# Patient Record
Sex: FEMALE | Race: ASIAN | NOT HISPANIC OR LATINO | Employment: OTHER | URBAN - METROPOLITAN AREA
[De-identification: names, ages, dates, MRNs, and addresses within clinical notes are randomized per-mention and may not be internally consistent; named-entity substitution may affect disease eponyms.]

---

## 2017-01-10 ENCOUNTER — TELEPHONE (OUTPATIENT)
Dept: FAMILY MEDICINE CLINIC | Facility: CLINIC | Age: 82
End: 2017-01-10

## 2017-01-10 DIAGNOSIS — R39.9 UTI SYMPTOMS: Primary | ICD-10-CM

## 2017-01-10 DIAGNOSIS — N39.0 URINARY TRACT INFECTION, SITE UNSPECIFIED: Primary | ICD-10-CM

## 2017-01-10 LAB
BILIRUB BLD-MCNC: NEGATIVE MG/DL
CLARITY, POC: ABNORMAL
COLOR UR: YELLOW
GLUCOSE UR STRIP-MCNC: NEGATIVE MG/DL
KETONES UR QL: NEGATIVE
LEUKOCYTE EST, POC: ABNORMAL
NITRITE UR-MCNC: NEGATIVE MG/ML
PH UR: 5.5 [PH] (ref 5–8)
PROT UR STRIP-MCNC: ABNORMAL MG/DL
RBC # UR STRIP: ABNORMAL /UL
SP GR UR: 1.03 (ref 1–1.03)
UROBILINOGEN UR QL: NORMAL

## 2017-01-10 PROCEDURE — 81003 URINALYSIS AUTO W/O SCOPE: CPT | Performed by: FAMILY MEDICINE

## 2017-01-10 RX ORDER — CIPROFLOXACIN 250 MG/1
250 TABLET, FILM COATED ORAL 2 TIMES DAILY
Qty: 14 TABLET | Refills: 0 | Status: SHIPPED | OUTPATIENT
Start: 2017-01-10 | End: 2017-06-22

## 2017-01-12 LAB
BACTERIA UR CULT: NO GROWTH
BACTERIA UR CULT: NORMAL

## 2017-06-22 ENCOUNTER — OFFICE VISIT (OUTPATIENT)
Dept: FAMILY MEDICINE CLINIC | Facility: CLINIC | Age: 82
End: 2017-06-22

## 2017-06-22 VITALS
BODY MASS INDEX: 22.16 KG/M2 | HEIGHT: 65 IN | DIASTOLIC BLOOD PRESSURE: 80 MMHG | SYSTOLIC BLOOD PRESSURE: 120 MMHG | HEART RATE: 66 BPM | OXYGEN SATURATION: 91 % | WEIGHT: 133 LBS

## 2017-06-22 DIAGNOSIS — K59.09 OTHER CONSTIPATION: ICD-10-CM

## 2017-06-22 DIAGNOSIS — R07.89 ATYPICAL CHEST PAIN: ICD-10-CM

## 2017-06-22 DIAGNOSIS — I10 BENIGN ESSENTIAL HYPERTENSION: Primary | ICD-10-CM

## 2017-06-22 DIAGNOSIS — E78.5 DYSLIPIDEMIA: ICD-10-CM

## 2017-06-22 DIAGNOSIS — M81.0 POST-MENOPAUSAL OSTEOPOROSIS: ICD-10-CM

## 2017-06-22 DIAGNOSIS — E11.9 CONTROLLED TYPE 2 DIABETES MELLITUS WITHOUT COMPLICATION, WITHOUT LONG-TERM CURRENT USE OF INSULIN (HCC): ICD-10-CM

## 2017-06-22 DIAGNOSIS — R10.32 LLQ ABDOMINAL PAIN: ICD-10-CM

## 2017-06-22 LAB
HCT VFR BLDA CALC: 38 %
HGB BLDA-MCNC: 12.3 G/DL
MCH, POC: 32.3
MCHC, POC: 32.3
MCV, POC: 100.1
PLATELET # BLD AUTO: 198 10*3/MM3
PMV BLD: 7.3 FL
RBC, POC: 3.8
RDW, POC: 13.4
WBC # BLD: 5.6 10*3/UL

## 2017-06-22 PROCEDURE — 36415 COLL VENOUS BLD VENIPUNCTURE: CPT | Performed by: FAMILY MEDICINE

## 2017-06-22 PROCEDURE — 85027 COMPLETE CBC AUTOMATED: CPT | Performed by: FAMILY MEDICINE

## 2017-06-22 PROCEDURE — 74000 XR ABDOMEN KUB: CPT | Performed by: FAMILY MEDICINE

## 2017-06-22 PROCEDURE — 93000 ELECTROCARDIOGRAM COMPLETE: CPT | Performed by: FAMILY MEDICINE

## 2017-06-22 PROCEDURE — 99214 OFFICE O/P EST MOD 30 MIN: CPT | Performed by: FAMILY MEDICINE

## 2017-06-22 RX ORDER — LISINOPRIL 10 MG/1
10 TABLET ORAL DAILY
Qty: 90 TABLET | Refills: 3 | Status: SHIPPED | OUTPATIENT
Start: 2017-06-22 | End: 2017-08-11 | Stop reason: HOSPADM

## 2017-06-22 RX ORDER — LOVASTATIN 40 MG/1
40 TABLET ORAL NIGHTLY
Qty: 90 TABLET | Refills: 3 | Status: SHIPPED | OUTPATIENT
Start: 2017-06-22

## 2017-06-22 RX ORDER — POLYETHYLENE GLYCOL 3350 17 G/17G
17 POWDER, FOR SOLUTION ORAL NIGHTLY
Qty: 30 PACKET | Refills: 12 | Status: SHIPPED | OUTPATIENT
Start: 2017-06-22

## 2017-06-22 RX ORDER — BUDESONIDE AND FORMOTEROL FUMARATE DIHYDRATE 80; 4.5 UG/1; UG/1
2 AEROSOL RESPIRATORY (INHALATION)
Qty: 3 INHALER | Refills: 2 | Status: SHIPPED | OUTPATIENT
Start: 2017-06-22

## 2017-06-22 NOTE — PROGRESS NOTES
Subjective   Magnus Hartley is a 88 y.o. female. Presents today for   Chief Complaint   Patient presents with   • Diabetes     pt here for 6 month med review and labs   • Constipation     pt has been having hard time with bowel mvmt. she has been taking milk of magnesium once per week.   • Dizziness     pt has been getting dizzy when she stands up too fast. I did orthostatics on her.       Chest Pain    This is a new (x1 only) problem. Episode onset: 2 months ago, has not recurred. The onset quality is sudden. Episode frequency: x1. The problem has been resolved. The pain is present in the substernal region. The pain is moderate. The quality of the pain is described as tightness. The pain does not radiate. Associated symptoms include abdominal pain (occly llq abd pain if moves certain position mild.) and dizziness. Pertinent negatives include no back pain, cough, diaphoresis, fever, leg pain, lower extremity edema, nausea, near-syncope, palpitations, PND, shortness of breath, syncope, vomiting or weakness. Associated symptoms comments: Lasted 30 seconds.. The pain is aggravated by nothing. She has tried nothing for the symptoms.   Her past medical history is significant for diabetes, hyperlipidemia and hypertension.   Pertinent negatives for past medical history include no CAD, no heart disease, no MI, no PE and no PVD. Prior workup: Did not seek care.   Dizziness   This is a new problem. The current episode started 1 to 4 weeks ago. The problem occurs intermittently. The problem has been unchanged. Associated symptoms include abdominal pain (occly llq abd pain if moves certain position mild.) and chest pain. Pertinent negatives include no coughing, diaphoresis, fever, nausea, vertigo, vomiting or weakness. Associated symptoms comments: +dizzy and light headed.  No falls, no syncope.. Exacerbated by: standing up quickly. Treatments tried: Takes time and helps. The treatment provided mild relief.   Diabetes   She  presents for her follow-up diabetic visit. She has type 2 diabetes mellitus. Her disease course has been stable. Hypoglycemia symptoms include dizziness. Associated symptoms include chest pain. Pertinent negatives for diabetes include no blurred vision, no foot paresthesias, no foot ulcerations and no weakness. Pertinent negatives for diabetic complications include no CVA, heart disease, nephropathy or PVD. Risk factors for coronary artery disease include diabetes mellitus, dyslipidemia, hypertension and post-menopausal. Current diabetic treatment includes diet. She is compliant with treatment all of the time. Her weight is stable. She is following a diabetic diet. She participates in exercise daily. An ACE inhibitor/angiotensin II receptor blocker is being taken. Eye exam is current.   Hyperlipidemia   This is a chronic problem. The current episode started more than 1 year ago. The problem is controlled. Recent lipid tests were reviewed and are normal. Exacerbating diseases include diabetes. There are no known factors aggravating her hyperlipidemia. Associated symptoms include chest pain. Pertinent negatives include no focal sensory loss, focal weakness, leg pain or shortness of breath. Current antihyperlipidemic treatment includes statins. The current treatment provides moderate improvement of lipids. There are no compliance problems.  Risk factors for coronary artery disease include dyslipidemia, diabetes mellitus, hypertension and post-menopausal.   Constipation   This is a chronic problem. The current episode started more than 1 month ago. The problem is unchanged. Her stool frequency is 1 time per day. The stool is described as formed. The patient is on a high fiber diet. She exercises regularly. There has been adequate water intake. Associated symptoms include abdominal pain (occly llq abd pain if moves certain position mild.). Pertinent negatives include no back pain, bloating, diarrhea, fever, hematochezia,  melena, nausea or vomiting. Risk factors: no changes. She has tried laxatives for the symptoms. The treatment provided no relief.   Hx of osteoporosis, due for prolia injection;    Denies falls.  Reports under a lot of stress of late as family want her to move in with them and not ready to give up home.    Review of Systems   Constitutional: Negative for diaphoresis and fever.   Eyes: Negative for blurred vision.   Respiratory: Negative for cough and shortness of breath.    Cardiovascular: Positive for chest pain. Negative for palpitations, syncope, PND and near-syncope.   Gastrointestinal: Positive for abdominal pain (occly llq abd pain if moves certain position mild.) and constipation. Negative for bloating, diarrhea, hematochezia, melena, nausea and vomiting.   Musculoskeletal: Negative for back pain.   Neurological: Positive for dizziness. Negative for vertigo, focal weakness and weakness.       The following portions of the patient's history were reviewed and updated as appropriate: allergies, current medications, past medical history and problem list.    Patient Active Problem List   Diagnosis   • Foot pain   • Asthma   • Benign essential hypertension   • Controlled type 2 diabetes mellitus without complication   • Dyslipidemia   • Macrocytosis without anemia   • Senile osteoporosis   • Post-menopausal osteoporosis   • Sinus bradycardia   • Stress incontinence in female   • Urge incontinence of urine   • Atrophic vaginitis   • Encounter for fitting and adjustment of other specified devices   • Incomplete emptying of bladder   • Urethral caruncle   • Incomplete uterovaginal prolapse       Allergies   Allergen Reactions   • Aspirin    • Atenolol    • Penicillins        Current Outpatient Prescriptions on File Prior to Visit   Medication Sig Dispense Refill   • prednisoLONE acetate (PRED FORTE) 1 % ophthalmic suspension instill 1 drop into both eyes twice a day for 2 weeks then STOP THIS DROP  0   • PREMARIN  "0.625 MG/GM vaginal cream Insert 0.5 g into the vagina daily.  0   • RESTASIS 0.05 % ophthalmic emulsion   0   • SYSTANE BALANCE 0.6 % solution instill 1 drop into both eyes four times a day  0   • [DISCONTINUED] budesonide-formoterol (SYMBICORT) 80-4.5 MCG/ACT inhaler Inhale 2 puffs 2 (two) times a day. 3 inhaler 2   • [DISCONTINUED] lisinopril (PRINIVIL,ZESTRIL) 10 MG tablet Take 1 tablet by mouth Daily. 90 tablet 3   • [DISCONTINUED] lovastatin (MEVACOR) 40 MG tablet Take 1 tablet by mouth Every Night. 90 tablet 3   • [DISCONTINUED] ciprofloxacin (CIPRO) 250 MG tablet Take 1 tablet by mouth 2 (Two) Times a Day. 14 tablet 0     No current facility-administered medications on file prior to visit.        Objective   Vitals:    06/22/17 1408 06/22/17 1412 06/22/17 1414   BP: 130/70 158/80 120/80   BP Location: Left arm Left arm Left arm   Patient Position: Lying Sitting Standing   Cuff Size: Adult Adult Adult   Pulse: 66 68 66   SpO2: 92% 95% 91%   Weight: 133 lb (60.3 kg)     Height: 64.5\" (163.8 cm)         Physical Exam   Constitutional: She appears well-developed and well-nourished.   HENT:   Head: Normocephalic and atraumatic.   Neck: Neck supple. No JVD present. No thyromegaly present.   Cardiovascular: Normal rate, regular rhythm and normal heart sounds.  Exam reveals no gallop and no friction rub.    No murmur heard.  Pulmonary/Chest: Effort normal and breath sounds normal. No respiratory distress. She has no wheezes. She has no rales.   Abdominal: Soft. Bowel sounds are normal. She exhibits no distension. There is no tenderness. There is no rebound and no guarding.   Musculoskeletal: She exhibits no edema.   Neurological: She is alert.   Skin: Skin is warm and dry.   Psychiatric: She has a normal mood and affect. Her behavior is normal.   Nursing note and vitals reviewed.    ECG 12 Lead - atypical chest pain  Date/Time: 6/22/2017 3:01 PM  Performed by: NEISHA LUCAS  Authorized by: NEISHA LUCAS"   Rhythm: sinus rhythm  Rate: normal  BPM: 61  Conduction: conduction normal  Conduction comments: QRS 98 ms  QTc 467 ms  ST Segments: ST segments normal  T flattening: III  QRS axis: normal  Clinical impression: normal ECG        XRAY: KUB  INDICATION:  Constipation, LLQ abd pain  Wet read:  NSBGP, right sided stool  No Comparative data  Imagine independently viewed by myself  Radiology reading pending.    POC CBC   Order: [id number]   Status:  Final result   Visible to patient:  No (Not Released) Dx:  LLQ abdominal pain      Ref Range & Units 3:36 PM     Hematocrit % 38.0   Hemoglobin g/dL 12.3   RBC  3.80   WBC  5.6   MCV  100.1   MCH  32.3   MCHC  32.3   RDW-CV  13.4   Platelets 10*3/mm3 198   MPV  7.3   Resulting Agency  [facility]      Specimen Collected: 06/22/17  3:36 PM Last Resulted: 06/22/17  3:36 PM                Assessment/Plan   Magnus was seen today for diabetes, constipation and dizziness.    Diagnoses and all orders for this visit:    Benign essential hypertension  -     Comprehensive Metabolic Panel  -     lisinopril (PRINIVIL,ZESTRIL) 10 MG tablet; Take 1 tablet by mouth Daily.    Controlled type 2 diabetes mellitus without complication, without long-term current use of insulin  -     Comprehensive Metabolic Panel  -     Lipid Panel  -     Hemoglobin A1c    Dyslipidemia  -     Comprehensive Metabolic Panel  -     Lipid Panel  -     lovastatin (MEVACOR) 40 MG tablet; Take 1 tablet by mouth Every Night.    LLQ abdominal pain  -     POC CBC  -     XR Abdomen KUB  -     polyethylene glycol (MIRALAX) packet; Take 17 g by mouth Every Night.    Other constipation  -     XR Abdomen KUB  -     polyethylene glycol (MIRALAX) packet; Take 17 g by mouth Every Night.    Atypical chest pain  -     ECG 12 Lead    Post-menopausal osteoporosis  -     Ambulatory Referral to [facility] For Infusion Treatment  -     denosumab (PROLIA) 60 MG/ML solution syringe; Inject 1 mL under the skin 1 (One) Time  for 1 dose.    Other orders  -     budesonide-formoterol (SYMBICORT) 80-4.5 MCG/ACT inhaler; Inhale 2 puffs 2 (Two) Times a Day.    -atypical cp, only 1 episode 2 months ago;  If recurs, go ER immediately if lasts more than few minutes.  If continues to let me know and will refer to Cardiology.  -llq abd pain very mild on deep palpation;  Has constipation;  WBC normal;  -gave fobt x1 to return;  If worsens, seek care immediately.  Will start daily fiber.  -osteoporosis, due for prolia will arrange  -HLD - continue statin, recheck lipids.  -dm2 - borderline, has been stable on just diet changes  -hypertension - controlled, continue medications;  Take time getting up, let sensation subside.  May need to cut back on BP medications;  Will have f/u in 3 mos rather than 6 to check on patient.  RTC if worsening.  No falls or syncope per patient.           -Follow up: 3 months       Current Outpatient Prescriptions:   •  budesonide-formoterol (SYMBICORT) 80-4.5 MCG/ACT inhaler, Inhale 2 puffs 2 (Two) Times a Day., Disp: 3 inhaler, Rfl: 2  •  lisinopril (PRINIVIL,ZESTRIL) 10 MG tablet, Take 1 tablet by mouth Daily., Disp: 90 tablet, Rfl: 3  •  lovastatin (MEVACOR) 40 MG tablet, Take 1 tablet by mouth Every Night., Disp: 90 tablet, Rfl: 3  •  prednisoLONE acetate (PRED FORTE) 1 % ophthalmic suspension, instill 1 drop into both eyes twice a day for 2 weeks then STOP THIS DROP, Disp: , Rfl: 0  •  PREMARIN 0.625 MG/GM vaginal cream, Insert 0.5 g into the vagina daily., Disp: , Rfl: 0  •  RESTASIS 0.05 % ophthalmic emulsion, , Disp: , Rfl: 0  •  SYSTANE BALANCE 0.6 % solution, instill 1 drop into both eyes four times a day, Disp: , Rfl: 0  •  denosumab (PROLIA) 60 MG/ML solution syringe, Inject 1 mL under the skin 1 (One) Time for 1 dose., Disp: 1 syringe, Rfl: 1  •  polyethylene glycol (MIRALAX) packet, Take 17 g by mouth Every Night., Disp: 30 packet, Rfl: 12

## 2017-06-23 LAB
ALBUMIN SERPL-MCNC: 3.9 G/DL (ref 3.5–4.7)
ALBUMIN/GLOB SERPL: 1.2 {RATIO} (ref 1.2–2.2)
ALP SERPL-CCNC: 62 IU/L (ref 39–117)
ALT SERPL-CCNC: 20 IU/L (ref 0–32)
AST SERPL-CCNC: 25 IU/L (ref 0–40)
BILIRUB SERPL-MCNC: 0.5 MG/DL (ref 0–1.2)
BUN SERPL-MCNC: 16 MG/DL (ref 8–27)
BUN/CREAT SERPL: 20 (ref 12–28)
CALCIUM SERPL-MCNC: 8.8 MG/DL (ref 8.7–10.3)
CHLORIDE SERPL-SCNC: 98 MMOL/L (ref 96–106)
CHOLEST SERPL-MCNC: 171 MG/DL (ref 100–199)
CO2 SERPL-SCNC: 27 MMOL/L (ref 18–29)
CREAT SERPL-MCNC: 0.81 MG/DL (ref 0.57–1)
GLOBULIN SER CALC-MCNC: 3.3 G/DL (ref 1.5–4.5)
GLUCOSE SERPL-MCNC: 105 MG/DL (ref 65–99)
HBA1C MFR BLD: 6.6 % (ref 4.8–5.6)
HDLC SERPL-MCNC: 73 MG/DL
LDLC SERPL CALC-MCNC: 77 MG/DL (ref 0–99)
POTASSIUM SERPL-SCNC: 4.4 MMOL/L (ref 3.5–5.2)
PROT SERPL-MCNC: 7.2 G/DL (ref 6–8.5)
SODIUM SERPL-SCNC: 140 MMOL/L (ref 134–144)
TRIGL SERPL-MCNC: 103 MG/DL (ref 0–149)
VLDLC SERPL CALC-MCNC: 21 MG/DL (ref 5–40)

## 2017-06-26 NOTE — PROGRESS NOTES
Diabetes is adequately controlled.  Cholesterol is adequately controlled.  Rest of labs normal or stable.

## 2017-07-03 ENCOUNTER — CLINICAL SUPPORT (OUTPATIENT)
Dept: FAMILY MEDICINE CLINIC | Facility: CLINIC | Age: 82
End: 2017-07-03

## 2017-07-03 DIAGNOSIS — K92.1 BLOOD IN STOOL: Primary | ICD-10-CM

## 2017-07-03 LAB
DEVELOPER EXPIRATION DATE: NORMAL
DEVELOPER LOT NUMBER: NORMAL
EXPIRATION DATE: NORMAL
FECAL OCCULT BLOOD SCREEN, POC: NEGATIVE
Lab: NORMAL
NEGATIVE CONTROL: NEGATIVE
POSITIVE CONTROL: POSITIVE

## 2017-07-03 PROCEDURE — 82270 OCCULT BLOOD FECES: CPT | Performed by: NURSE PRACTITIONER

## 2017-07-24 ENCOUNTER — APPOINTMENT (OUTPATIENT)
Dept: MRI IMAGING | Facility: HOSPITAL | Age: 82
End: 2017-07-24
Attending: INTERNAL MEDICINE

## 2017-07-24 ENCOUNTER — HOSPITAL ENCOUNTER (INPATIENT)
Facility: HOSPITAL | Age: 82
LOS: 4 days | End: 2017-07-28
Attending: EMERGENCY MEDICINE | Admitting: INTERNAL MEDICINE

## 2017-07-24 ENCOUNTER — APPOINTMENT (OUTPATIENT)
Dept: CT IMAGING | Facility: HOSPITAL | Age: 82
End: 2017-07-24

## 2017-07-24 DIAGNOSIS — R53.1 GENERALIZED WEAKNESS: ICD-10-CM

## 2017-07-24 DIAGNOSIS — I63.511 CEREBROVASCULAR ACCIDENT (CVA) DUE TO OCCLUSION OF RIGHT MIDDLE CEREBRAL ARTERY (HCC): Primary | ICD-10-CM

## 2017-07-24 DIAGNOSIS — I48.91 NEW ONSET ATRIAL FIBRILLATION (HCC): ICD-10-CM

## 2017-07-24 LAB
ABO GROUP BLD: NORMAL
ALBUMIN SERPL-MCNC: 3.9 G/DL (ref 3.5–5.2)
ALBUMIN/GLOB SERPL: 1 G/DL
ALP SERPL-CCNC: 66 U/L (ref 39–117)
ALT SERPL W P-5'-P-CCNC: 24 U/L (ref 1–33)
ANION GAP SERPL CALCULATED.3IONS-SCNC: 13.8 MMOL/L
ASA PLATELET INHIBITION: 604 ARU
AST SERPL-CCNC: 33 U/L (ref 1–32)
BASOPHILS # BLD AUTO: 0.01 10*3/MM3 (ref 0–0.2)
BASOPHILS NFR BLD AUTO: 0.1 % (ref 0–1.5)
BILIRUB SERPL-MCNC: 0.7 MG/DL (ref 0.1–1.2)
BLD GP AB SCN SERPL QL: NEGATIVE
BUN BLD-MCNC: 10 MG/DL (ref 8–23)
BUN/CREAT SERPL: 13.9 (ref 7–25)
CALCIUM SPEC-SCNC: 9 MG/DL (ref 8.6–10.5)
CHLORIDE SERPL-SCNC: 97 MMOL/L (ref 98–107)
CO2 SERPL-SCNC: 26.2 MMOL/L (ref 22–29)
CREAT BLD-MCNC: 0.72 MG/DL (ref 0.57–1)
CRP SERPL-MCNC: 3.44 MG/DL (ref 0–0.5)
DEPRECATED RDW RBC AUTO: 47.2 FL (ref 37–54)
EOSINOPHIL # BLD AUTO: 0.01 10*3/MM3 (ref 0–0.7)
EOSINOPHIL NFR BLD AUTO: 0.1 % (ref 0.3–6.2)
ERYTHROCYTE [DISTWIDTH] IN BLOOD BY AUTOMATED COUNT: 12.9 % (ref 11.7–13)
ERYTHROCYTE [SEDIMENTATION RATE] IN BLOOD: 37 MM/HR (ref 0–30)
GFR SERPL CREATININE-BSD FRML MDRD: 76 ML/MIN/1.73
GLOBULIN UR ELPH-MCNC: 3.9 GM/DL
GLUCOSE BLD-MCNC: 166 MG/DL (ref 65–99)
GLUCOSE BLDC GLUCOMTR-MCNC: 223 MG/DL (ref 70–130)
HCT VFR BLD AUTO: 40.2 % (ref 35.6–45.5)
HGB BLD-MCNC: 13.4 G/DL (ref 11.9–15.5)
IMM GRANULOCYTES # BLD: 0 10*3/MM3 (ref 0–0.03)
IMM GRANULOCYTES NFR BLD: 0 % (ref 0–0.5)
INR PPP: 1.09 (ref 0.9–1.1)
LYMPHOCYTES # BLD AUTO: 0.96 10*3/MM3 (ref 0.9–4.8)
LYMPHOCYTES NFR BLD AUTO: 10.6 % (ref 19.6–45.3)
MCH RBC QN AUTO: 33.3 PG (ref 26.9–32)
MCHC RBC AUTO-ENTMCNC: 33.3 G/DL (ref 32.4–36.3)
MCV RBC AUTO: 100 FL (ref 80.5–98.2)
MONOCYTES # BLD AUTO: 0.58 10*3/MM3 (ref 0.2–1.2)
MONOCYTES NFR BLD AUTO: 6.4 % (ref 5–12)
NEUTROPHILS # BLD AUTO: 7.53 10*3/MM3 (ref 1.9–8.1)
NEUTROPHILS NFR BLD AUTO: 82.8 % (ref 42.7–76)
NRBC BLD MANUAL-RTO: 0 /100 WBC (ref 0–0)
PLATELET # BLD AUTO: 190 10*3/MM3 (ref 140–500)
PMV BLD AUTO: 9.8 FL (ref 6–12)
POTASSIUM BLD-SCNC: 3.7 MMOL/L (ref 3.5–5.2)
PROT SERPL-MCNC: 7.8 G/DL (ref 6–8.5)
PROTHROMBIN TIME: 13.7 SECONDS (ref 11.7–14.2)
RBC # BLD AUTO: 4.02 10*6/MM3 (ref 3.9–5.2)
RH BLD: POSITIVE
SODIUM BLD-SCNC: 137 MMOL/L (ref 136–145)
TSH SERPL DL<=0.05 MIU/L-ACNC: 2.66 MIU/ML (ref 0.27–4.2)
WBC NRBC COR # BLD: 9.09 10*3/MM3 (ref 4.5–10.7)

## 2017-07-24 PROCEDURE — 63710000001 INSULIN ASPART PER 5 UNITS: Performed by: INTERNAL MEDICINE

## 2017-07-24 PROCEDURE — 70450 CT HEAD/BRAIN W/O DYE: CPT

## 2017-07-24 PROCEDURE — 70544 MR ANGIOGRAPHY HEAD W/O DYE: CPT

## 2017-07-24 PROCEDURE — 86900 BLOOD TYPING SEROLOGIC ABO: CPT | Performed by: EMERGENCY MEDICINE

## 2017-07-24 PROCEDURE — 99223 1ST HOSP IP/OBS HIGH 75: CPT | Performed by: RADIOLOGY

## 2017-07-24 PROCEDURE — 99285 EMERGENCY DEPT VISIT HI MDM: CPT

## 2017-07-24 PROCEDURE — 85576 BLOOD PLATELET AGGREGATION: CPT | Performed by: RADIOLOGY

## 2017-07-24 PROCEDURE — 97161 PT EVAL LOW COMPLEX 20 MIN: CPT

## 2017-07-24 PROCEDURE — 85652 RBC SED RATE AUTOMATED: CPT | Performed by: RADIOLOGY

## 2017-07-24 PROCEDURE — 94640 AIRWAY INHALATION TREATMENT: CPT

## 2017-07-24 PROCEDURE — 0 GADOBENATE DIMEGLUMINE 529 MG/ML SOLUTION: Performed by: INTERNAL MEDICINE

## 2017-07-24 PROCEDURE — 70553 MRI BRAIN STEM W/O & W/DYE: CPT

## 2017-07-24 PROCEDURE — A9577 INJ MULTIHANCE: HCPCS | Performed by: INTERNAL MEDICINE

## 2017-07-24 PROCEDURE — 80053 COMPREHEN METABOLIC PANEL: CPT | Performed by: EMERGENCY MEDICINE

## 2017-07-24 PROCEDURE — 86850 RBC ANTIBODY SCREEN: CPT | Performed by: EMERGENCY MEDICINE

## 2017-07-24 PROCEDURE — 86140 C-REACTIVE PROTEIN: CPT | Performed by: RADIOLOGY

## 2017-07-24 PROCEDURE — 85025 COMPLETE CBC W/AUTO DIFF WBC: CPT | Performed by: EMERGENCY MEDICINE

## 2017-07-24 PROCEDURE — 82962 GLUCOSE BLOOD TEST: CPT

## 2017-07-24 PROCEDURE — 82565 ASSAY OF CREATININE: CPT

## 2017-07-24 PROCEDURE — 70549 MR ANGIOGRAPH NECK W/O&W/DYE: CPT

## 2017-07-24 PROCEDURE — 84443 ASSAY THYROID STIM HORMONE: CPT | Performed by: INTERNAL MEDICINE

## 2017-07-24 PROCEDURE — 86901 BLOOD TYPING SEROLOGIC RH(D): CPT | Performed by: EMERGENCY MEDICINE

## 2017-07-24 PROCEDURE — 93005 ELECTROCARDIOGRAM TRACING: CPT | Performed by: EMERGENCY MEDICINE

## 2017-07-24 PROCEDURE — 99222 1ST HOSP IP/OBS MODERATE 55: CPT | Performed by: INTERNAL MEDICINE

## 2017-07-24 PROCEDURE — 93010 ELECTROCARDIOGRAM REPORT: CPT | Performed by: INTERNAL MEDICINE

## 2017-07-24 PROCEDURE — 92610 EVALUATE SWALLOWING FUNCTION: CPT | Performed by: SPEECH-LANGUAGE PATHOLOGIST

## 2017-07-24 PROCEDURE — 85610 PROTHROMBIN TIME: CPT | Performed by: EMERGENCY MEDICINE

## 2017-07-24 RX ORDER — DEXTROSE MONOHYDRATE 25 G/50ML
25 INJECTION, SOLUTION INTRAVENOUS
Status: DISCONTINUED | OUTPATIENT
Start: 2017-07-24 | End: 2017-07-28 | Stop reason: HOSPADM

## 2017-07-24 RX ORDER — ACETAMINOPHEN 325 MG/1
650 TABLET ORAL EVERY 4 HOURS PRN
Status: DISCONTINUED | OUTPATIENT
Start: 2017-07-24 | End: 2017-07-28 | Stop reason: HOSPADM

## 2017-07-24 RX ORDER — ACETAMINOPHEN 650 MG/1
650 SUPPOSITORY RECTAL EVERY 4 HOURS PRN
Status: DISCONTINUED | OUTPATIENT
Start: 2017-07-24 | End: 2017-07-28 | Stop reason: HOSPADM

## 2017-07-24 RX ORDER — SODIUM CHLORIDE 0.9 % (FLUSH) 0.9 %
10 SYRINGE (ML) INJECTION AS NEEDED
Status: DISCONTINUED | OUTPATIENT
Start: 2017-07-24 | End: 2017-07-28 | Stop reason: HOSPADM

## 2017-07-24 RX ORDER — CLOPIDOGREL BISULFATE 75 MG/1
75 TABLET ORAL DAILY
Status: DISCONTINUED | OUTPATIENT
Start: 2017-07-24 | End: 2017-07-26

## 2017-07-24 RX ORDER — PREDNISOLONE ACETATE 10 MG/ML
1 SUSPENSION/ DROPS OPHTHALMIC EVERY 12 HOURS SCHEDULED
Status: DISCONTINUED | OUTPATIENT
Start: 2017-07-24 | End: 2017-07-28 | Stop reason: HOSPADM

## 2017-07-24 RX ORDER — SODIUM CHLORIDE 0.9 % (FLUSH) 0.9 %
1-10 SYRINGE (ML) INJECTION AS NEEDED
Status: DISCONTINUED | OUTPATIENT
Start: 2017-07-24 | End: 2017-07-28 | Stop reason: HOSPADM

## 2017-07-24 RX ORDER — BUDESONIDE AND FORMOTEROL FUMARATE DIHYDRATE 80; 4.5 UG/1; UG/1
2 AEROSOL RESPIRATORY (INHALATION)
Status: DISCONTINUED | OUTPATIENT
Start: 2017-07-24 | End: 2017-07-28 | Stop reason: HOSPADM

## 2017-07-24 RX ORDER — NICOTINE POLACRILEX 4 MG
15 LOZENGE BUCCAL
Status: DISCONTINUED | OUTPATIENT
Start: 2017-07-24 | End: 2017-07-28 | Stop reason: HOSPADM

## 2017-07-24 RX ORDER — ATORVASTATIN CALCIUM 80 MG/1
80 TABLET, FILM COATED ORAL NIGHTLY
Status: DISCONTINUED | OUTPATIENT
Start: 2017-07-24 | End: 2017-07-28 | Stop reason: HOSPADM

## 2017-07-24 RX ORDER — SODIUM CHLORIDE 9 MG/ML
75 INJECTION, SOLUTION INTRAVENOUS CONTINUOUS
Status: DISCONTINUED | OUTPATIENT
Start: 2017-07-24 | End: 2017-07-28 | Stop reason: HOSPADM

## 2017-07-24 RX ADMIN — BUDESONIDE AND FORMOTEROL FUMARATE DIHYDRATE 2 PUFF: 80; 4.5 AEROSOL RESPIRATORY (INHALATION) at 19:50

## 2017-07-24 RX ADMIN — ATORVASTATIN CALCIUM 80 MG: 80 TABLET, FILM COATED ORAL at 21:21

## 2017-07-24 RX ADMIN — SODIUM CHLORIDE 75 ML/HR: 9 INJECTION, SOLUTION INTRAVENOUS at 11:28

## 2017-07-24 RX ADMIN — SODIUM CHLORIDE 1000 ML: 9 INJECTION, SOLUTION INTRAVENOUS at 10:19

## 2017-07-24 RX ADMIN — METOROPROLOL TARTRATE 2.5 MG: 5 INJECTION, SOLUTION INTRAVENOUS at 19:19

## 2017-07-24 RX ADMIN — GADOBENATE DIMEGLUMINE 12 ML: 529 INJECTION, SOLUTION INTRAVENOUS at 12:50

## 2017-07-24 RX ADMIN — PREDNISOLONE ACETATE 1 DROP: 10 SUSPENSION/ DROPS OPHTHALMIC at 21:21

## 2017-07-24 RX ADMIN — INSULIN ASPART 4 UNITS: 100 INJECTION, SOLUTION INTRAVENOUS; SUBCUTANEOUS at 21:30

## 2017-07-24 RX ADMIN — CLOPIDOGREL 75 MG: 75 TABLET, FILM COATED ORAL at 18:30

## 2017-07-24 RX ADMIN — SODIUM CHLORIDE 75 ML/HR: 9 INJECTION, SOLUTION INTRAVENOUS at 21:20

## 2017-07-25 ENCOUNTER — APPOINTMENT (OUTPATIENT)
Dept: CARDIOLOGY | Facility: HOSPITAL | Age: 82
End: 2017-07-25
Attending: INTERNAL MEDICINE

## 2017-07-25 LAB
BH CV ECHO MEAS - BSA(HAYCOCK): 1.6 M^2
BH CV ECHO MEAS - BSA: 1.6 M^2
BH CV ECHO MEAS - BZI_BMI: 24.5 KILOGRAMS/M^2
BH CV ECHO MEAS - BZI_METRIC_HEIGHT: 157.5 CM
BH CV ECHO MEAS - BZI_METRIC_WEIGHT: 60.8 KG
BH CV ECHO MEAS - TR MAX VEL: 266 CM/SEC
CHOLEST SERPL-MCNC: 154 MG/DL (ref 0–200)
GLUCOSE BLDC GLUCOMTR-MCNC: 105 MG/DL (ref 70–130)
GLUCOSE BLDC GLUCOMTR-MCNC: 106 MG/DL (ref 70–130)
GLUCOSE BLDC GLUCOMTR-MCNC: 158 MG/DL (ref 70–130)
HBA1C MFR BLD: 6.38 % (ref 4.8–5.6)
HDLC SERPL-MCNC: 71 MG/DL (ref 40–60)
LDLC SERPL CALC-MCNC: 73 MG/DL (ref 0–100)
LDLC/HDLC SERPL: 1.02 {RATIO}
TRIGL SERPL-MCNC: 52 MG/DL (ref 0–150)
VLDLC SERPL-MCNC: 10.4 MG/DL (ref 5–40)

## 2017-07-25 PROCEDURE — 83036 HEMOGLOBIN GLYCOSYLATED A1C: CPT | Performed by: INTERNAL MEDICINE

## 2017-07-25 PROCEDURE — 93325 DOPPLER ECHO COLOR FLOW MAPG: CPT

## 2017-07-25 PROCEDURE — 99152 MOD SED SAME PHYS/QHP 5/>YRS: CPT | Performed by: INTERNAL MEDICINE

## 2017-07-25 PROCEDURE — 25010000002 MIDAZOLAM PER 1 MG: Performed by: INTERNAL MEDICINE

## 2017-07-25 PROCEDURE — 93320 DOPPLER ECHO COMPLETE: CPT

## 2017-07-25 PROCEDURE — 63710000001 INSULIN ASPART PER 5 UNITS: Performed by: INTERNAL MEDICINE

## 2017-07-25 PROCEDURE — 99232 SBSQ HOSP IP/OBS MODERATE 35: CPT | Performed by: INTERNAL MEDICINE

## 2017-07-25 PROCEDURE — 93325 DOPPLER ECHO COLOR FLOW MAPG: CPT | Performed by: INTERNAL MEDICINE

## 2017-07-25 PROCEDURE — 94799 UNLISTED PULMONARY SVC/PX: CPT

## 2017-07-25 PROCEDURE — 99232 SBSQ HOSP IP/OBS MODERATE 35: CPT | Performed by: NURSE PRACTITIONER

## 2017-07-25 PROCEDURE — 97166 OT EVAL MOD COMPLEX 45 MIN: CPT | Performed by: OCCUPATIONAL THERAPIST

## 2017-07-25 PROCEDURE — 80061 LIPID PANEL: CPT | Performed by: INTERNAL MEDICINE

## 2017-07-25 PROCEDURE — 25010000002 FENTANYL CITRATE (PF) 100 MCG/2ML SOLUTION: Performed by: INTERNAL MEDICINE

## 2017-07-25 PROCEDURE — 93320 DOPPLER ECHO COMPLETE: CPT | Performed by: INTERNAL MEDICINE

## 2017-07-25 PROCEDURE — 97110 THERAPEUTIC EXERCISES: CPT

## 2017-07-25 PROCEDURE — 82962 GLUCOSE BLOOD TEST: CPT

## 2017-07-25 PROCEDURE — 93312 ECHO TRANSESOPHAGEAL: CPT | Performed by: INTERNAL MEDICINE

## 2017-07-25 PROCEDURE — 93312 ECHO TRANSESOPHAGEAL: CPT

## 2017-07-25 RX ORDER — FENTANYL CITRATE 50 UG/ML
INJECTION, SOLUTION INTRAMUSCULAR; INTRAVENOUS
Status: COMPLETED | OUTPATIENT
Start: 2017-07-25 | End: 2017-07-25

## 2017-07-25 RX ORDER — MIDAZOLAM HYDROCHLORIDE 1 MG/ML
INJECTION INTRAMUSCULAR; INTRAVENOUS
Status: COMPLETED | OUTPATIENT
Start: 2017-07-25 | End: 2017-07-25

## 2017-07-25 RX ORDER — SODIUM CHLORIDE 9 MG/ML
INJECTION, SOLUTION INTRAVENOUS
Status: COMPLETED | OUTPATIENT
Start: 2017-07-25 | End: 2017-07-25

## 2017-07-25 RX ADMIN — FENTANYL CITRATE 25 MCG: 50 INJECTION INTRAMUSCULAR; INTRAVENOUS at 12:22

## 2017-07-25 RX ADMIN — BENZOCAINE, BUTAMBEN, AND TETRACAINE HYDROCHLORIDE 1 SPRAY: .028; .004; .004 AEROSOL, SPRAY TOPICAL at 12:07

## 2017-07-25 RX ADMIN — METOROPROLOL TARTRATE 2.5 MG: 5 INJECTION, SOLUTION INTRAVENOUS at 22:14

## 2017-07-25 RX ADMIN — BUDESONIDE AND FORMOTEROL FUMARATE DIHYDRATE 2 PUFF: 80; 4.5 AEROSOL RESPIRATORY (INHALATION) at 07:53

## 2017-07-25 RX ADMIN — INSULIN ASPART 2 UNITS: 100 INJECTION, SOLUTION INTRAVENOUS; SUBCUTANEOUS at 18:24

## 2017-07-25 RX ADMIN — PREDNISOLONE ACETATE 1 DROP: 10 SUSPENSION/ DROPS OPHTHALMIC at 20:14

## 2017-07-25 RX ADMIN — METOROPROLOL TARTRATE 2.5 MG: 5 INJECTION, SOLUTION INTRAVENOUS at 20:05

## 2017-07-25 RX ADMIN — SODIUM CHLORIDE 75 ML/HR: 9 INJECTION, SOLUTION INTRAVENOUS at 13:30

## 2017-07-25 RX ADMIN — FENTANYL CITRATE 12.5 MCG: 50 INJECTION INTRAMUSCULAR; INTRAVENOUS at 12:26

## 2017-07-25 RX ADMIN — ATORVASTATIN CALCIUM 80 MG: 80 TABLET, FILM COATED ORAL at 20:16

## 2017-07-25 RX ADMIN — SODIUM CHLORIDE 50 ML/HR: 9 INJECTION, SOLUTION INTRAVENOUS at 12:02

## 2017-07-25 RX ADMIN — MIDAZOLAM HYDROCHLORIDE 1 MG: 1 INJECTION, SOLUTION INTRAMUSCULAR; INTRAVENOUS at 12:27

## 2017-07-25 RX ADMIN — APIXABAN 2.5 MG: 2.5 TABLET, FILM COATED ORAL at 23:40

## 2017-07-25 RX ADMIN — LIDOCAINE HYDROCHLORIDE 10 ML: 20 SOLUTION ORAL; TOPICAL at 12:02

## 2017-07-25 RX ADMIN — MIDAZOLAM HYDROCHLORIDE 2 MG: 1 INJECTION, SOLUTION INTRAMUSCULAR; INTRAVENOUS at 12:22

## 2017-07-25 RX ADMIN — BUDESONIDE AND FORMOTEROL FUMARATE DIHYDRATE 2 PUFF: 80; 4.5 AEROSOL RESPIRATORY (INHALATION) at 21:13

## 2017-07-25 RX ADMIN — CLOPIDOGREL 75 MG: 75 TABLET, FILM COATED ORAL at 08:33

## 2017-07-25 RX ADMIN — PREDNISOLONE ACETATE 1 DROP: 10 SUSPENSION/ DROPS OPHTHALMIC at 08:33

## 2017-07-25 RX ADMIN — MIDAZOLAM HYDROCHLORIDE 1 MG: 1 INJECTION, SOLUTION INTRAMUSCULAR; INTRAVENOUS at 12:25

## 2017-07-26 ENCOUNTER — APPOINTMENT (OUTPATIENT)
Dept: GENERAL RADIOLOGY | Facility: HOSPITAL | Age: 82
End: 2017-07-26

## 2017-07-26 LAB
GLUCOSE BLDC GLUCOMTR-MCNC: 118 MG/DL (ref 70–130)
GLUCOSE BLDC GLUCOMTR-MCNC: 136 MG/DL (ref 70–130)
GLUCOSE BLDC GLUCOMTR-MCNC: 149 MG/DL (ref 70–130)
GLUCOSE BLDC GLUCOMTR-MCNC: 98 MG/DL (ref 70–130)
INR PPP: 1.32 (ref 0.9–1.1)
PROTHROMBIN TIME: 15.9 SECONDS (ref 11.7–14.2)

## 2017-07-26 PROCEDURE — 92611 MOTION FLUOROSCOPY/SWALLOW: CPT

## 2017-07-26 PROCEDURE — 74230 X-RAY XM SWLNG FUNCJ C+: CPT

## 2017-07-26 PROCEDURE — 99231 SBSQ HOSP IP/OBS SF/LOW 25: CPT | Performed by: NURSE PRACTITIONER

## 2017-07-26 PROCEDURE — 97110 THERAPEUTIC EXERCISES: CPT

## 2017-07-26 PROCEDURE — 94799 UNLISTED PULMONARY SVC/PX: CPT

## 2017-07-26 PROCEDURE — 85610 PROTHROMBIN TIME: CPT | Performed by: INTERNAL MEDICINE

## 2017-07-26 PROCEDURE — 82962 GLUCOSE BLOOD TEST: CPT

## 2017-07-26 PROCEDURE — 97535 SELF CARE MNGMENT TRAINING: CPT | Performed by: OCCUPATIONAL THERAPIST

## 2017-07-26 PROCEDURE — 25010000002 ENOXAPARIN PER 10 MG: Performed by: INTERNAL MEDICINE

## 2017-07-26 RX ORDER — WARFARIN SODIUM 5 MG/1
5 TABLET ORAL
Status: DISCONTINUED | OUTPATIENT
Start: 2017-07-26 | End: 2017-07-28 | Stop reason: HOSPADM

## 2017-07-26 RX ORDER — BISACODYL 10 MG
10 SUPPOSITORY, RECTAL RECTAL DAILY PRN
Status: DISCONTINUED | OUTPATIENT
Start: 2017-07-26 | End: 2017-07-28 | Stop reason: HOSPADM

## 2017-07-26 RX ORDER — SENNA AND DOCUSATE SODIUM 50; 8.6 MG/1; MG/1
2 TABLET, FILM COATED ORAL NIGHTLY
Status: DISCONTINUED | OUTPATIENT
Start: 2017-07-26 | End: 2017-07-28 | Stop reason: HOSPADM

## 2017-07-26 RX ADMIN — CLOPIDOGREL 75 MG: 75 TABLET, FILM COATED ORAL at 09:05

## 2017-07-26 RX ADMIN — ACETAMINOPHEN 650 MG: 325 TABLET ORAL at 06:51

## 2017-07-26 RX ADMIN — BUDESONIDE AND FORMOTEROL FUMARATE DIHYDRATE 2 PUFF: 80; 4.5 AEROSOL RESPIRATORY (INHALATION) at 20:46

## 2017-07-26 RX ADMIN — WARFARIN SODIUM 5 MG: 5 TABLET ORAL at 17:44

## 2017-07-26 RX ADMIN — ACETAMINOPHEN 650 MG: 325 TABLET ORAL at 14:40

## 2017-07-26 RX ADMIN — ATORVASTATIN CALCIUM 80 MG: 80 TABLET, FILM COATED ORAL at 20:49

## 2017-07-26 RX ADMIN — DOCUSATE SODIUM -SENNOSIDES 2 TABLET: 50; 8.6 TABLET, COATED ORAL at 20:49

## 2017-07-26 RX ADMIN — PREDNISOLONE ACETATE 1 DROP: 10 SUSPENSION/ DROPS OPHTHALMIC at 09:05

## 2017-07-26 RX ADMIN — SODIUM CHLORIDE 75 ML/HR: 9 INJECTION, SOLUTION INTRAVENOUS at 04:30

## 2017-07-26 RX ADMIN — BUDESONIDE AND FORMOTEROL FUMARATE DIHYDRATE 2 PUFF: 80; 4.5 AEROSOL RESPIRATORY (INHALATION) at 07:20

## 2017-07-26 RX ADMIN — APIXABAN 2.5 MG: 2.5 TABLET, FILM COATED ORAL at 09:05

## 2017-07-26 RX ADMIN — ENOXAPARIN SODIUM 60 MG: 60 INJECTION SUBCUTANEOUS at 17:44

## 2017-07-26 RX ADMIN — PREDNISOLONE ACETATE 1 DROP: 10 SUSPENSION/ DROPS OPHTHALMIC at 20:49

## 2017-07-27 PROBLEM — Z79.01 WARFARIN ANTICOAGULATION: Status: ACTIVE | Noted: 2017-07-27

## 2017-07-27 LAB
DEPRECATED RDW RBC AUTO: 48.2 FL (ref 37–54)
ERYTHROCYTE [DISTWIDTH] IN BLOOD BY AUTOMATED COUNT: 12.9 % (ref 11.7–13)
GLUCOSE BLDC GLUCOMTR-MCNC: 122 MG/DL (ref 70–130)
GLUCOSE BLDC GLUCOMTR-MCNC: 127 MG/DL (ref 70–130)
GLUCOSE BLDC GLUCOMTR-MCNC: 131 MG/DL (ref 70–130)
GLUCOSE BLDC GLUCOMTR-MCNC: 169 MG/DL (ref 70–130)
HCT VFR BLD AUTO: 36.2 % (ref 35.6–45.5)
HGB BLD-MCNC: 12 G/DL (ref 11.9–15.5)
INR PPP: 1.25 (ref 0.9–1.1)
MCH RBC QN AUTO: 33.8 PG (ref 26.9–32)
MCHC RBC AUTO-ENTMCNC: 33.1 G/DL (ref 32.4–36.3)
MCV RBC AUTO: 102 FL (ref 80.5–98.2)
PLATELET # BLD AUTO: 161 10*3/MM3 (ref 140–500)
PMV BLD AUTO: 10.7 FL (ref 6–12)
PROTHROMBIN TIME: 15.2 SECONDS (ref 11.7–14.2)
RBC # BLD AUTO: 3.55 10*6/MM3 (ref 3.9–5.2)
WBC NRBC COR # BLD: 5.27 10*3/MM3 (ref 4.5–10.7)

## 2017-07-27 PROCEDURE — 94799 UNLISTED PULMONARY SVC/PX: CPT

## 2017-07-27 PROCEDURE — 97110 THERAPEUTIC EXERCISES: CPT

## 2017-07-27 PROCEDURE — 99233 SBSQ HOSP IP/OBS HIGH 50: CPT | Performed by: INTERNAL MEDICINE

## 2017-07-27 PROCEDURE — 82962 GLUCOSE BLOOD TEST: CPT

## 2017-07-27 PROCEDURE — 85027 COMPLETE CBC AUTOMATED: CPT | Performed by: HOSPITALIST

## 2017-07-27 PROCEDURE — 85610 PROTHROMBIN TIME: CPT | Performed by: INTERNAL MEDICINE

## 2017-07-27 PROCEDURE — 63710000001 INSULIN ASPART PER 5 UNITS: Performed by: INTERNAL MEDICINE

## 2017-07-27 PROCEDURE — 25010000002 ENOXAPARIN PER 10 MG: Performed by: INTERNAL MEDICINE

## 2017-07-27 PROCEDURE — 92526 ORAL FUNCTION THERAPY: CPT

## 2017-07-27 RX ORDER — DILTIAZEM HYDROCHLORIDE 60 MG/1
60 TABLET, FILM COATED ORAL EVERY 6 HOURS SCHEDULED
Status: DISCONTINUED | OUTPATIENT
Start: 2017-07-27 | End: 2017-07-28

## 2017-07-27 RX ADMIN — METOPROLOL TARTRATE 25 MG: 25 TABLET ORAL at 14:36

## 2017-07-27 RX ADMIN — ENOXAPARIN SODIUM 60 MG: 60 INJECTION SUBCUTANEOUS at 05:26

## 2017-07-27 RX ADMIN — PREDNISOLONE ACETATE 1 DROP: 10 SUSPENSION/ DROPS OPHTHALMIC at 23:51

## 2017-07-27 RX ADMIN — DILTIAZEM HYDROCHLORIDE 60 MG: 60 TABLET, FILM COATED ORAL at 12:38

## 2017-07-27 RX ADMIN — BUDESONIDE AND FORMOTEROL FUMARATE DIHYDRATE 2 PUFF: 80; 4.5 AEROSOL RESPIRATORY (INHALATION) at 21:05

## 2017-07-27 RX ADMIN — DILTIAZEM HYDROCHLORIDE 60 MG: 60 TABLET, FILM COATED ORAL at 17:06

## 2017-07-27 RX ADMIN — PREDNISOLONE ACETATE 1 DROP: 10 SUSPENSION/ DROPS OPHTHALMIC at 12:38

## 2017-07-27 RX ADMIN — METOPROLOL TARTRATE 25 MG: 25 TABLET ORAL at 20:39

## 2017-07-27 RX ADMIN — WARFARIN SODIUM 5 MG: 5 TABLET ORAL at 17:06

## 2017-07-27 RX ADMIN — INSULIN ASPART 2 UNITS: 100 INJECTION, SOLUTION INTRAVENOUS; SUBCUTANEOUS at 20:44

## 2017-07-27 RX ADMIN — ATORVASTATIN CALCIUM 80 MG: 80 TABLET, FILM COATED ORAL at 20:39

## 2017-07-27 RX ADMIN — DILTIAZEM HYDROCHLORIDE 60 MG: 60 TABLET, FILM COATED ORAL at 23:49

## 2017-07-27 RX ADMIN — ENOXAPARIN SODIUM 60 MG: 60 INJECTION SUBCUTANEOUS at 16:54

## 2017-07-27 RX ADMIN — BUDESONIDE AND FORMOTEROL FUMARATE DIHYDRATE 2 PUFF: 80; 4.5 AEROSOL RESPIRATORY (INHALATION) at 06:53

## 2017-07-27 RX ADMIN — SODIUM CHLORIDE 75 ML/HR: 9 INJECTION, SOLUTION INTRAVENOUS at 06:51

## 2017-07-27 RX ADMIN — SODIUM CHLORIDE 75 ML/HR: 9 INJECTION, SOLUTION INTRAVENOUS at 20:39

## 2017-07-27 RX ADMIN — DOCUSATE SODIUM -SENNOSIDES 2 TABLET: 50; 8.6 TABLET, COATED ORAL at 20:39

## 2017-07-28 ENCOUNTER — HOSPITAL ENCOUNTER (INPATIENT)
Facility: HOSPITAL | Age: 82
LOS: 14 days | Discharge: HOME | End: 2017-08-11
Attending: PHYSICAL MEDICINE & REHABILITATION | Admitting: PHYSICAL MEDICINE & REHABILITATION

## 2017-07-28 VITALS
OXYGEN SATURATION: 97 % | HEART RATE: 69 BPM | TEMPERATURE: 97.3 F | DIASTOLIC BLOOD PRESSURE: 85 MMHG | HEIGHT: 62 IN | WEIGHT: 134.7 LBS | RESPIRATION RATE: 18 BRPM | BODY MASS INDEX: 24.79 KG/M2 | SYSTOLIC BLOOD PRESSURE: 130 MMHG

## 2017-07-28 DIAGNOSIS — G81.94 LEFT HEMIPARESIS (HCC): Primary | ICD-10-CM

## 2017-07-28 DIAGNOSIS — R47.1 DYSARTHRIA: ICD-10-CM

## 2017-07-28 LAB
GLUCOSE BLDC GLUCOMTR-MCNC: 105 MG/DL (ref 70–130)
GLUCOSE BLDC GLUCOMTR-MCNC: 118 MG/DL (ref 70–130)
GLUCOSE BLDC GLUCOMTR-MCNC: 147 MG/DL (ref 70–130)
GLUCOSE BLDC GLUCOMTR-MCNC: 157 MG/DL (ref 70–130)
INR PPP: 1.59 (ref 0.9–1.1)
PROTHROMBIN TIME: 18.4 SECONDS (ref 11.7–14.2)

## 2017-07-28 PROCEDURE — 94799 UNLISTED PULMONARY SVC/PX: CPT

## 2017-07-28 PROCEDURE — 25010000002 ENOXAPARIN PER 10 MG: Performed by: INTERNAL MEDICINE

## 2017-07-28 PROCEDURE — 82962 GLUCOSE BLOOD TEST: CPT

## 2017-07-28 PROCEDURE — 94640 AIRWAY INHALATION TREATMENT: CPT

## 2017-07-28 PROCEDURE — 97110 THERAPEUTIC EXERCISES: CPT

## 2017-07-28 PROCEDURE — 25010000002 ENOXAPARIN PER 10 MG: Performed by: HOSPITALIST

## 2017-07-28 PROCEDURE — 85610 PROTHROMBIN TIME: CPT | Performed by: INTERNAL MEDICINE

## 2017-07-28 PROCEDURE — 63710000001 INSULIN ASPART PER 5 UNITS: Performed by: INTERNAL MEDICINE

## 2017-07-28 PROCEDURE — 99232 SBSQ HOSP IP/OBS MODERATE 35: CPT | Performed by: INTERNAL MEDICINE

## 2017-07-28 RX ORDER — DEXTROSE MONOHYDRATE 25 G/50ML
25 INJECTION, SOLUTION INTRAVENOUS
Status: CANCELLED | OUTPATIENT
Start: 2017-07-28

## 2017-07-28 RX ORDER — NICOTINE POLACRILEX 4 MG
15 LOZENGE BUCCAL
Status: CANCELLED | OUTPATIENT
Start: 2017-07-28

## 2017-07-28 RX ORDER — PREDNISOLONE ACETATE 10 MG/ML
1 SUSPENSION/ DROPS OPHTHALMIC EVERY 12 HOURS SCHEDULED
Status: DISCONTINUED | OUTPATIENT
Start: 2017-07-28 | End: 2017-08-11 | Stop reason: HOSPADM

## 2017-07-28 RX ORDER — ACETAMINOPHEN 650 MG/1
650 SUPPOSITORY RECTAL EVERY 4 HOURS PRN
Status: DISCONTINUED | OUTPATIENT
Start: 2017-07-28 | End: 2017-08-11 | Stop reason: HOSPADM

## 2017-07-28 RX ORDER — ACETAMINOPHEN 325 MG/1
650 TABLET ORAL EVERY 4 HOURS PRN
Status: DISCONTINUED | OUTPATIENT
Start: 2017-07-28 | End: 2017-08-11 | Stop reason: HOSPADM

## 2017-07-28 RX ORDER — ACETAMINOPHEN 650 MG/1
650 SUPPOSITORY RECTAL EVERY 4 HOURS PRN
Status: CANCELLED | OUTPATIENT
Start: 2017-07-28

## 2017-07-28 RX ORDER — ACETAMINOPHEN 325 MG/1
650 TABLET ORAL EVERY 4 HOURS PRN
Status: CANCELLED | OUTPATIENT
Start: 2017-07-28

## 2017-07-28 RX ORDER — WARFARIN SODIUM 5 MG/1
5 TABLET ORAL
Status: DISCONTINUED | OUTPATIENT
Start: 2017-07-28 | End: 2017-07-31

## 2017-07-28 RX ORDER — BISACODYL 10 MG
10 SUPPOSITORY, RECTAL RECTAL DAILY PRN
Status: CANCELLED | OUTPATIENT
Start: 2017-07-28

## 2017-07-28 RX ORDER — BUDESONIDE AND FORMOTEROL FUMARATE DIHYDRATE 80; 4.5 UG/1; UG/1
2 AEROSOL RESPIRATORY (INHALATION)
Status: DISCONTINUED | OUTPATIENT
Start: 2017-07-28 | End: 2017-08-11 | Stop reason: HOSPADM

## 2017-07-28 RX ORDER — NICOTINE POLACRILEX 4 MG
15 LOZENGE BUCCAL
Status: DISCONTINUED | OUTPATIENT
Start: 2017-07-28 | End: 2017-08-11 | Stop reason: HOSPADM

## 2017-07-28 RX ORDER — WARFARIN SODIUM 5 MG/1
5 TABLET ORAL
Status: CANCELLED | OUTPATIENT
Start: 2017-07-28

## 2017-07-28 RX ORDER — BISACODYL 10 MG
10 SUPPOSITORY, RECTAL RECTAL DAILY PRN
Status: DISCONTINUED | OUTPATIENT
Start: 2017-07-28 | End: 2017-08-11 | Stop reason: HOSPADM

## 2017-07-28 RX ORDER — SENNA AND DOCUSATE SODIUM 50; 8.6 MG/1; MG/1
2 TABLET, FILM COATED ORAL NIGHTLY
Status: DISCONTINUED | OUTPATIENT
Start: 2017-07-28 | End: 2017-08-11 | Stop reason: HOSPADM

## 2017-07-28 RX ORDER — ATORVASTATIN CALCIUM 80 MG/1
80 TABLET, FILM COATED ORAL NIGHTLY
Status: DISCONTINUED | OUTPATIENT
Start: 2017-07-28 | End: 2017-08-11 | Stop reason: HOSPADM

## 2017-07-28 RX ORDER — PREDNISOLONE ACETATE 10 MG/ML
1 SUSPENSION/ DROPS OPHTHALMIC EVERY 12 HOURS SCHEDULED
Status: CANCELLED | OUTPATIENT
Start: 2017-07-28

## 2017-07-28 RX ORDER — ONDANSETRON 4 MG/1
4 TABLET, FILM COATED ORAL EVERY 4 HOURS PRN
Status: DISCONTINUED | OUTPATIENT
Start: 2017-07-28 | End: 2017-08-11 | Stop reason: HOSPADM

## 2017-07-28 RX ORDER — SENNA AND DOCUSATE SODIUM 50; 8.6 MG/1; MG/1
2 TABLET, FILM COATED ORAL NIGHTLY
Status: CANCELLED | OUTPATIENT
Start: 2017-07-28

## 2017-07-28 RX ORDER — ATORVASTATIN CALCIUM 80 MG/1
80 TABLET, FILM COATED ORAL NIGHTLY
Status: CANCELLED | OUTPATIENT
Start: 2017-07-28

## 2017-07-28 RX ORDER — DEXTROSE MONOHYDRATE 25 G/50ML
25 INJECTION, SOLUTION INTRAVENOUS
Status: DISCONTINUED | OUTPATIENT
Start: 2017-07-28 | End: 2017-08-11 | Stop reason: HOSPADM

## 2017-07-28 RX ORDER — BUDESONIDE AND FORMOTEROL FUMARATE DIHYDRATE 80; 4.5 UG/1; UG/1
2 AEROSOL RESPIRATORY (INHALATION)
Status: CANCELLED | OUTPATIENT
Start: 2017-07-28

## 2017-07-28 RX ADMIN — DILTIAZEM HYDROCHLORIDE 60 MG: 60 TABLET, FILM COATED ORAL at 05:44

## 2017-07-28 RX ADMIN — METOPROLOL TARTRATE 25 MG: 25 TABLET ORAL at 09:47

## 2017-07-28 RX ADMIN — DOCUSATE SODIUM -SENNOSIDES 2 TABLET: 50; 8.6 TABLET, COATED ORAL at 20:52

## 2017-07-28 RX ADMIN — PREDNISOLONE ACETATE 1 DROP: 10 SUSPENSION/ DROPS OPHTHALMIC at 20:52

## 2017-07-28 RX ADMIN — METOPROLOL TARTRATE 25 MG: 25 TABLET ORAL at 20:52

## 2017-07-28 RX ADMIN — ATORVASTATIN CALCIUM 80 MG: 80 TABLET, FILM COATED ORAL at 20:52

## 2017-07-28 RX ADMIN — BUDESONIDE AND FORMOTEROL FUMARATE DIHYDRATE 2 PUFF: 80; 4.5 AEROSOL RESPIRATORY (INHALATION) at 20:31

## 2017-07-28 RX ADMIN — INSULIN ASPART 2 UNITS: 100 INJECTION, SOLUTION INTRAVENOUS; SUBCUTANEOUS at 13:09

## 2017-07-28 RX ADMIN — BUDESONIDE AND FORMOTEROL FUMARATE DIHYDRATE 2 PUFF: 80; 4.5 AEROSOL RESPIRATORY (INHALATION) at 08:06

## 2017-07-28 RX ADMIN — ENOXAPARIN SODIUM 60 MG: 60 INJECTION SUBCUTANEOUS at 18:22

## 2017-07-28 RX ADMIN — ENOXAPARIN SODIUM 60 MG: 60 INJECTION SUBCUTANEOUS at 05:44

## 2017-07-28 RX ADMIN — WARFARIN SODIUM 5 MG: 5 TABLET ORAL at 18:22

## 2017-07-28 RX ADMIN — PREDNISOLONE ACETATE 1 DROP: 10 SUSPENSION/ DROPS OPHTHALMIC at 09:47

## 2017-07-29 PROBLEM — I61.9 CVA (CEREBROVASCULAR ACCIDENT DUE TO INTRACEREBRAL HEMORRHAGE) (HCC): Status: ACTIVE | Noted: 2017-07-29

## 2017-07-29 LAB
CREAT BLDA-MCNC: 0.7 MG/DL (ref 0.6–1.3)
GLUCOSE BLDC GLUCOMTR-MCNC: 113 MG/DL (ref 70–130)
GLUCOSE BLDC GLUCOMTR-MCNC: 119 MG/DL (ref 70–130)
GLUCOSE BLDC GLUCOMTR-MCNC: 119 MG/DL (ref 70–130)
GLUCOSE BLDC GLUCOMTR-MCNC: 129 MG/DL (ref 70–130)
INR PPP: 1.77 (ref 0.9–1.1)
PROTHROMBIN TIME: 20 SECONDS (ref 11.7–14.2)

## 2017-07-29 PROCEDURE — 94799 UNLISTED PULMONARY SVC/PX: CPT

## 2017-07-29 PROCEDURE — 97165 OT EVAL LOW COMPLEX 30 MIN: CPT

## 2017-07-29 PROCEDURE — 92523 SPEECH SOUND LANG COMPREHEN: CPT | Performed by: SPEECH-LANGUAGE PATHOLOGIST

## 2017-07-29 PROCEDURE — 97162 PT EVAL MOD COMPLEX 30 MIN: CPT

## 2017-07-29 PROCEDURE — 85610 PROTHROMBIN TIME: CPT | Performed by: HOSPITALIST

## 2017-07-29 PROCEDURE — 97535 SELF CARE MNGMENT TRAINING: CPT

## 2017-07-29 PROCEDURE — 97110 THERAPEUTIC EXERCISES: CPT

## 2017-07-29 PROCEDURE — 82962 GLUCOSE BLOOD TEST: CPT

## 2017-07-29 PROCEDURE — 25010000002 ENOXAPARIN PER 10 MG: Performed by: HOSPITALIST

## 2017-07-29 RX ADMIN — DOCUSATE SODIUM -SENNOSIDES 2 TABLET: 50; 8.6 TABLET, COATED ORAL at 20:03

## 2017-07-29 RX ADMIN — PREDNISOLONE ACETATE 1 DROP: 10 SUSPENSION/ DROPS OPHTHALMIC at 20:03

## 2017-07-29 RX ADMIN — ATORVASTATIN CALCIUM 80 MG: 80 TABLET, FILM COATED ORAL at 20:03

## 2017-07-29 RX ADMIN — METOPROLOL TARTRATE 25 MG: 25 TABLET ORAL at 08:40

## 2017-07-29 RX ADMIN — ENOXAPARIN SODIUM 60 MG: 60 INJECTION SUBCUTANEOUS at 06:12

## 2017-07-29 RX ADMIN — PREDNISOLONE ACETATE 1 DROP: 10 SUSPENSION/ DROPS OPHTHALMIC at 08:40

## 2017-07-29 RX ADMIN — METOPROLOL TARTRATE 25 MG: 25 TABLET ORAL at 20:03

## 2017-07-29 RX ADMIN — ENOXAPARIN SODIUM 60 MG: 60 INJECTION SUBCUTANEOUS at 17:34

## 2017-07-29 RX ADMIN — WARFARIN SODIUM 5 MG: 5 TABLET ORAL at 17:34

## 2017-07-29 RX ADMIN — BUDESONIDE AND FORMOTEROL FUMARATE DIHYDRATE 2 PUFF: 80; 4.5 AEROSOL RESPIRATORY (INHALATION) at 19:36

## 2017-07-29 RX ADMIN — BUDESONIDE AND FORMOTEROL FUMARATE DIHYDRATE 2 PUFF: 80; 4.5 AEROSOL RESPIRATORY (INHALATION) at 07:43

## 2017-07-30 LAB
CREAT BLD-MCNC: 0.49 MG/DL (ref 0.57–1)
GFR SERPL CREATININE-BSD FRML MDRD: 119 ML/MIN/1.73
GLUCOSE BLDC GLUCOMTR-MCNC: 130 MG/DL (ref 70–130)
GLUCOSE BLDC GLUCOMTR-MCNC: 131 MG/DL (ref 70–130)
GLUCOSE BLDC GLUCOMTR-MCNC: 140 MG/DL (ref 70–130)
GLUCOSE BLDC GLUCOMTR-MCNC: 97 MG/DL (ref 70–130)
INR PPP: 2.23 (ref 0.9–1.1)
PLATELET # BLD AUTO: 190 10*3/MM3 (ref 140–500)
PROTHROMBIN TIME: 24 SECONDS (ref 11.7–14.2)

## 2017-07-30 PROCEDURE — 25010000002 ENOXAPARIN PER 10 MG: Performed by: HOSPITALIST

## 2017-07-30 PROCEDURE — 82565 ASSAY OF CREATININE: CPT | Performed by: HOSPITALIST

## 2017-07-30 PROCEDURE — 82962 GLUCOSE BLOOD TEST: CPT

## 2017-07-30 PROCEDURE — 94799 UNLISTED PULMONARY SVC/PX: CPT

## 2017-07-30 PROCEDURE — 85049 AUTOMATED PLATELET COUNT: CPT | Performed by: HOSPITALIST

## 2017-07-30 PROCEDURE — 85610 PROTHROMBIN TIME: CPT | Performed by: HOSPITALIST

## 2017-07-30 RX ADMIN — PREDNISOLONE ACETATE 1 DROP: 10 SUSPENSION/ DROPS OPHTHALMIC at 20:37

## 2017-07-30 RX ADMIN — BUDESONIDE AND FORMOTEROL FUMARATE DIHYDRATE 2 PUFF: 80; 4.5 AEROSOL RESPIRATORY (INHALATION) at 20:02

## 2017-07-30 RX ADMIN — ENOXAPARIN SODIUM 60 MG: 60 INJECTION SUBCUTANEOUS at 05:29

## 2017-07-30 RX ADMIN — METOPROLOL TARTRATE 25 MG: 25 TABLET ORAL at 20:34

## 2017-07-30 RX ADMIN — WARFARIN SODIUM 5 MG: 5 TABLET ORAL at 17:57

## 2017-07-30 RX ADMIN — PREDNISOLONE ACETATE 1 DROP: 10 SUSPENSION/ DROPS OPHTHALMIC at 08:28

## 2017-07-30 RX ADMIN — METOPROLOL TARTRATE 25 MG: 25 TABLET ORAL at 08:28

## 2017-07-30 RX ADMIN — BUDESONIDE AND FORMOTEROL FUMARATE DIHYDRATE 2 PUFF: 80; 4.5 AEROSOL RESPIRATORY (INHALATION) at 08:22

## 2017-07-30 RX ADMIN — DOCUSATE SODIUM -SENNOSIDES 2 TABLET: 50; 8.6 TABLET, COATED ORAL at 20:34

## 2017-07-30 RX ADMIN — ATORVASTATIN CALCIUM 80 MG: 80 TABLET, FILM COATED ORAL at 20:34

## 2017-07-31 LAB
GLUCOSE BLDC GLUCOMTR-MCNC: 107 MG/DL (ref 70–130)
GLUCOSE BLDC GLUCOMTR-MCNC: 129 MG/DL (ref 70–130)
GLUCOSE BLDC GLUCOMTR-MCNC: 172 MG/DL (ref 70–130)
INR PPP: 2.76 (ref 0.9–1.1)
PROTHROMBIN TIME: 28.3 SECONDS (ref 11.7–14.2)

## 2017-07-31 PROCEDURE — 85610 PROTHROMBIN TIME: CPT | Performed by: HOSPITALIST

## 2017-07-31 PROCEDURE — 97110 THERAPEUTIC EXERCISES: CPT

## 2017-07-31 PROCEDURE — 63710000001 INSULIN ASPART PER 5 UNITS: Performed by: HOSPITALIST

## 2017-07-31 PROCEDURE — 97532: CPT

## 2017-07-31 PROCEDURE — 94799 UNLISTED PULMONARY SVC/PX: CPT

## 2017-07-31 PROCEDURE — 82962 GLUCOSE BLOOD TEST: CPT

## 2017-07-31 PROCEDURE — 97112 NEUROMUSCULAR REEDUCATION: CPT

## 2017-07-31 RX ORDER — WARFARIN SODIUM 2 MG/1
2 TABLET ORAL
Status: DISCONTINUED | OUTPATIENT
Start: 2017-07-31 | End: 2017-08-01

## 2017-07-31 RX ADMIN — INSULIN ASPART 2 UNITS: 100 INJECTION, SOLUTION INTRAVENOUS; SUBCUTANEOUS at 11:52

## 2017-07-31 RX ADMIN — WARFARIN SODIUM 2 MG: 2 TABLET ORAL at 17:34

## 2017-07-31 RX ADMIN — ATORVASTATIN CALCIUM 80 MG: 80 TABLET, FILM COATED ORAL at 21:24

## 2017-07-31 RX ADMIN — METOPROLOL TARTRATE 25 MG: 25 TABLET ORAL at 21:24

## 2017-07-31 RX ADMIN — DOCUSATE SODIUM -SENNOSIDES 2 TABLET: 50; 8.6 TABLET, COATED ORAL at 21:24

## 2017-07-31 RX ADMIN — BUDESONIDE AND FORMOTEROL FUMARATE DIHYDRATE 2 PUFF: 80; 4.5 AEROSOL RESPIRATORY (INHALATION) at 06:44

## 2017-07-31 RX ADMIN — PREDNISOLONE ACETATE 1 DROP: 10 SUSPENSION/ DROPS OPHTHALMIC at 08:37

## 2017-07-31 RX ADMIN — BUDESONIDE AND FORMOTEROL FUMARATE DIHYDRATE 2 PUFF: 80; 4.5 AEROSOL RESPIRATORY (INHALATION) at 19:05

## 2017-07-31 RX ADMIN — PREDNISOLONE ACETATE 1 DROP: 10 SUSPENSION/ DROPS OPHTHALMIC at 21:25

## 2017-07-31 RX ADMIN — METOPROLOL TARTRATE 25 MG: 25 TABLET ORAL at 08:37

## 2017-08-01 LAB
GLUCOSE BLDC GLUCOMTR-MCNC: 118 MG/DL (ref 70–130)
GLUCOSE BLDC GLUCOMTR-MCNC: 96 MG/DL (ref 70–130)
INR PPP: 2.63 (ref 0.9–1.1)
PROTHROMBIN TIME: 27.3 SECONDS (ref 11.7–14.2)

## 2017-08-01 PROCEDURE — 94799 UNLISTED PULMONARY SVC/PX: CPT

## 2017-08-01 PROCEDURE — 85610 PROTHROMBIN TIME: CPT | Performed by: HOSPITALIST

## 2017-08-01 PROCEDURE — 97535 SELF CARE MNGMENT TRAINING: CPT

## 2017-08-01 PROCEDURE — 82962 GLUCOSE BLOOD TEST: CPT

## 2017-08-01 PROCEDURE — 97110 THERAPEUTIC EXERCISES: CPT

## 2017-08-01 PROCEDURE — 97532: CPT

## 2017-08-01 PROCEDURE — 95992 CANALITH REPOSITIONING PROC: CPT

## 2017-08-01 PROCEDURE — 97112 NEUROMUSCULAR REEDUCATION: CPT

## 2017-08-01 RX ORDER — WARFARIN SODIUM 4 MG/1
4 TABLET ORAL
Status: DISCONTINUED | OUTPATIENT
Start: 2017-08-01 | End: 2017-08-02

## 2017-08-01 RX ORDER — VALACYCLOVIR HYDROCHLORIDE 500 MG/1
2000 TABLET, FILM COATED ORAL EVERY 12 HOURS SCHEDULED
Status: COMPLETED | OUTPATIENT
Start: 2017-08-01 | End: 2017-08-01

## 2017-08-01 RX ADMIN — PREDNISOLONE ACETATE 1 DROP: 10 SUSPENSION/ DROPS OPHTHALMIC at 08:39

## 2017-08-01 RX ADMIN — VALACYCLOVIR 2000 MG: 500 TABLET, FILM COATED ORAL at 21:06

## 2017-08-01 RX ADMIN — DOCUSATE SODIUM -SENNOSIDES 2 TABLET: 50; 8.6 TABLET, COATED ORAL at 21:06

## 2017-08-01 RX ADMIN — ATORVASTATIN CALCIUM 80 MG: 80 TABLET, FILM COATED ORAL at 21:06

## 2017-08-01 RX ADMIN — METOPROLOL TARTRATE 25 MG: 25 TABLET ORAL at 08:39

## 2017-08-01 RX ADMIN — METOPROLOL TARTRATE 25 MG: 25 TABLET ORAL at 21:06

## 2017-08-01 RX ADMIN — VALACYCLOVIR 2000 MG: 500 TABLET, FILM COATED ORAL at 10:52

## 2017-08-01 RX ADMIN — BUDESONIDE AND FORMOTEROL FUMARATE DIHYDRATE 2 PUFF: 80; 4.5 AEROSOL RESPIRATORY (INHALATION) at 10:36

## 2017-08-01 RX ADMIN — PREDNISOLONE ACETATE 1 DROP: 10 SUSPENSION/ DROPS OPHTHALMIC at 21:07

## 2017-08-01 RX ADMIN — WARFARIN SODIUM 4 MG: 4 TABLET ORAL at 17:24

## 2017-08-01 RX ADMIN — BUDESONIDE AND FORMOTEROL FUMARATE DIHYDRATE 2 PUFF: 80; 4.5 AEROSOL RESPIRATORY (INHALATION) at 19:01

## 2017-08-01 NOTE — PLAN OF CARE
Problem: Stroke (Ischemic) (Adult)  Goal: Signs and Symptoms of Listed Potential Problems Will be Absent or Manageable (Stroke)  Outcome: Ongoing (interventions implemented as appropriate)    08/01/17 0220   Stroke (Ischemic)   Problems Assessed (Stroke (Ischemic)/TIA) all   Problems Present (Stroke (Ischemic)/TIA) motor/sensory impairment         Problem: Fall Risk (Adult)  Goal: Absence of Falls  Outcome: Ongoing (interventions implemented as appropriate)    08/01/17 0220   Fall Risk (Adult)   Absence of Falls making progress toward outcome         Problem: Patient Care Overview (Adult)  Goal: Plan of Care Review  Outcome: Ongoing (interventions implemented as appropriate)    08/01/17 0220   Coping/Psychosocial Response Interventions   Plan Of Care Reviewed With patient   Patient Care Overview   Progress improving   Outcome Evaluation   Outcome Summary/Follow up Plan Patient calm, cooperative, and pleasant. The patient states that she likes Dr. Paul and that he has a wonderful bedside. Ambulates with walker CGA of 1. No unsafe behaviors, no c/o pain.

## 2017-08-01 NOTE — CONSULTS
Adult Nutrition  Assessment/PES    Patient Name:  Magnus Hartley  YOB: 1928  MRN: [id number]  Admit Date:  7/28/2017    Assessment Date:  8/1/2017     Comments: Diet education for coumadin/Vit K interaction complete.           Reason for Assessment       08/01/17 1137    Reason for Assessment    Reason For Assessment/Visit education;physician consult              Nutrition/Diet History       08/01/17 1138    Nutrition/Diet History    Typical Food/Fluid Intake pt indicates she loves & eats a lot of deep leafy green vegetables at home.     Food Preferences Hot water q meal - she has her own green tea bags        Problem/Interventions:          Problem 2       08/01/17 1139    Nutrition Diagnoses Problem 2    Problem 2 Knowledge Deficit    Etiology (related to) Medication Interaction    Medication(s) Anticoagulant                  Intervention Goal       08/01/17 1139    Intervention Goal    General Provide information regarding MNT for treatment/condition;Disease management/therapy                Education/Evaluation       08/01/17 1139    Education    Education Provided education regarding;Education topics;Advised regarding habits/behavior    Provided education regarding Diet rationale;Key food habit change    Education Topics Vitamin K    Advised Regarding Habits/Behavior Food choices;Eating pattern;Appropriate portions;Other (comment)   written info provided    Monitor/Evaluation    Monitor Per protocol    Education Follow-up Reinforce PRN              Electronically signed by:  Tashia Farah RD  08/01/17 11:40 AM

## 2017-08-01 NOTE — PROGRESS NOTES
"   LOS: 4 days   Patient Care Team:  JOSHUA Chaudhari as PCP - General  Mario Mata MD as PCP - Claims Attributed  Hortencia Lisa MD as Consulting Physician (Cardiology)  Pierre Walker MD as Consulting Physician (Gastroenterology)    Chief Complaint: R MCA ischemic infarct    Subjective     History of Present Illness    Subjective    Strength stable.   Has fever blister - started 2 days ago.  History taken from: patient    Objective     Vital Signs  Temp:  [97.9 °F (36.6 °C)-98.9 °F (37.2 °C)] 98.2 °F (36.8 °C)  Heart Rate:  [] 88  Resp:  [18] 18  BP: (125-154)/(80-90) 154/80    Objective:  Vital signs: (most recent): Blood pressure 154/80, pulse 88, temperature 98.2 °F (36.8 °C), temperature source Oral, resp. rate 18, height 64\" (162.6 cm), weight 141 lb 11.2 oz (64.3 kg), SpO2 97 %.            MENTAL STATUS- A/A  HEENT- VESICLE LATERAL LEFT LIP AND SMALL AREA LATERAL RIGHT LIP  LUNGS-CTA  HEART-IRREG  ABD-NABS,SOFT,NT  EXT- NO EDEMA.  NEURO - TAKES RESISTANCE BUE AND BLE.     Results Review:     I reviewed the patient's new clinical results.    Lab Results  Lab Value Date/Time   INR 2.63 (H) 08/01/2017 0658   INR 2.76 (H) 07/31/2017 0609   INR 2.23 (H) 07/30/2017 0645   INR 1.77 (H) 07/29/2017 0541   INR 1.59 (H) 07/28/2017 0512   INR 1.25 (H) 07/27/2017 0515   INR 1.32 (H) 07/26/2017 1411   INR 1.09 07/24/2017 0954       Results from last 7 days  Lab Units 07/30/17  0645 07/27/17  0514   WBC 10*3/mm3  --  5.27   HEMOGLOBIN g/dL  --  12.0   HEMATOCRIT %  --  36.2   PLATELETS 10*3/mm3 190 161         Results from last 7 days  Lab Units 07/30/17  0645   CREATININE mg/dL 0.49*       Medication Review: done    atorvastatin 80 mg Oral Nightly   budesonide-formoterol 2 puff Inhalation BID - RT   insulin aspart 0-9 Units Subcutaneous BID AC   metoprolol tartrate 25 mg Oral Q12H   prednisoLONE acetate 1 drop Both Eyes Q12H   sennosides-docusate sodium 2 tablet Oral Nightly   warfarin 4 mg Oral Daily "         Assessment/Plan     Active Problems:    Benign essential hypertension    Controlled type 2 diabetes mellitus without complication    Cerebrovascular accident (CVA) due to occlusion of right middle cerebral artery    Atrial fibrillation    Warfarin anticoagulation (goal INR 2-3)    CVA (cerebrovascular accident due to intracerebral hemorrhage)      Assessment & Plan  Cerebrovascular accident (CVA) due to occlusion of right middle cerebral artery  Benign essential hypertension  Controlled type 2 diabetes mellitus without complication  Atrial fibrillation  Warfarin anticoagulation (goal INR 2-3)- INR trending up on 5 mg. Change to 2 mg on July 31 and re-assess dose on August 1. August 1 - change to 4 mg daily.  Fever blister- Valtrex ordered 2 gm q 12 hours for two doses (clarified dosing for herpes labialis with pharmacy for her age and renal function- can give standard dose).     Continue rehabilitation efforts.  Heraclio Paul MD  08/01/17  9:06 AM    Time:

## 2017-08-01 NOTE — PROGRESS NOTES
Inpatient Rehabilitation Functional Measures Assessment and Plan of Care    Plan of Care  Updated Problems/Interventions  Mobility    [PT] Bed/Chair/Wheelchair(Active)  Current Status(08/01/2017): Min/CGA  Weekly Goal(08/11/2017): CGA  Discharge Goal: SBA    [PT] Bed Mobility(Active)  Current Status(08/01/2017): CGA  Weekly Goal(08/11/2017): Indep  Discharge Goal: Indep    [PT] Walk(Active)  Current Status(08/01/2017): 2000 Rwx CGA  Weekly Goal(08/11/2017): BR Rwx SBA  Discharge Goal: 200 ft AAD SBA    [PT] Stairs(Active)  Current Status(08/01/2017): 4 HR Min/CGA  Weekly Goal(08/11/2017): PT only  Discharge Goal: 4 HR CGA    [PT] Car Transfers(Active)  Current Status(08/01/2017): CGA, rwx  Weekly Goal(08/11/2017): PT only  Discharge Goal: Car tsf CGA, AAD    Functional Measures  BETH Eating:  Branch  BETH Grooming: Branch  BETH Bathing:  Branch  BETH Upper Body Dressing:  Branch  BETH Lower Body Dressing:  Branch  [facility] Toileting:  Branch    [facility] Bladder Management  Level of Assistance:  Branch  Frequency/Number of Accidents this Shift:  Branch    BETH Bowel Management  Level of Assistance: Branch  Frequency/Number of Accidents this Shift: Branch    BETH Bed/Chair/Wheelchair Transfer:  Bed/chair/wheelchair Transfer Score = 4.  Patient performs 75% or more of effort and minimal assistance (little/incidental  help/lifting of one limb/steadying) for transferring to and from the  bed/chair/wheelchair, requiring: Patient requires the following assistive  device(s): Walker.  BETH Toilet Transfer:  Branch  BETH Tub/Shower Transfer:  Branch    Previously Documented Mode of Locomotion at Discharge: Field  BETH Expected Mode of Locomotion at Discharge: Branch  [facility] Walk/Wheelchair:  WHEELCHAIR OBSERVATION   Activity was not observed.    WALK OBSERVATION   Walk Distance Scale = 3.  Distance walked is greater than 150 feet. Walk Score  = 4.  Patient performs 75% or more of effort and requires minimal assistance.  Incidental help/contact  guard/steadying was provided. Patient walked a distance  of  200 feet. Patient requires the following assistive device(s): Rolling  walker.  BETH Stairs:  Stairs Score = 2.  Incidental assistance with lifting or lowering,  contact guard or steadying was provided. Patient performs 75% or more of effort  and requires minimal contact assistance. Patient negotiated  4 stairs. Patient  requires the following assistive device(s): Handrail(s).    BETH Comprehension:  Branch  BETH Expression:  Branch  BETH Social Interaction:  Branch  BETH Problem Solving:  Branch  BETH Memory:  Branch    Therapy Mode Minutes  Occupational Therapy: Branch  Physical Therapy: Branch  Speech Language Pathology:  Branch    Signed by: Angelica Treadwell PTA

## 2017-08-01 NOTE — PROGRESS NOTES
Occupational Therapy: Individual: 60 minutes.    Physical Therapy: Branch    Speech Language Pathology:  Branch    Signed by: Karen Allan OT Student     - CoSigned By: Adelia Salamanca OT 8/1/2017 3:47:46 PM

## 2017-08-01 NOTE — PROGRESS NOTES
Inpatient Rehabilitation Functional Measures Assessment    Functional Measures  BETH Eating:  Branch  [facility] Grooming: Branch  BETH Bathing:  Branch  [facility] Upper Body Dressing:  Branch  [facility] Lower Body Dressing:  Branch  [facility] Toileting:  Toileting Score = 5.  Patient is supervision/set-up for  toileting, requiring: Stand by assistance. Setting out toileting equipment.  Verbal cuing, prompting, or instructing. Patient requires the following  assistive device(s): Grab bar.    BETH Bladder Management  Level of Assistance:  Bladder Score = 5.  Patient is supervision/set-up for  bladder management, requiring: Setting out equipment. Stand by assistance. No  assistive devices were required.  Frequency/Number of Accidents this Shift:  Bladder accidents this shift:  0 .  Patient has not had an accident, but used a device/medication this shift  requiring: Pad .    BETH Bowel Management  Level of Assistance: Bowel Score = 5.  Patient is supervision/set-up for bowel  management, requiring: Setting out equipment. Patient requires the following  assistive device(s): Pad .  Frequency/Number of Accidents this Shift: Bowel accidents this shift: 0 .  Patient has not had an accident, but used a device/medication this shift  requiring: Pad .    BETH Bed/Chair/Wheelchair Transfer:  Bed/chair/wheelchair Transfer Score = 5.  Patient is supervision/set-up for transferring to and from the  bed/chair/wheelchair, requiring: Set up (positioning equipment, lock brakes  and/or adjust foot rest). Patient requires the following assistive device(s):  Grab bars.  BETH Toilet Transfer:  Activity was not observed.  BETH Tub/Shower Transfer:  Branch    Previously Documented Mode of Locomotion at Discharge: Field  BETH Expected Mode of Locomotion at Discharge: [facility] Walk/Wheelchair:  Branch  [facility] Stairs:  Branch    [facility] Comprehension:  Branch  BETH Expression:  Branch  BETH Social Interaction:  Branch  BETH Problem Solving:  Branch  BETH Memory:  Branch    Therapy  Mode Minutes  Occupational Therapy: Branch  Physical Therapy: Branch  Speech Language Pathology:  Branch    Signed by: JULITA Lemos

## 2017-08-01 NOTE — PROGRESS NOTES
Inpatient Rehabilitation Plan of Care Note    Plan of Care  Care Plan Reviewed - No updates at this time.    Safety    Performed Intervention(s)  falls precautions  hourly rounding, items within reach  bed alarm      Sphincter Control    Performed Intervention(s)  monitor intake and output  stool softners as ordered      Psychosocial    Performed Intervention(s)  encourage expression of feelings and concerns    Signed by: Carol Vinson RN

## 2017-08-01 NOTE — THERAPY TREATMENT NOTE
Inpatient Rehabilitation - Physical Therapy Treatment Note  [facility]     Patient Name: Magnus Hartley  : 1928  MRN: [id number]  Today's Date: 2017  Onset of Illness/Injury or Date of Surgery Date: 17  Date of Referral to PT: 17  Referring Physician: Jac    Admit Date: 2017    Visit Dx:    ICD-10-CM ICD-9-CM   1. Left hemiparesis G81.94 342.90   2. Dysarthria R47.1 784.51     Patient Active Problem List   Diagnosis   • Foot pain   • Asthma   • Benign essential hypertension   • Controlled type 2 diabetes mellitus without complication   • Dyslipidemia   • Macrocytosis without anemia   • Senile osteoporosis   • Post-menopausal osteoporosis   • Sinus bradycardia   • Stress incontinence in female   • Urge incontinence of urine   • Atrophic vaginitis   • Encounter for fitting and adjustment of other specified devices   • Incomplete emptying of bladder   • Urethral caruncle   • Incomplete uterovaginal prolapse   • Cerebrovascular accident (CVA) due to occlusion of right middle cerebral artery   • Atrial fibrillation   • Warfarin anticoagulation (goal INR 2-3)   • CVA (cerebrovascular accident due to intracerebral hemorrhage)               Adult Rehabilitation Note       17 1150 17 1100 17 0940    Rehab Assessment/Intervention    Discipline (P)  occupational therapist  -SHILPI speech language pathologist  -LT physical therapy assistant  -LB    Document Type (P)  therapy note (daily note)  -SHILPI therapy note (daily note)  -LT therapy note (daily note)  -LB    Subjective Information (P)  agree to therapy;no complaints  -SHILPI agree to therapy  -LT agree to therapy  -LB    Patient Effort, Rehab Treatment (P)  good  -SHILPI good  -LT good  -LB    Symptoms Noted During/After Treatment (P)  shortness of breath   w/ tsf  -SHILPI      Symptoms Noted Comment (P)  rest breaks as needed; monitered SpO2  -SHILPI      Precautions/Limitations (P)  fall precautions  -SHILPI  fall precautions  -LB     Recorded by [SHILPI] Karen Allan, OT Student [LT] Yvonne Miles MS CCC-SLP [LB] Angelica Treadwell PTA    Vital Signs    Intra SpO2 (%) (P)  98  -SHILPI      Recorded by [SHILPI] Karen Allan OT Student      Pain Assessment    Pain Assessment (P)  No/denies pain  -SHILPI  No/denies pain  -LB    Recorded by [SHILPI] Karen Allan, OT Student  [LB] Angelica Treadwell PTA    Cognitive Assessment/Intervention    Current Cognitive/Communication Assessment (P)  functional  -SHILPI      Orientation Status (P)  oriented x 4  -SHILPI      Follows Commands/Answers Questions (P)  100% of the time;able to follow single-step instructions;needs cueing;needs repetition  -SHILPI      Personal Safety (P)  mild impairment;decreased insight to deficits;decreased awareness, need for safety  -SHILPI      Personal Safety Interventions (P)  fall prevention program maintained;gait belt;nonskid shoes/slippers when out of bed;supervised activity  -SHILPI  fall prevention program maintained;gait belt;muscle strengthening facilitated;nonskid shoes/slippers when out of bed  -LB    Recorded by [SHILPI] Karen Allan, OT Student  [LB] Angelica Treadwell PTA    Improve memory skills    Memory Skills Progress  60%;with inconsistent cues  -LT     Recorded by  [LT] Yvonne Miles MS CCC-SLP     Improve functional problem solving    Functional Problem Solving Progress  50%;with inconsistent cues  -LT     Recorded by  [LT] Yvonne Miles MS CCC-SLP     Improve executive function skills    Executive Function Skills Progress  80%;without cues  -LT     Recorded by  [LT] Yvonne Miles MS CCC-SLP     Bed Mobility, Assessment/Treatment    Bed Mobility, Assistive Device   --   assessed on txment mat  -LB    Bed Mobility, Roll Left, [place]   contact guard assist  -LB    Bed Mobility, Roll Right, [place]   contact guard assist;verbal cues required  -LB    Bed Mobility, Scoot/Bridge, [place]   supervision required  -LB    Bed Mob, Supine to Sit, [place] (P)  contact guard  assist  -SHILPI  contact guard assist;verbal cues required  -LB    Bed Mob, Sit to Supine, [place]   contact guard assist;verbal cues required  -LB    Recorded by [SHILPI] Karen Allan, OT Student  [LB] Angelica Treadwell PTA    Transfer Assessment/Treatment    Transfers, Bed-Chair [place] (P)  contact guard assist;verbal cues required  -SHILPI  minimum assist (75% patient effort);contact guard assist;verbal cues required  -LB    Transfers, Chair-Bed [place]   minimum assist (75% patient effort);contact guard assist;verbal cues required  -LB    Transfers, Sit-Stand [place] (P)  contact guard assist;verbal cues required   vcs to maintain wide base of support  -SHILPI  contact guard assist  -LB    Transfers, Stand-Sit [place] (P)  verbal cues required;contact guard assist   vcs to reach back for w/c  -SHILPI  contact guard assist  -LB    Transfers, Sit-Stand-Sit, Assist Device   rolling walker  -LB    Toilet Transfer, [place] (P)  verbal cues required;contact guard assist   vcs for hand placement   -SHILPI      Toilet Transfer, Assistive Device (P)  rolling walker;elevated toilet seat   grab bars  -SHILPI      Transfer, Comment (P)  EOM tsf to and from w/c; CGA, VCs/gestural prompts for hand placement   -SHILPI  car tsf, rwx, CGA, vc's  -LB    Recorded by [SHILPI] Karen Allan, OT Student  [LB] Angelica Treadwell PTA    Gait Assessment/Treatment    Gait, [place] Level   contact guard assist;verbal cues required   cues for upright posture  -LB    Gait, Assistive Device   rolling walker  -LB    Gait, Distance (Feet)   200  -LB    Gait, Gait Deviations   rojelio decreased;forward flexed posture;left:;decreased heel strike  -LB    Recorded by   [LB] Angelica Treadwell PTA    Stairs Assessment/Treatment    Number of Stairs   4  -LB    Stairs, Handrail Location   both sides  -LB    Stairs, [place] Level   minimum assist (75% patient effort);contact guard assist  -LB    Stairs, Technique Used   step to step  (ascending);step to step (descending)  -LB    Stairs, Comment   curb, rwx, min/CGA  -LB    Recorded by   [LB] Angelica Treadwell, PTA    Functional Mobility    Functional Mobility- Ind. Level (P)  contact guard assist  -SHILPI      Functional Mobility- Device (P)  rolling walker  -SHILPI      Functional Mobility-Maintain WBing (P)  able to maintain weight bearing status  -SHILPI      Functional Mobility- Comment (P)  pt was able to walk into bathroom from bed and maneuver around bathroom w/ rwx w/ CGA   -SHILPI      Recorded by [SHILPI] Karen Allan, OT Student      Upper Body Dressing Assessment/Training    UB Dressing Assess/Train, Clothing Type (P)  doffing:;donning:;t-shirt  -SHILPI      UB Dressing Assess/Train, Position (P)  sitting;edge of bed  -SHILPI      UB Dressing Assess/Train, [place] (P)  minimum assist (75% patient effort)   min a to thread R arm through correct hole   -SHILPI      Recorded by [SHILPI] Karen Allan OT Student      Lower Body Dressing Assessment/Training    LB Dressing Assess/Train, Clothing Type (P)  doffing:;donning:;pants;shoes;socks  -SHILPI      LB Dressing Assess/Train, Position (P)  sitting;edge of bed;standing  -SHILPI      LB Dressing Assess/Train, [place] (P)  minimum assist (75% patient effort);contact guard assist  -SHILPI      LB Dressing Assess/Train, Comment (P)  CGA while standing to pull up pants; min a to adjust and tie shoes due to decreased sensation in LUE  -SHILPI      Recorded by [SHILPI] Karen Allan OT Student      Toileting Assessment/Training    Toileting Assess/Train, Assistive Device (P)  grab bars  -SHILPI      Toileting Assess/Train, Position (P)  sitting  -SHILPI      Toileting Assess/Train, Indepen Level (P)  contact guard assist  -SHILPI      Toileting Assess/Train, Comment (P)  pt able to manage clothes and perform hygiene IND; CGA while standing to adjust clothes  -SHILPI      Recorded by [SHILPI] Karen Allan OT Student      Therapy Exercises    Bilateral Lower Extremities   AROM:;10 reps;standing;supine   -LB    Recorded by   [LB] Angelica Treadwell PTA    Sensory Treatment    Sensory Reeducation Techniques (P)  sensory training, visual reinforcement  -SHILPI      Sensory Reeduction Treatment (P)  pt matched objects placed in hand w/ sight word cards w/ eyes closed, pt was unable to ID any items but was able to ID items w/ eyes open and tactile sensation; pt stated some items felt heavy, some soft w/o visual input;   -SHILPI      Sensory Treatment Comment (P)  pt selected small foam beads from a pile and put on a string using BLE while standing at countertop, pt displayed difficulty w/ using appropraite grasp to pickup and maintain hold on beads; pt found and picked out 10 buttons from yellow theraputty while standing at countertop w/ BUE, pt reported having a difficult time; pt was provided w/ sponge to squeeze and rub on LUE to increase sensory input to regain functional grasp and sensation necessary for self care tasks  -SHILPI      Recorded by [SHILPI] Karen Allan OT Student      Positioning and Restraints    Pre-Treatment Position (P)  in bed  -SHILPI      Post Treatment Position (P)  chair  -SHILPI  wheelchair  -LB    In Chair (P)  encouraged to call for assist;call light within reach;sitting  -SHILPI      In Wheelchair   sitting;with SLP  -LB    Recorded by [SHILPI] Karen Allan OT Student  [LB] Angelica Treadwell PTA      07/31/17 1551 07/31/17 1000 07/31/17 0947    Rehab Assessment/Intervention    Discipline occupational therapist  -SHILPI NULL,KIM speech language pathologist  -LT physical therapy assistant  -LB    Document Type therapy note (daily note)  -SHIPLI NULL AF2 therapy note (daily note)  -LT therapy note (daily note)  -LB    Subjective Information agree to therapy;no complaints  -SHILPI NULL AF2 agree to therapy  -LT agree to therapy  -LB    Patient Effort, Rehab Treatment good  -SHILPI NULL,CHAKA2 good  -LT good  -LB    Precautions/Limitations fall precautions  -SHILPI NULL AF2  fall precautions  -LB    Recorded by [SHILPI NULL,AF2] Adelia Salamanca, OTR (r)  Karen Allan, OT Student (t) Adelia Salamanca OTR (c) [LT] Yvonne Miles MS CCC-SLP [LB] Angelica Treadwell PTA    Pain Assessment    Pain Assessment No/denies pain  -AF,SHILPI,AF2  No/denies pain  -LB    Recorded by [AF,SHILPI,AF2] Adelia Salamanca OTR (r) Karen Allan, OT Student (t) NO Cooney (c)  [LB] Angelica Treadwell PTA    Cognitive Assessment/Intervention    Current Cognitive/Communication Assessment functional  -AF,SHILPI,AF2      Orientation Status oriented x 4  -AF,SHILPI,AF2      Follows Commands/Answers Questions 100% of the time;able to follow single-step instructions;needs cueing;needs repetition  -AF,SHILPI,AF2      Personal Safety mild impairment;decreased insight to deficits  -AF,SHILPI,AF2      Personal Safety Interventions fall prevention program maintained;gait belt;nonskid shoes/slippers when out of bed  -AF,SHILPI,AF2  fall prevention program maintained;gait belt;muscle strengthening facilitated;nonskid shoes/slippers when out of bed  -LB    Recorded by [AF,SHILPI,AF2] Adelia Salamanca OTR (r) Karen Allan, OT Student (t) NO [place]oney (c)  [LB] Angelica Treadwell PTA    Communication Treatment Objective and Progress    Cognitive Linguistic Treatment Objectives  Improve memory skills;Improve functional problem solving;Improve executive function skills  -LT     Recorded by  [LT] Yvonne Miles MS CCC-SLP     Improve memory skills    Improve memory skills through:  recalling unrelated word lists immediately;recalling unrelated word lists with an imposed delay;visual memory task;listen to a paragraph and answer questions;use written schedule;80%;without cues  -LT     Memory Skills Progress  0%;without cues;60%;70%;with inconsistent cues   recall 3 unrelated words p 5 mins  -LT     Recorded by  [LT] Yvonne Miles MS CCC-SLP     Improve functional problem solving    Improve functional problem solving through:  determine solutions to complex problems;complete high level reasoning task;complete  organization/home management task;complete functional math task;80%;without cues  -LT     Functional Problem Solving Progress  60%;with inconsistent cues  -LT     Recorded by  [LT] Yvonne Miles MS CCC-SLP     Improve executive function skills    Improve executive function skills:  identify strategies, strengths, limitations;exhibit cognitive flexibility;80%;without cues  -LT     Recorded by  [LT] Yvonne Miles MS CCC-SLP     Improve articulation    Improve articulation:  by over-articulating in connected speech;80%;without cues  -LT     Articulation Progress  with inconsistent cues  -LT     Recorded by  [LT] Yvonne Miles MS CCC-SLP     General UE Assessment    ROM RUE ROM was WNL;shoulder, left: UE ROM deficit;scapula, left: UE ROM deficit   AROM  -AF,SHILPI,AF2      ROM Detail PROM shoulder flexion/extension WNL, PROM shoulder external rotation WNL  -AF,SHILPI,AF2      Recorded by [AF,SHILPI,AF2] Adelia Salamanca OTR (r) Karen Allan, OT Student (t) NO Cooney (c)      General Hand Assessment    ROM other (see comments)  -AF,SHILPI,AF2      ROM Detail able to complete L opposition w/ increased time and attention ; no opposition deficits noted on R hand   -AF,SHILPI,AF2      Recorded by [AF,SHILPI,AF2] NO Cooney (r) Karen Allan OT Student (t) ON Cooney (c)      MMT (Manual Muscle Testing)    General MMT Assessment no strength deficits identified   WFL for age   -AF,SHILPI,AF2      General MMT Assessment Detail  R 45, L 25; 3 pt pinch R 8, L 5; lateral pinch R 8, L 6   pt reports right hand dominance   -AF,SHILPI,AF2      Recorded by [AF,SHILPI,AF2] NO [place] (r) Karen Allan OT Student (t) NO Cooney (c)      Bed Mobility, Assessment/Treatment    Bed Mobility, Comment   up in chair  -LB    Recorded by   [LB] Angelica Treadwell, PTA    Transfer Assessment/Treatment    Transfers, Sit-Stand [place]   contact guard assist  -LB    Transfers, Stand-Sit [place]   contact  guard assist  -LB    Transfers, Sit-Stand-Sit, Assist Device   rolling walker  -LB    Transfer, Comment to and from EOM from w/c CGA; from w/c to recliner CGA w/ vcs for hand placement   -AF,SHILPI,AF2  car tsf CG/sumi, Rwx  -LB    Recorded by [AF,SHILPI,AF2] Adelia Salamanca, OTR (r) Karen Allan, OT Student (t) Adelia Salamanca, OTR (c)  [LB] Angelica Treadwell PTA    Gait Assessment/Treatment    Gait, [place] Level   contact guard assist  -LB    Gait, Assistive Device   rolling walker  -LB    Gait, Distance (Feet)   160  -LB    Gait, Gait Deviations   rojelio decreased;forward flexed posture;narrow base;left:;decreased heel strike  -LB    Recorded by   [LB] Angelica Treadwell PTA    Stairs Assessment/Treatment    Number of Stairs   4  -LB    Stairs, Handrail Location   both sides  -LB    Stairs, [place] Level   minimum assist (75% patient effort);contact guard assist  -LB    Stairs, Technique Used   step to step (ascending);step to step (descending)  -LB    Stairs, Comment   curb, rwx, sumi, vc's  -LB    Recorded by   [LB] Angelica Treadwell PTA    Balance Skills Training    Standing-Level of Assistance   Minimum assistance  -LB    Static Standing Balance Support   parallel bars  -LB    Standing-Balance Activities   Compliant surfaces  -LB    Gait Balance-Level of Assistance   Contact guard  -LB    Gait Balance Support   parallel bars  -LB    Gait Balance Activities   side-stepping  -LB    Recorded by   [LB] Angelica Treadwell PTA    Therapy Exercises    Bilateral Lower Extremities   AROM:;10 reps;standing  -LB    Recorded by   [LB] Angelica Treadwell PTA    Fine Motor Coordination Training    9-Hole Peg Right 25 secs  -AF,SHILPI,AF2      9-Hole Peg Left 61 secs  -AF,SHILPI,AF2      Box and Blocks Right 56 blocks  -AF,SHILPI,AF2      Box and Blocks Left 39 blocks  -AF,SHILPI,AF2      Recorded by [AF,SHILPI,AF2] Adelia Salmaanca, OTR (r) Karen Allan, OT Student (t) Adelia Salamanca OTR (c)      Sensory Assessment/Intervention     Sensory Impairment numbness;dull;sharp  -AF,SHILPI,AF2      Light Touch LUE  -AF,SHILPI,AF2      LUE Light Touch mild impairment   unable to recognize light touch w/ eyes closed   -AF,SHILPI,AF2      RUE Light Touch WNL  -AF,SHILPI,AF2      Sharp/Dull Discrimination LUE   sharp 1/3 trials correct; dull 2/3 trials correct  -AF,SHILPI,AF2      LUE Sharp/Dull Discrimination severe impairment  -AF,SHILPI,AF2      Stereognosis LUE   unable to recognize 3/3 objects; dropped objects w/o noticin  -AF,SHILPI,AF2      LUE Stereognosis severe impairment  -AF,SHILPI,AF2      Recorded by [AF,SHILPI,AF2] NO Cooney (r) Karen Allan OT Student (t) NO Cooney (c)      Sensory Treatment    Sensory Reeducation Techniques sensory training, visual reinforcement  -AF,SHILPI,AF2      Sensory Reeduction Treatment pt was unable to find various objects w/ L hand from box of beans w/ eyes closed; pt dug to find objects w/ eyes open   -AF,SHILPI,AF2      Recorded by [AF,SHILPI,AF2] NO Cooney (r) Karen Allan, OT Student (t) NO Cooney (c)      Positioning and Restraints    Pre-Treatment Position sitting in chair/recliner  -AF,SHILPI,AF2      Post Treatment Position wheelchair  -AF,SHILPI,AF2  wheelchair  -LB    In Chair encouraged to call for assist;call light within reach  -AF,SHILPI,AF2      In Wheelchair   sitting;with SLP  -LB    Recorded by [AF,SHILPI,AF2] NO Cooney (r) Karen Allan OT Student (t) NO Cooney (rhona)  [LB] Angelica Treadwell PTA      User Key  (r) = Recorded By, (t) = Taken By, (c) = Cosigned By    Initials Name Effective Dates    LT Yvonne Miles MS CCC-SLP 04/13/15 -     LB Angelica Treadwell PTA 02/18/16 -     AF NO Cooney 12/01/15 -     SHILPI Allan, OT Student 07/11/17 -                 IP PT Goals       07/29/17 1211 07/24/17 1605 07/24/17 1546    Bed Mobility PT LTG    Bed Mobility PT LTG, Date Established 07/29/17  -LB 07/24/17  -LH (r) KS (t) LH (c) 07/24/17  -LH (r) KS (t) LH (c)     Bed Mobility PT LTG, Time to Achieve 2 wks  -LB 5 - 7 days  -LH (r) KS (t) LH (c) 5 - 7 days  -LH (r) KS (t) LH (c)    Bed Mobility PT LTG, Activity Type roll left/roll right;supine to sit/sit to supine  -LB all bed mobility  -LH (r) KS (t) LH (c) all bed mobility  -LH (r) KS (t) LH (c)    Bed Mobility PT LTG, [place] Level independent  -LB contact guard assist  -LH (r) KS (t) LH (c) independent  -LH (r) KS (t) LH (c)    Transfer Training PT LTG    Transfer Training PT LTG, Date Established 07/29/17  -LB  07/24/17  -LH (r) KS (t) LH (c)    Transfer Training PT LTG, Time to Achieve 2 wks  -LB  5 - 7 days  -LH (r) KS (t) LH (c)    Transfer Training PT LTG, Activity Type sit to stand/stand to sit  -LB  all transfers  -LH (r) KS (t) LH (c)    Transfer Training PT LTG, [place] Level conditional independence  -LB  independent  -LH (r) KS (t) LH (c)    Transfer Training PT LTG, Assist Device cane, straight  -LB  walker, rolling  -LH (r) KS (t) LH (c)    Transfer Training 2 PT LTG    Transfer Training PT 2 LTG, Date Established 07/29/17  -LB      Transfer Training PT 2 LTG, Time to Achieve 2 wks  -LB      Transfer Training PT 2 LTG, Activity Type --   car transfer  -LB      Transfer Training PT 2 LTG, [place] Level contact guard assist  -LB      Transfer Training PT 2 LTG, Assist Device cane, straight  -LB      Gait Training PT LTG    Gait Training Goal PT LTG, Date Established 07/29/17  -LB 07/24/17  -LH (r) KS (t) LH (c) 07/24/17  -LH (r) KS (t) LH (c)    Gait Training Goal PT LTG, Time to Achieve 2 wks  -LB 5 - 7 days  -LH (r) KS (t) LH (c) 5 - 7 days  -LH (r) KS (t) LH (c)    Gait Training Goal PT LTG, [place] Level conditional independence  -LB minimum assist (75% patient effort)  -LH (r) KS (t) LH (c) conditional independence  -LH (r) KS (t) LH (c)    Gait Training Goal PT LTG, Assist Device cane, straight  -LB walker, rolling  -LH (r) KS (t) LH (c) walker, rolling  -LH (r) KS (t) LH (c)    Gait  Training Goal PT LTG, Distance to Achieve 150  -  -LH (r) KS (t) LH (c) 150  -LH (r) KS (t) LH (c)    Stair Training PT LTG    Stair Training Goal PT LTG, Date Established 07/29/17  -LB      Stair Training Goal PT LTG, Time to Achieve 2 wks  -LB      Stair Training Goal PT LTG, Number of Steps 4  -LB      Stair Training Goal PT LTG, [place] Level contact guard assist  -LB      Stair Training Goal PT LTG, Assist Device 1 handrail  -LB      Static Sitting Balance PT LTG    Static Sitting Balance PT LTG, Date Established   07/24/17  -LH (r) KS (t) LH (c)    Static Sitting Balance PT LTG, Time to Achieve   5 - 7 days  -LH (r) KS (t) LH (c)    Static Sitting Balance PT LTG, [place] Level   independent  -LH (r) KS (t) LH (c)    Static Sitting Balance PT LTG, Assist Device   UE Support  -LH (r) KS (t) LH (c)    Static Sitting Balance PT LTG, Additional Goal   7 min w/o L lean  -LH (r) KS (t) LH (c)      User Key  (r) = Recorded By, (t) = Taken By, (c) = Cosigned By    Initials Name Provider Type    LB Rosemary Hoffmann, PT Physical Therapist     Angelica Pearson, PT Physical Therapist    KS Jessica Germain, PT Student PT Student          Physical Therapy Education     Title: PT OT SLP Therapies (Active)     Topic: Physical Therapy (Active)     Point: Mobility training (Done)    Learning Progress Summary    Learner Readiness Method Response Comment Documented by Status   Patient Acceptance E VU,NR  LB 08/01/17 0939 Done    Acceptance E VU,NR  LB 07/31/17 0947 Done    Acceptance E VU,NR  LB1 07/29/17 1210 Done                      User Key     Initials Effective Dates Name Provider Type Discipline    LB1 10/06/15 -  Rosemary Hoffmann, PT Physical Therapist PT    LB 02/18/16 -  Angelica Treadwell, PTA Physical Therapy Assistant PT                    PT Recommendation and Plan  Anticipated Equipment Needs At Discharge: standard cane  Anticipated Discharge Disposition: home with outpatient services, home with  assist  Planned Therapy Interventions: bed mobility training, balance training, neuromuscular re-education, transfer training, strengthening, stair training  PT Frequency: 2 times/day            Time Calculation:         PT Charges       08/01/17 1344 08/01/17 0939       Time Calculation    Start Time 1330  -LB 0930  -LB     Stop Time 1400  -LB 1000  -LB     Time Calculation (min) 30 min  -LB 30 min  -LB       User Key  (r) = Recorded By, (t) = Taken By, (c) = Cosigned By    Initials Name Provider Type     Angelica Treadwell PTA Physical Therapy Assistant          Therapy Charges for Today     Code Description Service Date Service Provider Modifiers Qty    21661841282 HC PT THER PROC EA 15 MIN 7/31/2017 Angelica Treadwell PTA GP 4    53421969603 HC PT THER PROC EA 15 MIN 8/1/2017 Angelica Treadwell PTA GP 4    33375110923 HC PT CANALITH REPOSITIONING PER DAY 8/1/2017 Angelica Treadwell PTA GP 1               Angelica Treadwell PTA  8/1/2017

## 2017-08-01 NOTE — PLAN OF CARE
Problem: Fall Risk (Adult)  Goal: Absence of Falls  Outcome: Ongoing (interventions implemented as appropriate)    Problem: Patient Care Overview (Adult)  Goal: Plan of Care Review  Outcome: Ongoing (interventions implemented as appropriate)    08/01/17 8209   Coping/Psychosocial Response Interventions   Plan Of Care Reviewed With patient   Patient Care Overview   Progress improving   Outcome Evaluation   Outcome Summary/Follow up Plan Pleasant and cooperative. No unsafe behavior. Ambulates with walker CGA of 1.

## 2017-08-01 NOTE — PROGRESS NOTES
Inpatient Rehabilitation Functional Measures Assessment    Functional Measures  BETH Eating:  [facility] Grooming: [facility] Bathing:  [facility] Upper Body Dressing:  [facility] Lower Body Dressing:  [facility] Toileting:  [facility] Bladder Management  Level of Assistance:  [place]  Frequency/Number of Accidents this Shift:  [facility] Bowel Management  Level of Assistance: [place]  Frequency/Number of Accidents this Shift: [facility] Bed/Chair/Wheelchair Transfer:  [facility] Toilet Transfer:  [facility] Tub/Shower Transfer:  [place]    Previously Documented Mode of Locomotion at Discharge: Field  BETH Expected Mode of Locomotion at Discharge: [facility] Walk/Wheelchair:  [facility] Stairs:  [facility] Comprehension:  Auditory comprehension is the usual mode. Comprehension  Score = 6, Modified [place].  Patient comprehends complex/abstract  information in their primary language with only mild difficulty.  BETH Expression:  Vocal expression is the usual mode. Expression Score = 6,  Modified Independent.  Patient expresses complex/abstract information in their  primary language with only mild difficulty with tasks.  BETH Social Interaction:  Social Interaction Score = 7, Independent. Patient is  completely independent for social interaction.  There are no activity  limitations.  BETH Problem Solving:  Activity was not observed.  BETH Memory:  Memory Score = 6, Modified [place].  Patient is modified  independent for memory, having only mild difficulty and using self-initiated or  environmental cues to remember.    Therapy Mode Minutes  Occupational Therapy: [place]  Physical Therapy: [place]  Speech Language Pathology:  [place]    Signed by: Carol Vinson RN

## 2017-08-01 NOTE — PROGRESS NOTES
Inpatient Rehabilitation Functional Measures Assessment    Functional Measures  BETH Eating:  [facility] Grooming: [place]  [facility] Bathing:  [place]  [facility] Upper Body Dressing:  [place]  [facility] Lower Body Dressing:  [place]  [facility] Toileting:  [facility] Bladder Management  Level of Assistance:  [place]  Frequency/Number of Accidents this Shift:  [facility] Bowel Management  Level of Assistance: [place]  Frequency/Number of Accidents this Shift: [place]    [facility] Bed/Chair/Wheelchair Transfer:  [facility] Toilet Transfer:  [facility] Tub/Shower Transfer:  [place]    Previously Documented Mode of Locomotion at Discharge: Field  BETH Expected Mode of Locomotion at Discharge: [facility] Walk/Wheelchair:  [facility] Stairs:  [facility] Comprehension:  Auditory comprehension is the usual mode. Patient does not  comprehend complex/abstract information in their primary language without  assistance from a helper. Comprehension Score = 4, Minimal Prompting. Patient  comprehends basic daily needs or ideas 75-90% of the time. Patient requires  minimal/occasional prompting. No assistive devices were required.  BETH Expression:  Vocal expression is the usual mode. Patient does not express  complex/abstract information in their primary language without a helper.  Expression Score = 4, Minimal Prompting. Patient expresses basic daily needs or  ideas 75-90% of the time.  Patient requires minimal/occasional prompting. No  assistive devices were required.  BETH Social Interaction:  Social Interaction Score = 7, Independent. Patient is  completely independent for social interaction.  There are no activity  limitations.  BETH Problem Solving:  Patient does not make appropriate decisions in order to  solve complex problems without assistance from a helper. Problem Solving Score =  4, Minimal Direction. Patient makes appropriate decisions in order to solve  routine problems 75-90% of the time. Patient requires minimal/occasional  direction for the  following behavior(s):  BETH Memory:  Memory Score = 3, Moderate Prompting. Patient recognizes and  remembers 50-74% of the time. Patient requires moderate/some prompting  for  memory for the following:    Therapy Mode Minutes  Occupational Therapy: [place]  Physical Therapy: [place]  Speech Language Pathology:  Individual: 60 minutes.    Signed by: Yvonne Miles MS,CCC-SLP

## 2017-08-01 NOTE — PROGRESS NOTES
Inpatient Rehabilitation Functional Measures Assessment    Functional Measures  BETH Eating:  [facility] Grooming: [facility] Bathing:  [facility] Upper Body Dressing:  [facility] Lower Body Dressing:  [facility] Toileting:  [facility] Bladder Management  Level of Assistance:  [place]  Frequency/Number of Accidents this Shift:  [facility] Bowel Management  Level of Assistance: [place]  Frequency/Number of Accidents this Shift: [facility] Bed/Chair/Wheelchair Transfer:  [facility] Toilet Transfer:  [facility] Tub/Shower Transfer:  [place]    Previously Documented Mode of Locomotion at Discharge: Field  BETH Expected Mode of Locomotion at Discharge: [facility] Walk/Wheelchair:  [facility] Stairs:  [facility] Comprehension:  Auditory comprehension is the usual mode. Comprehension  Score = 7, Independent.  Patient comprehends complex/abstract information in  their primary language.  Patient is completely independent for auditory  comprehension.  There are no activity limitations.  BETH Expression:  Vocal expression is the usual mode. Expression Score = 7,  Independent.  Patient expresses complex/abstract information in their primary  language.  Patient is completely independent for vocal expression.  There are no  activity limitations.  BETH Social Interaction:  Social Interaction Score = 7, Independent. Patient is  completely independent for social interaction.  There are no activity  limitations.  BETH Problem Solving:  Problem Solving Score = 6, Modified [place].  Patient  makes appropriate decisions in order to solve complex problems, but requires  extra time.  BETH Memory:  Memory Score = 7, Independent.  Patient is completely independent  for memory.  There are no activity limitations.    Therapy Mode Minutes  Occupational Therapy: [place]  Physical Therapy: [place]  Speech Language Pathology:  [place]    Signed by: Carmela Cuellar RN

## 2017-08-01 NOTE — THERAPY TREATMENT NOTE
Inpatient Rehabilitation - Occupational Therapy Treatment Note  [facility]     Patient Name: Magnus Hartley  : 1928  MRN: [id number]  Today's Date: 2017  Onset of Illness/Injury or Date of Surgery Date: 17     Referring Physician: Jac      Admit Date: 2017    Visit Dx:     ICD-10-CM ICD-9-CM   1. Left hemiparesis G81.94 342.90   2. Dysarthria R47.1 784.51     Patient Active Problem List   Diagnosis   • Foot pain   • Asthma   • Benign essential hypertension   • Controlled type 2 diabetes mellitus without complication   • Dyslipidemia   • Macrocytosis without anemia   • Senile osteoporosis   • Post-menopausal osteoporosis   • Sinus bradycardia   • Stress incontinence in female   • Urge incontinence of urine   • Atrophic vaginitis   • Encounter for fitting and adjustment of other specified devices   • Incomplete emptying of bladder   • Urethral caruncle   • Incomplete uterovaginal prolapse   • Cerebrovascular accident (CVA) due to occlusion of right middle cerebral artery   • Atrial fibrillation   • Warfarin anticoagulation (goal INR 2-3)   • CVA (cerebrovascular accident due to intracerebral hemorrhage)             Adult Rehabilitation Note       17 1150 17 1100 17 0940    Rehab Assessment/Intervention    Discipline (P)  occupational therapist  -SHILPI speech language pathologist  -LT physical therapy assistant  -LB    Document Type (P)  therapy note (daily note)  -SHILPI therapy note (daily note)  -LT therapy note (daily note)  -LB    Subjective Information (P)  agree to therapy;no complaints  -SHILPI agree to therapy  -LT agree to therapy  -LB    Patient Effort, Rehab Treatment (P)  good  -SHILPI good  -LT good  -LB    Symptoms Noted During/After Treatment (P)  shortness of breath   w/ tsf  -SHILPI      Symptoms Noted Comment (P)  rest breaks as needed; monitered SpO2  -SHILPI      Precautions/Limitations (P)  fall precautions  -SHILPI  fall precautions  -LB    Recorded by [SHILPI] Karen Allan,  OT Student [LT] Yvonne Miles MS CCC-SLP [LB] Angelica Treadwell PTA    Vital Signs    Intra SpO2 (%) (P)  98  -SHILPI      Recorded by [SHILPI] Karen Allan OT Student      Pain Assessment    Pain Assessment (P)  No/denies pain  -SHILPI  No/denies pain  -LB    Recorded by [SHILPI] Karen Allan OT Student  [LB] Angelica Treadwell PTA    Cognitive Assessment/Intervention    Current Cognitive/Communication Assessment (P)  functional  -SHILPI      Orientation Status (P)  oriented x 4  -SHILPI      Follows Commands/Answers Questions (P)  100% of the time;able to follow single-step instructions;needs cueing;needs repetition  -SHILPI      Personal Safety (P)  mild impairment;decreased insight to deficits;decreased awareness, need for safety  -SHILPI      Personal Safety Interventions (P)  fall prevention program maintained;gait belt;nonskid shoes/slippers when out of bed;supervised activity  -SHILPI  fall prevention program maintained;gait belt;muscle strengthening facilitated;nonskid shoes/slippers when out of bed  -LB    Recorded by [SHILPI] Karen Allan, OT Student  [LB] Angelica Treadwell PTA    Improve memory skills    Memory Skills Progress  60%;with inconsistent cues  -LT     Recorded by  [LT] Yvonne Miles MS CCC-SLP     Improve functional problem solving    Functional Problem Solving Progress  50%;with inconsistent cues  -LT     Recorded by  [LT] Yvonne Miles MS CCC-SLP     Improve executive function skills    Executive Function Skills Progress  80%;without cues  -LT     Recorded by  [LT] Yvonne Miles MS CCC-SLP     Bed Mobility, Assessment/Treatment    Bed Mobility, Assistive Device   --   assessed on txment mat  -LB    Bed Mobility, Roll Left, [place]   contact guard assist  -LB    Bed Mobility, Scoot/Bridge, [place]   supervision required  -LB    Bed Mob, Supine to Sit, [place] (P)  contact guard assist  -SHILPI  contact guard assist;verbal cues required  -LB    Recorded by [SHILPI] Karen Allan, OT Student  [LB] Angelica LOPES  YARA Treadwell    Transfer Assessment/Treatment    Transfers, Bed-Chair [place] (P)  contact guard assist;verbal cues required  -SHILPI  minimum assist (75% patient effort);contact guard assist;verbal cues required  -LB    Transfers, Chair-Bed [place]   minimum assist (75% patient effort);contact guard assist;verbal cues required  -LB    Transfers, Sit-Stand [place] (P)  contact guard assist;verbal cues required   vcs to maintain wide base of support  -SHILPI  contact guard assist  -LB    Transfers, Stand-Sit [place] (P)  verbal cues required;contact guard assist   vcs to reach back for w/c  -SHILPI  contact guard assist  -LB    Transfers, Sit-Stand-Sit, Assist Device   rolling walker  -LB    Toilet Transfer, [place] (P)  verbal cues required;contact guard assist   vcs for hand placement   -SHILPI      Toilet Transfer, Assistive Device (P)  rolling walker;elevated toilet seat   grab bars  -SHILPI      Transfer, Comment (P)  EOM tsf to and from w/c; CGA, VCs/gestural prompts for hand placement   -SHILPI  car tsf, rwx, CGA, vc's  -LB    Recorded by [SHILPI] Karen Allan, OT Student  [LB] Angelica Treadwell PTA    Gait Assessment/Treatment    Gait, [place] Level   contact guard assist;verbal cues required   cues for upright posture  -LB    Gait, Assistive Device   rolling walker  -LB    Gait, Distance (Feet)   200  -LB    Gait, Gait Deviations   rojelio decreased;forward flexed posture;left:;decreased heel strike  -LB    Recorded by   [LB] Angelica Treadwell PTA    Stairs Assessment/Treatment    Number of Stairs   4  -LB    Stairs, Handrail Location   both sides  -LB    Stairs, [place] Level   minimum assist (75% patient effort);contact guard assist  -LB    Stairs, Technique Used   step to step (ascending);step to step (descending)  -LB    Stairs, Comment   curb, rwx, min/CGA  -LB    Recorded by   [LB] Angelica Treadwell PTA    Functional Mobility    Functional Mobility- Ind. Level (P)  contact guard assist   -SHILPI      Functional Mobility- Device (P)  rolling walker  -SHILPI      Functional Mobility-Maintain WBing (P)  able to maintain weight bearing status  -SHILPI      Functional Mobility- Comment (P)  pt was able to walk into bathroom from bed and maneuver around bathroom w/ rwx w/ CGA   -SHILPI      Recorded by [SHILPI] Karen Allan, OT Student      Upper Body Dressing Assessment/Training    UB Dressing Assess/Train, Clothing Type (P)  doffing:;donning:;t-shirt  -SHILPI      UB Dressing Assess/Train, Position (P)  sitting;edge of bed  -SHILPI      UB Dressing Assess/Train, [place] (P)  minimum assist (75% patient effort)   min a to thread R arm through correct hole   -SHILPI      Recorded by [SHILPI] Karen Allan OT Student      Lower Body Dressing Assessment/Training    LB Dressing Assess/Train, Clothing Type (P)  doffing:;donning:;pants;shoes;socks  -SHILPI      LB Dressing Assess/Train, Position (P)  sitting;edge of bed;standing  -SHILPI      LB Dressing Assess/Train, [place] (P)  minimum assist (75% patient effort);contact guard assist  -SHILPI      LB Dressing Assess/Train, Comment (P)  CGA while standing to pull up pants; min a to adjust and tie shoes due to decreased sensation in LUE  -SHILPI      Recorded by [SHILPI] Karen Allan OT Student      Toileting Assessment/Training    Toileting Assess/Train, Assistive Device (P)  grab bars  -SHILPI      Toileting Assess/Train, Position (P)  sitting  -SHILPI      Toileting Assess/Train, [place] Level (P)  contact guard assist  -SHILPI      Toileting Assess/Train, Comment (P)  pt able to manage clothes and perform hygiene IND; CGA while standing to adjust clothes  -SHILPI      Recorded by [SHILPI] Karen Allan, OT Student      Sensory Treatment    Sensory Reeducation Techniques (P)  sensory training, visual reinforcement  -SHILPI      Sensory Reeduction Treatment (P)  pt matched objects placed in hand w/ sight word cards w/ eyes closed, pt was unable to ID any items but was able to ID items w/ eyes open and tactile  sensation; pt stated some items felt heavy, some soft w/o visual input;   -SHILPI      Sensory Treatment Comment (P)  pt selected small foam beads from a pile and put on a string using BLE while standing at countertop, pt displayed difficulty w/ using appropraite grasp to pickup and maintain hold on beads; pt found and picked out 10 buttons from yellow theraputty while standing at countertop w/ BUE, pt reported having a difficult time; pt was provided w/ sponge to squeeze and rub on LUE to increase sensory input to regain functional grasp and sensation necessary for self care tasks  -SHILPI      Recorded by [SHILPI] Karen Allan OT Student      Positioning and Restraints    Pre-Treatment Position (P)  in bed  -SHILPI      Post Treatment Position (P)  chair  -SHILPI  wheelchair  -LB    In Chair (P)  encouraged to call for assist;call light within reach;sitting  -SHILPI      In Wheelchair   sitting;with SLP  -LB    Recorded by [SHILPI] Karen Allan OT Student  [LB] Angelica Treadwell PTA      07/31/17 1551 07/31/17 1000 07/31/17 0947    Rehab Assessment/Intervention    Discipline occupational therapist  -SHILPI NULL,AF2 speech language pathologist  -LT physical therapy assistant  -LB    Document Type therapy note (daily note)  -SHILPI NULL,KIM therapy note (daily note)  -LT therapy note (daily note)  -LB    Subjective Information agree to therapy;no complaints  -SHILPI NULL,AF2 agree to therapy  -LT agree to therapy  -LB    Patient Effort, Rehab Treatment good  -SHILPI NULL,AF2 good  -LT good  -LB    Precautions/Limitations fall precautions  -SHILPI NULL,AF2  fall precautions  -LB    Recorded by [CHAKA,SHILPI,AF2] Adelia Salamanca OTBETO (r) Karen Allan OT Student (t) NO Cooney (c) [LT] Yvonne Miles MS CCC-SLP [LB] Angelica Treadwell PTA    Pain Assessment    Pain Assessment No/denies pain  -SHILPI NULL,AF2  No/denies pain  -LB    Recorded by [CHAKA,SHILPI,AF2] NO Cooney (r) Karen Allan OT Student (t) NO Cooney (c)  [LB] Angelica Treadwell, PTA     Cognitive Assessment/Intervention    Current Cognitive/Communication Assessment functional  -AF,SHILPI,AF2      Orientation Status oriented x 4  -AF,SHILPI,AF2      Follows Commands/Answers Questions 100% of the time;able to follow single-step instructions;needs cueing;needs repetition  -AF,SHILPI,AF2      Personal Safety mild impairment;decreased insight to deficits  -AF,SHILPI,AF2      Personal Safety Interventions fall prevention program maintained;gait belt;nonskid shoes/slippers when out of bed  -AF,SHILPI,AF2  fall prevention program maintained;gait belt;muscle strengthening facilitated;nonskid shoes/slippers when out of bed  -LB    Recorded by [AF,SHILPI,AF2] Adelia Salamanca, OTR (r) Karen Allan OT Student (t) Adelia Salamanca, OTR (c)  [LB] Angelica Treadwell PTA    Communication Treatment Objective and Progress    Cognitive Linguistic Treatment Objectives  Improve memory skills;Improve functional problem solving;Improve executive function skills  -LT     Recorded by  [LT] Yvonne Miles MS CCC-SLP     Improve memory skills    Improve memory skills through:  recalling unrelated word lists immediately;recalling unrelated word lists with an imposed delay;visual memory task;listen to a paragraph and answer questions;use written schedule;80%;without cues  -LT     Memory Skills Progress  0%;without cues;60%;70%;with inconsistent cues   recall 3 unrelated words p 5 mins  -LT     Recorded by  [LT] Yvonne Miles MS CCC-SLP     Improve functional problem solving    Improve functional problem solving through:  determine solutions to complex problems;complete high level reasoning task;complete organization/home management task;complete functional math task;80%;without cues  -LT     Functional Problem Solving Progress  60%;with inconsistent cues  -LT     Recorded by  [LT] Yvonne Miles MS CCC-SLP     Improve executive function skills    Improve executive function skills:  identify strategies, strengths, limitations;exhibit cognitive  flexibility;80%;without cues  -LT     Recorded by  [LT] Yvonne Miles MS CCC-SLP     Improve articulation    Improve articulation:  by over-articulating in connected speech;80%;without cues  -LT     Articulation Progress  with inconsistent cues  -LT     Recorded by  [LT] Yvonne Miles MS CCC-SLP     General UE Assessment    ROM RUE ROM was WNL;shoulder, left: UE ROM deficit;scapula, left: UE ROM deficit   AROM  -AF,SHILPI,AF2      ROM Detail PROM shoulder flexion/extension WNL, PROM shoulder external rotation WNL  -AF,SHILPI,AF2      Recorded by [AF,SHILPI,AF2] Adelia Salamanca OTR (r) Karen Allan, OT Student (t) NO Cooney (c)      General Hand Assessment    ROM other (see comments)  -AF,SHILPI,AF2      ROM Detail able to complete L opposition w/ increased time and attention ; no opposition deficits noted on R hand   -AF,SHILPI,AF2      Recorded by [AF,SHILPI,AF2] NO Cooney (r) Karen Allan OT Student (t) NO Cooney (c)      MMT (Manual Muscle Testing)    General MMT Assessment no strength deficits identified   WFL for age   -AF,SHILPI,AF2      General MMT Assessment Detail  R 45, L 25; 3 pt pinch R 8, L 5; lateral pinch R 8, L 6   pt reports right hand dominance   -AF,SHILPI,AF2      Recorded by [AF,SHILPI,AF2] NO Cooney (r) Karen Allan OT Student (t) NO Cooney (c)      Bed Mobility, Assessment/Treatment    Bed Mobility, Comment   up in chair  -LB    Recorded by   [LB] Angelica Treadwell, PTA    Transfer Assessment/Treatment    Transfers, Sit-Stand [place]   contact guard assist  -LB    Transfers, Stand-Sit [place]   contact guard assist  -LB    Transfers, Sit-Stand-Sit, Assist Device   rolling walker  -LB    Transfer, Comment to and from EOM from w/c CGA; from w/c to recliner CGA w/ vcs for hand placement   -AF,SHILPI,AF2  car tsf CG/sumi, Rwx  -LB    Recorded by [AF,SHILPI,AF2] Adelia Salamanca, OTR (r) Karen Allan OT Student (t) Adelia Salamanca, OTR (c)  [LB] Angelica LOPES  YARA Treadwell    Gait Assessment/Treatment    Gait, [place] Level   contact guard assist  -LB    Gait, Assistive Device   rolling walker  -LB    Gait, Distance (Feet)   160  -LB    Gait, Gait Deviations   rojelio decreased;forward flexed posture;narrow base;left:;decreased heel strike  -LB    Recorded by   [LB] Angelica Treadwell PTA    Stairs Assessment/Treatment    Number of Stairs   4  -LB    Stairs, Handrail Location   both sides  -LB    Stairs, [place] Level   minimum assist (75% patient effort);contact guard assist  -LB    Stairs, Technique Used   step to step (ascending);step to step (descending)  -LB    Stairs, Comment   curb, rwx, sumi, vc's  -LB    Recorded by   [LB] Angelica Treadwell PTA    Balance Skills Training    Standing-Level of Assistance   Minimum assistance  -LB    Static Standing Balance Support   parallel bars  -LB    Standing-Balance Activities   Compliant surfaces  -LB    Gait Balance-Level of Assistance   Contact guard  -LB    Gait Balance Support   parallel bars  -LB    Gait Balance Activities   side-stepping  -LB    Recorded by   [LB] Angelica Treadwell PTA    Therapy Exercises    Bilateral Lower Extremities   AROM:;10 reps;standing  -LB    Recorded by   [LB] Angelica Treadwell PTA    Fine Motor Coordination Training    9-Hole Peg Right 25 secs  -AF,SHILPI,AF2      9-Hole Peg Left 61 secs  -AF,SHILPI,AF2      Box and Blocks Right 56 blocks  -AF,SHILPI,AF2      Box and Blocks Left 39 blocks  -AF,SHILPI,AF2      Recorded by [AF,SHILPI,AF2] NO Cooney (r) Karen Allan OT Student (t) Adelia Salamanca OTR (c)      Sensory Assessment/Intervention    Sensory Impairment numbness;dull;sharp  -AF,SHILPI,AF2      Light Touch LUE  -AF,SHILPI,AF2      LUE Light Touch mild impairment   unable to recognize light touch w/ eyes closed   -AF,SHILPI,AF2      RUE Light Touch WNL  -AF,SHILPI,AF2      Sharp/Dull Discrimination LUE   sharp 1/3 trials correct; dull 2/3 trials correct  -AF,SHILPI,AF2      LUE Sharp/Dull  Discrimination severe impairment  -AF,SHILPI,AF2      Stereognosis LUE   unable to recognize 3/3 objects; dropped objects w/o noticin  -AF,SHILPI,AF2      LUE Stereognosis severe impairment  -AF,SHILPI,AF2      Recorded by [AF,SHILPI,AF2] NO Cooney (r) Karen Allan, OT Student (t) NO Cooney (c)      Sensory Treatment    Sensory Reeducation Techniques sensory training, visual reinforcement  -AF,SHILPI,AF2      Sensory Reeduction Treatment pt was unable to find various objects w/ L hand from box of beans w/ eyes closed; pt dug to find objects w/ eyes open   -AF,SHILPI,AF2      Recorded by [AF,SHILPI,AF2] NO Cooney (r) Karen Allan, OT Student (t) NO Cooney (c)      Positioning and Restraints    Pre-Treatment Position sitting in chair/recliner  -CHAKA,SHILPI,AF2      Post Treatment Position wheelchair  -CHAKASHILPI,AF2  wheelchair  -LB    In Chair encouraged to call for assist;call light within reach  -CRYSTAL NULLD,AF2      In Wheelchair   sitting;with SLP  -LB    Recorded by [AF,SHILPI,AF2] NO Cooney (r) Karen Allan, OT Student (t) NO Cooney (c)  [LB] Angelcia Treadwell, PTA      User Key  (r) = Recorded By, (t) = Taken By, (c) = Cosigned By    Initials Name Effective Dates    LT Yvonne Miles MS CCC-SLP 04/13/15 -     LB Angelica Treadwell, YARA 02/18/16 -     AF Adelia Salamanca OTR 12/01/15 -     SHILPI Allan OT Student 07/11/17 -                 OT Goals       07/29/17 1054 07/25/17 1443       Transfer Training OT STG    Transfer Training OT STG, Date Established 07/29/17  -RE      Transfer Training OT STG, Time to Achieve 3 days  -RE      Transfer Training OT STG, Activity Type toilet  -RE      Transfer Training OT STG, [place] Level supervision required;verbal cues required  -RE      Transfer Training OT STG, Outcome goal ongoing  -RE      Transfer Training OT LTG    Transfer Training OT LTG, Date Established 07/29/17  -RE 07/25/17  -SO     Transfer Training OT LTG, Time to  Achieve 1 wk  -RE 1 wk  -SO     Transfer Training OT LTG, Activity Type toilet  -RE sit to stand/stand to sit;toilet  -SO     Transfer Training OT LTG, [place] Level conditional independence  -RE contact guard assist;supervision required  -SO     Transfer Training OT LTG, Assist Device  walker, rolling  -SO     Transfer Training OT LTG, Outcome goal ongoing  -RE      Transfer Training 2 OT STG    Transfer Training 2 OT STG, Time to Achieve 3 days  -RE      Transfer Training 2 OT STG, Activity Type shower chair  -RE      Transfer Training 2 OT STG, [place] Level contact guard assist  -RE      Transfer Training 2 OT STG, Outcome goal ongoing  -RE      Transfer Training 2 OT LTG    Transfer Training 2 OT LTG, Time to Achieve 2 wks  -RE      Transfer Training 2 OT LTG, Activity Type shower chair;tub  -RE      Transfer Training 2 OT LTG, [place] Level supervision required  -RE      Transfer Training 2 OT LTG, Outcome goal ongoing  -RE      Bathing OT STG    Bathing Goal OT STG, Date Established 07/29/17  -RE      Bathing Goal OT STG, Time to Achieve 3 days  -RE      Bathing Goal OT STG, Activity Type upper body bathing;lower body bathing  -RE      Bathing Goal OT STG, [place] Level set up required  -RE      Bathing Goal OT STG, Outcome goal ongoing  -RE      Bathing OT LTG    Bathing Goal OT LTG, Time to Achieve 2 wks  -RE      Bathing Goal OT LTG, Activity Type upper body bathing;lower body bathing  -RE      Bathing Goal OT LTG, [place] Level conditional independence  -RE      Bathing Goal OT LTG, Outcome goal ongoing  -RE      Grooming OT STG    Grooming Goal OT STG, Time to Achieve 3 days  -RE      Grooming Goal OT STG, [place] Level conditional independence  -RE      Grooming Goal OT STG, Outcome goal ongoing  -RE      Grooming OT LTG    Grooming Goal OT LTG, Time to Achieve 1 wk  -RE      Grooming Goal OT LTG, [place] Level conditional independence  -RE      Grooming Goal OT LTG,  Outcome goal ongoing  -RE      LB Dressing OT STG    LB Dressing Goal OT STG, Date Established 07/29/17  -RE      LB Dressing Goal OT STG, Time to Achieve 5 - 7 days  -RE      LB Dressing Goal OT STG, [place] Level supervision required;verbal cues required  -RE      LB Dressing Goal OT STG, Outcome goal ongoing  -RE      LB Dressing OT LTG    LB Dressing Goal OT LTG, Date Established 07/29/17  -RE      LB Dressing Goal OT LTG, Time to Achieve 2 wks  -RE      LB Dressing Goal OT LTG, [place] Level conditional independence  -RE      LB Dressing Goal OT LTG, Outcome goal ongoing  -RE      UB Dressing OT STG    UB Dressing Goal OT STG, Date Established 07/29/17  -RE      UB Dressing Goal OT STG, Time to Achieve 3 days  -RE      UB Dressing Goal OT STG, [place] Level set up required  -RE      UB Dressing Goal OT STG, Outcome goal ongoing  -RE      UB Dressing OT LTG    UB Dressing Goal OT LTG, Date Established 07/29/17  -RE      UB Dressing Goal OT LTG, Time to Achieve 2 wks  -RE      UB Dressing Goal OT LTG, [place] Level conditional independence  -RE      UB Dressing Goal OT LTG, Outcome goal ongoing  -RE      Isolated Movement OT LTG    Isolated Movement OT LTG, Date Established  07/25/17  -SO     Isolated Movement OT LTG, Time to Achieve  1 wk  -SO     Isolated Movement OT LTG, Body Area  left scapula;left shoulder;left elbow;left forearm;left wrist;left fingers  -SO     Isolated Movement OT LTG, Level  WFL  -SO     ADL OT LTG    ADL OT LTG, Date Established  07/25/17  -SO     ADL OT LTG, Time to Achieve  1 wk  -SO     ADL OT LTG, Activity Type  ADL skills  -SO     ADL OT LTG, [place] Level  contact guard;standby assist;assistive device  -SO       User Key  (r) = Recorded By, (t) = Taken By, (c) = Cosigned By    Initials Name Provider Type    DELMER Apodaca, OTR Occupational Therapist    SO Bindu Matamoros, OTR Occupational Therapist                OT Recommendation and  Plan  Anticipated Equipment Needs At Discharge: tub bench  Planned Therapy Interventions: adaptive equipment training, ADL retraining, activity intolerance, energy conservation, fine motor coordination training, neuromuscular re-education  Therapy Frequency: 5 times/wk          Time Calculation:         Time Calculation- OT       08/01/17 1212 08/01/17 1211       Time Calculation- OT    OT Start Time (P)  1100  -SHILPI (P)  0830  -SHILPI     OT Stop Time (P)  1130  -SHILPI (P)  0900  -SHILPI     OT Time Calculation (min) (P)  30 min  -SHILPI (P)  30 min  -SHILPI       User Key  (r) = Recorded By, (t) = Taken By, (c) = Cosigned By    Initials Name Provider Type    SHILPIJUSTIN Allan, OT Student OT Student           Therapy Charges for Today     Code Description Service Date Service Provider Modifiers Qty    04078303448 HC OT NEUROMUSC RE EDUCATION EA 15 MIN 7/31/2017 Karen Allan OT Student GO 3    [id number]  OT THER PROC EA 15 MIN 7/31/2017 Karen Allan OT Student GO 1    [id number] HC OT SELF CARE/MGMT/TRAIN EA 15 MIN 8/1/2017 Karen Allan OT Student GO 2    [id number]  OT NEUROMUSC RE EDUCATION EA 15 MIN 8/1/2017 Karen Allan OT Student GO 2               Karen Allan OT Student  8/1/2017

## 2017-08-01 NOTE — THERAPY TREATMENT NOTE
Inpatient Rehabilitation - Speech Language Pathology Treatment Note  [facility]     Patient Name: Magnus Hartley  : 1928  MRN: [id number]  Today's Date: 2017         Admit Date: 2017    Visit Dx:      ICD-10-CM ICD-9-CM   1. Left hemiparesis G81.94 342.90   2. Dysarthria R47.1 784.51     Patient Active Problem List   Diagnosis   • Foot pain   • Asthma   • Benign essential hypertension   • Controlled type 2 diabetes mellitus without complication   • Dyslipidemia   • Macrocytosis without anemia   • Senile osteoporosis   • Post-menopausal osteoporosis   • Sinus bradycardia   • Stress incontinence in female   • Urge incontinence of urine   • Atrophic vaginitis   • Encounter for fitting and adjustment of other specified devices   • Incomplete emptying of bladder   • Urethral caruncle   • Incomplete uterovaginal prolapse   • Cerebrovascular accident (CVA) due to occlusion of right middle cerebral artery   • Atrial fibrillation   • Warfarin anticoagulation (goal INR 2-3)   • CVA (cerebrovascular accident due to intracerebral hemorrhage)              Adult Rehabilitation Note       17 1100 17 0940 17 1551    Rehab Assessment/Intervention    Discipline speech language pathologist  -LT physical therapy assistant  -LB occupational therapist  -SHILPI NULL,AF2    Document Type therapy note (daily note)  -LT therapy note (daily note)  -LB therapy note (daily note)  -SHILPI NULL,AF2    Subjective Information agree to therapy  -LT agree to therapy  -LB agree to therapy;no complaints  -CHAKASHILPI,AF2    Patient Effort, Rehab Treatment good  -LT good  -LB good  -CHAKASHILPI,AF2    Precautions/Limitations  fall precautions  -LB fall precautions  -SHILPI NULL,AF2    Recorded by [LT] Yvonne Miles MS CCC-SLP [LB] Angelica Treadwell PTA [CHAKA,SHILPI,AF2] NO Cooney (r) Karen Allan OT Student (t) NO Cooney (c)    Pain Assessment    Pain Assessment  No/denies pain  -LB No/denies pain  -CHAKASHILPI,AF2    Recorded  by  [LB] Angelica Treadwell PTA [AF,SHILPI,AF2] Adelia Salamanca OTR (r) Karen Allan OT Student (t) NO Cooney (c)    Cognitive Assessment/Intervention    Current Cognitive/Communication Assessment   functional  -AF,SHILPI,AF2    Orientation Status   oriented x 4  -AF,SHILPI,AF2    Follows Commands/Answers Questions   100% of the time;able to follow single-step instructions;needs cueing;needs repetition  -AF,SHILPI,AF2    Personal Safety   mild impairment;decreased insight to deficits  -AF,SHILPI,AF2    Personal Safety Interventions  fall prevention program maintained;gait belt;muscle strengthening facilitated;nonskid shoes/slippers when out of bed  -LB fall prevention program maintained;gait belt;nonskid shoes/slippers when out of bed  -AF,SHILPI,AF2    Recorded by  [LB] Angelica Treadwell PTA [AF,SHILPI,AF2] Adelia Salamanca OTR (r) Karen Allan, OT Student (t) NO Cooney (c)    Improve memory skills    Memory Skills Progress 60%;with inconsistent cues  -LT      Recorded by [LT] Yvonne Miles MS CCC-SLP      Improve functional problem solving    Functional Problem Solving Progress 50%;with inconsistent cues  -LT      Recorded by [LT] Yvonne Miles MS CCC-SLP      Improve executive function skills    Executive Function Skills Progress 80%;without cues  -LT      Recorded by [LT] Yvonne Miles MS CCC-SLP      General UE Assessment    ROM   RUE ROM was WNL;shoulder, left: UE ROM deficit;scapula, left: UE ROM deficit   AROM  -AF,SHILPI,AF2    ROM Detail   PROM shoulder flexion/extension WNL, PROM shoulder external rotation WNL  -AF,SHILPI,AF2    Recorded by   [AF,SHILPI,AF2] NO Cooney (r) Karen Allan OT Student (t) NO Cooney (c)    General Hand Assessment    ROM   other (see comments)  -AF,SHILPI,AF2    ROM Detail   able to complete L opposition w/ increased time and attention ; no opposition deficits noted on R hand   -AF,SHILPI,AF2    Recorded by   [AF,SHILPI,AF2] NO Cooney (r) Karen Allan, OT  Student (t) NO Cooney (c)    MMT (Manual Muscle Testing)    General MMT Assessment   no strength deficits identified   WFL for age   -AF,SHILPI,AF2    General MMT Assessment Detail    R 45, L 25; 3 pt pinch R 8, L 5; lateral pinch R 8, L 6   pt reports right hand dominance   -AF,SHILPI,AF2    Recorded by   [AF,SHILPI,AF2] NO Cooney (r) Karen Allan OT Student (t) NO Cooney (c)    Bed Mobility, Assessment/Treatment    Bed Mobility, Assistive Device  --   assessed on txment mat  -LB     Bed Mobility, Roll Left, [place]  contact guard assist  -LB     Bed Mobility, Scoot/Bridge, [place]  supervision required  -LB     Bed Mob, Supine to Sit, [place]  contact guard assist;verbal cues required  -LB     Recorded by  [LB] Angelica Treadwell PTA     Transfer Assessment/Treatment    Transfers, Bed-Chair [place]  minimum assist (75% patient effort);contact guard assist;verbal cues required  -LB     Transfers, Chair-Bed [place]  minimum assist (75% patient effort);contact guard assist;verbal cues required  -LB     Transfers, Sit-Stand [place]  contact guard assist  -LB     Transfers, Stand-Sit [place]  contact guard assist  -LB     Transfers, Sit-Stand-Sit, Assist Device  rolling walker  -LB     Transfer, Comment  car tsf, rwx, CGA, vc's  -LB to and from EOM from w/c CGA; from w/c to recliner CGA w/ vcs for hand placement   -AF,SHILPI,AF2    Recorded by  [LB] Angelica Treadwell PTA [AF,SHILPI,AF2] Adelia Salamanca OTR (r) Karen Allan OT Student (t) NO Cooney (c)    Gait Assessment/Treatment    Gait, [place] Level  contact guard assist;verbal cues required   cues for upright posture  -LB     Gait, Assistive Device  rolling walker  -LB     Gait, Distance (Feet)  200  -LB     Gait, Gait Deviations  rojelio decreased;forward flexed posture;left:;decreased heel strike  -LB     Recorded by  [LB] Angelica Treadwell PTA     Stairs Assessment/Treatment     Number of Stairs  4  -LB     Stairs, Handrail Location  both sides  -LB     Stairs, [place] Level  minimum assist (75% patient effort);contact guard assist  -LB     Stairs, Technique Used  step to step (ascending);step to step (descending)  -LB     Stairs, Comment  curb, rwx, min/CGA  -LB     Recorded by  [LB] Angelica Treadwell, PTA     Fine Motor Coordination Training    9-Hole Peg Right   25 secs  -AF,SHILPI,AF2    9-Hole Peg Left   61 secs  -AF,SHILPI,AF2    Box and Blocks Right   56 blocks  -AF,SHILPI,AF2    Box and Blocks Left   39 blocks  -AF,SHILPI,AF2    Recorded by   [AF,SHILPI,AF2] NO Cooney (r) Karen Allan, OT Student (t) NO Cooney (c)    Sensory Assessment/Intervention    Sensory Impairment   numbness;dull;sharp  -AF,SHILPI,AF2    Light Touch   LUE  -AF,SHILPI,AF2    LUE Light Touch   mild impairment   unable to recognize light touch w/ eyes closed   -AF,SHILPI,AF2    RUE Light Touch   WNL  -AF,SHILPI,AF2    Sharp/Dull Discrimination   LUE   sharp 1/3 trials correct; dull 2/3 trials correct  -AF,SHILPI,AF2    LUE Sharp/Dull Discrimination   severe impairment  -AF,SHILPI,AF2    Stereognosis   LUE   unable to recognize 3/3 objects; dropped objects w/o noticin  -AF,SHILPI,AF2    LUE Stereognosis   severe impairment  -AF,SHILPI,AF2    Recorded by   [AF,SHILPI,AF2] NO Cooney (r) Karen Allan, OT Student (t) NO Cooney (c)    Sensory Treatment    Sensory Reeducation Techniques   sensory training, visual reinforcement  -AF,SHILPI,AF2    Sensory Reeduction Treatment   pt was unable to find various objects w/ L hand from box of beans w/ eyes closed; pt dug to find objects w/ eyes open   -AF,SHILPI,AF2    Recorded by   [AF,SHILPI,AF2] NO Cooney (r) Karen Allan, OT Student (t) NO Cooney (c)    Positioning and Restraints    Pre-Treatment Position   sitting in chair/recliner  -AF,SHILPI,AF2    Post Treatment Position  wheelchair  -LB wheelchair  -CHAKA,SHILPI,AF2    In Chair   encouraged to call for assist;call  light within reach  -AF,SHILPI,AF2    In Wheelchair  sitting;with SLP  -LB     Recorded by  [LB] Angelica Treadewll PTA [AF,SHILPI,AF2] Adelia Salamanca, OTR (r) Karen Allan OT Student (t) Adelia Salamanca, OTR (c)      07/31/17 1000 07/31/17 0947       Rehab Assessment/Intervention    Discipline speech language pathologist  -LT physical therapy assistant  -LB     Document Type therapy note (daily note)  -LT therapy note (daily note)  -LB     Subjective Information agree to therapy  -LT agree to therapy  -LB     Patient Effort, Rehab Treatment good  -LT good  -LB     Precautions/Limitations  fall precautions  -LB     Recorded by [LT] Yvonne Miles MS CCC-SLP [LB] Angelica Treadwell PTA     Pain Assessment    Pain Assessment  No/denies pain  -LB     Recorded by  [LB] Angelica Treadwell PTA     Cognitive Assessment/Intervention    Personal Safety Interventions  fall prevention program maintained;gait belt;muscle strengthening facilitated;nonskid shoes/slippers when out of bed  -LB     Recorded by  [LB] Angelica Treadwell PTA     Communication Treatment Objective and Progress    Cognitive Linguistic Treatment Objectives Improve memory skills;Improve functional problem solving;Improve executive function skills  -LT      Recorded by [LT] Yvonne Miles MS CCC-SLP      Improve memory skills    Improve memory skills through: recalling unrelated word lists immediately;recalling unrelated word lists with an imposed delay;visual memory task;listen to a paragraph and answer questions;use written schedule;80%;without cues  -LT      Memory Skills Progress 0%;without cues;60%;70%;with inconsistent cues   recall 3 unrelated words p 5 mins  -LT      Recorded by [LT] Yvonne Miles MS CCC-SLP      Improve functional problem solving    Improve functional problem solving through: determine solutions to complex problems;complete high level reasoning task;complete organization/home management task;complete functional math task;80%;without  cues  -LT      Functional Problem Solving Progress 60%;with inconsistent cues  -LT      Recorded by [LT] Yvonne Miles MS CCC-SLP      Improve executive function skills    Improve executive function skills: identify strategies, strengths, limitations;exhibit cognitive flexibility;80%;without cues  -LT      Recorded by [LT] Yvonne Miles MS CCC-SLP      Improve articulation    Improve articulation: by over-articulating in connected speech;80%;without cues  -LT      Articulation Progress with inconsistent cues  -LT      Recorded by [LT] Yvonne Miles MS CCC-SLP      Bed Mobility, Assessment/Treatment    Bed Mobility, Comment  up in chair  -LB     Recorded by  [LB] Angelica Treadwell PTA     Transfer Assessment/Treatment    Transfers, Sit-Stand [place]  contact guard assist  -LB     Transfers, Stand-Sit [place]  contact guard assist  -LB     Transfers, Sit-Stand-Sit, Assist Device  rolling walker  -LB     Transfer, Comment  car tsf CG/sumi, Rwx  -LB     Recorded by  [LB] Angelica Treadwell PTA     Gait Assessment/Treatment    Gait, [place] Level  contact guard assist  -LB     Gait, Assistive Device  rolling walker  -LB     Gait, Distance (Feet)  160  -LB     Gait, Gait Deviations  rojelio decreased;forward flexed posture;narrow base;left:;decreased heel strike  -LB     Recorded by  [LB] Angelica Treadwell PTA     Stairs Assessment/Treatment    Number of Stairs  4  -LB     Stairs, Handrail Location  both sides  -LB     Stairs, [place] Level  minimum assist (75% patient effort);contact guard assist  -LB     Stairs, Technique Used  step to step (ascending);step to step (descending)  -LB     Stairs, Comment  curb, rwx, sumi, vc's  -LB     Recorded by  [LB] Angelica Treadwell PTA     Balance Skills Training    Standing-Level of Assistance  Minimum assistance  -LB     Static Standing Balance Support  parallel bars  -LB     Standing-Balance Activities  Compliant surfaces  -LB     Gait Balance-Level of  Assistance  Contact guard  -LB     Gait Balance Support  parallel bars  -LB     Gait Balance Activities  side-stepping  -LB     Recorded by  [LB] Angelica Treadwell PTA     Therapy Exercises    Bilateral Lower Extremities  AROM:;10 reps;standing  -LB     Recorded by  [LB] Angelica Treadwell PTA     Positioning and Restraints    Post Treatment Position  wheelchair  -LB     In Wheelchair  sitting;with SLP  -LB     Recorded by  [LB] Angelica Treadwell PTA       User Key  (r) = Recorded By, (t) = Taken By, (c) = Cosigned By    Initials Name Effective Dates    LT Yvonne Ginger, MS CCC-SLP 04/13/15 -     LB Angelica Treadwell, PTA 02/18/16 -     AF Adelia Salamanca, OTR 12/01/15 -     SHILPI Karen Allan, OT Student 07/11/17 -               IP SLP Goals       07/31/17 1102 07/27/17 1501 07/26/17 0930    Safely Consume Diet    Safely Consume Diet- SLP, Date Established   07/26/17  -OC    Safely Consume Diet- SLP, Time to Achieve  by discharge  -JT by discharge  -OC    Safely Consume Diet- SLP, Activity Level  Patient will improve oral skills for more efficient eating  -JT     Safely Consume Diet- SLP, Outcome  goal ongoing  -JT goal ongoing  -OC    Cognitive Linguistic- Optimal Participation in Care    Cognitive Linguistic Optimal Participation in Care- SLP, Date Established 07/31/17  -LT      Cognitive Linguistic Optimal Participation in Care- SLP, Time to Achieve by discharge  -LT      Cognitive Linguistic Optimal Participation in Care- SLP, Outcome goal ongoing  -LT      Dysarthria- Optimal Particpation in Care    Dysarthria Optimal Participation in Care- SLP, Date Established 07/31/17  -LT      Dysarthria Optimal Participation in Care- SLP, Time to Achieve by discharge  -LT      Dysarthria Optimal Participation in Care- SLP, Outcome goal ongoing  -LT        07/24/17 1817          Safely Consume Diet    Safely Consume Diet- SLP, Date Established 07/24/17  -SA      Safely Consume Diet- SLP, Time to Achieve by discharge  -SA         User Key  (r) = Recorded By, (t) = Taken By, (c) = Cosigned By    Initials Name Provider Type    SA Julia Beach, MS CCC-SLP Speech and Language Pathologist    OC Kaylah Vasquez MA,[facility]-SLP Speech and Language Pathologist    LT Yvonne Miles MS CCC-SLP Speech and Language Pathologist    JT Karen Ha Speech and Language Pathologist          EDUCATION  The patient has been educated in the following areas:   Cognitive Impairment.    SLP Recommendation and Plan                                         SLP Outcome Measures (last 72 hours)      SLP Outcome Measures       07/31/17 1100 07/29/17 1200       SLP Outcome Measures    Outcome Measure Used? Adult NOMS  -LT Adult NOMS  -JJ     FCM Scores    FCM Chosen  Motor Speech  -JJ     Swallowing FCM Score  5  -JJ     Problem Solving FCM Score 5  -LT        User Key  (r) = Recorded By, (t) = Taken By, (c) = Cosigned By    Initials Name Effective Dates     Lashawn Godwin, MS CCC-SLP 04/13/15 -     LT Yvonne Miles MS CCC-SLP 04/13/15 -           Time Calculation:         Time Calculation- SLP       08/01/17 1138 08/01/17 0930       Time Calculation- SLP    SLP Start Time 1000  -LT 0900  -LT     SLP Stop Time 1030  -LT 0930  -LT     SLP Time Calculation (min) 30 min  -LT 30 min  -LT       User Key  (r) = Recorded By, (t) = Taken By, (c) = Cosigned By    Initials Name Provider Type    LT Yvonne Miles MS CCC-SLP Speech and Language Pathologist          Therapy Charges for Today     Code Description Service Date Service Provider Modifiers Qty    [id number]  ST DEV OF COGN SKILLS EACH 15 MIN 7/31/2017 Yvonne Miles MS CCC-SLP GN 4    [id number]  ST DEV OF COGN SKILLS EACH 15 MIN 8/1/2017 Yvonne Miles MS CCC-SLP GN 4               Yvonne Miles MS CCC-SLP  8/1/2017

## 2017-08-01 NOTE — PROGRESS NOTES
Inpatient Rehabilitation Plan of Care Note    Plan of Care  Care Plan Reviewed - No updates at this time.    Safety    Performed Intervention(s)  falls precautions  hourly rounding, items within reach  bed alarm      Sphincter Control    Performed Intervention(s)  monitor intake and output  stool softners as ordered      Psychosocial    Performed Intervention(s)  encourage expression of feelings and concerns    Signed by: Carmela Cuellar RN

## 2017-08-01 NOTE — PROGRESS NOTES
Occupational Therapy: Branch    Physical Therapy: Individual: 60 minutes.    Speech Language Pathology:  Branch    Signed by: Angelica Treadwell PTA

## 2017-08-02 LAB
GLUCOSE BLDC GLUCOMTR-MCNC: 104 MG/DL (ref 70–130)
GLUCOSE BLDC GLUCOMTR-MCNC: 138 MG/DL (ref 70–130)
INR PPP: 2.87 (ref 0.9–1.1)
PROTHROMBIN TIME: 29.2 SECONDS (ref 11.7–14.2)

## 2017-08-02 PROCEDURE — 82962 GLUCOSE BLOOD TEST: CPT

## 2017-08-02 PROCEDURE — 97532: CPT

## 2017-08-02 PROCEDURE — 97535 SELF CARE MNGMENT TRAINING: CPT

## 2017-08-02 PROCEDURE — 97110 THERAPEUTIC EXERCISES: CPT

## 2017-08-02 PROCEDURE — 94799 UNLISTED PULMONARY SVC/PX: CPT

## 2017-08-02 PROCEDURE — 85610 PROTHROMBIN TIME: CPT | Performed by: HOSPITALIST

## 2017-08-02 PROCEDURE — 97112 NEUROMUSCULAR REEDUCATION: CPT

## 2017-08-02 RX ORDER — WARFARIN SODIUM 2.5 MG/1
2.5 TABLET ORAL
Status: DISCONTINUED | OUTPATIENT
Start: 2017-08-02 | End: 2017-08-03

## 2017-08-02 RX ADMIN — DOCUSATE SODIUM -SENNOSIDES 2 TABLET: 50; 8.6 TABLET, COATED ORAL at 20:09

## 2017-08-02 RX ADMIN — BUDESONIDE AND FORMOTEROL FUMARATE DIHYDRATE 2 PUFF: 80; 4.5 AEROSOL RESPIRATORY (INHALATION) at 20:46

## 2017-08-02 RX ADMIN — WARFARIN SODIUM 2.5 MG: 2.5 TABLET ORAL at 17:07

## 2017-08-02 RX ADMIN — BUDESONIDE AND FORMOTEROL FUMARATE DIHYDRATE 2 PUFF: 80; 4.5 AEROSOL RESPIRATORY (INHALATION) at 06:35

## 2017-08-02 RX ADMIN — METOPROLOL TARTRATE 25 MG: 25 TABLET ORAL at 20:09

## 2017-08-02 RX ADMIN — METOPROLOL TARTRATE 25 MG: 25 TABLET ORAL at 08:15

## 2017-08-02 RX ADMIN — PREDNISOLONE ACETATE 1 DROP: 10 SUSPENSION/ DROPS OPHTHALMIC at 08:15

## 2017-08-02 RX ADMIN — PREDNISOLONE ACETATE 1 DROP: 10 SUSPENSION/ DROPS OPHTHALMIC at 20:44

## 2017-08-02 RX ADMIN — ATORVASTATIN CALCIUM 80 MG: 80 TABLET, FILM COATED ORAL at 20:09

## 2017-08-02 NOTE — PROGRESS NOTES
Inpatient Rehabilitation Functional Measures Assessment    Functional Measures  BETH Eating:  [facility] Grooming: [facility] Bathing:  [facility] Upper Body Dressing:  [facility] Lower Body Dressing:  [facility] Toileting:  [facility] Bladder Management  Level of Assistance:  [place]  Frequency/Number of Accidents this Shift:  [facility] Bowel Management  Level of Assistance: [place]  Frequency/Number of Accidents this Shift: [facility] Bed/Chair/Wheelchair Transfer:  [facility] Toilet Transfer:  [facility] Tub/Shower Transfer:  [place]    Previously Documented Mode of Locomotion at Discharge: Field  BETH Expected Mode of Locomotion at Discharge: [facility] Walk/Wheelchair:  [facility] Stairs:  [facility] Comprehension:  Auditory comprehension is the usual mode. Comprehension  Score = 6, Modified [place].  Patient comprehends complex/abstract  information in their primary language, requiring:  BETH Expression:  Vocal expression is the usual mode. Expression Score = 6,  Modified Independent.  Patient expresses complex/abstract information in their  primary language, requiring:  [facility] Social Interaction:  Social Interaction Score = 7, Independent. Patient is  completely independent for social interaction.  There are no activity  limitations.  BETH Problem Solving:  Activity was not observed.  BETH Memory:  Memory Score = 6, Modified [place].  Patient is modified  independent for memory, requiring:    Therapy Mode Minutes  Occupational Therapy: Branch  Physical Therapy: Branch  Speech Language Pathology:  Branch    Signed by: Mila Flores RN

## 2017-08-02 NOTE — PROGRESS NOTES
Inpatient Rehabilitation Functional Measures Assessment    Functional Measures  BETH Eating:  [facility] Grooming: [facility] Bathing:  [facility] Upper Body Dressing:  [facility] Lower Body Dressing:  [facility] Toileting:  [facility] Bladder Management  Level of Assistance:  [place]  Frequency/Number of Accidents this Shift:  [facility] Bowel Management  Level of Assistance: [place]  Frequency/Number of Accidents this Shift: [facility] Bed/Chair/Wheelchair Transfer:  [facility] Toilet Transfer:  [facility] Tub/Shower Transfer:  [place]    Previously Documented Mode of Locomotion at Discharge: Field  BETH Expected Mode of Locomotion at Discharge: [facility] Walk/Wheelchair:  [facility] Stairs:  [facility] Comprehension:  Auditory comprehension is the usual mode. Comprehension  Score = 6, Modified [place].  Patient comprehends complex/abstract  information in their primary language, requiring: Additional time.  BETH Expression:  Vocal expression is the usual mode. Expression Score = 6,  Modified Independent.  Patient expresses complex/abstract information in their  primary language, requiring: Additional time.  BETH Social Interaction:  Social Interaction Score = 6, Modified Independent.  Patient is modified independent for social interaction, requiring: Requires  additional time.  BETH Problem Solving:  Problem Solving Score = 6, Modified [place].  Patient  makes appropriate decisions in order to solve complex problems, but requires  extra time.  BETH Memory:  Memory Score = 6, Modified [place].  Patient is modified  independent for memory, requiring: Requires additional time.    Therapy Mode Minutes  Occupational Therapy: Branch  Physical Therapy: [place]  Speech Language Pathology:  [place]    Signed by: Jayashree Weston RN

## 2017-08-02 NOTE — PLAN OF CARE
Problem: Stroke (Ischemic) (Adult)  Goal: Signs and Symptoms of Listed Potential Problems Will be Absent or Manageable (Stroke)  Outcome: Ongoing (interventions implemented as appropriate)    Problem: Fall Risk (Adult)  Goal: Absence of Falls  Outcome: Ongoing (interventions implemented as appropriate)    Problem: Patient Care Overview (Adult)  Goal: Plan of Care Review  Outcome: Ongoing (interventions implemented as appropriate)    08/02/17 1414   Coping/Psychosocial Response Interventions   Plan Of Care Reviewed With patient   Patient Care Overview   Progress improving   Outcome Evaluation   Outcome Summary/Follow up Plan Patient doing well. Patient participated in therapy.        Goal: Adult Individualization and Mutuality  Outcome: Ongoing (interventions implemented as appropriate)  Goal: Discharge Needs Assessment  Outcome: Ongoing (interventions implemented as appropriate)

## 2017-08-02 NOTE — PLAN OF CARE
Problem: Inpatient Occupational Therapy  Goal: Transfer Training Goal 1 STG- OT  Outcome: Ongoing (interventions implemented as appropriate)    07/29/17 1054 08/02/17 1558   Transfer Training OT STG   Transfer Training OT STG, Date Established --  08/02/17   Transfer Training OT STG, Time to Achieve --  1 wk   Transfer Training OT STG, Activity Type toilet --    Transfer Training OT STG, [place] Level supervision required;verbal cues required --    Transfer Training OT STG, Assist Device --  walker, rolling  (grab bars)   Transfer Training OT STG, Date Goal Reviewed --  08/02/17   Transfer Training OT STG, Outcome goal ongoing --        Goal: Transfer Training Goal 1 LTG- OT  Outcome: Ongoing (interventions implemented as appropriate)    07/29/17 1054 08/02/17 1558   Transfer Training OT LTG   Transfer Training OT LTG, Date Established 07/29/17 --    Transfer Training OT LTG, Time to Achieve 1 wk --    Transfer Training OT LTG, Activity Type toilet --    Transfer Training OT LTG, [place] Level conditional independence --    Transfer Training OT LTG, Date Goal Reviewed --  08/02/17   Transfer Training OT LTG, Outcome goal ongoing --        Goal: Transfer Training Goal 2 STG- OT  Outcome: Revised    08/02/17 1558   Transfer Training 2 OT STG   Transfer Training 2 OT STG, Date Established 08/02/17   Transfer Training 2 OT STG, Time to Achieve 1 wk   Transfer Training 2 OT STG, Activity Type shower chair  (rwx)   Transfer Training 2 OT STG, [place] Level supervision required   Transfer Training 2 OT STG, Outcome goal revised       Goal: Transfer Training Goal 2 LTG- OT  Outcome: Ongoing (interventions implemented as appropriate)    07/29/17 1054 08/02/17 1558   Transfer Training 2 OT LTG   Transfer Training 2 OT LTG, Date Established --  08/02/17   Transfer Training 2 OT LTG, Time to Achieve --  by discharge   Transfer Training 2 OT LTG, Activity Type shower chair;tub --    Transfer Training 2 OT LTG,  [place] Level supervision required --    Transfer Training 2 OT LTG, Date Goal Reviewed --  08/02/17   Transfer Training 2 OT LTG, Outcome goal ongoing --        Goal: Bathing Goal STG- OT  Outcome: Ongoing (interventions implemented as appropriate)    07/29/17 1054 08/02/17 1558   Bathing OT STG   Bathing Goal OT STG, Date Established --  08/02/17   Bathing Goal OT STG, Time to Achieve --  1 wk   Bathing Goal OT STG, [place] Level --  contact guard assist   Bathing Goal OT STG, Assist Device --  grab bars;shower head, detachable;shower chair with back   Bathing Goal OT STG, Date Goal Reviewed --  08/02/17   Bathing Goal OT STG, Outcome goal ongoing --        Goal: Bathing Goal LTG- OT  Outcome: Ongoing (interventions implemented as appropriate)    07/29/17 1054 08/02/17 1558   Bathing OT LTG   Bathing Goal OT LTG, Date Established --  08/02/17   Bathing Goal OT LTG, Time to Achieve --  by discharge   Bathing Goal OT LTG, Activity Type upper body bathing;lower body bathing --    Bathing Goal OT LTG, [place] Level --  supervision required   Bathing Goal OT LTG, Date Goal Reviewed --  08/02/17   Bathing Goal OT LTG, Outcome goal ongoing --        Goal: Grooming Goal STG- OT    07/29/17 1054 08/02/17 1558   Grooming OT STG   Grooming Goal OT STG, Date Established --  08/02/17   Grooming Goal OT STG, Time to Achieve --  1 wk   Grooming Goal OT STG, [place] Level conditional independence --    Grooming Goal OT STG, Date Goal Reviewed --  08/02/17       Goal: Grooming Goal LTG- OT  Outcome: Ongoing (interventions implemented as appropriate)    07/29/17 1054 08/02/17 1558   Grooming OT LTG   Grooming Goal OT LTG, Date Established --  08/02/17   Grooming Goal OT LTG, Time to Achieve --  by discharge   Grooming Goal OT LTG, [place] Level conditional independence --    Grooming Goal OT LTG, Date Goal Reviewed --  08/02/17       Goal: LB Dressing Goal STG- OT  Outcome: Ongoing (interventions  implemented as appropriate)    08/02/17 1558   LB Dressing OT STG   LB Dressing Goal OT STG, Date Established 08/02/17   LB Dressing Goal OT STG, Time to Achieve 1 wk   LB Dressing Goal OT STG, [place] Level contact guard assist  (tying shoes )   LB Dressing Goal OT STG, Adaptive Equipment (elastic laces)   LB Dressing Goal OT STG, Date Goal Reviewed 08/02/17       Goal: LB Dressing Goal LTG- OT  Outcome: Ongoing (interventions implemented as appropriate)    08/02/17 1558   LB Dressing OT LTG   LB Dressing Goal OT LTG, Date Established 08/02/17   LB Dressing Goal OT LTG, Time to Achieve by discharge   LB Dressing Goal OT LTG, [place] Level supervision required   LB Dressing Goal OT LTG, Adaptive Equipment (w/ shoe strings)   LB Dressing Goal OT LTG, Date Goal Reviewed 08/02/17       Goal: UB Dressing Goal STG- OT  Outcome: Ongoing (interventions implemented as appropriate)    07/29/17 1054 08/02/17 1558   UB Dressing OT STG   UB Dressing Goal OT STG, Date Established --  08/02/17   UB Dressing Goal OT STG, Time to Achieve --  1 wk   UB Dressing Goal OT STG, [place] Level set up required --    UB Dressing Goal OT STG, Date Goal Reviewed --  08/02/17   UB Dressing Goal OT STG, Outcome goal ongoing --        Goal: UB Dressing Goal LTG- OT  Outcome: Ongoing (interventions implemented as appropriate)    07/29/17 1054 08/02/17 1558   UB Dressing OT LTG   UB Dressing Goal OT LTG, Date Established --  08/02/17   UB Dressing Goal OT LTG, Time to Achieve --  by discharge   UB Dressing Goal OT LTG, [place] Level conditional independence --    UB Dressing Goal OT LTG, Date Goal Reviewed --  08/02/17   UB Dressing Goal OT LTG, Outcome goal ongoing --

## 2017-08-02 NOTE — PROGRESS NOTES
LOS: 5 days   Patient Care Team:  JOSHUA Chaudhari as PCP - General  Mario Mata MD as PCP - Claims Attributed  Hortencia Lisa MD as Consulting Physician (Cardiology)  Pierre Walker MD as Consulting Physician (Gastroenterology)    Chief Complaint: same    Subjective     History of Present Illness    Subjective Pt is awake and alert. Pt is upset because she was told that she has to take all meds at once in AM.     History taken from: patient    Objective     Vital Signs  Temp:  [98.2 °F (36.8 °C)-98.3 °F (36.8 °C)] 98.2 °F (36.8 °C)  Heart Rate:  [54-96] 82  Resp:  [16-20] 16  BP: (119-154)/(69-86) 154/86    Objectiveexam unchanged calves soft NT resp unlabored and regular    Results Review:     I reviewed the patient's new clinical results.    Medication Review:     Assessment/Plan     Active Problems:    Benign essential hypertension    Controlled type 2 diabetes mellitus without complication    Cerebrovascular accident (CVA) due to occlusion of right middle cerebral artery    Atrial fibrillation    Warfarin anticoagulation (goal INR 2-3)    CVA (cerebrovascular accident due to intracerebral hemorrhage)      Assessment & Plan    Bayron Nathan MD  08/02/17  8:25 AM    Time:

## 2017-08-02 NOTE — THERAPY TREATMENT NOTE
Inpatient Rehabilitation - Speech Language Pathology Treatment Note  [facility]     Patient Name: Magnus Hartley  : 1928  MRN: [id number]  Today's Date: 2017         Admit Date: 2017    Visit Dx:      ICD-10-CM ICD-9-CM   1. Left hemiparesis G81.94 342.90   2. Dysarthria R47.1 784.51     Patient Active Problem List   Diagnosis   • Foot pain   • Asthma   • Benign essential hypertension   • Controlled type 2 diabetes mellitus without complication   • Dyslipidemia   • Macrocytosis without anemia   • Senile osteoporosis   • Post-menopausal osteoporosis   • Sinus bradycardia   • Stress incontinence in female   • Urge incontinence of urine   • Atrophic vaginitis   • Encounter for fitting and adjustment of other specified devices   • Incomplete emptying of bladder   • Urethral caruncle   • Incomplete uterovaginal prolapse   • Cerebrovascular accident (CVA) due to occlusion of right middle cerebral artery   • Atrial fibrillation   • Warfarin anticoagulation (goal INR 2-3)   • CVA (cerebrovascular accident due to intracerebral hemorrhage)              Adult Rehabilitation Note       17 1000 17 0941 17 1150    Rehab Assessment/Intervention    Discipline speech language pathologist  -LT physical therapy assistant  -LB occupational therapist  -CHAKASHILPI,AF2    Document Type therapy note (daily note)  -LT therapy note (daily note)  -LB therapy note (daily note)  -SHILPI NULL AF2    Subjective Information agree to therapy  -LT agree to therapy  -LB agree to therapy;no complaints  -AFSHILPI,AF2    Patient Effort, Rehab Treatment good  -LT good  -LB good  -CHAKASHILPI,AF2    Symptoms Noted During/After Treatment   shortness of breath   w/ tsf  -AFSHILPI,AF2    Symptoms Noted Comment   rest breaks as needed; monitered SpO2  -AF,SHILPI,AF2    Precautions/Limitations  fall precautions  -LB fall precautions  -CHAKASHILPI,AF2    Recorded by [LT] Yvonne Miles MS CCC-SLP [LB] Angelica Treadwell PTA [AF,SHILPI,AF2] Adelia Salamanca,  OTR (r) Karen Allan OT Student (t) NO Cooney (c)    Vital Signs    Intra SpO2 (%)   98  -AF,SHILPI,AF2    Recorded by   [AF,SHILPI,AF2] NO Cooney (r) Karen Allan OT Student (t) NO Cooney (c)    Pain Assessment    Pain Assessment  No/denies pain  -LB No/denies pain  -AF,SHILPI,AF2    Recorded by  [LB] Angelica Treadwell PTA [AF,SHILPI,AF2] Adelia Salamanca OTR (r) Karen Allan, OT Student (t) NO Cooney (c)    Cognitive Assessment/Intervention    Current Cognitive/Communication Assessment   functional  -AF,SHILPI,AF2    Orientation Status   oriented x 4  -AF,SHILPI,AF2    Follows Commands/Answers Questions   100% of the time;able to follow single-step instructions;needs cueing;needs repetition  -AF,SHILPI,AF2    Personal Safety   mild impairment;decreased insight to deficits;decreased awareness, need for safety  -AF,SHILPI,AF2    Personal Safety Interventions  fall prevention program maintained;gait belt;muscle strengthening facilitated;nonskid shoes/slippers when out of bed  -LB fall prevention program maintained;gait belt;nonskid shoes/slippers when out of bed;supervised activity  -AF,SHILPI,AF2    Recorded by  [LB] Angelica Treadwell PTA [AF,SHILPI,AF2] Adelia Salamanca OTR (r) Karen Allan OT Student (t) NO Cooney (c)    Improve memory skills    Memory Skills Progress 80%;with inconsistent cues  -LT      Recorded by [LT] Yvonne Miles MS CCC-SLP      Improve functional problem solving    Functional Problem Solving Progress 70%;with inconsistent cues  -LT      Recorded by [LT] Yvonne Miles MS CCC-SLP      Improve executive function skills    Executive Function Skills Progress 70%;without cues  -LT      Recorded by [LT] Yvonne Miles MS CCC-SLP      Bed Mobility, Assessment/Treatment    Bed Mobility, Assistive Device  --   assessed on txment mat  -LB     Bed Mobility, Roll Left, [place]  supervision required  -LB     Bed Mobility, Roll Right, [place]  supervision  required  -LB     Bed Mob, Supine to Sit, [place]  supervision required  -LB contact guard assist  -AF,SHILPI,AF2    Bed Mob, Sit to Supine, [place]  supervision required  -LB     Recorded by  [LB] Angelica Treadwell PTA [AF,SHILPI,AF2] Adelia Salamanca, OTR (r) Karen Allan, OT Student (t) Adelia Salamanca OTR (c)    Transfer Assessment/Treatment    Transfers, Bed-Chair [place]  contact guard assist;minimum assist (75% patient effort)  -LB contact guard assist;verbal cues required  -AF,SHILPI,AF2    Transfers, Chair-Bed [place]  contact guard assist;minimum assist (75% patient effort)  -LB     Transfers, Bed-Chair-Bed, Assist Device  rolling walker  -LB     Transfers, Sit-Stand [place]  contact guard assist  -LB contact guard assist;verbal cues required   vcs to maintain wide base of support  -AF,SHILPI,AF2    Transfers, Stand-Sit [place]  contact guard assist  -LB verbal cues required;contact guard assist   vcs to reach back for w/c  -AF,SHILIP,AF2    Transfers, Sit-Stand-Sit, Assist Device  rolling walker  -LB     Toilet Transfer, [place]   verbal cues required;contact guard assist   vcs for hand placement   -AF,SHILPI,AF2    Toilet Transfer, Assistive Device   rolling walker;elevated toilet seat   grab bars  -AF,SHILPI,AF2    Transfer, Comment  car tsf CGA/min, Rwx  -LB EOM tsf to and from w/c; CGA, VCs/gestural prompts for hand placement   -AF,SHILPI,AF2    Recorded by  [LB] Angelica Treadwell PTA [AF,SHILPI,AF2] Adelia Salamanca OTR (r) Karen Allan, OT Student (t) NO Cooney (c)    Gait Assessment/Treatment    Gait, [place] Level  contact guard assist;verbal cues required  -LB     Gait, Assistive Device  rolling walker  -LB     Gait, Distance (Feet)  200  -LB     Gait, Gait Deviations  rojelio decreased;forward flexed posture  -LB     Recorded by  [LB] Angelica Treadwell PTA     Stairs Assessment/Treatment    Number of Stairs  4  -LB     Stairs, Handrail Location  both sides  -LB      Stairs, [place] Level  minimum assist (75% patient effort);contact guard assist;verbal cues required  -LB     Stairs, Technique Used  step to step (ascending);step to step (descending)  -LB     Stairs, Comment  curb rwx, cga, vc's  -LB     Recorded by  [LB] Angelica Treadwell, PTA     Functional Mobility    Functional Mobility- Ind. Level   contact guard assist  -AF,SHILPI,AF2    Functional Mobility- Device   rolling walker  -AF,SHILPI,AF2    Functional Mobility-Maintain WBing   able to maintain weight bearing status  -AF,SHILPI,AF2    Functional Mobility- Comment   pt was able to walk into bathroom from bed and maneuver around bathroom w/ rwx w/ CGA   -AF,SHILPI,AF2    Recorded by   [AF,SHILPI,AF2] NO Cooney (r) Karen Allan, OT Student (t) NO Cooney (c)    Upper Body Dressing Assessment/Training    UB Dressing Assess/Train, Clothing Type   doffing:;donning:;t-shirt  -AF,SHILPI,AF2    UB Dressing Assess/Train, Position   sitting;edge of bed  -AF,SHILPI,AF2    UB Dressing Assess/Train, [place]   minimum assist (75% patient effort)   min a to thread R arm through correct hole   -AF,SHILPI,AF2    Recorded by   [AF,SHILPI,AF2] NO Cooney (r) Karen Allan, OT Student (t) NO Cooney (c)    Lower Body Dressing Assessment/Training    LB Dressing Assess/Train, Clothing Type   doffing:;donning:;pants;shoes;socks  -AF,SHILPI,AF2    LB Dressing Assess/Train, Position   sitting;edge of bed;standing  -AF,SHILPI,AF2    LB Dressing Assess/Train, [place]   minimum assist (75% patient effort);contact guard assist  -AF,SHILPI,AF2    LB Dressing Assess/Train, Comment   CGA while standing to pull up pants; min a to adjust and tie shoes due to decreased sensation in LUE  -AF,SHILPI,AF2    Recorded by   [AF,SHILPI,AF2] NO Cooney (r) Karen Allan OT Student (t) Adelia Mattie Salamanca, OTR (c)    Toileting Assessment/Training    Toileting Assess/Train, Assistive Device   riley zapata  -AF,SHILPI,AF2    Toileting Assess/Train,  Position   sitting  -AF,SHILPI,AF2    Toileting Assess/Train, Indepen Level   contact guard assist  -AF,SHILPI,AF2    Toileting Assess/Train, Comment   pt able to manage clothes and perform hygiene IND; CGA while standing to adjust clothes  -AF,SHILPI,AF2    Recorded by   [AF,SHILPI,AF2] NO Cooney (r) Karen Allan, OT Student (t) NO Cooney (c)    Sensory Treatment    Sensory Reeducation Techniques   sensory training, visual reinforcement  -AF,SHILPI,AF2    Sensory Reeduction Treatment   pt matched objects placed in hand w/ sight word cards w/ eyes closed, pt was unable to ID any items but was able to ID items w/ eyes open and tactile sensation; pt stated some items felt heavy, some soft w/o visual input;   -AF,SHILPI,AF2    Sensory Treatment Comment   pt selected small foam beads from a pile and put on a string using BLE while standing at countertop, pt displayed difficulty w/ using appropraite grasp to pickup and maintain hold on beads; pt found and picked out 10 buttons from yellow theraputty while standing at countertop w/ BUE, pt reported having a difficult time; pt was provided w/ sponge to squeeze and rub on LUE to increase sensory input to regain functional grasp and sensation necessary for self care tasks  -AF,SHILPI,AF2    Recorded by   [AF,SHILPI,AF2] NO Cooney (r) Karen Allan, OT Student (t) NO [place] (c)    Positioning and Restraints    Pre-Treatment Position   in bed  -AF,SHILPI,AF2    Post Treatment Position  wheelchair  -LB chair  -AF,SHILPI,AF2    In Chair   encouraged to call for assist;call light within reach;sitting  -AF,SHILPI,AF2    In Wheelchair  sitting;with SLP  -LB     Recorded by  [LB] Angelica Treadwell, PTA [AF,SHILPI,AF2] NO Cooney (r) Karen Allan OT Student (t) NO Cooney (c)      08/01/17 1100 08/01/17 0940 07/31/17 8671    Rehab Assessment/Intervention    Discipline speech language pathologist  -LT physical therapy assistant  -LB occupational therapist   -AF,SHILPI,AF2    Document Type therapy note (daily note)  -LT therapy note (daily note)  -LB therapy note (daily note)  -AF,SHILPI,AF2    Subjective Information agree to therapy  -LT agree to therapy  -LB agree to therapy;no complaints  -AF,SHILPI,AF2    Patient Effort, Rehab Treatment good  -LT good  -LB good  -AF,SHILPI,AF2    Precautions/Limitations  fall precautions  -LB fall precautions  -AF,SHILPI,AF2    Recorded by [LT] Yvonne Miles MS CCC-SLP [LB] Angelica Treadwell PTA [AF,SHILPI,AF2] Adelia Salamanca OTR (r) Karen Allan OT Student (t) NO Cooney (c)    Pain Assessment    Pain Assessment  No/denies pain  -LB No/denies pain  -AF,SIHLPI,AF2    Recorded by  [LB] Angelica Treadwell PTA [AF,SHILPI,AF2] NO Cooney (r) Karen Allan OT Student (t) NO Cooney (c)    Cognitive Assessment/Intervention    Current Cognitive/Communication Assessment   functional  -AF,SHILPI,AF2    Orientation Status   oriented x 4  -AF,SHILPI,AF2    Follows Commands/Answers Questions   100% of the time;able to follow single-step instructions;needs cueing;needs repetition  -AF,SHILPI,AF2    Personal Safety   mild impairment;decreased insight to deficits  -AF,SHILPI,AF2    Personal Safety Interventions  fall prevention program maintained;gait belt;muscle strengthening facilitated;nonskid shoes/slippers when out of bed  -LB fall prevention program maintained;gait belt;nonskid shoes/slippers when out of bed  -AF,SHILPI,AF2    Recorded by  [LB] Angelica Treadwell PTA [AF,SHILPI,AF2] NO Cooney (r) Karen Allan OT Student (t) NO Cooney (c)    Improve memory skills    Memory Skills Progress 60%;with inconsistent cues  -LT      Recorded by [LT] Yvonne Miles MS CCC-SLP      Improve functional problem solving    Functional Problem Solving Progress 50%;with inconsistent cues  -LT      Recorded by [LT] Yvonne Ginger, MS CCC-SLP      Improve executive function skills    Executive Function Skills Progress 80%;without cues  -LT       Recorded by [LT] Yvonne Miles MS CCC-SLP      General UE Assessment    ROM   RUE ROM was WNL;shoulder, left: UE ROM deficit;scapula, left: UE ROM deficit   AROM  -AF,SHILPI,AF2    ROM Detail   PROM shoulder flexion/extension WNL, PROM shoulder external rotation WNL  -AF,SHILPI,AF2    Recorded by   [AF,SHILPI,AF2] NO Cooney (r) Karen Allan OT Student (t) NO Cooney (c)    General Hand Assessment    ROM   other (see comments)  -AF,SHILPI,AF2    ROM Detail   able to complete L opposition w/ increased time and attention ; no opposition deficits noted on R hand   -AF,SHILPI,AF2    Recorded by   [AF,SHILPI,AF2] NO Cooney (r) Karen Allan OT Student (t) NO Cooney (c)    MMT (Manual Muscle Testing)    General MMT Assessment   no strength deficits identified   WFL for age   -AF,SHILPI,AF2    General MMT Assessment Detail    R 45, L 25; 3 pt pinch R 8, L 5; lateral pinch R 8, L 6   pt reports right hand dominance   -AF,SHILPI,AF2    Recorded by   [AF,SHILPI,AF2] NO Cooney (r) Karen Allan, OT Student (t) NO Cooney (c)    Bed Mobility, Assessment/Treatment    Bed Mobility, Assistive Device  --   assessed on txment mat  -LB     Bed Mobility, Roll Left, [place]  contact guard assist  -LB     Bed Mobility, Roll Right, [place]  contact guard assist;verbal cues required  -LB     Bed Mobility, Scoot/Bridge, [place]  supervision required  -LB     Bed Mob, Supine to Sit, [place]  contact guard assist;verbal cues required  -LB     Bed Mob, Sit to Supine, [place]  contact guard assist;verbal cues required  -LB     Recorded by  [LB] Angelica Treadwell PTA     Transfer Assessment/Treatment    Transfers, Bed-Chair [place]  minimum assist (75% patient effort);contact guard assist;verbal cues required  -LB     Transfers, Chair-Bed [place]  minimum assist (75% patient effort);contact guard assist;verbal cues required  -LB     Transfers, Sit-Stand [place]   contact guard assist  -LB     Transfers, Stand-Sit [place]  contact guard assist  -LB     Transfers, Sit-Stand-Sit, Assist Device  rolling walker  -LB     Transfer, Comment  car tsf, rwx, CGA, vc's  -LB to and from EOM from w/c CGA; from w/c to recliner CGA w/ vcs for hand placement   -AF,SHILPI,AF2    Recorded by  [LB] Angelica Treadwell PTA [AF,SHILPI,AF2] Adelia Salamanca OTR (r) Karen Allan, OT Student (t) NO Cooney (c)    Gait Assessment/Treatment    Gait, [place] Level  contact guard assist;verbal cues required   cues for upright posture  -LB     Gait, Assistive Device  rolling walker  -LB     Gait, Distance (Feet)  200  -LB     Gait, Gait Deviations  rojelio decreased;forward flexed posture;left:;decreased heel strike  -LB     Recorded by  [LB] Angelica Treadwell PTA     Stairs Assessment/Treatment    Number of Stairs  4  -LB     Stairs, Handrail Location  both sides  -LB     Stairs, [place] Level  minimum assist (75% patient effort);contact guard assist  -LB     Stairs, Technique Used  step to step (ascending);step to step (descending)  -LB     Stairs, Comment  curb, rwx, min/CGA  -LB     Recorded by  [LB] Angelica Treadwell PTA     Therapy Exercises    Bilateral Lower Extremities  AROM:;10 reps;standing;supine  -LB     Recorded by  [LB] Angelica Treadwell PTA     Fine Motor Coordination Training    9-Hole Peg Right   25 secs  -AF,SHILPI,AF2    9-Hole Peg Left   61 secs  -AF,SHILPI,AF2    Box and Blocks Right   56 blocks  -AF,SHILPI,AF2    Box and Blocks Left   39 blocks  -AF,SHILPI,AF2    Recorded by   [AF,SHILPI,AF2] NO Cooney (r) Karen Allan, OT Student (t) NO Cooney (c)    Sensory Assessment/Intervention    Sensory Impairment   numbness;dull;sharp  -AF,SHILPI,AF2    Light Touch   LUE  -AF,SHILPI,AF2    LUE Light Touch   mild impairment   unable to recognize light touch w/ eyes closed   -AF,SHILPI,AF2    RUE Light Touch   WNL  -AF,SHILPI,AF2    Sharp/Dull Discrimination   LUE   sharp 1/3  trials correct; dull 2/3 trials correct  -AF,SHILPI,AF2    LUE Sharp/Dull Discrimination   severe impairment  -AF,SHILPI,AF2    Stereognosis   LUE   unable to recognize 3/3 objects; dropped objects w/o noticin  -AF,SHILPI,AF2    LUE Stereognosis   severe impairment  -AF,SHILPI,AF2    Recorded by   [AF,SHILPI,AF2] NO Cooney (r) Karen Allan OT Student (t) NO Cooney (c)    Sensory Treatment    Sensory Reeducation Techniques   sensory training, visual reinforcement  -AF,SHILPI,AF2    Sensory Reeduction Treatment   pt was unable to find various objects w/ L hand from box of beans w/ eyes closed; pt dug to find objects w/ eyes open   -AF,SHILPI,AF2    Recorded by   [AF,SHILPI,AF2] NO Cooney (r) Karen Allan, OT Student (t) NO Cooney (c)    Positioning and Restraints    Pre-Treatment Position   sitting in chair/recliner  -AF,SHILPI,AF2    Post Treatment Position  wheelchair  -LB wheelchair  -AF,SHILPI,AF2    In Chair   encouraged to call for assist;call light within reach  -AF,SHILPI,AF2    In Wheelchair  sitting;with SLP  -LB     Recorded by  [LB] Angelica Treadwell PTA [AF,SHILPI,AF2] NO Cooney (r) Karen Allan, OT Student (t) NO Cooney (c)      07/31/17 1000 07/31/17 0947       Rehab Assessment/Intervention    Discipline speech language pathologist  -LT physical therapy assistant  -LB     Document Type therapy note (daily note)  -LT therapy note (daily note)  -LB     Subjective Information agree to therapy  -LT agree to therapy  -LB     Patient Effort, Rehab Treatment good  -LT good  -LB     Precautions/Limitations  fall precautions  -LB     Recorded by [LT] Yvonne Miles MS CCC-SLP [LB] Angelica Treadwell PTA     Pain Assessment    Pain Assessment  No/denies pain  -LB     Recorded by  [LB] Angelica Treadwell PTA     Cognitive Assessment/Intervention    Personal Safety Interventions  fall prevention program maintained;gait belt;muscle strengthening facilitated;nonskid shoes/slippers when  out of bed  -LB     Recorded by  [LB] Angelica Treadwell PTA     Communication Treatment Objective and Progress    Cognitive Linguistic Treatment Objectives Improve memory skills;Improve functional problem solving;Improve executive function skills  -LT      Recorded by [LT] Yvonne Miles MS CCC-SLP      Improve memory skills    Improve memory skills through: recalling unrelated word lists immediately;recalling unrelated word lists with an imposed delay;visual memory task;listen to a paragraph and answer questions;use written schedule;80%;without cues  -LT      Memory Skills Progress 0%;without cues;60%;70%;with inconsistent cues   recall 3 unrelated words p 5 mins  -LT      Recorded by [LT] Yvonne Miles MS CCC-SLP      Improve functional problem solving    Improve functional problem solving through: determine solutions to complex problems;complete high level reasoning task;complete organization/home management task;complete functional math task;80%;without cues  -LT      Functional Problem Solving Progress 60%;with inconsistent cues  -LT      Recorded by [LT] Yvonne Miles MS CCC-SLP      Improve executive function skills    Improve executive function skills: identify strategies, strengths, limitations;exhibit cognitive flexibility;80%;without cues  -LT      Recorded by [LT] Yvonne Miles MS CCC-SLP      Improve articulation    Improve articulation: by over-articulating in connected speech;80%;without cues  -LT      Articulation Progress with inconsistent cues  -LT      Recorded by [LT] Yvonne Miles MS CCC-SLP      Bed Mobility, Assessment/Treatment    Bed Mobility, Comment  up in chair  -LB     Recorded by  [LB] Angelica Treadwell PTA     Transfer Assessment/Treatment    Transfers, Sit-Stand [place]  contact guard assist  -LB     Transfers, Stand-Sit [place]  contact guard assist  -LB     Transfers, Sit-Stand-Sit, Assist Device  rolling walker  -LB     Transfer, Comment  car tsf CG/sumi, Rwx  -LB      Recorded by  [WINNIE] Angelica Treadwell PTA     Gait Assessment/Treatment    Gait, [place] Level  contact guard assist  -LB     Gait, Assistive Device  rolling walker  -LB     Gait, Distance (Feet)  160  -LB     Gait, Gait Deviations  rojelio decreased;forward flexed posture;narrow base;left:;decreased heel strike  -LB     Recorded by  [WINNIE] Angelica Treadwell PTA     Stairs Assessment/Treatment    Number of Stairs  4  -LB     Stairs, Handrail Location  both sides  -LB     Stairs, [place] Level  minimum assist (75% patient effort);contact guard assist  -LB     Stairs, Technique Used  step to step (ascending);step to step (descending)  -LB     Stairs, Comment  curb, rwx, sumi, vc's  -LB     Recorded by  [WINNIE] Angelica Treadwell PTA     Balance Skills Training    Standing-Level of Assistance  Minimum assistance  -LB     Static Standing Balance Support  parallel bars  -LB     Standing-Balance Activities  Compliant surfaces  -LB     Gait Balance-Level of Assistance  Contact guard  -LB     Gait Balance Support  parallel bars  -LB     Gait Balance Activities  side-stepping  -LB     Recorded by  [WINNIE] Angelica Treadwell PTA     Therapy Exercises    Bilateral Lower Extremities  AROM:;10 reps;standing  -LB     Recorded by  [WINNIE] Angelica Treadwell PTA     Positioning and Restraints    Post Treatment Position  wheelchair  -LB     In Wheelchair  sitting;with SLP  -LB     Recorded by  [WINNIE] Angelica Treadwell PTA       User Key  (r) = Recorded By, (t) = Taken By, (c) = Cosigned By    Initials Name Effective Dates    LT Yvonne Miles MS CCC-SLP 04/13/15 -     LB Angelica Treadwell PTA 02/18/16 -     AF Adelia Salamanca, OTR 12/01/15 -     SHILPI Karen Allan, OT Student 07/11/17 -               IP SLP Goals       07/31/17 1102 07/27/17 1501 07/26/17 0930    Safely Consume Diet    Safely Consume Diet- SLP, Date Established   07/26/17  -OC    Safely Consume Diet- SLP, Time to Achieve  by discharge  -JT by discharge  -OC     Safely Consume Diet- SLP, Activity Level  Patient will improve oral skills for more efficient eating  -JT     Safely Consume Diet- SLP, Outcome  goal ongoing  -JT goal ongoing  -OC    Cognitive Linguistic- Optimal Participation in Care    Cognitive Linguistic Optimal Participation in Care- SLP, Date Established 07/31/17  -LT      Cognitive Linguistic Optimal Participation in Care- SLP, Time to Achieve by discharge  -LT      Cognitive Linguistic Optimal Participation in Care- SLP, Outcome goal ongoing  -LT      Dysarthria- Optimal Particpation in Care    Dysarthria Optimal Participation in Care- SLP, Date Established 07/31/17  -LT      Dysarthria Optimal Participation in Care- SLP, Time to Achieve by discharge  -LT      Dysarthria Optimal Participation in Care- SLP, Outcome goal ongoing  -LT        07/24/17 1817          Safely Consume Diet    Safely Consume Diet- SLP, Date Established 07/24/17  -SA      Safely Consume Diet- SLP, Time to Achieve by discharge  -SA        User Key  (r) = Recorded By, (t) = Taken By, (c) = Cosigned By    Initials Name Provider Type    SA Julia Beach MS CCC-SLP Speech and Language Pathologist    OC Kaylah Vasquez MA,[facility]-SLP Speech and Language Pathologist    LT Yvonne Miles MS CCC-SLP Speech and Language Pathologist    CRYSTALT Karen Ha Speech and Language Pathologist          EDUCATION  The patient has been educated in the following areas:   Cognitive Impairment.    SLP Recommendation and Plan                                         SLP Outcome Measures (last 72 hours)      SLP Outcome Measures       07/31/17 1100          SLP Outcome Measures    Outcome Measure Used? Adult NOMS  -LT      FCM Scores    Problem Solving FCM Score 5  -LT        User Key  (r) = Recorded By, (t) = Taken By, (c) = Cosigned By    Initials Name Effective Dates    LT Yvonne Miles MS CCC-SLP 04/13/15 -           Time Calculation:         Time Calculation- SLP       08/02/17 1030 08/02/17 0830        Time Calculation- SLP    SLP Start Time 1000  -LT 0800  -LT     SLP Stop Time 1030  -LT 0830  -LT     SLP Time Calculation (min) 30 min  -LT 30 min  -LT       User Key  (r) = Recorded By, (t) = Taken By, (c) = Cosigned By    Initials Name Provider Type    LT Yvonne Miles MS CCC-SLP Speech and Language Pathologist          Therapy Charges for Today     Code Description Service Date Service Provider Modifiers Qty    [id number]  ST DEV OF COGN SKILLS EACH 15 MIN 8/1/2017 Yvonne Miles MS CCC-SLP GN 4    [id number]  ST DEV OF COGN SKILLS EACH 15 MIN 8/2/2017 Yvonne Miles MS CCC-SLP GN 4               Yvonne Miles MS CCC-YUNG  8/2/2017

## 2017-08-02 NOTE — PROGRESS NOTES
Inpatient Rehabilitation Plan of Care Note    Plan of Care  Care Plan Reviewed - No updates at this time.    Safety    Performed Intervention(s)  falls precautions  hourly rounding, items within reach  bed alarm      Sphincter Control    Performed Intervention(s)  monitor intake and output  stool softners as ordered      Psychosocial    Performed Intervention(s)  encourage expression of feelings and concerns    Signed by: Jayashree Weston RN

## 2017-08-02 NOTE — PLAN OF CARE
Problem: Stroke (Ischemic) (Adult)  Goal: Signs and Symptoms of Listed Potential Problems Will be Absent or Manageable (Stroke)  Outcome: Ongoing (interventions implemented as appropriate)    08/02/17 0132   Stroke (Ischemic)   Problems Assessed (Stroke (Ischemic)/TIA) all   Problems Present (Stroke (Ischemic)/TIA) motor/sensory impairment         Problem: Fall Risk (Adult)  Goal: Absence of Falls  Outcome: Ongoing (interventions implemented as appropriate)    08/02/17 0132   Fall Risk (Adult)   Absence of Falls making progress toward outcome

## 2017-08-02 NOTE — PROGRESS NOTES
Inpatient Rehabilitation Plan of Care Note    Plan of Care  Updated Problems/Interventions  Medical Problem(s)    BNE (Active)  Att'n. - Mildly Imp.  Exec. Fx. - Mod. Imp.  Rsng/Jgmnt - WNL  Arith. - WNL  Visuospatial Skills - Mild-Mod. Imp.  Visual Mem. - Mildly Imp.  Verbal Mem. - Mod-Severely Imp.  Emot. - Pt endorsed dep and anx    Signed by: Héctor Burgos Psy.d

## 2017-08-02 NOTE — PROGRESS NOTES
Occupational Therapy: Individual: 60 minutes.    Physical Therapy: Branch    Speech Language Pathology:  Branch    Signed by: Karen Allan OT Student     - CoSigned By: Adelia aSlamanca OT 8/2/2017 4:44:30 PM

## 2017-08-02 NOTE — THERAPY TREATMENT NOTE
Inpatient Rehabilitation - Physical Therapy Treatment Note  [facility]     Patient Name: Magnus Hartley  : 1928  MRN: [id number]  Today's Date: 2017  Onset of Illness/Injury or Date of Surgery Date: 17  Date of Referral to PT: 17  Referring Physician: Jac    Admit Date: 2017    Visit Dx:    ICD-10-CM ICD-9-CM   1. Left hemiparesis G81.94 342.90   2. Dysarthria R47.1 784.51     Patient Active Problem List   Diagnosis   • Foot pain   • Asthma   • Benign essential hypertension   • Controlled type 2 diabetes mellitus without complication   • Dyslipidemia   • Macrocytosis without anemia   • Senile osteoporosis   • Post-menopausal osteoporosis   • Sinus bradycardia   • Stress incontinence in female   • Urge incontinence of urine   • Atrophic vaginitis   • Encounter for fitting and adjustment of other specified devices   • Incomplete emptying of bladder   • Urethral caruncle   • Incomplete uterovaginal prolapse   • Cerebrovascular accident (CVA) due to occlusion of right middle cerebral artery   • Atrial fibrillation   • Warfarin anticoagulation (goal INR 2-3)   • CVA (cerebrovascular accident due to intracerebral hemorrhage)               Adult Rehabilitation Note       17 1506 17 1000 17 0941    Rehab Assessment/Intervention    Discipline (P)  occupational therapist  -SHILPI speech language pathologist  -LT physical therapy assistant  -LB    Document Type (P)  therapy note (daily note)  -SHILPI therapy note (daily note)  -LT therapy note (daily note)  -LB    Subjective Information (P)  agree to therapy;no complaints  -SHILPI agree to therapy  -LT agree to therapy  -LB    Patient Effort, Rehab Treatment (P)  good  -SHILPI good  -LT good  -LB    Precautions/Limitations (P)  fall precautions  -SHILPI  fall precautions  -LB    Recorded by [SHILPI] Karen Allan OT Student [LT] Yvonne Miles MS CCC-SLP [LB] Angelica Treadwell PTA    Pain Assessment    Pain Assessment (P)  No/denies pain   -SHILPI  No/denies pain  -LB    Recorded by [SHILPI] Karen Allan, OT Student  [LB] Angelica Treadwell PTA    Cognitive Assessment/Intervention    Current Cognitive/Communication Assessment (P)  functional  -SHILPI      Orientation Status (P)  oriented x 4  -SHILPI      Follows Commands/Answers Questions (P)  100% of the time;able to follow single-step instructions;able to follow multi-step instructions;needs repetition;needs cueing  -SHILPI      Personal Safety (P)  mild impairment  -SHILPI      Personal Safety Interventions (P)  fall prevention program maintained;gait belt;nonskid shoes/slippers when out of bed;supervised activity  -SHILPI  fall prevention program maintained;gait belt;muscle strengthening facilitated;nonskid shoes/slippers when out of bed  -LB    Recorded by [SHILPI] Karen Allan, OT Student  [LB] Angelica Treadwell PTA    Improve memory skills    Memory Skills Progress  80%;with inconsistent cues  -LT     Recorded by  [LT] Yvonne Miles MS CCC-SLP     Improve functional problem solving    Functional Problem Solving Progress  70%;with inconsistent cues  -LT     Recorded by  [LT] Yvonne Miles MS CCC-SLP     Improve executive function skills    Executive Function Skills Progress  70%;without cues  -LT     Recorded by  [LT] Yvonne Miles MS CCC-SLP     Bed Mobility, Assessment/Treatment    Bed Mobility, Assistive Device   --   assessed on txment mat  -LB    Bed Mobility, Roll Left, [place]   supervision required  -LB    Bed Mobility, Roll Right, [place]   supervision required  -LB    Bed Mob, Supine to Sit, [place] (P)  supervision required  -SHILPI  supervision required  -LB    Bed Mob, Sit to Supine, [place]   supervision required  -LB    Recorded by [SHILPI] Karen Allan, OT Student  [LB] Angelica Treadwell PTA    Transfer Assessment/Treatment    Transfers, Bed-Chair [place] (P)  contact guard assist  -SHILPI  contact guard assist;minimum assist (75% patient effort)  -LB    Transfers, Chair-Bed  [place]   contact guard assist;minimum assist (75% patient effort)  -LB    Transfers, Bed-Chair-Bed, Assist Device   rolling walker  -LB    Transfers, Sit-Stand [place] (P)  contact guard assist  -SHILPI  contact guard assist  -LB    Transfers, Stand-Sit [place] (P)  contact guard assist  -SHILPI  contact guard assist  -LB    Transfers, Sit-Stand-Sit, Assist Device   rolling walker  -LB    Toilet Transfer, [place] (P)  contact guard assist  -SHILPI      Toilet Transfer, Assistive Device (P)  elevated toilet seat;rolling walker   grab bars  -SHILPI      Walk-In Shower Transfer, [place] (P)  contact guard assist;verbal cues required   vcs for rwx placement, sequencing, hand placement   -SHILPI      Walk-In Shower Transfer, Assist Device (P)  rolling walker;standard shower chair   grab bars  -SHILPI      Transfer, Comment   car tsf CGA/min, Rwx  -LB    Recorded by [SHILPI] Karen Allan OT Student  [LB] Angelica Treadwell PTA    Gait Assessment/Treatment    Gait, [place] Level   contact guard assist;verbal cues required  -LB    Gait, Assistive Device   rolling walker  -LB    Gait, Distance (Feet)   200  -LB    Gait, Gait Deviations   rojelio decreased;forward flexed posture  -LB    Recorded by   [LB] Angelica Treadwell PTA    Stairs Assessment/Treatment    Number of Stairs   4  -LB    Stairs, Handrail Location   both sides  -LB    Stairs, [place] Level   minimum assist (75% patient effort);contact guard assist;verbal cues required  -LB    Stairs, Technique Used   step to step (ascending);step to step (descending)  -LB    Stairs, Comment   curb rwx, cga, vc's  -LB    Recorded by   [LB] Angelica Treadwell PTA    Upper Body Bathing Assessment/Training    UB Bathing Assess/Train Assistive Device (P)  grab bars;hand-held shower head;shower chair with back  -SHILPI      UB Bathing Assess/Train, Position (P)  sitting  -SHILPI      UB Bathing Assess/Train, [place] Level (P)  set up required;contact guard assist  -SHILPI       Recorded by [SHILPI] Karen Allan, OT Student      Lower Body Bathing Assessment/Training    LB Bathing Assess/Train Assistive Device (P)  grab bars;hand-held shower head;shower chair with back  -SHILPI      LB Bathing Assess/Train, Position (P)  sitting;standing  -SHILPI      LB Bathing Assess/Train, [place] Level (P)  contact guard assist;verbal cues required  -SHILPI      LB Bathing Assess/Train, Comment (P)  vcs for hand placement, CGA for balance when standing to wash buttocks/elsi  -SHILPI      Recorded by [SHILPI] Karen Allan, OT Student      Upper Body Dressing Assessment/Training    UB Dressing Assess/Train, Clothing Type (P)  doffing:;donning:;t-shirt;hospital gown   dof gown, don shirt   -SHILPI      UB Dressing Assess/Train, Position (P)  sitting  -SHILPI      UB Dressing Assess/Train, [place] (P)  set up required;contact guard assist  -SHILPI      UB Dressing Assess/Train, Comment (P)  pt set up assist to orient shirt correctly to thread arm   -SHILPI      Recorded by [SHILPI] Karen Allan, OT Student      Lower Body Dressing Assessment/Training    LB Dressing Assess/Train, Clothing Type (P)  doffing:;donning:;socks;shoes;pants   dof underwear; don underwear, capris, shoes, socks   -SHILPI      LB Dressing Assess/Train, Position (P)  sitting;standing  -SHILPI      LB Dressing Assess/Train, [place] (P)  minimum assist (75% patient effort)  -SHILPI      LB Dressing Assess/Train, Comment (P)  CGA to stand for clothing management; required assist w/ tying shoes due to decreased sensation in LUE  -SHILPI      Recorded by [SHILPI] Karen Allan, OT Student      Toileting Assessment/Training    Toileting Assess/Train, Assistive Device (P)  grab bars;raised toilet seat  -SHILPI      Toileting Assess/Train, Position (P)  sitting;standing  -SHILPI      Toileting Assess/Train, [place] Level (P)  contact guard assist  -SHILPI      Toileting Assess/Train, Comment (P)  CGA for balance during clothing management   -SHILPI      Recorded by [SHILPI] Karen Allan, OT Student       Grooming Assessment/Training    Grooming Assess/Train, Position (P)  sitting   in recliner using tray table mirror  -SHILPI      Grooming Assess/Train, [place] Level (P)  set up required  -SHILPI      Grooming Assess/Train, Comment (P)  combing hair; dentures hygiene performed before session   -SHILPI      Recorded by [SHILPI] Karen Allan OT Student      Balance Skills Training    Standing-Level of Assistance   Minimum assistance  -LB    Static Standing Balance Support   Right upper extremity supported;Left upper extremity supported;eliu bar   UE support as needed  -LB    Standing-Balance Activities   Compliant surfaces  -LB    Gait Balance-Level of Assistance   Contact guard  -LB    Gait Balance Support   Right upper extremity supported;Left upper extremity supported;eliu bar  -LB    Gait Balance Activities   side-stepping  -LB    Recorded by   [LB] Angelica Treadwell PTA    Therapy Exercises    Bilateral Lower Extremities   AROM:;10 reps;standing  -LB    Recorded by   [LB] Angelica Treadwell PTA    Sensory Treatment    Sensory Reeducation Techniques (P)  comparison, tactile sensory information;sensory train, w/o visual reinforcement;sensory training, visual reinforcement  -SHILPI      Sensory Reeduction Treatment (P)  3 objects were placed on table w/ associated sight word cards, pt touched each object and described tactile details w/ eyes open, pt then ID'ed object placed in L hand w/ eyes closed; pt was able to ID 2/3 w/ eyes closed, pt was able to ID the other w/ eyes open; pt searched for items in rice bin w/ eyes open, correctly decsribed tactile details w/ min vcs while manipulating in hand, then reburried the items  -SHILPI      Recorded by [SHILPI] Karen Allan OT Student      Positioning and Restraints    Pre-Treatment Position (P)  in bed  -SHILPI      Post Treatment Position (P)  chair   recliner  -SHILPI  wheelchair  -LB    In Chair (P)  encouraged to call for assist;sitting;call light within reach  -SHILPI      In Wheelchair    sitting;with SLP  -LB    Recorded by [SHILPI] Karen Allan OT Student  [LB] Angelica Treadwell PTA      08/01/17 1150 08/01/17 1100 08/01/17 0940    Rehab Assessment/Intervention    Discipline occupational therapist  -SHILPI NULL,CHAKA2 speech language pathologist  -LT physical therapy assistant  -LB    Document Type therapy note (daily note)  -CHAKA,SHILPI,AF2 therapy note (daily note)  -LT therapy note (daily note)  -LB    Subjective Information agree to therapy;no complaints  -CHAKA,SHILPI,AF2 agree to therapy  -LT agree to therapy  -LB    Patient Effort, Rehab Treatment good  -CHAKA,SHILPI,AF2 good  -LT good  -LB    Symptoms Noted During/After Treatment shortness of breath   w/ tsf  -CHAKA,SHILPI,AF2      Symptoms Noted Comment rest breaks as needed; monitered SpO2  -AF,SHILPI,AF2      Precautions/Limitations fall precautions  -AF,SHILPI,AF2  fall precautions  -LB    Recorded by [AF,SHILPI,AF2] NO Cooney (r) Karen Allan OT Student (t) NO Cooney (c) [LT] Yvonne Miles MS CCC-SLP [LB] Angelica Treadwell PTA    Vital Signs    Intra SpO2 (%) 98  -AF,SHILPI,AF2      Recorded by [AF,SHILPI,AF2] NO Cooney (r) Karen Allan OT Student (t) NO Cooney (c)      Pain Assessment    Pain Assessment No/denies pain  -AF,SHILPI,AF2  No/denies pain  -LB    Recorded by [AF,SHILPI,AF2] NO Cooney (r) Karen Allan OT Student (t) NO Cooney (c)  [LB] Angelica Treadwell PTA    Cognitive Assessment/Intervention    Current Cognitive/Communication Assessment functional  -AF,SHILPI,AF2      Orientation Status oriented x 4  -AF,SHILPI,AF2      Follows Commands/Answers Questions 100% of the time;able to follow single-step instructions;needs cueing;needs repetition  -CHAKA,SHILPI,AF2      Personal Safety mild impairment;decreased insight to deficits;decreased awareness, need for safety  -CHAKA,SHILPI,AF2      Personal Safety Interventions fall prevention program maintained;gait belt;nonskid shoes/slippers when out of bed;supervised activity   -AF,SHILPI,AF2  fall prevention program maintained;gait belt;muscle strengthening facilitated;nonskid shoes/slippers when out of bed  -LB    Recorded by [AF,SHILPI,AF2] Adelia Salamanca OTR (r) Karen Allan, OT Student (t) Adelia Salamanca OTR (c)  [LB] Angelica Treadwell PTA    Improve memory skills    Memory Skills Progress  60%;with inconsistent cues  -LT     Recorded by  [LT] Yvonne Miles MS CCC-SLP     Improve functional problem solving    Functional Problem Solving Progress  50%;with inconsistent cues  -LT     Recorded by  [LT] Yvonne Miles MS CCC-SLP     Improve executive function skills    Executive Function Skills Progress  80%;without cues  -LT     Recorded by  [LT] Yvonne Miles MS CCC-SLP     Bed Mobility, Assessment/Treatment    Bed Mobility, Assistive Device   --   assessed on txment mat  -LB    Bed Mobility, Roll Left, [place]   contact guard assist  -LB    Bed Mobility, Roll Right, [place]   contact guard assist;verbal cues required  -LB    Bed Mobility, Scoot/Bridge, [place]   supervision required  -LB    Bed Mob, Supine to Sit, [place] contact guard assist  -AF,SHILPI,AF2  contact guard assist;verbal cues required  -LB    Bed Mob, Sit to Supine, [place]   contact guard assist;verbal cues required  -LB    Recorded by [AF,SHILPI,AF2] NO Cooney (r) Karen Allan, OT Student (t) NO Cooney (c)  [LB] Angelica Treadwell PTA    Transfer Assessment/Treatment    Transfers, Bed-Chair [place] contact guard assist;verbal cues required  -AF,SHILPI,AF2  minimum assist (75% patient effort);contact guard assist;verbal cues required  -LB    Transfers, Chair-Bed [place]   minimum assist (75% patient effort);contact guard assist;verbal cues required  -LB    Transfers, Sit-Stand [place] contact guard assist;verbal cues required   vcs to maintain wide base of support  -AF,SHILPI,AF2  contact guard assist  -LB    Transfers, Stand-Sit [place] verbal cues  required;contact guard assist   vcs to reach back for w/c  -AF,SHILPI,AF2  contact guard assist  -LB    Transfers, Sit-Stand-Sit, Assist Device   rolling walker  -LB    Toilet Transfer, [place] verbal cues required;contact guard assist   vcs for hand placement   -AF,SHILPI,AF2      Toilet Transfer, Assistive Device rolling walker;elevated toilet seat   grab bars  -AF,SHILPI,AF2      Transfer, Comment EOM tsf to and from w/c; CGA, VCs/gestural prompts for hand placement   -AF,SHILPI,AF2  car tsf, rwx, CGA, vc's  -LB    Recorded by [AF,SHILPI,AF2] Adelia Salamanca OTR (r) Karen Allan, OT Student (t) NO Cooney (c)  [LB] Angelica Treadwell PTA    Gait Assessment/Treatment    Gait, [place] Level   contact guard assist;verbal cues required   cues for upright posture  -LB    Gait, Assistive Device   rolling walker  -LB    Gait, Distance (Feet)   200  -LB    Gait, Gait Deviations   rojelio decreased;forward flexed posture;left:;decreased heel strike  -LB    Recorded by   [LB] Angelica Treadwell PTA    Stairs Assessment/Treatment    Number of Stairs   4  -LB    Stairs, Handrail Location   both sides  -LB    Stairs, [place] Level   minimum assist (75% patient effort);contact guard assist  -LB    Stairs, Technique Used   step to step (ascending);step to step (descending)  -LB    Stairs, Comment   curb, rwx, min/CGA  -LB    Recorded by   [LB] Angelica Treadwell PTA    Functional Mobility    Functional Mobility- Ind. Level contact guard assist  -AF,SHILPI,AF2      Functional Mobility- Device rolling walker  -AF,SHILPI,AF2      Functional Mobility-Maintain WBing able to maintain weight bearing status  -AF,SHILPI,AF2      Functional Mobility- Comment pt was able to walk into bathroom from bed and maneuver around bathroom w/ rwx w/ CGA   -AF,SHILPI,AF2      Recorded by [AF,SHILPI,AF2] Adelia Salamanca, OTR (r) Karen Allan, OT Student (t) NO Cooney (c)      Upper Body Dressing Assessment/Training    UB Dressing Assess/Train,  Clothing Type doffing:;donning:;t-shirt  -AF,SHILPI,AF2      UB Dressing Assess/Train, Position sitting;edge of bed  -AF,SHILPI,AF2      UB Dressing Assess/Train, [place] minimum assist (75% patient effort)   min a to thread R arm through correct hole   -AF,SHILPI,AF2      Recorded by [AF,SHILPI,AF2] NO Cooney (r) Karen Allan, OT Student (t) NO Cooney (c)      Lower Body Dressing Assessment/Training    LB Dressing Assess/Train, Clothing Type doffing:;donning:;pants;shoes;socks  -AF,SHILPI,AF2      LB Dressing Assess/Train, Position sitting;edge of bed;standing  -AF,SHILPI,AF2      LB Dressing Assess/Train, [place] minimum assist (75% patient effort);contact guard assist  -AF,SHILPI,AF2      LB Dressing Assess/Train, Comment CGA while standing to pull up pants; min a to adjust and tie shoes due to decreased sensation in LUE  -AF,SHILPI,AF2      Recorded by [AF,SHILPI,AF2] NO Cooney (r) Karen Allan, OT Student (t) NO Cooney (c)      Toileting Assessment/Training    Toileting Assess/Train, Assistive Device grab bars  -AF,SHILPI,AF2      Toileting Assess/Train, Position sitting  -AF,SHILPI,AF2      Toileting Assess/Train, [place] Level contact guard assist  -AF,SHILPI,AF2      Toileting Assess/Train, Comment pt able to manage clothes and perform hygiene IND; CGA while standing to adjust clothes  -AF,SHILPI,AF2      Recorded by [AF,SHILPI,AF2] NO Cooney (r) Karen Allan, OT Student (t) NO Cooney ([place])      Therapy Exercises    Bilateral Lower Extremities   AROM:;10 reps;standing;supine  -LB    Recorded by   [LB] Angelica Treadwell, PTA    Sensory Treatment    Sensory Reeducation Techniques sensory training, visual reinforcement  -AF,SHILPI,AF2      Sensory Reeduction Treatment pt matched objects placed in hand w/ sight word cards w/ eyes closed, pt was unable to ID any items but was able to ID items w/ eyes open and tactile sensation; pt stated some items felt heavy, some soft w/o visual input;    -SHILPI NULL,AF2      Sensory Treatment Comment pt selected small foam beads from a pile and put on a string using BLE while standing at countertop, pt displayed difficulty w/ using appropraite grasp to pickup and maintain hold on beads; pt found and picked out 10 buttons from yellow theraputty while standing at countertop w/ BUE, pt reported having a difficult time; pt was provided w/ sponge to squeeze and rub on LUE to increase sensory input to regain functional grasp and sensation necessary for self care tasks  -SHILPI NULL,AF2      Recorded by [SHILPI NULL,AF2] NO Cooney (r) Karen Allan OT Student (t) NO Cooney (c)      Positioning and Restraints    Pre-Treatment Position in bed  -SHILPI NULL AF2      Post Treatment Position chair  -SHILPI NULL,CHAKA2  wheelchair  -LB    In Chair encouraged to call for assist;call light within reach;sitting  -SHILPI NULL AF2      In Wheelchair   sitting;with SLP  -LB    Recorded by [SHILPI NULL,AF2] NO Cooney (r) Karen Allan OT Student (t) NO Cooney (c)  [LB] Angelica Treadwell, YARA      07/31/17 1551 07/31/17 1000 07/31/17 0947    Rehab Assessment/Intervention    Discipline occupational therapist  -SHILPI NULL,KIM speech language pathologist  -LT physical therapy assistant  -LB    Document Type therapy note (daily note)  -SHILPI NULL AF2 therapy note (daily note)  -LT therapy note (daily note)  -LB    Subjective Information agree to therapy;no complaints  -SHILPI NULL,CHAKA2 agree to therapy  -LT agree to therapy  -LB    Patient Effort, Rehab Treatment good  -SHILPI NULL,AF2 good  -LT good  -LB    Precautions/Limitations fall precautions  -SHILPI NULL,AF2  fall precautions  -LB    Recorded by [SHILPI NULL,AF2] NO Cooney (r) Karen Allan, OT Student (t) NO Cooney (c) [LT] Yvonne Miles MS CCC-SLP [LB] Angelica Treadwell, PTA    Pain Assessment    Pain Assessment No/denies pain  -AF,SHILPI,AF2  No/denies pain  -LB    Recorded by [CHAKA,SHILPI,AF2] Adelia Salamanca, OTR (r) Karen Allan, OT  Student (t) Adelia Salamanca, OTR (c)  [LB] Angelica Treadwell PTA    Cognitive Assessment/Intervention    Current Cognitive/Communication Assessment functional  -AF,SHILPI,AF2      Orientation Status oriented x 4  -AF,SHILPI,AF2      Follows Commands/Answers Questions 100% of the time;able to follow single-step instructions;needs cueing;needs repetition  -AF,SHILIP,AF2      Personal Safety mild impairment;decreased insight to deficits  -AF,SHILPI,AF2      Personal Safety Interventions fall prevention program maintained;gait belt;nonskid shoes/slippers when out of bed  -AF,SHILPI,AF2  fall prevention program maintained;gait belt;muscle strengthening facilitated;nonskid shoes/slippers when out of bed  -LB    Recorded by [AF,SHILPI,AF2] Adelia Salamanca, OTR (r) Karen Allan OT Student (t) Adelia Salamanca, OTR (c)  [LB] Angelica Treadwell PTA    Communication Treatment Objective and Progress    Cognitive Linguistic Treatment Objectives  Improve memory skills;Improve functional problem solving;Improve executive function skills  -LT     Recorded by  [LT] Yvonne Miles MS CCC-SLP     Improve memory skills    Improve memory skills through:  recalling unrelated word lists immediately;recalling unrelated word lists with an imposed delay;visual memory task;listen to a paragraph and answer questions;use written schedule;80%;without cues  -LT     Memory Skills Progress  0%;without cues;60%;70%;with inconsistent cues   recall 3 unrelated words p 5 mins  -LT     Recorded by  [LT] Yvonne Miles MS CCC-SLP     Improve functional problem solving    Improve functional problem solving through:  determine solutions to complex problems;complete high level reasoning task;complete organization/home management task;complete functional math task;80%;without cues  -LT     Functional Problem Solving Progress  60%;with inconsistent cues  -LT     Recorded by  [LT] Yvonne Miles MS CCC-SLP     Improve executive function skills    Improve executive function  skills:  identify strategies, strengths, limitations;exhibit cognitive flexibility;80%;without cues  -LT     Recorded by  [LT] Yvonne Miles MS CCC-SLP     Improve articulation    Improve articulation:  by over-articulating in connected speech;80%;without cues  -LT     Articulation Progress  with inconsistent cues  -LT     Recorded by  [LT] Yvonne Miles MS CCC-SLP     General UE Assessment    ROM RUE ROM was WNL;shoulder, left: UE ROM deficit;scapula, left: UE ROM deficit   AROM  -AF,SHILPI,AF2      ROM Detail PROM shoulder flexion/extension WNL, PROM shoulder external rotation WNL  -AF,SHILPI,AF2      Recorded by [AF,SHILPI,AF2] NO Cooney (r) Karen Allan OT Student (t) NO Cooney (c)      General Hand Assessment    ROM other (see comments)  -AF,SHILPI,AF2      ROM Detail able to complete L opposition w/ increased time and attention ; no opposition deficits noted on R hand   -AF,SHILPI,AF2      Recorded by [AF,SHILPI,AF2] NO Cooney (r) Karen Allan OT Student (t) NO Cooney (c)      MMT (Manual Muscle Testing)    General MMT Assessment no strength deficits identified   WFL for age   -AF,SHILPI,AF2      General MMT Assessment Detail  R 45, L 25; 3 pt pinch R 8, L 5; lateral pinch R 8, L 6   pt reports right hand dominance   -AF,SHILPI,AF2      Recorded by [AF,SHILPI,AF2] NO Cooney (r) Karen Allan OT Student (t) NO Cooney (c)      Bed Mobility, Assessment/Treatment    Bed Mobility, Comment   up in chair  -LB    Recorded by   [LB] Angelica Treadwell, PTA    Transfer Assessment/Treatment    Transfers, Sit-Stand [place]   contact guard assist  -LB    Transfers, Stand-Sit [place]   contact guard assist  -LB    Transfers, Sit-Stand-Sit, Assist Device   rolling walker  -LB    Transfer, Comment to and from EOM from w/c CGA; from w/c to recliner CGA w/ vcs for hand placement   -AF,SHILPI,AF2  car tsf CG/sumi, Rwx  -LB    Recorded by [AF,SHILPI,AF2] Adelia Salamanca, OTR (r)  Karen Allan, OT Student (t) Adelia Salamanca OTR (c)  [LB] Angelica Treadwell PTA    Gait Assessment/Treatment    Gait, [place] Level   contact guard assist  -LB    Gait, Assistive Device   rolling walker  -LB    Gait, Distance (Feet)   160  -LB    Gait, Gait Deviations   rojelio decreased;forward flexed posture;narrow base;left:;decreased heel strike  -LB    Recorded by   [LB] Angelica Treadwell PTA    Stairs Assessment/Treatment    Number of Stairs   4  -LB    Stairs, Handrail Location   both sides  -LB    Stairs, [place] Level   minimum assist (75% patient effort);contact guard assist  -LB    Stairs, Technique Used   step to step (ascending);step to step (descending)  -LB    Stairs, Comment   curb, rwx, sumi, vc's  -LB    Recorded by   [LB] Angelica Treadwell PTA    Balance Skills Training    Standing-Level of Assistance   Minimum assistance  -LB    Static Standing Balance Support   parallel bars  -LB    Standing-Balance Activities   Compliant surfaces  -LB    Gait Balance-Level of Assistance   Contact guard  -LB    Gait Balance Support   parallel bars  -LB    Gait Balance Activities   side-stepping  -LB    Recorded by   [LB] Angelica Treadwell PTA    Therapy Exercises    Bilateral Lower Extremities   AROM:;10 reps;standing  -LB    Recorded by   [LB] Angelica Treadwell PTA    Fine Motor Coordination Training    9-Hole Peg Right 25 secs  -AF,SHILPI,AF2      9-Hole Peg Left 61 secs  -AF,SHILPI,AF2      Box and Blocks Right 56 blocks  -AF,SHILPI,AF2      Box and Blocks Left 39 blocks  -AF,SHILPI,AF2      Recorded by [AF,SHILPI,AF2] Adelia Salamanca, OTR (r) Karen Allan, OT Student (t) Adelia Salamanca, OTR (c)      Sensory Assessment/Intervention    Sensory Impairment numbness;dull;sharp  -AF,SHILPI,AF2      Light Touch LUE  -AF,SHILPI,AF2      LUE Light Touch mild impairment   unable to recognize light touch w/ eyes closed   -AF,SHILPI,AF2      RUE Light Touch WNL  -AF,SHILPI,AF2      Sharp/Dull Discrimination LUE   sharp 1/3 trials  correct; dull 2/3 trials correct  -AF,SHILPI,AF2      LUE Sharp/Dull Discrimination severe impairment  -AF,SHILPI,AF2      Stereognosis LUE   unable to recognize 3/3 objects; dropped objects w/o noticin  -AF,SHILPI,AF2      LUE Stereognosis severe impairment  -AF,SHILPI,AF2      Recorded by [AF,SHILPI,AF2] NO Cooney (r) Karen Allan OT Student (t) NO Cooney (c)      Sensory Treatment    Sensory Reeducation Techniques sensory training, visual reinforcement  -AF,SHILPI,AF2      Sensory Reeduction Treatment pt was unable to find various objects w/ L hand from box of beans w/ eyes closed; pt dug to find objects w/ eyes open   -AF,SHILPI,AF2      Recorded by [AF,SHILPI,AF2] NO Cooney (r) CHERELLE Sepulveda (t) NO Cooney (c)      Positioning and Restraints    Pre-Treatment Position sitting in chair/recliner  -CHAKA,SHILPI,AF2      Post Treatment Position wheelchair  -CRYSTAL NULLD,AF2  wheelchair  -LB    In Chair encouraged to call for assist;call light within reach  -CHAKA,SHILPI,AF2      In Wheelchair   sitting;with SLP  -LB    Recorded by [AF,SHILPI,AF2] NO Cooney (r) Karen Allan OT Student (t) NO Cooney (c)  [LB] Angelica Treadwell, PTA      User Key  (r) = Recorded By, (t) = Taken By, (c) = Cosigned By    Initials Name Effective Dates    LT Yvonne Miles MS CCC-SLP 04/13/15 -     LB Angelica Treadwell PTA 02/18/16 -     AF NO Cooney 12/01/15 -     SHILPI Allan OT Student 07/11/17 -                 IP PT Goals       07/29/17 1211 07/24/17 1605 07/24/17 1546    Bed Mobility PT LTG    Bed Mobility PT LTG, Date Established 07/29/17  -LB 07/24/17  -LH (r) KS (t) LH (c) 07/24/17  -LH (r) KS (t) LH (c)    Bed Mobility PT LTG, Time to Achieve 2 wks  -LB 5 - 7 days  -LH (r) KS (t) LH (c) 5 - 7 days  -LH (r) KS (t) LH (c)    Bed Mobility PT LTG, Activity Type roll left/roll right;supine to sit/sit to supine  -LB all bed mobility  -LH (r) KS (t) LH (c) all bed mobility  -LH (r) KS  (t) LH (c)    Bed Mobility PT LTG, [place] Level independent  -LB contact guard assist  -LH (r) KS (t) LH (c) independent  -LH (r) KS (t) LH (c)    Transfer Training PT LTG    Transfer Training PT LTG, Date Established 07/29/17  -LB  07/24/17  -LH (r) KS (t) LH (c)    Transfer Training PT LTG, Time to Achieve 2 wks  -LB  5 - 7 days  -LH (r) KS (t) LH (c)    Transfer Training PT LTG, Activity Type sit to stand/stand to sit  -LB  all transfers  -LH (r) KS (t) LH (c)    Transfer Training PT LTG, [place] Level conditional independence  -LB  independent  -LH (r) KS (t) LH (c)    Transfer Training PT LTG, Assist Device cane, straight  -LB  walker, rolling  -LH (r) KS (t) LH (c)    Transfer Training 2 PT LTG    Transfer Training PT 2 LTG, Date Established 07/29/17  -LB      Transfer Training PT 2 LTG, Time to Achieve 2 wks  -LB      Transfer Training PT 2 LTG, Activity Type --   car transfer  -LB      Transfer Training PT 2 LTG, [place] Level contact guard assist  -LB      Transfer Training PT 2 LTG, Assist Device cane, straight  -LB      Gait Training PT LTG    Gait Training Goal PT LTG, Date Established 07/29/17  -LB 07/24/17  -LH (r) KS (t) LH (c) 07/24/17  -LH (r) KS (t) LH (c)    Gait Training Goal PT LTG, Time to Achieve 2 wks  -LB 5 - 7 days  -LH (r) KS (t) LH (c) 5 - 7 days  -LH (r) KS (t) LH (c)    Gait Training Goal PT LTG, [place] Level conditional independence  -LB minimum assist (75% patient effort)  -LH (r) KS (t) LH (c) conditional independence  -LH (r) KS (t) LH (c)    Gait Training Goal PT LTG, Assist Device cane, straight  -LB walker, rolling  -LH (r) KS (t) LH (c) walker, rolling  -LH (r) KS (t) LH (c)    Gait Training Goal PT LTG, Distance to Achieve 150  -  -LH (r) KS (t) LH (c) 150  -LH (r) KS (t) LH (c)    Stair Training PT LTG    Stair Training Goal PT LTG, Date Established 07/29/17  -LB      Stair Training Goal PT LTG, Time to Achieve 2 wks  -LB      Stair Training Goal PT  LTG, Number of Steps 4  -LB      Stair Training Goal PT LTG, [place] Level contact guard assist  -LB      Stair Training Goal PT LTG, Assist Device 1 handrail  -LB      Static Sitting Balance PT LTG    Static Sitting Balance PT LTG, Date Established   07/24/17  -LH (r) KS (t) LH (c)    Static Sitting Balance PT LTG, Time to Achieve   5 - 7 days  -LH (r) KS (t) LH (c)    Static Sitting Balance PT LTG, [place] Level   independent  -LH (r) KS (t) LH (c)    Static Sitting Balance PT LTG, Assist Device   UE Support  -LH (r) KS (t) LH (c)    Static Sitting Balance PT LTG, Additional Goal   7 min w/o L lean  -LH (r) KS (t) LH (c)      User Key  (r) = Recorded By, (t) = Taken By, (c) = Cosigned By    Initials Name Provider Type    LB Rosemary Hoffmann, PT Physical Therapist     Angelica Pearson, PT Physical Therapist    KS Jessica Germain, PT Student PT Student          Physical Therapy Education     Title: PT OT SLP Therapies (Active)     Topic: Physical Therapy (Active)     Point: Mobility training (Done)    Learning Progress Summary    Learner Readiness Method Response Comment Documented by Status   Patient Acceptance E VU,NR car, stairs, curb LB 08/02/17 1031 Done    Acceptance E VU,NR  LB 08/01/17 0939 Done    Acceptance E VU,NR  LB 07/31/17 0947 Done    Acceptance E VU,NR  LB1 07/29/17 1210 Done                      User Key     Initials Effective Dates Name Provider Type Discipline    [facility] 10/06/15 -  Rosemary Hoffmann, PT Physical Therapist PT     02/18/16 -  Angelica Treadwell, PTA Physical Therapy Assistant PT                    PT Recommendation and Plan  Anticipated Equipment Needs At Discharge: standard cane  Anticipated Discharge Disposition: home with outpatient services, home with assist  Planned Therapy Interventions: bed mobility training, balance training, neuromuscular re-education, transfer training, strengthening, stair training  PT Frequency: 2 times/day            Time Calculation:         PT  Charges       08/02/17 1620 08/02/17 1029       Time Calculation    Start Time 1330  -LB 0930  -LB     Stop Time 1400  -LB 1000  -LB     Time Calculation (min) 30 min  -LB 30 min  -LB       User Key  (r) = Recorded By, (t) = Taken By, (c) = Cosigned By    Initials Name Provider Type    WINNIE Treadwell PTA Physical Therapy Assistant          Therapy Charges for Today     Code Description Service Date Service Provider Modifiers Qty    38832032269 HC PT THER PROC EA 15 MIN 8/1/2017 Angelica Treadwell PTA GP 4    28216330237 HC PT CANALITH REPOSITIONING PER DAY 8/1/2017 Angelica Treadwell PTA GP 1    14704866519 HC PT THER PROC EA 15 MIN 8/2/2017 Angelica Treadwell PTA GP 4               Angelica Treadwell, YARA  8/2/2017

## 2017-08-02 NOTE — PROGRESS NOTES
Inpatient Rehabilitation Functional Measures Assessment and Plan of Care    Plan of Care  Updated Problems/Interventions  Mobility    [PT] Bed/Chair/Wheelchair(Active)  Current Status(08/02/2017): Min/CGA, rwx  Weekly Goal(08/11/2017): CGA  Discharge Goal: SBA    [PT] Bed Mobility(Active)  Current Status(08/02/2017): supervision  Weekly Goal(08/11/2017): Indep  Discharge Goal: Indep    [PT] Walk(Active)  Current Status(08/02/2017): 200 Rwx CGA  Weekly Goal(08/11/2017): BR Rwx SBA  Discharge Goal: 200 ft AAD SBA    [PT] Stairs(Active)  Current Status(08/02/2017): 4 HR Min/CGA  Weekly Goal(08/11/2017): PT only  Discharge Goal: 4 HR CGA    [PT] Car Transfers(Active)  Current Status(08/02/2017): CGA, rwx  Weekly Goal(08/11/2017): PT only  Discharge Goal: Car tsf CGA, AAD    Functional Measures  BETH Eating:  Branch  [facility] Grooming: Branch  [facility] Bathing:  Branch  [facility] Upper Body Dressing:  Branch  BETH Lower Body Dressing:  Branch  [facility] Toileting:  Branch    [facility] Bladder Management  Level of Assistance:  Branch  Frequency/Number of Accidents this Shift:  [facility] Bowel Management  Level of Assistance: [place]  Frequency/Number of Accidents this Shift: Branch    [facility] Bed/Chair/Wheelchair Transfer:  Bed/chair/wheelchair Transfer Score = 4.  Patient performs 75% or more of effort and minimal assistance (little/incidental  help/lifting of one limb/steadying) for transferring to and from the  bed/chair/wheelchair, requiring: Patient requires the following assistive  device(s): Walker.  BETH Toilet Transfer:  Branch  [facility] Tub/Shower Transfer:  Branch    Previously Documented Mode of Locomotion at Discharge: Field  BETH Expected Mode of Locomotion at Discharge: Branch  [facility] Walk/Wheelchair:  WHEELCHAIR OBSERVATION   Activity was not observed.    WALK OBSERVATION   Walk Distance Scale = 3.  Distance walked is greater than 150 feet. Walk Score  = 4.  Patient performs 75% or more of effort and requires minimal assistance.  Incidental  help/contact guard/steadying was provided. Patient walked a distance  of  200 feet. Patient requires the following assistive device(s): Rolling  walker.  BETH Stairs:  Stairs Score = 2.  Incidental assistance with lifting or lowering,  contact guard or steadying was provided. Patient performs 75% or more of effort  and requires minimal contact assistance. Patient negotiated  4 stairs. Patient  requires the following assistive device(s): Handrail(s).    BETH Comprehension:  Branch  BETH Expression:  Branch  BETH Social Interaction:  Branch  BETH Problem Solving:  Branch  BETH Memory:  Branch    Therapy Mode Minutes  Occupational Therapy: Branch  Physical Therapy: Branch  Speech Language Pathology:  Branch    Signed by: Angelica Treadwell PTA

## 2017-08-02 NOTE — PROGRESS NOTES
Inpatient Rehabilitation Functional Measures Assessment and Plan of Care    Plan of Care  Updated Problems/Interventions  Mobility    [OT] Toilet Transfers(Active)  Current Status(08/02/2017): CGA  Weekly Goal(08/09/2017): SBA  Discharge Goal: MOD I    [OT] Tub/Shower Transfers(Active)  Current Status(08/02/2017): CGA  Weekly Goal(08/09/2017): SBA  Discharge Goal: SUP        Self Care    [OT] Bathing(Active)  Current Status(08/02/2017): MIN  Weekly Goal(08/09/2017): CGA  Discharge Goal: SUP    [OT] Dressing (Lower)(Active)  Current Status(08/02/2017): CGA/MIN  Weekly Goal(08/09/2017): CGA (w/ elastic laces)  Discharge Goal: SUP (w/ shoe strings)    [OT] Dressing (Upper)(Active)  Current Status(08/02/2017): MIN  Weekly Goal(08/09/2017): CGA  Discharge Goal: MOD I    [OT] Grooming(Active)  Current Status(08/02/2017): SBA  Weekly Goal(08/09/2017): MOD I  Discharge Goal: MOD I    [OT] Toileting(Active)  Current Status(08/02/2017): CGA/MIN  Weekly Goal(08/09/2017): CGA  Discharge Goal: SUP    Functional Measures  BETH Eating:  Branch  BETH Grooming: Branch  BETH Bathing:  Branch  BETH Upper Body Dressing:  Branch  BETH Lower Body Dressing:  Branch  BETH Toileting:  Branch    BETH Bladder Management  Level of Assistance:  Branch  Frequency/Number of Accidents this Shift:  Branch    BETH Bowel Management  Level of Assistance: [place]  Frequency/Number of Accidents this Shift: Branch    BETH Bed/Chair/Wheelchair Transfer:  Branch  BETH Toilet Transfer:  Branch  BETH Tub/Shower Transfer:  Branch    Previously Documented Mode of Locomotion at Discharge: Field  BETH Expected Mode of Locomotion at Discharge: Branch  BETH Walk/Wheelchair:  Branch  BETH Stairs:  Branch    BETH Comprehension:  Branch  BETH Expression:  Branch  BETH Social Interaction:  Branch  BETH Problem Solving:  Branch  BETH Memory:  Branch    Therapy Mode Minutes  Occupational Therapy: Branch  Physical Therapy: Branch  Speech Language Pathology:  Branch    Signed by: Karen  CHERELLE Allan Student     - CoSigned By: Adelia Salamanca OT 8/2/2017 4:43:33 PM

## 2017-08-02 NOTE — PROGRESS NOTES
Inpatient Rehabilitation Plan of Care Note    Plan of Care  Care Plan Reviewed - No updates at this time.    Safety    Performed Intervention(s)  falls precautions  hourly rounding, items within reach  bed alarm      Sphincter Control    Performed Intervention(s)  monitor intake and output  stool softners as ordered      Psychosocial    Performed Intervention(s)  encourage expression of feelings and concerns    Signed by: Mila Flores RN

## 2017-08-02 NOTE — PROGRESS NOTES
Case Management  Inpatient Rehabilitation Plan of Care and Discharge Plan Note    Rehabilitation Diagnosis:  Branch  Date of Onset:  Branch    Medical Summary:  Branch  Past Medical History: Branch    Plan of Care  Updated Problems/Interventions  Field    Expected Intensity:  Branch  Interdisciplinary Team:  Katina  Estimated Length of Stay/Anticipated Discharge Date: Branch  Anticipated Discharge Destination:  Anticipated discharge destination from inpatient rehabilitation is community  discharge with assistance. To son's home with 24/7 assist      Based on the patient's medical and functional status, their prognosis and  expected level of functional improvement is:  Branch    Signed by: JUAN DAVID Howell

## 2017-08-02 NOTE — THERAPY TREATMENT NOTE
Inpatient Rehabilitation - Occupational Therapy Treatment Note  [facility]     Patient Name: Magnus Hartley  : 1928  MRN: [id number]  Today's Date: 2017  Onset of Illness/Injury or Date of Surgery Date: 17     Referring Physician: Jac      Admit Date: 2017    Visit Dx:     ICD-10-CM ICD-9-CM   1. Left hemiparesis G81.94 342.90   2. Dysarthria R47.1 784.51     Patient Active Problem List   Diagnosis   • Foot pain   • Asthma   • Benign essential hypertension   • Controlled type 2 diabetes mellitus without complication   • Dyslipidemia   • Macrocytosis without anemia   • Senile osteoporosis   • Post-menopausal osteoporosis   • Sinus bradycardia   • Stress incontinence in female   • Urge incontinence of urine   • Atrophic vaginitis   • Encounter for fitting and adjustment of other specified devices   • Incomplete emptying of bladder   • Urethral caruncle   • Incomplete uterovaginal prolapse   • Cerebrovascular accident (CVA) due to occlusion of right middle cerebral artery   • Atrial fibrillation   • Warfarin anticoagulation (goal INR 2-3)   • CVA (cerebrovascular accident due to intracerebral hemorrhage)             Adult Rehabilitation Note       17 1506 17 1000 17 0941    Rehab Assessment/Intervention    Discipline (P)  occupational therapist  -SHILPI speech language pathologist  -LT physical therapy assistant  -LB    Document Type (P)  therapy note (daily note)  -SHILPI therapy note (daily note)  -LT therapy note (daily note)  -LB    Subjective Information (P)  agree to therapy;no complaints  -SHILPI agree to therapy  -LT agree to therapy  -LB    Patient Effort, Rehab Treatment (P)  good  -SHILPI good  -LT good  -LB    Precautions/Limitations (P)  fall precautions  -SHILPI  fall precautions  -LB    Recorded by [SHILPI] Karen Allan, OT Student [LT] Yvonne Miles MS CCC-SLP [LB] Angelica Treadwell PTA    Pain Assessment    Pain Assessment (P)  No/denies pain  -SHILPI  No/denies pain  -LB     Recorded by [SHILPI] Karen Allan, OT Student  [LB] Angelica Treadwell PTA    Cognitive Assessment/Intervention    Current Cognitive/Communication Assessment (P)  functional  -SHILPI      Orientation Status (P)  oriented x 4  -SHILPI      Follows Commands/Answers Questions (P)  100% of the time;able to follow single-step instructions;able to follow multi-step instructions;needs repetition;needs cueing  -SHILPI      Personal Safety (P)  mild impairment  -SHILPI      Personal Safety Interventions (P)  fall prevention program maintained;gait belt;nonskid shoes/slippers when out of bed;supervised activity  -SHILPI  fall prevention program maintained;gait belt;muscle strengthening facilitated;nonskid shoes/slippers when out of bed  -LB    Recorded by [SHILPI] Karen Allan, OT Student  [LB] Angelica Treadwell PTA    Improve memory skills    Memory Skills Progress  80%;with inconsistent cues  -LT     Recorded by  [LT] Yvonne Miles MS CCC-SLP     Improve functional problem solving    Functional Problem Solving Progress  70%;with inconsistent cues  -LT     Recorded by  [LT] Yvonne Miles MS CCC-SLP     Improve executive function skills    Executive Function Skills Progress  70%;without cues  -LT     Recorded by  [LT] Yvonne Miles MS CCC-SLP     Bed Mobility, Assessment/Treatment    Bed Mobility, Assistive Device   --   assessed on txment mat  -LB    Bed Mobility, Roll Left, [place]   supervision required  -LB    Bed Mobility, Roll Right, [place]   supervision required  -LB    Bed Mob, Supine to Sit, [place] (P)  supervision required  -SHILPI  supervision required  -LB    Bed Mob, Sit to Supine, [place]   supervision required  -LB    Recorded by [SHILPI] Karen Allan, OT Student  [LB] Angelica Treadwell PTA    Transfer Assessment/Treatment    Transfers, Bed-Chair [place] (P)  contact guard assist  -SHILPI  contact guard assist;minimum assist (75% patient effort)  -LB    Transfers, Chair-Bed [place]   contact guard  assist;minimum assist (75% patient effort)  -LB    Transfers, Bed-Chair-Bed, Assist Device   rolling walker  -LB    Transfers, Sit-Stand [place] (P)  contact guard assist  -SHILPI  contact guard assist  -LB    Transfers, Stand-Sit [place] (P)  contact guard assist  -SHILPI  contact guard assist  -LB    Transfers, Sit-Stand-Sit, Assist Device   rolling walker  -LB    Toilet Transfer, [place] (P)  contact guard assist  -SHILPI      Toilet Transfer, Assistive Device (P)  elevated toilet seat;rolling walker   grab bars  -SHILPI      Walk-In Shower Transfer, [place] (P)  contact guard assist;verbal cues required   vcs for rwx placement, sequencing, hand placement   -SHILPI      Walk-In Shower Transfer, Assist Device (P)  rolling walker;standard shower chair   grab bars  -SHILPI      Transfer, Comment   car tsf CGA/min, Rwx  -LB    Recorded by [SHILPI] Karen Allan, OT Student  [LB] Angelica Treadwell PTA    Gait Assessment/Treatment    Gait, [place] Level   contact guard assist;verbal cues required  -LB    Gait, Assistive Device   rolling walker  -LB    Gait, Distance (Feet)   200  -LB    Gait, Gait Deviations   rojelio decreased;forward flexed posture  -LB    Recorded by   [LB] Angelica Treadwell PTA    Stairs Assessment/Treatment    Number of Stairs   4  -LB    Stairs, Handrail Location   both sides  -LB    Stairs, [place] Level   minimum assist (75% patient effort);contact guard assist;verbal cues required  -LB    Stairs, Technique Used   step to step (ascending);step to step (descending)  -LB    Stairs, Comment   curb rwx, cga, vc's  -LB    Recorded by   [LB] Angelica Treadwell PTA    Upper Body Bathing Assessment/Training    UB Bathing Assess/Train Assistive Device (P)  grab bars;hand-held shower head;shower chair with back  -SHILPI      UB Bathing Assess/Train, Position (P)  sitting  -SHILPI      UB Bathing Assess/Train, [place] Level (P)  set up required;contact guard assist  -SHILPI      Recorded by [SHILPI] Karen  Jerrell, OT Student      Lower Body Bathing Assessment/Training    LB Bathing Assess/Train Assistive Device (P)  grab bars;hand-held shower head;shower chair with back  -SHILPI      LB Bathing Assess/Train, Position (P)  sitting;standing  -SHILPI      LB Bathing Assess/Train, [place] Level (P)  contact guard assist;verbal cues required  -SHILPI      LB Bathing Assess/Train, Comment (P)  vcs for hand placement, CGA for balance when standing to wash buttocks/elsi  -SHILPI      Recorded by [SHILPI] Karen Allan, OT Student      Upper Body Dressing Assessment/Training    UB Dressing Assess/Train, Clothing Type (P)  doffing:;donning:;t-shirt;hospital gown   dof gown, don shirt   -SHILPI      UB Dressing Assess/Train, Position (P)  sitting  -SHILPI      UB Dressing Assess/Train, [place] (P)  set up required;contact guard assist  -SHILPI      UB Dressing Assess/Train, Comment (P)  pt set up assist to orient shirt correctly to thread arm   -SHILPI      Recorded by [SHILPI] Karen Allan, OT Student      Lower Body Dressing Assessment/Training    LB Dressing Assess/Train, Clothing Type (P)  doffing:;donning:;socks;shoes;pants   dof underwear; don underwear, capris, shoes, socks   -SHILPI      LB Dressing Assess/Train, Position (P)  sitting;standing  -SHILPI      LB Dressing Assess/Train, [place] (P)  minimum assist (75% patient effort)  -SHILPI      LB Dressing Assess/Train, Comment (P)  CGA to stand for clothing management; required assist w/ tying shoes due to decreased sensation in LUE  -SHILPI      Recorded by [SHILPI] Karen Allan, OT Student      Toileting Assessment/Training    Toileting Assess/Train, Assistive Device (P)  grab bars;raised toilet seat  -SHILPI      Toileting Assess/Train, Position (P)  sitting;standing  -SHILPI      Toileting Assess/Train, [place] Level (P)  contact guard assist  -SHILPI      Toileting Assess/Train, Comment (P)  CGA for balance during clothing management   -SHILPI      Recorded by [SHILPI] Karen Allan, OT Student      Grooming  Assessment/Training    Grooming Assess/Train, Position (P)  sitting   in recliner using tray table mirror  -SHILPI      Grooming Assess/Train, [place] Level (P)  set up required  -SHILPI      Grooming Assess/Train, Comment (P)  combing hair; dentures hygiene performed before session   -SHILPI      Recorded by [SHILPI] Karen Allan OT Student      Balance Skills Training    Standing-Level of Assistance   Minimum assistance  -LB    Static Standing Balance Support   Right upper extremity supported;Left upper extremity supported;eliu bar   UE support as needed  -LB    Standing-Balance Activities   Compliant surfaces  -LB    Gait Balance-Level of Assistance   Contact guard  -LB    Gait Balance Support   Right upper extremity supported;Left upper extremity supported;eliu bar  -LB    Gait Balance Activities   side-stepping  -LB    Recorded by   [LB] Angelica Treadwell PTA    Therapy Exercises    Bilateral Lower Extremities   AROM:;10 reps;standing  -LB    Recorded by   [LB] Angelica Treadwell PTA    Sensory Treatment    Sensory Reeducation Techniques (P)  comparison, tactile sensory information;sensory train, w/o visual reinforcement;sensory training, visual reinforcement  -SHILPI      Sensory Reeduction Treatment (P)  3 objects were placed on table w/ associated sight word cards, pt touched each object and described tactile details w/ eyes open, pt then ID'ed object placed in L hand w/ eyes closed; pt was able to ID 2/3 w/ eyes closed, pt was able to ID the other w/ eyes open; pt searched for items in rice bin w/ eyes open, correctly decsribed tactile details w/ min vcs while manipulating in hand, then reburried the items  -SHILPI      Recorded by [SHILPI] Karen Allan OT Student      Positioning and Restraints    Pre-Treatment Position (P)  in bed  -SHILPI      Post Treatment Position (P)  chair   recliner  -SHILPI  wheelchair  -LB    In Chair (P)  encouraged to call for assist;sitting;call light within reach  -SHILPI      In Wheelchair   sitting;with  SLP  -LB    Recorded by [SHILPI] Karen Allan OT Student  [LB] Angelica rTeadwell PTA      08/01/17 1150 08/01/17 1100 08/01/17 0940    Rehab Assessment/Intervention    Discipline occupational therapist  -SHILPI NULL,CHAKA2 speech language pathologist  -LT physical therapy assistant  -LB    Document Type therapy note (daily note)  -CHAKA,SHILPI,AF2 therapy note (daily note)  -LT therapy note (daily note)  -LB    Subjective Information agree to therapy;no complaints  -AF,SHILPI,AF2 agree to therapy  -LT agree to therapy  -LB    Patient Effort, Rehab Treatment good  -CHAKA,SHILPI,AF2 good  -LT good  -LB    Symptoms Noted During/After Treatment shortness of breath   w/ tsf  -CHAKA,SHILPI,AF2      Symptoms Noted Comment rest breaks as needed; monitered SpO2  -AF,SHILPI,AF2      Precautions/Limitations fall precautions  -AF,SHILPI,AF2  fall precautions  -LB    Recorded by [AF,SHILPI,AF2] NO Cooney (r) Karen Allan OT Student (t) NO Cooney (c) [LT] Yvonne Miles MS CCC-SLP [LB] Angelica Treadwell PTA    Vital Signs    Intra SpO2 (%) 98  -AF,SHILPI,AF2      Recorded by [AF,SHILPI,AF2] NO Cooney (r) Karen Allan OT Student (t) NO Cooney (c)      Pain Assessment    Pain Assessment No/denies pain  -AF,SHILPI,AF2  No/denies pain  -LB    Recorded by [AF,SHILPI,AF2] NO Cooney (r) Karen Allan OT Student (t) NO Cooney (c)  [LB] Angelica Treadwell PTA    Cognitive Assessment/Intervention    Current Cognitive/Communication Assessment functional  -AF,SHILPI,AF2      Orientation Status oriented x 4  -AF,SHILPI,AF2      Follows Commands/Answers Questions 100% of the time;able to follow single-step instructions;needs cueing;needs repetition  -AF,SHILPI,AF2      Personal Safety mild impairment;decreased insight to deficits;decreased awareness, need for safety  -AF,SHILPI,AF2      Personal Safety Interventions fall prevention program maintained;gait belt;nonskid shoes/slippers when out of bed;supervised activity  -SHILPI NULL,CHAKA2  fall  prevention program maintained;gait belt;muscle strengthening facilitated;nonskid shoes/slippers when out of bed  -LB    Recorded by [AF,SHILPI,AF2] Adelia Salamanca OTR (r) Karen Allan, OT Student (t) Adelia Salamanca OTR (c)  [LB] Angelica Treadwell PTA    Improve memory skills    Memory Skills Progress  60%;with inconsistent cues  -LT     Recorded by  [LT] Yvonne Miles MS CCC-SLP     Improve functional problem solving    Functional Problem Solving Progress  50%;with inconsistent cues  -LT     Recorded by  [LT] Yvonne Miles MS CCC-SLP     Improve executive function skills    Executive Function Skills Progress  80%;without cues  -LT     Recorded by  [LT] Yvonne Miles MS CCC-SLP     Bed Mobility, Assessment/Treatment    Bed Mobility, Assistive Device   --   assessed on txment mat  -LB    Bed Mobility, Roll Left, [place]   contact guard assist  -LB    Bed Mobility, Roll Right, [place]   contact guard assist;verbal cues required  -LB    Bed Mobility, Scoot/Bridge, [place]   supervision required  -LB    Bed Mob, Supine to Sit, [place] contact guard assist  -AF,SHILPI,AF2  contact guard assist;verbal cues required  -LB    Bed Mob, Sit to Supine, [place]   contact guard assist;verbal cues required  -LB    Recorded by [AF,SHILPI,AF2] Adelia Salamanca, CHERELLER (r) Karen Allan, OT Student (t) Adelia Salamanca OTR (c)  [LB] Angelica Treadwell PTA    Transfer Assessment/Treatment    Transfers, Bed-Chair [place] contact guard assist;verbal cues required  -AF,SHILPI,AF2  minimum assist (75% patient effort);contact guard assist;verbal cues required  -LB    Transfers, Chair-Bed [place]   minimum assist (75% patient effort);contact guard assist;verbal cues required  -LB    Transfers, Sit-Stand [place] contact guard assist;verbal cues required   vcs to maintain wide base of support  -AF,SHILPI,AF2  contact guard assist  -LB    Transfers, Stand-Sit [place] verbal cues required;contact guard assist    vcs to reach back for w/c  -AF,SHILPI,AF2  contact guard assist  -LB    Transfers, Sit-Stand-Sit, Assist Device   rolling walker  -LB    Toilet Transfer, [place] verbal cues required;contact guard assist   vcs for hand placement   -AF,SHILPI,AF2      Toilet Transfer, Assistive Device rolling walker;elevated toilet seat   grab bars  -AF,SHILPI,AF2      Transfer, Comment EOM tsf to and from w/c; CGA, VCs/gestural prompts for hand placement   -AF,SHILPI,AF2  car tsf, rwx, CGA, vc's  -LB    Recorded by [AF,SHILPI,AF2] Adelia Salamanca, OTR (r) Karen Allan, OT Student (t) NO Cooney (c)  [LB] Angelica Treadwell PTA    Gait Assessment/Treatment    Gait, [place] Level   contact guard assist;verbal cues required   cues for upright posture  -LB    Gait, Assistive Device   rolling walker  -LB    Gait, Distance (Feet)   200  -LB    Gait, Gait Deviations   rojelio decreased;forward flexed posture;left:;decreased heel strike  -LB    Recorded by   [LB] Angelica Treadwell PTA    Stairs Assessment/Treatment    Number of Stairs   4  -LB    Stairs, Handrail Location   both sides  -LB    Stairs, [place] Level   minimum assist (75% patient effort);contact guard assist  -LB    Stairs, Technique Used   step to step (ascending);step to step (descending)  -LB    Stairs, Comment   curb, rwx, min/CGA  -LB    Recorded by   [LB] Angelica Treadwell PTA    Functional Mobility    Functional Mobility- Ind. Level contact guard assist  -AF,SHILPI,AF2      Functional Mobility- Device rolling walker  -AF,SHILPI,AF2      Functional Mobility-Maintain WBing able to maintain weight bearing status  -AF,SHILPI,AF2      Functional Mobility- Comment pt was able to walk into bathroom from bed and maneuver around bathroom w/ rwx w/ CGA   -AF,SHILPI,AF2      Recorded by [AF,SHILPI,AF2] Adelia Salamanca OTR (r) Karen Allan, OT Student (t) NO Cooney (c)      Upper Body Dressing Assessment/Training    UB Dressing Assess/Train, Clothing Type  doffing:;donning:;t-shirt  -AF,SHILPI,AF2      UB Dressing Assess/Train, Position sitting;edge of bed  -AF,SHILPI,AF2      UB Dressing Assess/Train, [place] minimum assist (75% patient effort)   min a to thread R arm through correct hole   -AF,SHILPI,AF2      Recorded by [AF,SHILPI,AF2] Adelia Salamanca OTR (r) Karen Allan, OT Student (t) NO Cooney (c)      Lower Body Dressing Assessment/Training    LB Dressing Assess/Train, Clothing Type doffing:;donning:;pants;shoes;socks  -AF,SHILPI,AF2      LB Dressing Assess/Train, Position sitting;edge of bed;standing  -AF,SHILPI,AF2      LB Dressing Assess/Train, [place] minimum assist (75% patient effort);contact guard assist  -AF,SHILPI,AF2      LB Dressing Assess/Train, Comment CGA while standing to pull up pants; min a to adjust and tie shoes due to decreased sensation in LUE  -AF,SHILPI,AF2      Recorded by [AF,SHILPI,AF2] Adelia Salamanca OTR (r) Karen Allan, OT Student (t) NO Cooney (c)      Toileting Assessment/Training    Toileting Assess/Train, Assistive Device grab bars  -AF,SHIPLI,AF2      Toileting Assess/Train, Position sitting  -AF,SHILPI,AF2      Toileting Assess/Train, [place]ep[place] Level contact guard assist  -AF,SHILPI,AF2      Toileting Assess/Train, Comment pt able to manage clothes and perform hygiene IND; CGA while standing to adjust clothes  -AF,SHILPI,AF2      Recorded by [AF,SHILPI,AF2] NO Cooney (r) Karen Allan, OT Student (t) NO Cooney (c)      Therapy Exercises    Bilateral Lower Extremities   AROM:;10 reps;standing;supine  -LB    Recorded by   [LB] Angelica Treadwell, PTA    Sensory Treatment    Sensory Reeducation Techniques sensory training, visual reinforcement  -AF,SHILPI,AF2      Sensory Reeduction Treatment pt matched objects placed in hand w/ sight word cards w/ eyes closed, pt was unable to ID any items but was able to ID items w/ eyes open and tactile sensation; pt stated some items felt heavy, some soft w/o visual input;   -AF,SHILPI,AF2       Sensory Treatment Comment pt selected small foam beads from a pile and put on a string using BLE while standing at countertop, pt displayed difficulty w/ using appropraite grasp to pickup and maintain hold on beads; pt found and picked out 10 buttons from yellow theraputty while standing at countertop w/ BUE, pt reported having a difficult time; pt was provided w/ sponge to squeeze and rub on LUE to increase sensory input to regain functional grasp and sensation necessary for self care tasks  -SHILPI NULL,AF2      Recorded by [SHILPI NULL,AF2] NO Cooney (r) Karen Allan OT Student (t) NO Cooney (c)      Positioning and Restraints    Pre-Treatment Position in bed  -SHILPI NULL AF2      Post Treatment Position chair  -SHILPI NULL,CHAKA2  wheelchair  -LB    In Chair encouraged to call for assist;call light within reach;sitting  -SHILPI NULL AF2      In Wheelchair   sitting;with SLP  -LB    Recorded by [SHILPI NULL,AF2] NO Cooney (r) Karen Allan OT Student (t) NO Cooney (c)  [LB] Angelica Treadwell, YARA      07/31/17 1551 07/31/17 1000 07/31/17 0947    Rehab Assessment/Intervention    Discipline occupational therapist  -SHILPI NULL AF2 speech language pathologist  -LT physical therapy assistant  -LB    Document Type therapy note (daily note)  -SHILPI NULL AF2 therapy note (daily note)  -LT therapy note (daily note)  -LB    Subjective Information agree to therapy;no complaints  -SHILPI NULL AF2 agree to therapy  -LT agree to therapy  -LB    Patient Effort, Rehab Treatment good  -SHILPI NULL,AF2 good  -LT good  -LB    Precautions/Limitations fall precautions  -SHILPI NULL,AF2  fall precautions  -LB    Recorded by [SHILPI NULL,AF2] NO Cooney (r) Karen Allan, OT Student (t) NO Cooney (c) [LT] Yvonne Miles MS CCC-SLP [LB] Angelica Treadwell, PTA    Pain Assessment    Pain Assessment No/denies pain  -SHILPI NULL,AF2  No/denies pain  -LB    Recorded by [CHAKA,SHILPI,AF2] Adelia Salamanca, OTR (r) Karen Allan OT Student (t)  Adelia Salamanca, OTR (c)  [LB] Angelica Treadwell PTA    Cognitive Assessment/Intervention    Current Cognitive/Communication Assessment functional  -AF,SHILPI,AF2      Orientation Status oriented x 4  -AF,SHILPI,AF2      Follows Commands/Answers Questions 100% of the time;able to follow single-step instructions;needs cueing;needs repetition  -AF,SHILPI,AF2      Personal Safety mild impairment;decreased insight to deficits  -AF,SHILPI,AF2      Personal Safety Interventions fall prevention program maintained;gait belt;nonskid shoes/slippers when out of bed  -AF,SHILPI,AF2  fall prevention program maintained;gait belt;muscle strengthening facilitated;nonskid shoes/slippers when out of bed  -LB    Recorded by [AF,SHILPI,AF2] Adelia Salamanca, OTR (r) Karen Allan OT Student (t) Adelia Salamanca, OTR (c)  [LB] Angelica Treadwell PTA    Communication Treatment Objective and Progress    Cognitive Linguistic Treatment Objectives  Improve memory skills;Improve functional problem solving;Improve executive function skills  -LT     Recorded by  [LT] Yvonne Miles MS CCC-SLP     Improve memory skills    Improve memory skills through:  recalling unrelated word lists immediately;recalling unrelated word lists with an imposed delay;visual memory task;listen to a paragraph and answer questions;use written schedule;80%;without cues  -LT     Memory Skills Progress  0%;without cues;60%;70%;with inconsistent cues   recall 3 unrelated words p 5 mins  -LT     Recorded by  [LT] Yvonne Miles MS CCC-SLP     Improve functional problem solving    Improve functional problem solving through:  determine solutions to complex problems;complete high level reasoning task;complete organization/home management task;complete functional math task;80%;without cues  -LT     Functional Problem Solving Progress  60%;with inconsistent cues  -LT     Recorded by  [LT] Yvonne Miles MS CCC-SLP     Improve executive function skills    Improve executive function skills:   identify strategies, strengths, limitations;exhibit cognitive flexibility;80%;without cues  -LT     Recorded by  [LT] Yvonne Miles MS CCC-SLP     Improve articulation    Improve articulation:  by over-articulating in connected speech;80%;without cues  -LT     Articulation Progress  with inconsistent cues  -LT     Recorded by  [LT] Yvonne Miles MS CCC-SLP     General UE Assessment    ROM RUE ROM was WNL;shoulder, left: UE ROM deficit;scapula, left: UE ROM deficit   AROM  -AF,SHILPI,AF2      ROM Detail PROM shoulder flexion/extension WNL, PROM shoulder external rotation WNL  -AF,SHILPI,AF2      Recorded by [AF,SHILPI,AF2] Adelia Salamanca OTR (r) Karen Allan OT Student (t) NO Cooney (c)      General Hand Assessment    ROM other (see comments)  -AF,SHILPI,AF2      ROM Detail able to complete L opposition w/ increased time and attention ; no opposition deficits noted on R hand   -AF,SHILPI,AF2      Recorded by [AF,SHILPI,AF2] NO Cooney (r) Karen Allan OT Student (t) NO Cooney (c)      MMT (Manual Muscle Testing)    General MMT Assessment no strength deficits identified   WFL for age   -AF,SHILPI,AF2      General MMT Assessment Detail  R 45, L 25; 3 pt pinch R 8, L 5; lateral pinch R 8, L 6   pt reports right hand dominance   -AF,SHILPI,AF2      Recorded by [AF,SHILPI,AF2] NO Cooney (r) Karen Allan OT Student (t) NO Cooney (c)      Bed Mobility, Assessment/Treatment    Bed Mobility, Comment   up in chair  -LB    Recorded by   [LB] Angelica Treadwell, PTA    Transfer Assessment/Treatment    Transfers, Sit-Stand [place]   contact guard assist  -LB    Transfers, Stand-Sit [place]   contact guard assist  -LB    Transfers, Sit-Stand-Sit, Assist Device   rolling walker  -LB    Transfer, Comment to and from EOM from w/c CGA; from w/c to recliner CGA w/ vcs for hand placement   -AF,SHILPI,AF2  car tsf CG/sumi, Rwx  -LB    Recorded by [AF,SHILPI,AF2] Adelia Salamanca, NO (christine) Karen  Jerrell, OT Student (t) Adelia Salamanca OTR (c)  [LB] Angelica Treadwell PTA    Gait Assessment/Treatment    Gait, [place] Level   contact guard assist  -LB    Gait, Assistive Device   rolling walker  -LB    Gait, Distance (Feet)   160  -LB    Gait, Gait Deviations   rojelio decreased;forward flexed posture;narrow base;left:;decreased heel strike  -LB    Recorded by   [LB] Angelica Treadwell PTA    Stairs Assessment/Treatment    Number of Stairs   4  -LB    Stairs, Handrail Location   both sides  -LB    Stairs, [place] Level   minimum assist (75% patient effort);contact guard assist  -LB    Stairs, Technique Used   step to step (ascending);step to step (descending)  -LB    Stairs, Comment   curb, rwx, sumi, vc's  -LB    Recorded by   [LB] Angelica Treadewll PTA    Balance Skills Training    Standing-Level of Assistance   Minimum assistance  -LB    Static Standing Balance Support   parallel bars  -LB    Standing-Balance Activities   Compliant surfaces  -LB    Gait Balance-Level of Assistance   Contact guard  -LB    Gait Balance Support   parallel bars  -LB    Gait Balance Activities   side-stepping  -LB    Recorded by   [LB] Angelica Treadwell PTA    Therapy Exercises    Bilateral Lower Extremities   AROM:;10 reps;standing  -LB    Recorded by   [LB] Angelica Treadwell PTA    Fine Motor Coordination Training    9-Hole Peg Right 25 secs  -AF,SHILPI,AF2      9-Hole Peg Left 61 secs  -AF,SHILPI,AF2      Box and Blocks Right 56 blocks  -AF,SHILPI,AF2      Box and Blocks Left 39 blocks  -AF,SHILPI,AF2      Recorded by [AF,SHILPI,AF2] Adelia Salamanca, OTR (r) Karen Allan, OT Student (t) Adelia Salamanca, OTR (c)      Sensory Assessment/Intervention    Sensory Impairment numbness;dull;sharp  -AF,SHILPI,AF2      Light Touch LUE  -AF,SHILPI,AF2      LUE Light Touch mild impairment   unable to recognize light touch w/ eyes closed   -AF,SHILPI,AF2      RUE Light Touch WNL  -AF,SHILPI,AF2      Sharp/Dull Discrimination LUE   sharp 1/3 trials correct;  dull 2/3 trials correct  -AF,SHILPI,AF2      LUE Sharp/Dull Discrimination severe impairment  -AF,SHILPI,AF2      Stereognosis LUE   unable to recognize 3/3 objects; dropped objects w/o noticin  -AF,SHILPI,AF2      LUE Stereognosis severe impairment  -AF,SHILPI,AF2      Recorded by [AF,SHILPI,AF2] NO Cooney (r) Karen Allan OT Student (t) NO Cooney (c)      Sensory Treatment    Sensory Reeducation Techniques sensory training, visual reinforcement  -AF,SHILPI,AF2      Sensory Reeduction Treatment pt was unable to find various objects w/ L hand from box of beans w/ eyes closed; pt dug to find objects w/ eyes open   -AF,SHILPI,AF2      Recorded by [AF,SHILPI,AF2] NO Cooney (r) Karen Allan OT Student (t) NO Cooney (c)      Positioning and Restraints    Pre-Treatment Position sitting in chair/recliner  -CHAKA,SHILPI,AF2      Post Treatment Position wheelchair  -SHILPI NULL,AF2  wheelchair  -LB    In Chair encouraged to call for assist;call light within reach  -CRYSTAL NULLD,AF2      In Wheelchair   sitting;with SLP  -LB    Recorded by [AF,SHILPI,AF2] NO Cooney (r) Karen Allan OT Student (t) NO Cooney (c)  [LB] Angelica Treadwell, PTA      User Key  (r) = Recorded By, (t) = Taken By, (c) = Cosigned By    Initials Name Effective Dates    LT Yvonne Miles MS CCC-SLP 04/13/15 -     LB Angelica Treadwell PTA 02/18/16 -     AF NO Cooney 12/01/15 -     SHILPI Allan OT Student 07/11/17 -                 OT Goals       07/29/17 1054 07/25/17 1443       Transfer Training OT STG    Transfer Training OT STG, Date Established 07/29/17  -RE      Transfer Training OT STG, Time to Achieve 3 days  -RE      Transfer Training OT STG, Activity Type toilet  -RE      Transfer Training OT STG, [place] Level supervision required;verbal cues required  -RE      Transfer Training OT STG, Outcome goal ongoing  -RE      Transfer Training OT LTG    Transfer Training OT LTG, Date Established 07/29/17   -RE 07/25/17  -SO     Transfer Training OT LTG, Time to Achieve 1 wk  -RE 1 wk  -SO     Transfer Training OT LTG, Activity Type toilet  -RE sit to stand/stand to sit;toilet  -SO     Transfer Training OT LTG, [place] Level conditional independence  -RE contact guard assist;supervision required  -SO     Transfer Training OT LTG, Assist Device  walker, rolling  -SO     Transfer Training OT LTG, Outcome goal ongoing  -RE      Transfer Training 2 OT STG    Transfer Training 2 OT STG, Time to Achieve 3 days  -RE      Transfer Training 2 OT STG, Activity Type shower chair  -RE      Transfer Training 2 OT STG, [place] Level contact guard assist  -RE      Transfer Training 2 OT STG, Outcome goal ongoing  -RE      Transfer Training 2 OT LTG    Transfer Training 2 OT LTG, Time to Achieve 2 wks  -RE      Transfer Training 2 OT LTG, Activity Type shower chair;tub  -RE      Transfer Training 2 OT LTG, [place] Level supervision required  -RE      Transfer Training 2 OT LTG, Outcome goal ongoing  -RE      Bathing OT STG    Bathing Goal OT STG, Date Established 07/29/17  -RE      Bathing Goal OT STG, Time to Achieve 3 days  -RE      Bathing Goal OT STG, Activity Type upper body bathing;lower body bathing  -RE      Bathing Goal OT STG, [place] Level set up required  -RE      Bathing Goal OT STG, Outcome goal ongoing  -RE      Bathing OT LTG    Bathing Goal OT LTG, Time to Achieve 2 wks  -RE      Bathing Goal OT LTG, Activity Type upper body bathing;lower body bathing  -RE      Bathing Goal OT LTG, [place] Level conditional independence  -RE      Bathing Goal OT LTG, Outcome goal ongoing  -RE      Grooming OT STG    Grooming Goal OT STG, Time to Achieve 3 days  -RE      Grooming Goal OT STG, [place] Level conditional independence  -RE      Grooming Goal OT STG, Outcome goal ongoing  -RE      Grooming OT LTG    Grooming Goal OT LTG, Time to Achieve 1 wk  -RE      Grooming Goal OT LTG, [place] Level  conditional independence  -RE      Grooming Goal OT LTG, Outcome goal ongoing  -RE      LB Dressing OT STG    LB Dressing Goal OT STG, Date Established 07/29/17  -RE      LB Dressing Goal OT STG, Time to Achieve 5 - 7 days  -RE      LB Dressing Goal OT STG, [place] Level supervision required;verbal cues required  -RE      LB Dressing Goal OT STG, Outcome goal ongoing  -RE      LB Dressing OT LTG    LB Dressing Goal OT LTG, Date Established 07/29/17  -RE      LB Dressing Goal OT LTG, Time to Achieve 2 wks  -RE      LB Dressing Goal OT LTG, [place] Level conditional independence  -RE      LB Dressing Goal OT LTG, Outcome goal ongoing  -RE      UB Dressing OT STG    UB Dressing Goal OT STG, Date Established 07/29/17  -RE      UB Dressing Goal OT STG, Time to Achieve 3 days  -RE      UB Dressing Goal OT STG, [place] Level set up required  -RE      UB Dressing Goal OT STG, Outcome goal ongoing  -RE      UB Dressing OT LTG    UB Dressing Goal OT LTG, Date Established 07/29/17  -RE      UB Dressing Goal OT LTG, Time to Achieve 2 wks  -RE      UB Dressing Goal OT LTG, [place] Level conditional independence  -RE      UB Dressing Goal OT LTG, Outcome goal ongoing  -RE      Isolated Movement OT LTG    Isolated Movement OT LTG, Date Established  07/25/17  -SO     Isolated Movement OT LTG, Time to Achieve  1 wk  -SO     Isolated Movement OT LTG, Body Area  left scapula;left shoulder;left elbow;left forearm;left wrist;left fingers  -SO     Isolated Movement OT LTG, Level  WFL  -SO     ADL OT LTG    ADL OT LTG, Date Established  07/25/17  -SO     ADL OT LTG, Time to Achieve  1 wk  -SO     ADL OT LTG, Activity Type  ADL skills  -SO     ADL OT LTG, [place] Level  contact guard;standby assist;assistive device  -SO       User Key  (r) = Recorded By, (t) = Taken By, (c) = Cosigned By    Initials Name Provider Type    RE Maya Apodaca OTR Occupational Therapist    SO Bindu Matamoros OTR Occupational  Therapist                OT Recommendation and Plan  Anticipated Equipment Needs At Discharge: tub bench  Planned Therapy Interventions: adaptive equipment training, ADL retraining, activity intolerance, energy conservation, fine motor coordination training, neuromuscular re-education  Therapy Frequency: 5 times/wk          Time Calculation:         Time Calculation- OT       08/02/17 1523 08/02/17 1522       Time Calculation- OT    OT Start Time (P)  1100  -SHILPI (P)  0830  -SHILPI     OT Stop Time (P)  1130  -SHILPI (P)  0900  -SHILPI     OT Time Calculation (min) (P)  30 min  -SHILPI (P)  30 min  -SHILPI       User Key  (r) = Recorded By, (t) = Taken By, (c) = Cosigned By    Initials Name Provider Type    SHILPIJUSTIN Allan, OT Student OT Student           Therapy Charges for Today     Code Description Service Date Service Provider Modifiers Qty    44918358578 HC OT SELF CARE/MGMT/TRAIN EA 15 MIN 8/1/2017 Karen Allan OT Student GO 2    50939410099 HC OT NEUROMUSC RE EDUCATION EA 15 MIN 8/1/2017 Karen Allan OT Student GO 2    35212025956 HC OT SELF CARE/MGMT/TRAIN EA 15 MIN 8/2/2017 Karen Allan OT Student GO 2    [id number] HC OT NEUROMUSC RE EDUCATION EA 15 MIN 8/2/2017 Karen Allan OT Student GO 2               Karen Allan OT Student  8/2/2017

## 2017-08-02 NOTE — PROGRESS NOTES
Inpatient Rehabilitation Functional Measures Assessment and Plan of Care    Plan of Care  Updated Problems/Interventions      Functional Measures  BETH Eating:  Branch  [facility] Grooming: Branch  [facility] Bathing:  Branch  [facility] Upper Body Dressing:  Branch  [facility] Lower Body Dressing:  [facility] Toileting:  [facility] Bladder Management  Level of Assistance:  [place]  Frequency/Number of Accidents this Shift:  [facility] Bowel Management  Level of Assistance: [place]  Frequency/Number of Accidents this Shift: [facility] Bed/Chair/Wheelchair Transfer:  [facility] Toilet Transfer:  [facility] Tub/Shower Transfer:  [place]    Previously Documented Mode of Locomotion at Discharge: Field  BETH Expected Mode of Locomotion at Discharge: [facility] Walk/Wheelchair:  [facility] Stairs:  [facility] Comprehension:  [facility] Expression:  [facility] Social Interaction:  [facility] Problem Solving:  [facility] Memory:  [place]    Therapy Mode Minutes  Occupational Therapy: Branch  Physical Therapy: Branch  Speech Language Pathology:  Individual: 60 minutes.    Signed by: Yvonne Miles MS,CCC-SLP

## 2017-08-03 LAB
GLUCOSE BLDC GLUCOMTR-MCNC: 112 MG/DL (ref 70–130)
GLUCOSE BLDC GLUCOMTR-MCNC: 126 MG/DL (ref 70–130)
INR PPP: 2.95 (ref 0.9–1.1)
PROTHROMBIN TIME: 29.8 SECONDS (ref 11.7–14.2)

## 2017-08-03 PROCEDURE — 94799 UNLISTED PULMONARY SVC/PX: CPT

## 2017-08-03 PROCEDURE — 94760 N-INVAS EAR/PLS OXIMETRY 1: CPT

## 2017-08-03 PROCEDURE — 97535 SELF CARE MNGMENT TRAINING: CPT

## 2017-08-03 PROCEDURE — 85610 PROTHROMBIN TIME: CPT | Performed by: HOSPITALIST

## 2017-08-03 PROCEDURE — 82962 GLUCOSE BLOOD TEST: CPT

## 2017-08-03 PROCEDURE — 97110 THERAPEUTIC EXERCISES: CPT

## 2017-08-03 PROCEDURE — 97532: CPT

## 2017-08-03 PROCEDURE — 97112 NEUROMUSCULAR REEDUCATION: CPT

## 2017-08-03 RX ORDER — WARFARIN SODIUM 1 MG/1
1 TABLET ORAL
Status: DISCONTINUED | OUTPATIENT
Start: 2017-08-03 | End: 2017-08-04

## 2017-08-03 RX ADMIN — BUDESONIDE AND FORMOTEROL FUMARATE DIHYDRATE 2 PUFF: 80; 4.5 AEROSOL RESPIRATORY (INHALATION) at 19:58

## 2017-08-03 RX ADMIN — DOCUSATE SODIUM -SENNOSIDES 2 TABLET: 50; 8.6 TABLET, COATED ORAL at 20:26

## 2017-08-03 RX ADMIN — WARFARIN SODIUM 1 MG: 1 TABLET ORAL at 18:31

## 2017-08-03 RX ADMIN — METOPROLOL TARTRATE 25 MG: 25 TABLET ORAL at 20:26

## 2017-08-03 RX ADMIN — PREDNISOLONE ACETATE 1 DROP: 10 SUSPENSION/ DROPS OPHTHALMIC at 20:26

## 2017-08-03 RX ADMIN — METOPROLOL TARTRATE 25 MG: 25 TABLET ORAL at 08:08

## 2017-08-03 RX ADMIN — PREDNISOLONE ACETATE 1 DROP: 10 SUSPENSION/ DROPS OPHTHALMIC at 08:08

## 2017-08-03 RX ADMIN — BUDESONIDE AND FORMOTEROL FUMARATE DIHYDRATE 2 PUFF: 80; 4.5 AEROSOL RESPIRATORY (INHALATION) at 06:58

## 2017-08-03 RX ADMIN — ATORVASTATIN CALCIUM 80 MG: 80 TABLET, FILM COATED ORAL at 20:26

## 2017-08-03 NOTE — PROGRESS NOTES
Inpatient Rehabilitation Plan of Care Note    Plan of Care  Care Plan Reviewed - No updates at this time.    Signed by: Jayashree Weston RN

## 2017-08-03 NOTE — PLAN OF CARE
Problem: Stroke (Ischemic) (Adult)  Goal: Signs and Symptoms of Listed Potential Problems Will be Absent or Manageable (Stroke)  Outcome: Ongoing (interventions implemented as appropriate)    Problem: Fall Risk (Adult)  Goal: Absence of Falls  Outcome: Ongoing (interventions implemented as appropriate)    Problem: Patient Care Overview (Adult)  Goal: Plan of Care Review  Outcome: Ongoing (interventions implemented as appropriate)    08/03/17 1331   Coping/Psychosocial Response Interventions   Plan Of Care Reviewed With patient   Patient Care Overview   Progress improving   Outcome Evaluation   Outcome Summary/Follow up Plan Patient doing well. Patient participating in therapy. Patient denies having any pain.        Goal: Adult Individualization and Mutuality  Outcome: Ongoing (interventions implemented as appropriate)  Goal: Discharge Needs Assessment  Outcome: Ongoing (interventions implemented as appropriate)

## 2017-08-03 NOTE — PLAN OF CARE
Problem: Stroke (Ischemic) (Adult)  Goal: Signs and Symptoms of Listed Potential Problems Will be Absent or Manageable (Stroke)  Outcome: Ongoing (interventions implemented as appropriate)    08/03/17 0158   Stroke (Ischemic)   Problems Assessed (Stroke (Ischemic)/TIA) all   Problems Present (Stroke (Ischemic)/TIA) motor/sensory impairment         Problem: Fall Risk (Adult)  Goal: Absence of Falls  Outcome: Ongoing (interventions implemented as appropriate)    08/03/17 0158   Fall Risk (Adult)   Absence of Falls making progress toward outcome         Problem: Patient Care Overview (Adult)  Goal: Plan of Care Review  Outcome: Ongoing (interventions implemented as appropriate)    08/03/17 0158   Coping/Psychosocial Response Interventions   Plan Of Care Reviewed With patient   Patient Care Overview   Progress improving   Outcome Evaluation   Outcome Summary/Follow up Plan Patient calm, cooperative, and pleasant. Patient still very motivated to participate in therapies. Patient inquired about why she was taking a Lipitor and education was provided via verbal instructions. No c/o pain, no unsafe behaviors.

## 2017-08-03 NOTE — PROGRESS NOTES
Occupational Therapy: Individual: 60 minutes.    Physical Therapy: Branch    Speech Language Pathology:  Branch    Signed by: Adelia Slaamanca OT

## 2017-08-03 NOTE — PROGRESS NOTES
Inpatient Rehabilitation Functional Measures Assessment    Functional Measures  BETH Eating:  [facility] Grooming: [facility] Bathing:  [facility] Upper Body Dressing:  [facility] Lower Body Dressing:  [facility] Toileting:  Toileting Score = 5.  Patient is supervision/set-up for  toileting, requiring: Stand by assistance. Verbal cuing, prompting, or  instructing. Setting out toileting equipment. Patient requires the following  assistive device(s): Grab bar.    [facility] Bladder Management  Level of Assistance:  Bladder Score = 5.  Patient is supervision/set-up for  bladder management, requiring: Setting out equipment. No assistive devices were  required.  Frequency/Number of Accidents this Shift:  Bladder accidents this shift:  0 .  Patient has not had an accident this shift.    BETH Bowel Management  Level of Assistance: Bowel Score = 5.  Patient is supervision/set-up for bowel  management, requiring: Setting out equipment. Patient requires the following  assistive device(s): Pad .  Frequency/Number of Accidents this Shift: [facility] Bed/Chair/Wheelchair Transfer:  Bed/chair/wheelchair Transfer Score = 4.  Patient performs 75% or more of effort and minimal assistance (little/incidental  help/lifting of one limb/steadying) for transferring to and from the  bed/chair/wheelchair, requiring: Steadying. Contact guard. Hand placement. No  assistive devices were required.  [facility] Toilet Transfer:  [facility] Tub/Shower Transfer:  [place]    Previously Documented Mode of Locomotion at Discharge: Field  BETH Expected Mode of Locomotion at Discharge: [facility] Walk/Wheelchair:  [facility] Stairs:  [facility] Comprehension:  [facility] Expression:  [facility] Social Interaction:  [facility] Problem Solving:  [facility] Memory:  [place]    Therapy Mode Minutes  Occupational Therapy: Branch  Physical Therapy: [place]  Speech Language Pathology:  [place]    Signed by: JULITA Lemos

## 2017-08-03 NOTE — THERAPY TREATMENT NOTE
Inpatient Rehabilitation - Speech Language Pathology Treatment Note  [facility]     Patient Name: Magnus Hartley  : 1928  MRN: [id number]  Today's Date: 8/3/2017         Admit Date: 2017    Visit Dx:      ICD-10-CM ICD-9-CM   1. Left hemiparesis G81.94 342.90   2. Dysarthria R47.1 784.51     Patient Active Problem List   Diagnosis   • Foot pain   • Asthma   • Benign essential hypertension   • Controlled type 2 diabetes mellitus without complication   • Dyslipidemia   • Macrocytosis without anemia   • Senile osteoporosis   • Post-menopausal osteoporosis   • Sinus bradycardia   • Stress incontinence in female   • Urge incontinence of urine   • Atrophic vaginitis   • Encounter for fitting and adjustment of other specified devices   • Incomplete emptying of bladder   • Urethral caruncle   • Incomplete uterovaginal prolapse   • Cerebrovascular accident (CVA) due to occlusion of right middle cerebral artery   • Atrial fibrillation   • Warfarin anticoagulation (goal INR 2-3)   • CVA (cerebrovascular accident due to intracerebral hemorrhage)              Adult Rehabilitation Note       17 1300 17 [id number] 17 [phone]    Rehab Assessment/Intervention    Discipline speech language pathologist  -LT physical therapy assistant  -LB occupational therapist  -SHILPI NULL,AF2    Document Type therapy note (daily note)  -LT therapy note (daily note)  -LB therapy note (daily note)  -SHILPI NULL,AF2    Subjective Information agree to therapy  -LT agree to therapy  -LB agree to therapy;no complaints  -CRYSTAL NULLD,AF2    Patient Effort, Rehab Treatment excellent  -LT good  -LB good  -CRYSTAL NULLD,AF2    Precautions/Limitations  fall precautions  -LB fall precautions  -SHILPI NULL,AF2    Recorded by [LT] Yvonne Miles MS CCC-SLP [LB] Angelica Treadwell PTA [SHILPI NULL,AF2] NO Cooney (r) Karen Allan OT Student (t) NO Cooney (c)    Pain Assessment    Pain Assessment  No/denies pain  -LB No/denies pain  -SHILPI NULL,AF2     Recorded by  [LB] Angelica Treadwell PTA [AF,SHILPI,AF2] NO Cooney (r) Karen Allan OT Student (t) NO Cooney (c)    Cognitive Assessment/Intervention    Current Cognitive/Communication Assessment   functional  -AF,SHILPI,AF2    Orientation Status   oriented x 4  -AF,SHILPI,AF2    Follows Commands/Answers Questions   100% of the time;able to follow single-step instructions;able to follow multi-step instructions;needs repetition;needs cueing  -AF,SHILPI,AF2    Personal Safety   mild impairment  -AF,SHILPI,AF2    Personal Safety Interventions   fall prevention program maintained;gait belt;nonskid shoes/slippers when out of bed;supervised activity  -AF,SHILPI,AF2    Recorded by   [AF,SHILPI,AF2] NO Cooney (r) Karen Allan OT Student (t) NO Cooney (c)    Improve memory skills    Memory Skills Progress 90%;with inconsistent cues  -LT      Recorded by [LT] Yvonne Miles MS CCC-SLP      Improve functional problem solving    Functional Problem Solving Progress 80%;with inconsistent cues  -LT      Recorded by [LT] Yvonne Miles MS CCC-SLP      Improve executive function skills    Executive Function Skills Progress 80%;without cues  -LT      Recorded by [LT] Yvonne Miles MS CCC-SLP      Bed Mobility, Assessment/Treatment    Bed Mob, Supine to Sit, [place]   supervision required  -AF,SHILPI,AF2    Recorded by   [AF,SHILPI,AF2] NO Cooney (r) Karen Allan OT Student (t) NO Cooney (c)    Transfer Assessment/Treatment    Transfers, Bed-Chair [place]   contact guard assist  -AF,SHILPI,AF2    Transfers, Sit-Stand [place]  contact guard assist;stand by assist  -LB contact guard assist  -AF,SHILPI,AF2    Transfers, Stand-Sit [place]  contact guard assist  -LB contact guard assist  -AF,SHILPI,AF2    Transfers, Sit-Stand-Sit, Assist Device  rolling walker  -LB     Toilet Transfer, [place]   contact guard assist  -AF,SHILPI,AF2    Toilet Transfer, Assistive Device   elevated toilet  seat;rolling walker   grab bars  -AF,SHILPI,AF2    Walk-In Shower Transfer, [place]   contact guard assist;verbal cues required   vcs for rwx placement, sequencing, hand placement   -AF,SHILPI,AF2    Walk-In Shower Transfer, Assist Device   rolling walker;standard shower chair   grab bars  -AF,SHILPI,AF2    Recorded by  [LB] Angelica Treadwell PTA [AF,SHILPI,AF2] Adelia Salamanca OTR (r) Karen Allan, OT Student (t) NO Cooney (c)    Gait Assessment/Treatment    Gait, [place] Level  contact guard assist;stand by assist  -LB     Gait, Assistive Device  rolling walker  -LB     Gait, Distance (Feet)  250  -LB     Gait, Gait Deviations  forward flexed posture;rojelio decreased  -LB     Recorded by  [LB] Angelica Treadwell PTA     Stairs Assessment/Treatment    Number of Stairs  8  -LB     Stairs, Handrail Location  both sides  -LB     Stairs, [place] Level  minimum assist (75% patient effort);contact guard assist;verbal cues required  -LB     Stairs, Technique Used  step to step (ascending);step to step (descending)  -LB     Stairs, Comment  curb, ramp rwx cga, vc's  -LB     Recorded by  [LB] Angelica Treadwell PTA     Upper Body Bathing Assessment/Training    UB Bathing Assess/Train Assistive Device   grab bars;hand-held shower head;shower chair with back  -AF,SHILPI,AF2    UB Bathing Assess/Train, Position   sitting  -AF,SHILPI,AF2    UB Bathing Assess/Train, [place] Level   set up required;contact guard assist  -AF,SHILPI,AF2    Recorded by   [AF,SHILPI,AF2] Adelia Salamanca OTR (r) Karen Allan, OT Student (t) NO Cooney (c)    Lower Body Bathing Assessment/Training    LB Bathing Assess/Train Assistive Device   grab bars;hand-held shower head;shower chair with back  -AF,SHILPI,AF2    LB Bathing Assess/Train, Position   sitting;standing  -AF,SHILPI,AF2    LB Bathing Assess/Train, [place] Level   contact guard assist;verbal cues required  -AF,SHILPI,AF2    LB Bathing Assess/Train, Comment   vcs for hand  placement, CGA for balance when standing to wash buttocks/elsi  -AF,SHILPI,AF2    Recorded by   [AF,SHILPI,AF2] NO Cooney (r) Karen Allan, OT Student (t) NO Cooney (c)    Upper Body Dressing Assessment/Training    UB Dressing Assess/Train, Clothing Type   doffing:;donning:;t-shirt;hospital gown   dof gown, don shirt   -AF,SHILPI,AF2    UB Dressing Assess/Train, Position   sitting  -AF,SHILPI,AF2    UB Dressing Assess/Train, [place]   set up required;contact guard assist  -AF,SHILPI,AF2    UB Dressing Assess/Train, Comment   pt set up assist to orient shirt correctly to thread arm   -AF,SHILPI,AF2    Recorded by   [AF,SHILPI,AF2] NO Cooney (r) Karen Allan, OT Student (t) NO Cooney (c)    Lower Body Dressing Assessment/Training    LB Dressing Assess/Train, Clothing Type   doffing:;donning:;socks;shoes;pants   dof underwear; don underwear, capris, shoes, socks   -AF,SHILPI,AF2    LB Dressing Assess/Train, Position   sitting;standing  -AF,SHILPI,AF2    LB Dressing Assess/Train, [place]   minimum assist (75% patient effort)  -AF,SHILPI,AF2    LB Dressing Assess/Train, Comment   CGA to stand for clothing management; required assist w/ tying shoes due to decreased sensation in LUE  -AF,SHILPI,AF2    Recorded by   [AF,SHILPI,AF2] NO Cooney (r) Karen Allan, OT Student (t) NO Cooney (c)    Toileting Assessment/Training    Toileting Assess/Train, Assistive Device   grab bars;raised toilet seat  -AF,SHILPI,AF2    Toileting Assess/Train, Position   sitting;standing  -AF,SHILPI,AF2    Toileting Assess/Train, [place] Level   contact guard assist  -AF,SHILPI,AF2    Toileting Assess/Train, Comment   CGA for balance during clothing management   -AF,SHILPI,AF2    Recorded by   [AF,SHILPI,AF2] Adelia Salamanca OTR (r) Karen Allan, OT Student (t) Adelia Salamanca OTR (c)    Grooming Assessment/Training    Grooming Assess/Train, Position   sitting   in recliner using tray table mirror  -SHILPI NULL,AF2    Grooming  Assess/Train, [place] Level   set up required  -SHILPI NULL,AF2    Grooming Assess/Train, Comment   combing hair; dentures hygiene performed before session   -SHILPI NULL,AF2    Recorded by   [CHAKA,SHILPI,AF2] NO Cooney (r) Karen Allan, OT Student (t) NO Cooney (c)    Sensory Treatment    Sensory Reeducation Techniques   comparison, tactile sensory information;sensory train, w/o visual reinforcement;sensory training, visual reinforcement  -SHILPI NULL,AF2    Sensory Reeduction Treatment   3 objects were placed on table w/ associated sight word cards, pt touched each object and described tactile details w/ eyes open, pt then ID'ed object placed in L hand w/ eyes closed; pt was able to ID 2/3 w/ eyes closed, pt was able to ID the other w/ eyes open; pt searched for items in rice bin w/ eyes open, correctly decsribed tactile details w/ min vcs while manipulating in hand, then reburried the items  -SHILPI NULL,AF2    Recorded by   [SHILPI NULL,AF2] NO Cooney (r) Karen Allan, OT Student (t) NO Cooney (c)    Positioning and Restraints    Pre-Treatment Position   in bed  -SHILPI NULL,CHAKA2    Post Treatment Position  chair  -LB chair   recliner  -SHILPI NULL AF2    In Chair  sitting;call light within reach  -LB encouraged to call for assist;sitting;call light within reach  -SHILPI NULL,AF2    Recorded by  [LB] Angelica Treadwell, PTA [SHILPI NULL,AF2] NO Cooney (r) Karen Allan, OT Student (t) NO Cooney (c)      08/02/17 1000 08/02/17 0941 08/01/17 1150    Rehab Assessment/Intervention    Discipline speech language pathologist  -LT physical therapy assistant  -LB occupational therapist  -SHILPI NULL AF2    Document Type therapy note (daily note)  -LT therapy note (daily note)  -LB therapy note (daily note)  -SHILPI NULL,AF2    Subjective Information agree to therapy  -LT agree to therapy  -LB agree to therapy;no complaints  -SHILPI NULL,AF2    Patient Effort, Rehab Treatment good  -LT good  -LB good  -AF,SHILPI,AF2    Symptoms  Noted During/After Treatment   shortness of breath   w/ tsf  -AF,SHILPI,AF2    Symptoms Noted Comment   rest breaks as needed; monitered SpO2  -AF,SHILPI,AF2    Precautions/Limitations  fall precautions  -LB fall precautions  -AF,SHILPI,AF2    Recorded by [LT] Yvonne Miles MS CCC-SLP [LB] Angelica Treadwell PTA [AF,SHILPI,AF2] NO Cooney (r) Karen Allan OT Student (t) NO Cooney (c)    Vital Signs    Intra SpO2 (%)   98  -AF,SHILPI,AF2    Recorded by   [AF,SHILPI,AF2] NO Cooney (r) Karen Allan OT Student (t) NO Cooney (c)    Pain Assessment    Pain Assessment  No/denies pain  -LB No/denies pain  -AF,SHILPI,AF2    Recorded by  [LB] Angelica Treadwell PTA [AF,SHILPI,AF2] NO Cooney (r) Karen Allan OT Student (t) NO Cooney (c)    Cognitive Assessment/Intervention    Current Cognitive/Communication Assessment   functional  -AF,SHILPI,AF2    Orientation Status   oriented x 4  -AF,SHILPI,AF2    Follows Commands/Answers Questions   100% of the time;able to follow single-step instructions;needs cueing;needs repetition  -AF,SHILPI,AF2    Personal Safety   mild impairment;decreased insight to deficits;decreased awareness, need for safety  -AF,SHILPI,AF2    Personal Safety Interventions  fall prevention program maintained;gait belt;muscle strengthening facilitated;nonskid shoes/slippers when out of bed  -LB fall prevention program maintained;gait belt;nonskid shoes/slippers when out of bed;supervised activity  -AF,SHILPI,AF2    Recorded by  [LB] Angelica Treadwell PTA [AF,SHILPI,AF2] Adelia Salamanca OTR (r) Karen Allan OT Student (t) NO Cooney (c)    Improve memory skills    Memory Skills Progress 80%;with inconsistent cues  -LT      Recorded by [LT] Yvonne Miles MS CCC-SLP      Improve functional problem solving    Functional Problem Solving Progress 70%;with inconsistent cues  -LT      Recorded by [LT] Yvonne Miles MS CCC-SLP      Improve executive function skills    Executive  Function Skills Progress 70%;without cues  -LT      Recorded by [LT] Yvonne Miles MS CCC-SLP      Bed Mobility, Assessment/Treatment    Bed Mobility, Assistive Device  --   assessed on txment mat  -LB     Bed Mobility, Roll Left, [place]  supervision required  -LB     Bed Mobility, Roll Right, [place]  supervision required  -LB     Bed Mob, Supine to Sit, [place]  supervision required  -LB contact guard assist  -AF,SHILPI,AF2    Bed Mob, Sit to Supine, [place]  supervision required  -LB     Recorded by  [LB] Angelica Treadwell PTA [AF,SHILPI,AF2] Adelia Salamanca, OTR (r) Karen Allan, OT Student (t) NO Cooney (c)    Transfer Assessment/Treatment    Transfers, Bed-Chair [place]  contact guard assist;minimum assist (75% patient effort)  -LB contact guard assist;verbal cues required  -AF,SHILPI,AF2    Transfers, Chair-Bed [place]  contact guard assist;minimum assist (75% patient effort)  -LB     Transfers, Bed-Chair-Bed, Assist Device  rolling walker  -LB     Transfers, Sit-Stand [place]  contact guard assist  -LB contact guard assist;verbal cues required   vcs to maintain wide base of support  -AF,SHILPI,AF2    Transfers, Stand-Sit [place]  contact guard assist  -LB verbal cues required;contact guard assist   vcs to reach back for w/c  -AF,SHILPI,AF2    Transfers, Sit-Stand-Sit, Assist Device  rolling walker  -LB     Toilet Transfer, [place]   verbal cues required;contact guard assist   vcs for hand placement   -AF,SHILPI,AF2    Toilet Transfer, Assistive Device   rolling walker;elevated toilet seat   grab bars  -AF,SHILPI,AF2    Transfer, Comment  car tsf CGA/min, Rwx  -LB EOM tsf to and from w/c; CGA, VCs/gestural prompts for hand placement   -AF,SHILPI,AF2    Recorded by  [LB] Angelica Treadwell PTA [AF,SHILPI,AF2] Adelia Salamanca OTR (r) Karen Allan, OT Student (t) NO Cooney (c)    Gait Assessment/Treatment    Gait, [place] Level  contact guard assist;verbal cues  required  -LB     Gait, Assistive Device  rolling walker  -LB     Gait, Distance (Feet)  200  -LB     Gait, Gait Deviations  rojelio decreased;forward flexed posture  -LB     Recorded by  [LB] Angelica Treadwell PTA     Stairs Assessment/Treatment    Number of Stairs  4  -LB     Stairs, Handrail Location  both sides  -LB     Stairs, [place] Level  minimum assist (75% patient effort);contact guard assist;verbal cues required  -LB     Stairs, Technique Used  step to step (ascending);step to step (descending)  -LB     Stairs, Comment  curb rwx, cga, vc's  -LB     Recorded by  [LB] Angelica Treadwell PTA     Functional Mobility    Functional Mobility- [place] Level   contact guard assist  -CRYSTAL NULLD,AF2    Functional Mobility- Device   rolling walker  -CRYSTAL NULLD,AF2    Functional Mobility-Maintain WBing   able to maintain weight bearing status  -AF,SHILPI,AF2    Functional Mobility- Comment   pt was able to walk into bathroom from bed and maneuver around bathroom w/ rwx w/ CGA   -AF,SHILPI,AF2    Recorded by   [AF,SHILPI,AF2] Adelia Salamanca, OTR (r) Karen Allan, OT Student (t) NO Cooney (c)    Upper Body Dressing Assessment/Training    UB Dressing Assess/Train, Clothing Type   doffing:;donning:;t-shirt  -CHAKA,SHILPI,AF2    UB Dressing Assess/Train, Position   sitting;edge of bed  -CHAKA,SHILPI,AF2    UB Dressing Assess/Train, [place]   minimum assist (75% patient effort)   min a to thread R arm through correct hole   -AF,SHILPI,AF2    Recorded by   [AF,SHILPI,AF2] Adelia Salamanca OTR (r) Karen Allan, OT Student (t) NO Cooney (c)    Lower Body Dressing Assessment/Training    LB Dressing Assess/Train, Clothing Type   doffing:;donning:;pants;shoes;socks  -AF,SHILPI,AF2    LB Dressing Assess/Train, Position   sitting;edge of bed;standing  -AF,SHILPI,AF2    LB Dressing Assess/Train, [place]   minimum assist (75% patient effort);contact guard assist  -CHAKA,SHILPI,AF2    LB Dressing Assess/Train, Comment   CGA while standing to pull up  pants; min a to adjust and tie shoes due to decreased sensation in LUE  -AF,SHILPI,AF2    Recorded by   [AF,SHILPI,AF2] Adelia Salamanca, OTR (r) Karen Allan, OT Student (t) NO Cooney (c)    Toileting Assessment/Training    Toileting Assess/Train, Assistive Device   grab bars  -AF,SHILPI,AF2    Toileting Assess/Train, Position   sitting  -AF,SHILPI,AF2    Toileting Assess/Train, [place] Level   contact guard assist  -AF,SHILPI,AF2    Toileting Assess/Train, Comment   pt able to manage clothes and perform hygiene IND; CGA while standing to adjust clothes  -AF,SHILPI,AF2    Recorded by   [AF,SHILPI,AF2] Adelia Salamanca, NO (r) Karen Allan, OT Student (t) NO Cooney (c)    Balance Skills Training    Standing-Level of Assistance  Minimum assistance  -LB     Static Standing Balance Support  Right upper extremity supported;Left upper extremity supported;eliu bar   UE support as needed  -LB     Standing-Balance Activities  Compliant surfaces  -LB     Gait Balance-Level of Assistance  Contact guard  -LB     Gait Balance Support  Right upper extremity supported;Left upper extremity supported;eliu bar  -LB     Gait Balance Activities  side-stepping  -LB     Recorded by  [LB] Angelica Treadwell PTA     Therapy Exercises    Bilateral Lower Extremities  AROM:;10 reps;standing  -LB     Recorded by  [LB] Angelica Treadwell PTA     Sensory Treatment    Sensory Reeducation Techniques   sensory training, visual reinforcement  -AF,SHILPI,AF2    Sensory Reeduction Treatment   pt matched objects placed in hand w/ sight word cards w/ eyes closed, pt was unable to ID any items but was able to ID items w/ eyes open and tactile sensation; pt stated some items felt heavy, some soft w/o visual input;   -AF,SHILPI,AF2    Sensory Treatment Comment   pt selected small foam beads from a pile and put on a string using BLE while standing at countertop, pt displayed difficulty w/ using appropraite grasp to pickup and maintain hold on beads; pt found and  picked out 10 buttons from yellow theraputty while standing at countertop w/ BUE, pt reported having a difficult time; pt was provided w/ sponge to squeeze and rub on LUE to increase sensory input to regain functional grasp and sensation necessary for self care tasks  -AF,SHILPI,AF2    Recorded by   [AF,SHILPI,AF2] NO Cooney (r) Karen Allan OT Student (t) NO Cooney (c)    Positioning and Restraints    Pre-Treatment Position   in bed  -AF,SHILPI,AF2    Post Treatment Position  wheelchair  -LB chair  -AF,SHILPI,AF2    In Chair   encouraged to call for assist;call light within reach;sitting  -AF,SHILPI,AF2    In Wheelchair  sitting;with SLP  -LB     Recorded by  [LB] Angelica Treadwell PTA [AF,SHILPI,AF2] NO Cooney (r) Karen Allan OT Student (t) NO Cooney (c)      08/01/17 1100 08/01/17 0940 07/31/17 1551    Rehab Assessment/Intervention    Discipline speech language pathologist  -LT physical therapy assistant  -LB occupational therapist  -AF,SHILPI,AF2    Document Type therapy note (daily note)  -LT therapy note (daily note)  -LB therapy note (daily note)  -AF,SHILPI,AF2    Subjective Information agree to therapy  -LT agree to therapy  -LB agree to therapy;no complaints  -AF,SHILPI,AF2    Patient Effort, Rehab Treatment good  -LT good  -LB good  -AF,SHILPI,AF2    Precautions/Limitations  fall precautions  -LB fall precautions  -AF,SHILPI,AF2    Recorded by [LT] Yvonne Miles MS CCC-SLP [LB] Angelica Treadwell PTA [AF,SHILPI,AF2] NO Cooney (r) Karen Allan OT Student (t) NO Cooney (c)    Pain Assessment    Pain Assessment  No/denies pain  -LB No/denies pain  -AF,SHILPI,AF2    Recorded by  [LB] Angelica Treadwell PTA [AF,SHILPI,AF2] NO Cooney (r) Karen Allan OT Student (t) Adelia Salamanca, OTR (c)    Cognitive Assessment/Intervention    Current Cognitive/Communication Assessment   functional  -CHAKA,SHILPI,AF2    Orientation Status   oriented x 4  -SHILPI NULL,AF2    Follows  Commands/Answers Questions   100% of the time;able to follow single-step instructions;needs cueing;needs repetition  -AF,SHILPI,AF2    Personal Safety   mild impairment;decreased insight to deficits  -AF,SHILPI,AF2    Personal Safety Interventions  fall prevention program maintained;gait belt;muscle strengthening facilitated;nonskid shoes/slippers when out of bed  -LB fall prevention program maintained;gait belt;nonskid shoes/slippers when out of bed  -AF,SHILPI,AF2    Recorded by  [LB] Angelica Treadwell PTA [AF,SHILPI,AF2] Adelia Salamanca OTR (r) Karen Allan OT Student (t) NO Cooney (c)    Improve memory skills    Memory Skills Progress 60%;with inconsistent cues  -LT      Recorded by [LT] Yvonne Miles MS CCC-SLP      Improve functional problem solving    Functional Problem Solving Progress 50%;with inconsistent cues  -LT      Recorded by [LT] Yvonne Miles MS CCC-SLP      Improve executive function skills    Executive Function Skills Progress 80%;without cues  -LT      Recorded by [LT] Yvonne Miles MS CCC-SLP      General UE Assessment    ROM   RUE ROM was WNL;shoulder, left: UE ROM deficit;scapula, left: UE ROM deficit   AROM  -AF,SHILPI,AF2    ROM Detail   PROM shoulder flexion/extension WNL, PROM shoulder external rotation WNL  -AF,SHILPI,AF2    Recorded by   [AF,SHILPI,AF2] NO Cooney (r) Karen Allan OT Student (t) NO Cooney (c)    General Hand Assessment    ROM   other (see comments)  -AF,SHILPI,AF2    ROM Detail   able to complete L opposition w/ increased time and attention ; no opposition deficits noted on R hand   -AF,SHILPI,AF2    Recorded by   [AF,SHILPI,AF2] NO Cooney (r) Karen Allan OT Student (t) NO Cooney (c)    MMT (Manual Muscle Testing)    General MMT Assessment   no strength deficits identified   WFL for age   -AF,SHILPI,AF2    General MMT Assessment Detail    R 45, L 25; 3 pt pinch R 8, L 5; lateral pinch R 8, L 6   pt reports right hand dominance    -AF,SHILPI,AF2    Recorded by   [AF,SHILPI,AF2] Adelia Salamanca OTR (r) Karen Allan, OT Student (t) NO Cooney (c)    Bed Mobility, Assessment/Treatment    Bed Mobility, Assistive Device  --   assessed on txment mat  -LB     Bed Mobility, Roll Left, [place]  contact guard assist  -LB     Bed Mobility, Roll Right, [place]  contact guard assist;verbal cues required  -LB     Bed Mobility, Scoot/Bridge, [place]  supervision required  -LB     Bed Mob, Supine to Sit, [place]  contact guard assist;verbal cues required  -LB     Bed Mob, Sit to Supine, [place]  contact guard assist;verbal cues required  -LB     Recorded by  [LB] Angelica Treadwell PTA     Transfer Assessment/Treatment    Transfers, Bed-Chair [place]  minimum assist (75% patient effort);contact guard assist;verbal cues required  -LB     Transfers, Chair-Bed [place]  minimum assist (75% patient effort);contact guard assist;verbal cues required  -LB     Transfers, Sit-Stand [place]  contact guard assist  -LB     Transfers, Stand-Sit [place]  contact guard assist  -LB     Transfers, Sit-Stand-Sit, Assist Device  rolling walker  -LB     Transfer, Comment  car tsf, rwx, CGA, vc's  -LB to and from EOM from w/c CGA; from w/c to recliner CGA w/ vcs for hand placement   -AF,SHILPI,AF2    Recorded by  [LB] Angelica Treadwell PTA [AF,SHILPI,AF2] NO Cooney (r) Karen Allan OT Student (t) NO Cooney (c)    Gait Assessment/Treatment    Gait, [place] Level  contact guard assist;verbal cues required   cues for upright posture  -LB     Gait, Assistive Device  rolling walker  -LB     Gait, Distance (Feet)  200  -LB     Gait, Gait Deviations  rojelio decreased;forward flexed posture;left:;decreased heel strike  -LB     Recorded by  [LB] Angelica Treadwell PTA     Stairs Assessment/Treatment    Number of Stairs  4  -LB     Stairs, Handrail Location  both sides  -LB     Stairs, [place] Level  minimum  assist (75% patient effort);contact guard assist  -LB     Stairs, Technique Used  step to step (ascending);step to step (descending)  -LB     Stairs, Comment  curb, rwx, min/CGA  -LB     Recorded by  [LB] Angelica Treadwell PTA     Therapy Exercises    Bilateral Lower Extremities  AROM:;10 reps;standing;supine  -LB     Recorded by  [LB] Angelica Treadwell PTA     Fine Motor Coordination Training    9-Hole Peg Right   25 secs  -AF,SHILPI,AF2    9-Hole Peg Left   61 secs  -AF,SHILPI,AF2    Box and Blocks Right   56 blocks  -AF,SHILPI,AF2    Box and Blocks Left   39 blocks  -AF,SHILPI,AF2    Recorded by   [AF,SHILPI,AF2] NO Cooney (r) Karen Allan, OT Student (t) NO Cooney (c)    Sensory Assessment/Intervention    Sensory Impairment   numbness;dull;sharp  -AF,SHILPI,AF2    Light Touch   LUE  -AF,SHILPI,AF2    LUE Light Touch   mild impairment   unable to recognize light touch w/ eyes closed   -AF,SHILPI,AF2    RUE Light Touch   WNL  -AF,SHILPI,AF2    Sharp/Dull Discrimination   LUE   sharp 1/3 trials correct; dull 2/3 trials correct  -AF,SHILPI,AF2    LUE Sharp/Dull Discrimination   severe impairment  -AF,SHILPI,AF2    Stereognosis   LUE   unable to recognize 3/3 objects; dropped objects w/o noticin  -AF,SHILPI,AF2    LUE Stereognosis   severe impairment  -AF,SHILPI,AF2    Recorded by   [AF,SHILPI,AF2] NO Cooney (r) Karen Allan OT Student (t) NO Cooney (c)    Sensory Treatment    Sensory Reeducation Techniques   sensory training, visual reinforcement  -AF,SHILPI,AF2    Sensory Reeduction Treatment   pt was unable to find various objects w/ L hand from box of beans w/ eyes closed; pt dug to find objects w/ eyes open   -AF,SHILPI,AF2    Recorded by   [AF,SHILPI,AF2] NO Cooney (r) Karen Allan OT Student (t) Adelia Salamanca OTBETO (c)    Positioning and Restraints    Pre-Treatment Position   sitting in chair/recliner  -CRYSTAL NULLD,AF2    Post Treatment Position  wheelchair  -LB wheelchair  -SHILPI NULL,AF2    In Chair   encouraged to  call for assist;call light within reach  -AF,SHILPI,AF2    In Wheelchair  sitting;with SLP  -LB     Recorded by  [LB] Angelica Treadwell, PTA [AF,SHILPI,AF2] Adelia Salamanca, OTR (r) Karen Allan, OT Student (t) Adelia Salamanca, OTR (c)      User Key  (r) = Recorded By, (t) = Taken By, (c) = Cosigned By    Initials Name Effective Dates    LT Yvonneapolinar Miles MS CCC-SLP 04/13/15 -     LB Angelica Treadwell, PTA 02/18/16 -     AF Adelia Salamanca, OTR 12/01/15 -     SHILPI Karen Allan, OT Student 07/11/17 -               IP SLP Goals       07/31/17 1102 07/27/17 1501 07/26/17 0930    Safely Consume Diet    Safely Consume Diet- SLP, Date Established   07/26/17  -OC    Safely Consume Diet- SLP, Time to Achieve  by discharge  -JT by discharge  -OC    Safely Consume Diet- SLP, Activity Level  Patient will improve oral skills for more efficient eating  -JT     Safely Consume Diet- SLP, Outcome  goal ongoing  -JT goal ongoing  -OC    Cognitive Linguistic- Optimal Participation in Care    Cognitive Linguistic Optimal Participation in Care- SLP, Date Established 07/31/17  -LT      Cognitive Linguistic Optimal Participation in Care- SLP, Time to Achieve by discharge  -LT      Cognitive Linguistic Optimal Participation in Care- SLP, Outcome goal ongoing  -LT      Dysarthria- Optimal Particpation in Care    Dysarthria Optimal Participation in Care- SLP, Date Established 07/31/17  -LT      Dysarthria Optimal Participation in Care- SLP, Time to Achieve by discharge  -LT      Dysarthria Optimal Participation in Care- SLP, Outcome goal ongoing  -LT        07/24/17 1817          Safely Consume Diet    Safely Consume Diet- SLP, Date Established 07/24/17  -SA      Safely Consume Diet- SLP, Time to Achieve by discharge  -SA        User Key  (r) = Recorded By, (t) = Taken By, (c) = Cosigned By    Initials Name Provider Type    SA Julia Beach, MS CCC-SLP Speech and Language Pathologist    OC Kaylah Vasquez MA,[facility]-SLP Speech and Language  Pathologist    LT Yvonne Miles MS CCC-SLP Speech and Language Pathologist    JDINA Ha Speech and Language Pathologist          EDUCATION  The patient has been educated in the following areas:   Cognitive Impairment.    SLP Recommendation and Plan                                          Time Calculation:         Time Calculation- SLP       08/03/17 1353 08/03/17 1230       Time Calculation- SLP    SLP Start Time 1300  -LT 1200  -LT     SLP Stop Time 1330  -LT 1230  -LT     SLP Time Calculation (min) 30 min  -LT 30 min  -LT       User Key  (r) = Recorded By, (t) = Taken By, (c) = Cosigned By    Initials Name Provider Type    LT Yvonne Miles MS CCC-SLP Speech and Language Pathologist          Therapy Charges for Today     Code Description Service Date Service Provider Modifiers Qty    [id number]  ST DEV OF COGN SKILLS EACH 15 MIN 8/2/2017 Yvonne Miles MS CCC-SLP GN 4    [id number]  ST DEV OF COGN SKILLS EACH 15 MIN 8/3/2017 Yvonne Miles MS CCC-SLP GN 4               Yvonne Miles MS CCC-YUNG  8/3/2017

## 2017-08-03 NOTE — PROGRESS NOTES
Occupational Therapy: Branch    Physical Therapy: Branch    Speech Language Pathology:  Individual: 60 minutes.    Signed by: Yvonne Miles MS,CCC-SLP

## 2017-08-03 NOTE — THERAPY TREATMENT NOTE
Inpatient Rehabilitation - Physical Therapy Treatment Note  [facility]     Patient Name: Magnus Hartley  : 1928  MRN: [id number]  Today's Date: 8/3/2017  Onset of Illness/Injury or Date of Surgery Date: 17  Date of Referral to PT: 17  Referring Physician: Jac    Admit Date: 2017    Visit Dx:    ICD-10-CM ICD-9-CM   1. Left hemiparesis G81.94 342.90   2. Dysarthria R47.1 784.51     Patient Active Problem List   Diagnosis   • Foot pain   • Asthma   • Benign essential hypertension   • Controlled type 2 diabetes mellitus without complication   • Dyslipidemia   • Macrocytosis without anemia   • Senile osteoporosis   • Post-menopausal osteoporosis   • Sinus bradycardia   • Stress incontinence in female   • Urge incontinence of urine   • Atrophic vaginitis   • Encounter for fitting and adjustment of other specified devices   • Incomplete emptying of bladder   • Urethral caruncle   • Incomplete uterovaginal prolapse   • Cerebrovascular accident (CVA) due to occlusion of right middle cerebral artery   • Atrial fibrillation   • Warfarin anticoagulation (goal INR 2-3)   • CVA (cerebrovascular accident due to intracerebral hemorrhage)               Adult Rehabilitation Note       17 1456 17 1300 17 0944    Rehab Assessment/Intervention    Discipline (P)  occupational therapist  -SHILPI speech language pathologist  -LT physical therapy assistant  -LB    Document Type (P)  therapy note (daily note)  -SHILPI therapy note (daily note)  -LT therapy note (daily note)  -LB    Subjective Information (P)  no complaints;agree to therapy  -SHILPI agree to therapy  -LT agree to therapy  -LB    Patient Effort, Rehab Treatment (P)  excellent  -SHILPI excellent  -LT good  -LB    Symptoms Noted During/After Treatment (P)  shortness of breath   w/ tsfs, muscle strengthening tasks  -SHILPI      Symptoms Noted Comment (P)  rest breaks, monitered SpO2, vcs for breathing  -SHILPI      Precautions/Limitations (P)  fall  precautions  -SHILPI  fall precautions  -LB    Recorded by [SHILPI] Karen Allan OT Student [LT] Yvonne Miles MS CCC-SLP [LB] Angelica Treadwell PTA    Vital Signs    Intra SpO2 (%) (P)  97   see symptoms noted comment   -SHILPI      Recorded by [SHILPI] Karen Allan OT Student      Pain Assessment    Pain Assessment (P)  No/denies pain  -SHILPI  No/denies pain  -LB    Recorded by [SHILPI] Karen Allan, OT Student  [LB] Angelica Treadwell PTA    Cognitive Assessment/Intervention    Current Cognitive/Communication Assessment (P)  impaired  -SHILPI      Orientation Status (P)  oriented x 4  -SHILPI      Follows Commands/Answers Questions (P)  100% of the time;able to follow single-step instructions;needs cueing;needs increased time;needs repetition  -SHILPI      Personal Safety (P)  mild impairment;decreased awareness, need for assist;decreased awareness, need for safety;decreased insight to deficits   some impulsivity when iniating tsfs   -SHILPI      Personal Safety Interventions (P)  fall prevention program maintained;gait belt;nonskid shoes/slippers when out of bed;supervised activity  -SHILPI  fall prevention program maintained;gait belt;muscle strengthening facilitated;nonskid shoes/slippers when out of bed  -LB    Recorded by [SHILPI] Karen Allan OT Student  [LB] Angelica Treadwell PTA    Improve memory skills    Memory Skills Progress  90%;with inconsistent cues  -LT     Recorded by  [LT] Yvonne Miles MS CCC-SLP     Improve functional problem solving    Functional Problem Solving Progress  80%;with inconsistent cues  -LT     Recorded by  [LT] Yvonne Miles MS CCC-SLP     Improve executive function skills    Executive Function Skills Progress  80%;without cues  -LT     Recorded by  [LT] Yvonne Miles MS CCC-SLP     Bed Mobility, Assessment/Treatment    Bed Mobility, Assistive Device   --   assessed on txment mat  -LB    Bed Mobility, Roll Left, [place]   supervision required  -LB    Bed Mobility, Roll Right, [place]   supervision  required  -LB    Bed Mobility, Scoot/Bridge, [place]   supervision required  -LB    Bed Mob, Supine to Sit, [place]   supervision required  -LB    Bed Mob, Sit to Supine, [place]   supervision required  -LB    Recorded by   [LB] Angelica Treadwell PTA    Transfer Assessment/Treatment    Transfers, Bed-Chair [place] (P)  contact guard assist  -SHILPI  contact guard assist;stand by assist  -LB    Transfers, Chair-Bed [place]   contact guard assist;stand by assist  -LB    Transfers, Bed-Chair-Bed, Assist Device   rolling walker  -LB    Transfers, Sit-Stand [place] (P)  contact guard assist;verbal cues required  -SHILPI  contact guard assist;stand by assist  -LB    Transfers, Stand-Sit [place] (P)  contact guard assist  -SHILPI  contact guard assist  -LB    Transfers, Sit-Stand-Sit, Assist Device   rolling walker  -LB    Recorded by [SHILPI] Karen Allan, OT Student  [LB] Angelica Treadwell PTA    Gait Assessment/Treatment    Gait, [place] Level   contact guard assist;stand by assist  -LB    Gait, Assistive Device   rolling walker  -LB    Gait, Distance (Feet)   250  -LB    Gait, Gait Deviations   forward flexed posture;rojelio decreased  -LB    Recorded by   [LB] Angelica Treadwell PTA    Stairs Assessment/Treatment    Number of Stairs   8  -LB    Stairs, Handrail Location   both sides  -LB    Stairs, [place] Level   minimum assist (75% patient effort);contact guard assist;verbal cues required  -LB    Stairs, Technique Used   step to step (ascending);step to step (descending)  -LB    Stairs, Comment   curb, ramp rwx cga, vc's  -LB    Recorded by   [LB] Angelica Treadwell PTA    Upper Body Bathing Assessment/Training    UB Bathing Assess/Train, Position (P)  sitting;sink side  -SHILPI      UB Bathing Assess/Train, [place] Level (P)  verbal cues required;stand by assist   sequencing vcs  -SHILPI      Recorded by [SHILPI] Karen Allan, OT Student      Lower Body Bathing Assessment/Training    LB  Bathing Assess/Train, Position (P)  sitting;sink side;standing  -SHILPI      LB Bathing Assess/Train, [place] Level (P)  contact guard assist;verbal cues required  -SHILPI      LB Bathing Assess/Train, Comment (P)  vcs for sequening, cga for balance when standing to wash buttocks/elsi area  -SHILPI      Recorded by [SHILPI] Karen Allan, OT Student      Upper Body Dressing Assessment/Training    UB Dressing Assess/Train, Clothing Type (P)  doffing:;donning:;hospital gown;bra;t-shirt   dof gown; don bra, shirt   -SHILPI      UB Dressing Assess/Train, Position (P)  sitting;sink side  -SHILPI      UB Dressing Assess/Train, [place] (P)  set up required   to gather clothing items   -SHILPI      Recorded by [SHILPI] Karen Allan, OT Student      Lower Body Dressing Assessment/Training    LB Dressing Assess/Train, Clothing Type (P)  doffing:;donning:;socks;shoes;pants   dof underwear; don underwear, pants, socks, shoes   -SHILPI      LB Dressing Assess/Train, Position (P)  standing;sitting;sink side  -SHILPI      LB Dressing Assess/Train, [place] (P)  minimum assist (75% patient effort)  -SHILPI      LB Dressing Assess/Train, Comment (P)  pt required assist to gather clothes, adjust pants over hips, tie shoes   -SHILPI      Recorded by [SHILPI] Karen Allan, OT Student      Toileting Assessment/Training    Toileting Assess/Train, Assistive Device (P)  grab bars;raised toilet seat  -SHILPI      Toileting Assess/Train, Position (P)  sitting;standing  -SHILPI      Toileting Assess/Train, [place] Level (P)  verbal cues required;contact guard assist  -SHILPI      Toileting Assess/Train, Comment (P)  vcs for clothing management before standing to pull up, CGA to maintain standing balance in am  -SHILPI      Recorded by [SHILPI] Karen Allan, OT Student      Grooming Assessment/Training    Grooming Assess/Train, Position (P)  sitting;sink side  -SHILPI      Grooming Assess/Train, [place] Level (P)  supervision required  -SHILPI      Grooming Assess/Train, Comment (P)  combing hair    -SHILPI      Recorded by [SHILPI] Karen Allan OT Student      Balance Skills Training    Standing-Level of Assistance   Contact guard;Minimum assistance  -LB    Static Standing Balance Support   No upper extremity supported  -LB    Standing-Balance Activities   Compliant surfaces  -LB    Gait Balance-Level of Assistance   Contact guard;Minimum assistance  -LB    Gait Balance Support   Right upper extremity supported;Left upper extremity supported;parallel bars  -LB    Gait Balance Activities   --   side stepping on foam plank  -LB    Recorded by   [LB] Angelica Treadwell PTA    Therapy Exercises    Bilateral Lower Extremities   AROM:;10 reps;standing;heel raises;mini squats  -LB    Recorded by   [LB] Angelica Treadwell PTA    Fine Motor Coordination Training    Detail (Fine Motor Coordination Training) (P)  pt completed simulated fastener task (tie laces, zip/unzip, button/unbutton) to increase sensory input/fine motor coordination for functional ADLs while seated in w/c at table; pt required increased time   -SHILPI      Recorded by [SHILPI] Karen Allan OT Student      Sensory Treatment    Sensory Reeducation Techniques (P)  other (see comments)  -SHILPI      Sensory Reeduction Treatment (P)  pt pinched clothes pins and placed on/removed from horizontal mary carmen w/ LUE to improve sensory input to modulate grasp, completed standing at countertop; pt required hand over hand to complete the first clothes pin but was able to complete the rest w/o difficulty   -SHILPI      Recorded by [SHILPI] Karen Allan OT Student      Positioning and Restraints    Pre-Treatment Position (P)  in bed  -SHILPI      Post Treatment Position (P)  wheelchair  -SHILPI  chair  -LB    In Chair (P)  encouraged to call for assist;call light within reach;sitting;with family/caregiver  -SHILPI  sitting;call light within reach  -LB    Recorded by [SHILPI] Karen Allan OT Student  [LB] Angelica Treadwell PTA      08/02/17 1506 08/02/17 1000 08/02/17 0941    Rehab  Assessment/Intervention    Discipline occupational therapist  -CHAKA,SHILPI,AF2 speech language pathologist  -LT physical therapy assistant  -LB    Document Type therapy note (daily note)  -AF,SHILPI,AF2 therapy note (daily note)  -LT therapy note (daily note)  -LB    Subjective Information agree to therapy;no complaints  -AF,SHILPI,AF2 agree to therapy  -LT agree to therapy  -LB    Patient Effort, Rehab Treatment good  -AF,SHILPI,AF2 good  -LT good  -LB    Precautions/Limitations fall precautions  -AF,SHILPI,AF2  fall precautions  -LB    Recorded by [AF,SHILPI,AF2] Adelia Salamanca OTR (r) Karen Allan OT Student (t) NO Cooney (c) [LT] Yvonne Miles MS CCC-SLP [LB] Angelica Treadwell PTA    Pain Assessment    Pain Assessment No/denies pain  -AF,SHILPI,AF2  No/denies pain  -LB    Recorded by [AF,SHILPI,AF2] NO Cooney (r) Karen Allan OT Student (t) NO Cooney (c)  [LB] Angelica Treadwell PTA    Cognitive Assessment/Intervention    Current Cognitive/Communication Assessment functional  -AF,SHILPI,AF2      Orientation Status oriented x 4  -AF,SHILPI,AF2      Follows Commands/Answers Questions 100% of the time;able to follow single-step instructions;able to follow multi-step instructions;needs repetition;needs cueing  -AF,SHILPI,AF2      Personal Safety mild impairment  -AF,SHILPI,AF2      Personal Safety Interventions fall prevention program maintained;gait belt;nonskid shoes/slippers when out of bed;supervised activity  -AF,SHILPI,AF2  fall prevention program maintained;gait belt;muscle strengthening facilitated;nonskid shoes/slippers when out of bed  -LB    Recorded by [AF,SHILPI,AF2] NO Cooney (r) Karen Allan OT Student (t) NO Cooney (c)  [LB] Angelica Treadwell PTA    Improve memory skills    Memory Skills Progress  80%;with inconsistent cues  -LT     Recorded by  [LT] Yvonne Miles MS CCC-SLP     Improve functional problem solving    Functional Problem Solving Progress  70%;with inconsistent cues   -LT     Recorded by  [LT] Yvonne Miles MS CCC-SLP     Improve executive function skills    Executive Function Skills Progress  70%;without cues  -LT     Recorded by  [LT] Yvonne Miles MS CCC-SLP     Bed Mobility, Assessment/Treatment    Bed Mobility, Assistive Device   --   assessed on txment mat  -LB    Bed Mobility, Roll Left, [place]   supervision required  -LB    Bed Mobility, Roll Right, [place]   supervision required  -LB    Bed Mob, Supine to Sit, [place] supervision required  -AF,SHILPI,AF2  supervision required  -LB    Bed Mob, Sit to Supine, [place]   supervision required  -LB    Recorded by [AF,SHILPI,AF2] Adelia Salamanca, OTR (r) Karen Allan, OT Student (t) Adelia Salamanca, OTR (c)  [LB] Angelica Treadwell, PTA    Transfer Assessment/Treatment    Transfers, Bed-Chair [place] contact guard assist  -AF,SHILPI,AF2  contact guard assist;minimum assist (75% patient effort)  -LB    Transfers, Chair-Bed [place]   contact guard assist;minimum assist (75% patient effort)  -LB    Transfers, Bed-Chair-Bed, Assist Device   rolling walker  -LB    Transfers, Sit-Stand [place] contact guard assist  -AF,SHILPI,AF2  contact guard assist  -LB    Transfers, Stand-Sit [place] contact guard assist  -AF,SHILPI,AF2  contact guard assist  -LB    Transfers, Sit-Stand-Sit, Assist Device   rolling walker  -LB    Toilet Transfer, [place] contact guard assist  -AF,SHILPI,AF2      Toilet Transfer, Assistive Device elevated toilet seat;rolling walker   grab bars  -AF,SHILPI,AF2      Walk-In Shower Transfer, [place] contact guard assist;verbal cues required   vcs for rwx placement, sequencing, hand placement   -AF,SHILPI,AF2      Walk-In Shower Transfer, Assist Device rolling walker;standard shower chair   grab bars  -AF,SHILPI,AF2      Transfer, Comment   car tsf CGA/min, Rwx  -LB    Recorded by [AF,SHILPI,AF2] Adelia Salamanca, OTR (r) Karen Allan, OT Student (t) Adelia Salamanca, OTR (c)  [LB] Angelica Treadwell,  PTA    Gait Assessment/Treatment    Gait, [place] Level   contact guard assist;verbal cues required  -LB    Gait, Assistive Device   rolling walker  -LB    Gait, Distance (Feet)   200  -LB    Gait, Gait Deviations   rojelio decreased;forward flexed posture  -LB    Recorded by   [LB] Angelica Treadwell PTA    Stairs Assessment/Treatment    Number of Stairs   4  -LB    Stairs, Handrail Location   both sides  -LB    Stairs, [place] Level   minimum assist (75% patient effort);contact guard assist;verbal cues required  -LB    Stairs, Technique Used   step to step (ascending);step to step (descending)  -LB    Stairs, Comment   curb rwx, cga, vc's  -LB    Recorded by   [LB] Angelica Treadwell PTA    Upper Body Bathing Assessment/Training    UB Bathing Assess/Train Assistive Device grab bars;hand-held shower head;shower chair with back  -AF,SHILPI,AF2      UB Bathing Assess/Train, Position sitting  -AF,SHILPI,AF2      UB Bathing Assess/Train, [place] Level set up required;contact guard assist  -AF,SHILPI,AF2      Recorded by [AF,SHILPI,AF2] Adelia Salamanca, OTR (r) Karen Allan, OT Student (t) NO Cooney (c)      Lower Body Bathing Assessment/Training    LB Bathing Assess/Train Assistive Device grab bars;hand-held shower head;shower chair with back  -AF,SHILPI,AF2      LB Bathing Assess/Train, Position sitting;standing  -AF,SHILPI,AF2      LB Bathing Assess/Train, [place] Level contact guard assist;verbal cues required  -AF,SHILPI,AF2      LB Bathing Assess/Train, Comment vcs for hand placement, CGA for balance when standing to wash buttocks/elsi  -AF,SHILPI,AF2      Recorded by [AF,SHILPI,AF2] NO Cooney (r) Karen Allan, OT Student (t) NO Cooney (c)      Upper Body Dressing Assessment/Training    UB Dressing Assess/Train, Clothing Type doffing:;donning:;t-shirt;hospital gown   dof gown, don shirt   -AF,SHILPI,AF2      UB Dressing Assess/Train, Position sitting  -AF,SHILPI,AF2      UB Dressing Assess/Train,  [place] set up required;contact guard assist  -AF,SHILPI,AF2      UB Dressing Assess/Train, Comment pt set up assist to orient shirt correctly to thread arm   -AF,SHILPI,AF2      Recorded by [AF,SHILPI,AF2] NO Cooney (r) Karen Allan, OT Student (t) ON Cooney (c)      Lower Body Dressing Assessment/Training    LB Dressing Assess/Train, Clothing Type doffing:;donning:;socks;shoes;pants   dof underwear; don underwear, capris, shoes, socks   -AF,SHILPI,AF2      LB Dressing Assess/Train, Position sitting;standing  -AF,SHILPI,AF2      LB Dressing Assess/Train, [place] minimum assist (75% patient effort)  -AF,SHILPI,AF2      LB Dressing Assess/Train, Comment CGA to stand for clothing management; required assist w/ tying shoes due to decreased sensation in LUE  -AF,SHILPI,AF2      Recorded by [AF,SHILPI,AF2] NO Cooney (r) Karen Allan, OT Student (t) NO Cooney (c)      Toileting Assessment/Training    Toileting Assess/Train, Assistive Device grab bars;raised toilet seat  -AF,SHILPI,AF2      Toileting Assess/Train, Position sitting;standing  -AF,SHILPI,AF2      Toileting Assess/Train, [place] Level contact guard assist  -AF,SHILPI,AF2      Toileting Assess/Train, Comment CGA for balance during clothing management   -AF,SHILPI,AF2      Recorded by [AF,SHILPI,AF2] NO Cooney (r) Karen Allan, OT Student (t) NO Cooney (c)      Grooming Assessment/Training    Grooming Assess/Train, Position sitting   in recliner using tray table mirror  -AF,SHILPI,AF2      Grooming Assess/Train, [place] Level set up required  -AF,SHILPI,AF2      Grooming Assess/Train, Comment combing hair; dentures hygiene performed before session   -AF,SHILPI,AF2      Recorded by [AF,SHILPI,AF2] NO Cooney (r) Karen Jerrell, OT Student (t) Adelia Salamanca, OTR (c)      Balance Skills Training    Standing-Level of Assistance   Minimum assistance  -LB    Static Standing Balance Support   Right upper extremity supported;Left upper  extremity supported;eliu bar   UE support as needed  -LB    Standing-Balance Activities   Compliant surfaces  -LB    Gait Balance-Level of Assistance   Contact guard  -LB    Gait Balance Support   Right upper extremity supported;Left upper extremity supported;eliu bar  -LB    Gait Balance Activities   side-stepping  -LB    Recorded by   [LB] Angelica Treadwell PTA    Therapy Exercises    Bilateral Lower Extremities   AROM:;10 reps;standing  -LB    Recorded by   [LB] Angelica Treadwell PTA    Sensory Treatment    Sensory Reeducation Techniques comparison, tactile sensory information;sensory train, w/o visual reinforcement;sensory training, visual reinforcement  -SHILPI NULL,AF2      Sensory Reeduction Treatment 3 objects were placed on table w/ associated sight word cards, pt touched each object and described tactile details w/ eyes open, pt then ID'ed object placed in L hand w/ eyes closed; pt was able to ID 2/3 w/ eyes closed, pt was able to ID the other w/ eyes open; pt searched for items in rice bin w/ eyes open, correctly decsribed tactile details w/ min vcs while manipulating in hand, then reburried the items  -SHILPI NULL,CHAKA2      Recorded by [SHILPI NULL,AF2] Adelia Salamanca OTBETO (r) Karen Allan, OT Student (t) NO Cooney (c)      Positioning and Restraints    Pre-Treatment Position in bed  -SHILPI NULL AF2      Post Treatment Position chair   recliner  -SHILPI NULL,CHAKA2  wheelchair  -LB    In Chair encouraged to call for assist;sitting;call light within reach  -SHILPI NULL AF2      In Wheelchair   sitting;with SLP  -LB    Recorded by [SHILPI NULL,AF2] NO Cooney (r) Karen Allan OT Student (t) NO Cooney (c)  [LB] Angelica Treadwell PTA      08/01/17 1150 08/01/17 1100 08/01/17 0940    Rehab Assessment/Intervention    Discipline occupational therapist  -SHILPI NULL,KIM speech language pathologist  -LT physical therapy assistant  -LB    Document Type therapy note (daily note)  -SHILPI NULL AF2 therapy note (daily note)  -LT  therapy note (daily note)  -LB    Subjective Information agree to therapy;no complaints  -AF,SHILPI,AF2 agree to therapy  -LT agree to therapy  -LB    Patient Effort, Rehab Treatment good  -AF,SHILPI,AF2 good  -LT good  -LB    Symptoms Noted During/After Treatment shortness of breath   w/ tsf  -AF,SHILPI,AF2      Symptoms Noted Comment rest breaks as needed; monitered SpO2  -AF,SHILPI,AF2      Precautions/Limitations fall precautions  -AF,SHILPI,AF2  fall precautions  -LB    Recorded by [AF,SHILPI,AF2] NO Cooney (r) Karen Allan OT Student (t) NO Cooney (c) [LT] Yvonne Miles MS CCC-SLP [LB] Angelica Treadwell PTA    Vital Signs    Intra SpO2 (%) 98  -AF,SHILPI,AF2      Recorded by [AF,SHILPI,AF2] NO Cooney (r) Karen Allan OT Student (t) NO Cooney (c)      Pain Assessment    Pain Assessment No/denies pain  -AF,SHILPI,AF2  No/denies pain  -LB    Recorded by [AF,SHILPI,AF2] NO Cooney (r) Karen Allan OT Student (t) NO Cooney (c)  [LB] Angelica Treadwell PTA    Cognitive Assessment/Intervention    Current Cognitive/Communication Assessment functional  -AF,SHILPI,AF2      Orientation Status oriented x 4  -AF,SHILPI,AF2      Follows Commands/Answers Questions 100% of the time;able to follow single-step instructions;needs cueing;needs repetition  -AF,SHILPI,AF2      Personal Safety mild impairment;decreased insight to deficits;decreased awareness, need for safety  -AF,SHILPI,AF2      Personal Safety Interventions fall prevention program maintained;gait belt;nonskid shoes/slippers when out of bed;supervised activity  -AF,SHILPI,AF2  fall prevention program maintained;gait belt;muscle strengthening facilitated;nonskid shoes/slippers when out of bed  -LB    Recorded by [AF,SHILPI,AF2] NO Cooney (r) Karen Jerrell, OT Student (t) Adelia Salamanca, OTR (c)  [LB] Angelica Treadwell, PTA    Improve memory skills    Memory Skills Progress  60%;with inconsistent cues  -LT     Recorded by  [LT] Yvonne  MS Ginger CCC-SLP     Improve functional problem solving    Functional Problem Solving Progress  50%;with inconsistent cues  -LT     Recorded by  [LT] Yvonne Miles MS CCC-SLP     Improve executive function skills    Executive Function Skills Progress  80%;without cues  -LT     Recorded by  [LT] Yvonne Miles MS CCC-SLP     Bed Mobility, Assessment/Treatment    Bed Mobility, Assistive Device   --   assessed on txment mat  -LB    Bed Mobility, Roll Left, [place]   contact guard assist  -LB    Bed Mobility, Roll Right, [place]   contact guard assist;verbal cues required  -LB    Bed Mobility, Scoot/Bridge, [place]   supervision required  -LB    Bed Mob, Supine to Sit, [place] contact guard assist  -AF,SHILPI,AF2  contact guard assist;verbal cues required  -LB    Bed Mob, Sit to Supine, [place]   contact guard assist;verbal cues required  -LB    Recorded by [AF,SHILPI,AF2] Adelia Salamanca, OTR (r) Karen Allan, OT Student (t) Adelia Salamanca, OTR (c)  [LB] Angelica Treadwell, PTA    Transfer Assessment/Treatment    Transfers, Bed-Chair [place] contact guard assist;verbal cues required  -AF,SHILPI,AF2  minimum assist (75% patient effort);contact guard assist;verbal cues required  -LB    Transfers, Chair-Bed [place]   minimum assist (75% patient effort);contact guard assist;verbal cues required  -LB    Transfers, Sit-Stand [place] contact guard assist;verbal cues required   vcs to maintain wide base of support  -AF,SHILPI,AF2  contact guard assist  -LB    Transfers, Stand-Sit [place] verbal cues required;contact guard assist   vcs to reach back for w/c  -AF,SHILPI,AF2  contact guard assist  -LB    Transfers, Sit-Stand-Sit, Assist Device   rolling walker  -LB    Toilet Transfer, [place] verbal cues required;contact guard assist   vcs for hand placement   -AF,SHILPI,AF2      Toilet Transfer, Assistive Device rolling walker;elevated toilet seat   grab bars  -AF,SHILPI,AF2      Transfer, Comment  EOM tsf to and from w/c; CGA, VCs/gestural prompts for hand placement   -CHAKA,SHILPI,AF2  car tsf, rwx, CGA, vc's  -LB    Recorded by [AF,SHILPI,AF2] Adelia Salamanca OTR (r) Karen Allan, OT Student (t) NO Cooney (c)  [LB] Angelica Treadwell PTA    Gait Assessment/Treatment    Gait, [place] Level   contact guard assist;verbal cues required   cues for upright posture  -LB    Gait, Assistive Device   rolling walker  -LB    Gait, Distance (Feet)   200  -LB    Gait, Gait Deviations   rojelio decreased;forward flexed posture;left:;decreased heel strike  -LB    Recorded by   [LB] Angelica Treadwell PTA    Stairs Assessment/Treatment    Number of Stairs   4  -LB    Stairs, Handrail Location   both sides  -LB    Stairs, [place] Level   minimum assist (75% patient effort);contact guard assist  -LB    Stairs, Technique Used   step to step (ascending);step to step (descending)  -LB    Stairs, Comment   curb, rwx, min/CGA  -LB    Recorded by   [LB] Angelica Treadwell PTA    Functional Mobility    Functional Mobility- Ind. Level contact guard assist  -CHAKA,SHILPI,AF2      Functional Mobility- Device rolling walker  -CRYSTAL NULLD,AF2      Functional Mobility-Maintain WBing able to maintain weight bearing status  -AF,SHILPI,AF2      Functional Mobility- Comment pt was able to walk into bathroom from bed and maneuver around bathroom w/ rwx w/ CGA   -AF,SHILPI,AF2      Recorded by [AF,SHILPI,AF2] NO Cooney (r) Karen Allan, OT Student (t) NO Cooney (c)      Upper Body Dressing Assessment/Training    UB Dressing Assess/Train, Clothing Type doffing:;donning:;t-shirt  -CRYSTAL NULLD,AF2      UB Dressing Assess/Train, Position sitting;edge of bed  -CHAKA,SHILPI,AF2      UB Dressing Assess/Train, [place] minimum assist (75% patient effort)   min a to thread R arm through correct hole   -CHAKA,SHILPI,AF2      Recorded by [AF,SHILPI,AF2] NO Cooney (r) Karen Allan, OT Student (t) Adelia Salamanca, OTR (c)      Lower Body Dressing  Assessment/Training    LB Dressing Assess/Train, Clothing Type doffing:;donning:;pants;shoes;socks  -AF,SHILPI,AF2      LB Dressing Assess/Train, Position sitting;edge of bed;standing  -AF,SHILPI,AF2      LB Dressing Assess/Train, [place] minimum assist (75% patient effort);contact guard assist  -AF,SHILPI,AF2      LB Dressing Assess/Train, Comment CGA while standing to pull up pants; min a to adjust and tie shoes due to decreased sensation in LUE  -AF,SHILPI,AF2      Recorded by [AF,SHILPI,AF2] Adelia Salamanca, OTR (r) Karen Allan, OT Student (t) NO Cooney (c)      Toileting Assessment/Training    Toileting Assess/Train, Assistive Device grab bars  -AF,SHILPI,AF2      Toileting Assess/Train, Position sitting  -AF,SHILPI,AF2      Toileting Assess/Train, [place] Level contact guard assist  -AF,SHILPI,AF2      Toileting Assess/Train, Comment pt able to manage clothes and perform hygiene IND; CGA while standing to adjust clothes  -AF,SHILPI,AF2      Recorded by [AF,SHILPI,AF2] Adelia Salamanca, OTR (r) Karen Allan, OT Student (t) Adelia Salamanca, OTR (c)      Therapy Exercises    Bilateral Lower Extremities   AROM:;10 reps;standing;supine  -LB    Recorded by   [LB] Angelica Treadwell, PTA    Sensory Treatment    Sensory Reeducation Techniques sensory training, visual reinforcement  -AF,SHILPI,AF2      Sensory Reeduction Treatment pt matched objects placed in hand w/ sight word cards w/ eyes closed, pt was unable to ID any items but was able to ID items w/ eyes open and tactile sensation; pt stated some items felt heavy, some soft w/o visual input;   -AF,SHILPI,AF2      Sensory Treatment Comment pt selected small foam beads from a pile and put on a string using BLE while standing at countertop, pt displayed difficulty w/ using appropraite grasp to pickup and maintain hold on beads; pt found and picked out 10 buttons from yellow theraputty while standing at countertop w/ BUE, pt reported having a difficult time; pt was provided w/ sponge to  squeeze and rub on LUE to increase sensory input to regain functional grasp and sensation necessary for self care tasks  -AF,SHILPI,AF2      Recorded by [AF,SHILPI,AF2] NO Cooney (r) Karen Allan OT Student (t) NO Cooney (c)      Positioning and Restraints    Pre-Treatment Position in bed  -AF,SHILPI,AF2      Post Treatment Position chair  -AF,SHILPI,AF2  wheelchair  -LB    In Chair encouraged to call for assist;call light within reach;sitting  -AF,SHILPI,AF2      In Wheelchair   sitting;with SLP  -LB    Recorded by [AF,SHILPI,AF2] NO Cooney (r) Karen Allan, CHERELLE Student (t) NO Cooney (c)  [LB] Angelica LOPES Mile, PTA      07/31/17 1551          Rehab Assessment/Intervention    Discipline occupational therapist  -AF,SHILPI,AF2      Document Type therapy note (daily note)  -AF,SHILPI,AF2      Subjective Information agree to therapy;no complaints  -AF,SHILPI,AF2      Patient Effort, Rehab Treatment good  -AF,SHILPI,AF2      Precautions/Limitations fall precautions  -AF,SHILPI,AF2      Recorded by [AF,SHILPI,AF2] NO Cooney (r) Karen Allan OT Student (t) NO oConey (c)      Pain Assessment    Pain Assessment No/denies pain  -AF,SHILPI,AF2      Recorded by [AF,SHILPI,AF2] NO Cooney (r) Karen Allan OT Student (t) NO Cooney (c)      Cognitive Assessment/Intervention    Current Cognitive/Communication Assessment functional  -AF,SHILPI,AF2      Orientation Status oriented x 4  -AF,SHILPI,AF2      Follows Commands/Answers Questions 100% of the time;able to follow single-step instructions;needs cueing;needs repetition  -AF,SHILPI,AF2      Personal Safety mild impairment;decreased insight to deficits  -AF,SHILPI,AF2      Personal Safety Interventions fall prevention program maintained;gait belt;nonskid shoes/slippers when out of bed  -AF,SHILPI,AF2      Recorded by [AF,SHILPI,AF2] Adelia Salamanca, OTR (r) Karen Allan, OT Student (t) Adelia Salamanca, OTR (c)      General UE Assessment    ROM LANI  ROM was WNL;shoulder, left: UE ROM deficit;scapula, left: UE ROM deficit   AROM  -AF,SHILPI,AF2      ROM Detail PROM shoulder flexion/extension WNL, PROM shoulder external rotation WNL  -AF,SHILPI,AF2      Recorded by [AF,SHILPI,AF2] NO Cooney (r) Karen Allan OT Student (t) NO Cooney (c)      General Hand Assessment    ROM other (see comments)  -AF,SHILPI,AF2      ROM Detail able to complete L opposition w/ increased time and attention ; no opposition deficits noted on R hand   -AF,SHILPI,AF2      Recorded by [AF,SHILPI,AF2] NO Cooney (r) Karen Allan OT Student (t) NO Cooney (c)      MMT (Manual Muscle Testing)    General MMT Assessment no strength deficits identified   WFL for age   -AF,SHILPI,AF2      General MMT Assessment Detail  R 45, L 25; 3 pt pinch R 8, L 5; lateral pinch R 8, L 6   pt reports right hand dominance   -AF,SHILPI,AF2      Recorded by [AF,SHILPI,AF2] NO Cooney (r) Karen Allan OT Student (t) NO Cooney (c)      Transfer Assessment/Treatment    Transfer, Comment to and from EOM from w/c CGA; from w/c to recliner CGA w/ vcs for hand placement   -AF,SHILPI,AF2      Recorded by [AF,SHILPI,AF2] NO Cooney (r) Karen Allan OT Student (t) NO Cooney (c)      Fine Motor Coordination Training    9-Hole Peg Right 25 secs  -AF,SHILPI,AF2      9-Hole Peg Left 61 secs  -AF,SHILPI,AF2      Box and Blocks Right 56 blocks  -AF,SHILPI,AF2      Box and Blocks Left 39 blocks  -AF,SHILPI,AF2      Recorded by [AF,SHILPI,AF2] NO Cooney (r) Karen Allan OT Student (t) Adelia Mattie Salamanca, OTR (c)      Sensory Assessment/Intervention    Sensory Impairment numbness;dull;sharp  -AF,SHILPI,AF2      Light Touch LUE  -AF,SHILPI,AF2      LUE Light Touch mild impairment   unable to recognize light touch w/ eyes closed   -AF,SHILPI,AF2      RUE Light Touch WNL  -AF,SHILPI,AF2      Sharp/Dull Discrimination LUE   sharp 1/3 trials correct; dull 2/3 trials correct  -SHILPI NULL,AF2      LUE  Sharp/Dull Discrimination severe impairment  -AF,SHILPI,AF2      Stereognosis LUE   unable to recognize 3/3 objects; dropped objects w/o noticin  -AF,SHILPI,AF2      LUE Stereognosis severe impairment  -AF,SHILPI,AF2      Recorded by [AF,SHILPI,AF2] NO Cooney (r) Karen Allan OT Student (t) NO Cooney (c)      Sensory Treatment    Sensory Reeducation Techniques sensory training, visual reinforcement  -AF,SHILPI,AF2      Sensory Reeduction Treatment pt was unable to find various objects w/ L hand from box of beans w/ eyes closed; pt dug to find objects w/ eyes open   -AF,SHILPI,AF2      Recorded by [AF,SHILPI,AF2] NO Cooney (r) Karen Allan OT Student (t) NO Cooney (c)      Positioning and Restraints    Pre-Treatment Position sitting in chair/recliner  -CHAKA,SHILPI,AF2      Post Treatment Position wheelchair  -CHAKASHILPI,AF2      In Chair encouraged to call for assist;call light within reach  -AF,SHILPI,AF2      Recorded by [AF,SHILPI,AF2] NO Cooney (r) Karen Allan OT Student (t) NO Cooney ([place])        User Key  (r) = Recorded By, (t) = Taken By, (c) = Cosigned By    Initials Name Effective Dates    LT Yvonne Miles MS CCC-SLP 04/13/15 -     LB Angelica Treadwell, PTA 02/18/16 -     AF NO Cooney 12/01/15 -     SHILPI Allan OT Student 07/11/17 -                 IP PT Goals       07/29/17 1211 07/24/17 1605 07/24/17 1546    Bed Mobility PT LTG    Bed Mobility PT LTG, Date Established 07/29/17  -LB 07/24/17  -LH (r) KS (t) LH (c) 07/24/17  -LH (r) KS (t) LH (c)    Bed Mobility PT LTG, Time to Achieve 2 wks  -LB 5 - 7 days  -LH (r) KS (t) LH (c) 5 - 7 days  -LH (r) KS (t) LH (c)    Bed Mobility PT LTG, Activity Type roll left/roll right;supine to sit/sit to supine  -LB all bed mobility  -LH (r) KS (t) LH (c) all bed mobility  -LH (r) KS (t) LH (c)    Bed Mobility PT LTG, [place] Level independent  -LB contact guard assist  -LH (r) KS (t) LH (c) independent  -LH (r)  KS (t) LH (c)    Transfer Training PT LTG    Transfer Training PT LTG, Date Established 07/29/17  -LB  07/24/17  -LH (r) KS (t) LH (c)    Transfer Training PT LTG, Time to Achieve 2 wks  -LB  5 - 7 days  -LH (r) KS (t) LH (c)    Transfer Training PT LTG, Activity Type sit to stand/stand to sit  -LB  all transfers  -LH (r) KS (t) LH (c)    Transfer Training PT LTG, [place] Level conditional independence  -LB  independent  -LH (r) KS (t) LH (c)    Transfer Training PT LTG, Assist Device cane, straight  -LB  walker, rolling  -LH (r) KS (t) LH (c)    Transfer Training 2 PT LTG    Transfer Training PT 2 LTG, Date Established 07/29/17  -LB      Transfer Training PT 2 LTG, Time to Achieve 2 wks  -LB      Transfer Training PT 2 LTG, Activity Type --   car transfer  -LB      Transfer Training PT 2 LTG, [place] Level contact guard assist  -LB      Transfer Training PT 2 LTG, Assist Device cane, straight  -LB      Gait Training PT LTG    Gait Training Goal PT LTG, Date Established 07/29/17  -LB 07/24/17  -LH (r) KS (t) LH (c) 07/24/17  -LH (r) KS (t) LH (c)    Gait Training Goal PT LTG, Time to Achieve 2 wks  -LB 5 - 7 days  -LH (r) KS (t) LH (c) 5 - 7 days  -LH (r) KS (t) LH (c)    Gait Training Goal PT LTG, [place] Level conditional independence  -LB minimum assist (75% patient effort)  -LH (r) KS (t) LH (c) conditional independence  -LH (r) KS (t) LH (c)    Gait Training Goal PT LTG, Assist Device cane, straight  -LB walker, rolling  -LH (r) KS (t) LH (c) walker, rolling  -LH (r) KS (t) LH (c)    Gait Training Goal PT LTG, Distance to Achieve 150  -  -LH (r) KS (t) LH (c) 150  -LH (r) KS (t) LH (c)    Stair Training PT LTG    Stair Training Goal PT LTG, Date Established 07/29/17  -LB      Stair Training Goal PT LTG, Time to Achieve 2 wks  -LB      Stair Training Goal PT LTG, Number of Steps 4  -LB      Stair Training Goal PT LTG, [place] Level contact guard assist  -LB      Stair Training Goal PT  LTG, Assist Device 1 handrail  -LB      Static Sitting Balance PT LTG    Static Sitting Balance PT LTG, Date Established   07/24/17  -LH (r) KS (t) LH (c)    Static Sitting Balance PT LTG, Time to Achieve   5 - 7 days  -LH (r) KS (t) LH (c)    Static Sitting Balance PT LTG, [place] Level   independent  -LH (r) KS (t) LH (c)    Static Sitting Balance PT LTG, Assist Device   UE Support  -LH (r) KS (t) LH (c)    Static Sitting Balance PT LTG, Additional Goal   7 min w/o L lean  -LH (r) KS (t) LH (c)      User Key  (r) = Recorded By, (t) = Taken By, (c) = Cosigned By    Initials Name Provider Type    LB Rosemary Hoffmann, PT Physical Therapist     Angelica Pearson, PT Physical Therapist    KS Jessica Germain, PT Student PT Student          Physical Therapy Education     Title: PT OT SLP Therapies (Active)     Topic: Physical Therapy (Active)     Point: Mobility training (Done)    Learning Progress Summary    Learner Readiness Method Response Comment Documented by Status   Patient Acceptance E VU,NR  LB 08/03/17 1103 Done    Acceptance E VU,NR car, stairs, curb LB 08/02/17 1031 Done    Acceptance E VU,NR  LB 08/01/17 0939 Done    Acceptance E VU,NR  LB 07/31/17 0947 Done    Acceptance E VU,NR  LB1 07/29/17 1210 Done                      User Key     Initials Effective Dates Name Provider Type Discipline    [facility] 10/06/15 -  Rosemary Hoffmann, PT Physical Therapist PT     02/18/16 -  Angelica Treadwell, PTA Physical Therapy Assistant PT                    PT Recommendation and Plan  Anticipated Equipment Needs At Discharge: standard cane  Anticipated Discharge Disposition: home with outpatient services, home with assist  Planned Therapy Interventions: bed mobility training, balance training, neuromuscular re-education, transfer training, strengthening, stair training  PT Frequency: 2 times/day            Time Calculation:         PT Charges       08/03/17 1343 08/03/17 1102       Time Calculation    Start Time 1330  -LB 0930   -LB     Stop Time 1400  -LB 1000  -LB     Time Calculation (min) 30 min  -LB 30 min  -LB       User Key  (r) = Recorded By, (t) = Taken By, (c) = Cosigned By    Initials Name Provider Type    WINNIE Treadwell PTA Physical Therapy Assistant          Therapy Charges for Today     Code Description Service Date Service Provider Modifiers Qty    91423772990 HC PT THER PROC EA 15 MIN 8/2/2017 Angelica Treadwell PTA GP 4    19738785484 HC PT THER PROC EA 15 MIN 8/3/2017 Angelica Treadwell PTA GP 4               Angelica Treadwell PTA  8/3/2017

## 2017-08-03 NOTE — PROGRESS NOTES
Inpatient Rehabilitation Functional Measures Assessment    Functional Measures  BETH Eating:  [facility] Grooming: [facility] Bathing:  [facility] Upper Body Dressing:  [facility] Lower Body Dressing:  [facility] Toileting:  [facility] Bladder Management  Level of Assistance:  [place]  Frequency/Number of Accidents this Shift:  [facility] Bowel Management  Level of Assistance: [place]  Frequency/Number of Accidents this Shift: [facility] Bed/Chair/Wheelchair Transfer:  [facility] Toilet Transfer:  [facility] Tub/Shower Transfer:  [place]    Previously Documented Mode of Locomotion at Discharge: Field  BETH Expected Mode of Locomotion at Discharge: [facility] Walk/Wheelchair:  [facility] Stairs:  [facility] Comprehension:  Auditory comprehension is the usual mode. Comprehension  Score = 6, Modified [place].  Patient comprehends complex/abstract  information in their primary language, requiring: Additional time.  BETH Expression:  Vocal expression is the usual mode. Expression Score = 6,  Modified Independent.  Patient expresses complex/abstract information in their  primary language, requiring: Additional time.  BETH Social Interaction:  Social Interaction Score = 6, Modified Independent.  Patient is modified independent for social interaction, requiring: Requires  additional time.  BETH Problem Solving:  Problem Solving Score = 6, Modified [place].  Patient  makes appropriate decisions in order to solve complex problems, but requires  extra time.  BETH Memory:  Memory Score = 6, Modified [place].  Patient is modified  independent for memory, requiring: Requires additional time.    Therapy Mode Minutes  Occupational Therapy: Branch  Physical Therapy: [place]  Speech Language Pathology:  [place]    Signed by: Jayashree Weston RN

## 2017-08-03 NOTE — PROGRESS NOTES
LOS: 6 days   Patient Care Team:  JOSHUA Chaudhari as PCP - General  Mario Mata MD as PCP - Claims Attributed  Hortencia Lisa MD as Consulting Physician (Cardiology)  Pierre Walker MD as Consulting Physician (Gastroenterology)    Chief Complaint: same    Subjective     History of Present Illness    SubjectivePt is awake and alert. Pt feeling better today. No new concerns voiced.     History taken from: patient    Objective     Vital Signs  Temp:  [98 °F (36.7 °C)-98.3 °F (36.8 °C)] 98 °F (36.7 °C)  Heart Rate:  [76-94] 79  Resp:  [16-18] 16  BP: (138-149)/(82-87) 138/87    Objective exam unchanged.     Results Review:     I reviewed the patient's new clinical results.    Medication Review:     Assessment/Plan     Active Problems:    Benign essential hypertension    Controlled type 2 diabetes mellitus without complication    Cerebrovascular accident (CVA) due to occlusion of right middle cerebral artery    Atrial fibrillation    Warfarin anticoagulation (goal INR 2-3)    CVA (cerebrovascular accident due to intracerebral hemorrhage)      Assessment & PlanContinue to prepare for dc. I returned to pt's room to discuss with pt and son the dc plan as finalized in team mtg this am.     Bayron Nathan MD  08/03/17  7:12 AM    Time:

## 2017-08-03 NOTE — THERAPY TREATMENT NOTE
Inpatient Rehabilitation - Occupational Therapy Treatment Note  [facility]     Patient Name: Magnus Hartley  : 1928  MRN: [id number]  Today's Date: 8/3/2017  Onset of Illness/Injury or Date of Surgery Date: 17     Referring Physician: Jac      Admit Date: 2017    Visit Dx:     ICD-10-CM ICD-9-CM   1. Left hemiparesis G81.94 342.90   2. Dysarthria R47.1 784.51     Patient Active Problem List   Diagnosis   • Foot pain   • Asthma   • Benign essential hypertension   • Controlled type 2 diabetes mellitus without complication   • Dyslipidemia   • Macrocytosis without anemia   • Senile osteoporosis   • Post-menopausal osteoporosis   • Sinus bradycardia   • Stress incontinence in female   • Urge incontinence of urine   • Atrophic vaginitis   • Encounter for fitting and adjustment of other specified devices   • Incomplete emptying of bladder   • Urethral caruncle   • Incomplete uterovaginal prolapse   • Cerebrovascular accident (CVA) due to occlusion of right middle cerebral artery   • Atrial fibrillation   • Warfarin anticoagulation (goal INR 2-3)   • CVA (cerebrovascular accident due to intracerebral hemorrhage)             Adult Rehabilitation Note       17 1456 17 1300 17 0944    Rehab Assessment/Intervention    Discipline (P)  occupational therapist  -SHILPI speech language pathologist  -LT physical therapy assistant  -LB    Document Type (P)  therapy note (daily note)  -SHILPI therapy note (daily note)  -LT therapy note (daily note)  -LB    Subjective Information (P)  no complaints;agree to therapy  -SHILPI agree to therapy  -LT agree to therapy  -LB    Patient Effort, Rehab Treatment (P)  excellent  -SHILPI excellent  -LT good  -LB    Symptoms Noted During/After Treatment (P)  shortness of breath   w/ tsfs, muscle strengthening tasks  -SHILPI      Symptoms Noted Comment (P)  rest breaks, monitered SpO2, vcs for breathing  -SHILPI      Precautions/Limitations (P)  fall precautions  -SHILPI  fall  precautions  -LB    Recorded by [SHILPI] Karen Allan, OT Student [LT] Yvonne Miles MS CCC-SLP [LB] Angelica Treadwell PTA    Vital Signs    Intra SpO2 (%) (P)  97   see symptoms noted comment   -SHILPI      Recorded by [SHILPI] Karen Allan, OT Student      Pain Assessment    Pain Assessment (P)  No/denies pain  -SHILPI  No/denies pain  -LB    Recorded by [SHILPI] Karen Allan, OT Student  [LB] Angelica Treadwell PTA    Cognitive Assessment/Intervention    Current Cognitive/Communication Assessment (P)  impaired  -SHILPI      Orientation Status (P)  oriented x 4  -SHILPI      Follows Commands/Answers Questions (P)  100% of the time;able to follow single-step instructions;needs cueing;needs increased time;needs repetition  -SHILPI      Personal Safety (P)  mild impairment;decreased awareness, need for assist;decreased awareness, need for safety;decreased insight to deficits   some impulsivity when iniating tsfs   -SHILPI      Personal Safety Interventions (P)  fall prevention program maintained;gait belt;nonskid shoes/slippers when out of bed;supervised activity  -SHILPI  fall prevention program maintained;gait belt;muscle strengthening facilitated;nonskid shoes/slippers when out of bed  -LB    Recorded by [SHILPI] Karen Allan, OT Student  [LB] Angelica Treadwell PTA    Improve memory skills    Memory Skills Progress  90%;with inconsistent cues  -LT     Recorded by  [LT] Yvonne Miles MS CCC-SLP     Improve functional problem solving    Functional Problem Solving Progress  80%;with inconsistent cues  -LT     Recorded by  [LT] Yvonne Miles MS CCC-SLP     Improve executive function skills    Executive Function Skills Progress  80%;without cues  -LT     Recorded by  [LT] Yvonne Miles MS CCC-SLP     Bed Mobility, Assessment/Treatment    Bed Mobility, Assistive Device   --   assessed on txment mat  -LB    Bed Mobility, Roll Left, [place]   supervision required  -LB    Bed Mobility, Roll Right, [place]   supervision required  -LB    Bed  Mobility, Scoot/Bridge, [place]   supervision required  -LB    Bed Mob, Supine to Sit, [place]   supervision required  -LB    Bed Mob, Sit to Supine, [place]   supervision required  -LB    Recorded by   [LB] Angelica Treadwell PTA    Transfer Assessment/Treatment    Transfers, Bed-Chair [place] (P)  contact guard assist  -SHILPI  contact guard assist;stand by assist  -LB    Transfers, Chair-Bed [place]   contact guard assist;stand by assist  -LB    Transfers, Bed-Chair-Bed, Assist Device   rolling walker  -LB    Transfers, Sit-Stand [place] (P)  contact guard assist;verbal cues required  -SHILPI  contact guard assist;stand by assist  -LB    Transfers, Stand-Sit [place] (P)  contact guard assist  -SHILPI  contact guard assist  -LB    Transfers, Sit-Stand-Sit, Assist Device   rolling walker  -LB    Recorded by [SHILPI] Karen Allan, OT Student  [LB] Angelica Treadwell PTA    Gait Assessment/Treatment    Gait, [place] Level   contact guard assist;stand by assist  -LB    Gait, Assistive Device   rolling walker  -LB    Gait, Distance (Feet)   250  -LB    Gait, Gait Deviations   forward flexed posture;rojelio decreased  -LB    Recorded by   [LB] Angelica Treadwell PTA    Stairs Assessment/Treatment    Number of Stairs   8  -LB    Stairs, Handrail Location   both sides  -LB    Stairs, [place] Level   minimum assist (75% patient effort);contact guard assist;verbal cues required  -LB    Stairs, Technique Used   step to step (ascending);step to step (descending)  -LB    Stairs, Comment   curb, ramp rwx cga, vc's  -LB    Recorded by   [LB] Angelica Treadwell PTA    Upper Body Bathing Assessment/Training    UB Bathing Assess/Train, Position (P)  sitting;sink side  -SHILPI      UB Bathing Assess/Train, [place] Level (P)  verbal cues required;stand by assist   sequencing vcs  -SHILPI      Recorded by [SHILPI] Karen Allan, OT Student      Lower Body Bathing Assessment/Training    LB Bathing Assess/Train,  Position (P)  sitting;sink side;standing  -SHILPI      LB Bathing Assess/Train, [place] Level (P)  contact guard assist;verbal cues required  -SHILPI      LB Bathing Assess/Train, Comment (P)  vcs for sequening, cga for balance when standing to wash buttocks/elsi area  -SHILPI      Recorded by [SHILPI] Karen Allan, OT Student      Upper Body Dressing Assessment/Training    UB Dressing Assess/Train, Clothing Type (P)  doffing:;donning:;hospital gown;bra;t-shirt   dof gown; don bra, shirt   -SHILPI      UB Dressing Assess/Train, Position (P)  sitting;sink side  -SHILPI      UB Dressing Assess/Train, [place] (P)  set up required   to gather clothing items   -SHILPI      Recorded by [SHILPI] Karen Allan, OT Student      Lower Body Dressing Assessment/Training    LB Dressing Assess/Train, Clothing Type (P)  doffing:;donning:;socks;shoes;pants   dof underwear; don underwear, pants, socks, shoes   -SHILPI      LB Dressing Assess/Train, Position (P)  standing;sitting;sink side  -SHILPI      LB Dressing Assess/Train, [place] (P)  minimum assist (75% patient effort)  -SHILPI      LB Dressing Assess/Train, Comment (P)  pt required assist to gather clothes, adjust pants over hips, tie shoes   -SHILPI      Recorded by [SHILPI] Karen Allan OT Student      Toileting Assessment/Training    Toileting Assess/Train, Assistive Device (P)  grab bars;raised toilet seat  -SHILPI      Toileting Assess/Train, Position (P)  sitting;standing  -SHILPI      Toileting Assess/Train, [place]ep[place] Level (P)  verbal cues required;contact guard assist  -SHILPI      Toileting Assess/Train, Comment (P)  vcs for clothing management before standing to pull up, CGA to maintain standing balance in am  -SHILPI      Recorded by [SHILPI] Karen Allan, OT Student      Grooming Assessment/Training    Grooming Assess/Train, Position (P)  sitting;sink side  -SHILPI      Grooming Assess/Train, [place]ep[place] Level (P)  supervision required  -SHILPI      Grooming Assess/Train, Comment (P)  combing hair   -SHILPI      Recorded by  [SHILPI] Karen Allan OT Student      Fine Motor Coordination Training    Detail (Fine Motor Coordination Training) (P)  pt completed simulated fastener task (tie laces, zip/unzip, button/unbutton) to increase sensory input/fine motor coordination for functional ADLs while seated in w/c at table; pt required increased time   -SHILPI      Recorded by [SHILPI] Karen Allan OT Student      Sensory Treatment    Sensory Reeducation Techniques (P)  other (see comments)  -SHILPI      Sensory Reeduction Treatment (P)  pt pinched clothes pins and placed on/removed from horizontal mary carmen w/ LUE to improve sensory input to modulate grasp, completed standing at countertop; pt required hand over hand to complete the first clothes pin but was able to complete the rest w/o difficulty   -SHILPI      Recorded by [SHILPI] CHERELLE Sepulveda      Positioning and Restraints    Pre-Treatment Position (P)  in bed  -SHILPI      Post Treatment Position (P)  wheelchair  -SHILPI  chair  -LB    In Chair (P)  encouraged to call for assist;call light within reach;sitting;with family/caregiver  -SHILPI  sitting;call light within reach  -LB    Recorded by [SHILPI] Karen Allan OT Student  [LB] Angelica Treadwell PTA      08/02/17 1506 08/02/17 1000 08/02/17 0941    Rehab Assessment/Intervention    Discipline occupational therapist  -SHILPI NULL,KIM speech language pathologist  -LT physical therapy assistant  -LB    Document Type therapy note (daily note)  -SHILPI NULL,CHAKA2 therapy note (daily note)  -LT therapy note (daily note)  -LB    Subjective Information agree to therapy;no complaints  -SHILPI NULL,AF2 agree to therapy  -LT agree to therapy  -LB    Patient Effort, Rehab Treatment good  -SHILPI NULL,AF2 good  -LT good  -LB    Precautions/Limitations fall precautions  -SHILPI NULL,AF2  fall precautions  -LB    Recorded by [SHILPI NULL,AF2] Adelia Salamanca OTBETO (r) Karen Allan OT Student (t) Adelia Salamanca OTBETO (c) [LT] Yvonne Miles MS CCC-SLP [LB] Angelica Treadwell PTA    Pain Assessment    Pain  Assessment No/denies pain  -AF,SHILPI,AF2  No/denies pain  -LB    Recorded by [AF,SHILPI,AF2] NO Cooney (r) Karen Allan OT Student (t) NO Cooney (c)  [LB] Angelica Treadwell PTA    Cognitive Assessment/Intervention    Current Cognitive/Communication Assessment functional  -AF,SHILPI,AF2      Orientation Status oriented x 4  -AF,SHILPI,AF2      Follows Commands/Answers Questions 100% of the time;able to follow single-step instructions;able to follow multi-step instructions;needs repetition;needs cueing  -AF,SHILPI,AF2      Personal Safety mild impairment  -AF,SHILPI,AF2      Personal Safety Interventions fall prevention program maintained;gait belt;nonskid shoes/slippers when out of bed;supervised activity  -AF,SHILPI,AF2  fall prevention program maintained;gait belt;muscle strengthening facilitated;nonskid shoes/slippers when out of bed  -LB    Recorded by [AF,SHILPI,AF2] NO Cooney (r) Karen Allan OT Student (t) NO Cooney (c)  [LB] Angelica Treadwell, YARA    Improve memory skills    Memory Skills Progress  80%;with inconsistent cues  -LT     Recorded by  [LT] Yvonne Miles MS CCC-SLP     Improve functional problem solving    Functional Problem Solving Progress  70%;with inconsistent cues  -LT     Recorded by  [LT] Yvonne Miles MS CCC-SLP     Improve executive function skills    Executive Function Skills Progress  70%;without cues  -LT     Recorded by  [LT] Yvonne Miles MS CCC-SLP     Bed Mobility, Assessment/Treatment    Bed Mobility, Assistive Device   --   assessed on txment mat  -LB    Bed Mobility, Roll Left, [place]   supervision required  -LB    Bed Mobility, Roll Right, [place]   supervision required  -LB    Bed Mob, Supine to Sit, [place] supervision required  -AF,SHILPI,AF2  supervision required  -LB    Bed Mob, Sit to Supine, [place]   supervision required  -LB    Recorded by [AF,SHILPI,AF2] NO Cooney (r) Karen Allan OT Student (t) Adelia Salamanca  OTR (c)  [LB] Angelica Treadwell PTA    Transfer Assessment/Treatment    Transfers, Bed-Chair [place] contact guard assist  -AF,SHILPI,AF2  contact guard assist;minimum assist (75% patient effort)  -LB    Transfers, Chair-Bed [place]   contact guard assist;minimum assist (75% patient effort)  -LB    Transfers, Bed-Chair-Bed, Assist Device   rolling walker  -LB    Transfers, Sit-Stand [place] contact guard assist  -AF,SHILPI,AF2  contact guard assist  -LB    Transfers, Stand-Sit [place] contact guard assist  -AF,SHILPI,AF2  contact guard assist  -LB    Transfers, Sit-Stand-Sit, Assist Device   rolling walker  -LB    Toilet Transfer, [place] contact guard assist  -AF,SHILPI,AF2      Toilet Transfer, Assistive Device elevated toilet seat;rolling walker   grab bars  -AF,SHILPI,AF2      Walk-In Shower Transfer, [place] contact guard assist;verbal cues required   vcs for rwx placement, sequencing, hand placement   -AF,SHILPI,AF2      Walk-In Shower Transfer, Assist Device rolling walker;standard shower chair   grab bars  -AF,SHILPI,AF2      Transfer, Comment   car tsf CGA/min, Rwx  -LB    Recorded by [AF,SHILPI,AF2] Adelia Salamanca, OTR (r) Karen Allan OT Student (t) Adelia Salamanca OTR (c)  [LB] Angelica Treadwell PTA    Gait Assessment/Treatment    Gait, [place] Level   contact guard assist;verbal cues required  -LB    Gait, Assistive Device   rolling walker  -LB    Gait, Distance (Feet)   200  -LB    Gait, Gait Deviations   rojelio decreased;forward flexed posture  -LB    Recorded by   [LB] Angelica Treadwell PTA    Stairs Assessment/Treatment    Number of Stairs   4  -LB    Stairs, Handrail Location   both sides  -LB    Stairs, [place] Level   minimum assist (75% patient effort);contact guard assist;verbal cues required  -LB    Stairs, Technique Used   step to step (ascending);step to step (descending)  -LB    Stairs, Comment   curb rwx, cga, vc's  -LB    Recorded by   [LB] Angelica Treadwell PTA     Upper Body Bathing Assessment/Training    UB Bathing Assess/Train Assistive Device grab bars;hand-held shower head;shower chair with back  -AF,SHILPI,AF2      UB Bathing Assess/Train, Position sitting  -AF,SHILPI,AF2      UB Bathing Assess/Train, [place] Level set up required;contact guard assist  -AF,SHILPI,AF2      Recorded by [AF,SHILPI,AF2] NO Cooney (r) Karen Allan, OT Student (t) NO Cooney (c)      Lower Body Bathing Assessment/Training    LB Bathing Assess/Train Assistive Device grab bars;hand-held shower head;shower chair with back  -AF,SHILPI,AF2      LB Bathing Assess/Train, Position sitting;standing  -AF,SHILPI,AF2      LB Bathing Assess/Train, [place] Level contact guard assist;verbal cues required  -AF,SHILPI,AF2      LB Bathing Assess/Train, Comment vcs for hand placement, CGA for balance when standing to wash buttocks/elsi  -AF,SHILPI,AF2      Recorded by [AF,SHILPI,AF2] NO Cooney (r) Karen Allan, OT Student (t) NO Cooney (c)      Upper Body Dressing Assessment/Training    UB Dressing Assess/Train, Clothing Type doffing:;donning:;t-shirt;hospital gown   dof gown, don shirt   -AF,SHILPI,AF2      UB Dressing Assess/Train, Position sitting  -AF,SHILPI,AF2      UB Dressing Assess/Train, [place] set up required;contact guard assist  -AF,SHILPI,AF2      UB Dressing Assess/Train, Comment pt set up assist to orient shirt correctly to thread arm   -AF,SHILPI,AF2      Recorded by [AF,SHILPI,AF2] NO Cooney (r) Karen Allan, OT Student (t) NO Cooney (c)      Lower Body Dressing Assessment/Training    LB Dressing Assess/Train, Clothing Type doffing:;donning:;socks;shoes;pants   dof underwear; don underwear, capris, shoes, socks   -AF,SHILPI,AF2      LB Dressing Assess/Train, Position sitting;standing  -AF,SHILPI,AF2      LB Dressing Assess/Train, [place] minimum assist (75% patient effort)  -CRYSTAL NULLD,AF2      LB Dressing Assess/Train, Comment CGA to stand for clothing management;  required assist w/ tying shoes due to decreased sensation in LUE  -AF,SHILPI,AF2      Recorded by [AF,SHILPI,AF2] NO Cooney (r) Karen Allan, OT Student (t) NO Cooney (c)      Toileting Assessment/Training    Toileting Assess/Train, Assistive Device grab bars;raised toilet seat  -AF,SHILPI,AF2      Toileting Assess/Train, Position sitting;standing  -AF,SHILPI,AF2      Toileting Assess/Train, [place] Level contact guard assist  -AF,SHILPI,AF2      Toileting Assess/Train, Comment CGA for balance during clothing management   -AF,SHILPI,AF2      Recorded by [AF,SHILPI,AF2] NO Cooney (r) Karen Allan, OT Student (t) NO Cooney (c)      Grooming Assessment/Training    Grooming Assess/Train, Position sitting   in recliner using tray table mirror  -AF,SHILPI,AF2      Grooming Assess/Train, [place] Level set up required  -AF,SHILPI,AF2      Grooming Assess/Train, Comment combing hair; dentures hygiene performed before session   -AF,SHILPI,AF2      Recorded by [AF,SHILPI,AF2] NO Cooney (r) Karen Allan, OT Student (t) NO Cooney (c)      Balance Skills Training    Standing-Level of Assistance   Minimum assistance  -LB    Static Standing Balance Support   Right upper extremity supported;Left upper extremity supported;eliu bar   UE support as needed  -LB    Standing-Balance Activities   Compliant surfaces  -LB    Gait Balance-Level of Assistance   Contact guard  -LB    Gait Balance Support   Right upper extremity supported;Left upper extremity supported;eliu bar  -LB    Gait Balance Activities   side-stepping  -LB    Recorded by   [LB] Angelica Treadwell PTA    Therapy Exercises    Bilateral Lower Extremities   AROM:;10 reps;standing  -LB    Recorded by   [LB] Angelica Treadwell PTA    Sensory Treatment    Sensory Reeducation Techniques comparison, tactile sensory information;sensory train, w/o visual reinforcement;sensory training, visual reinforcement  -AF,SHILPI,AF2      Sensory Reeduction  Treatment 3 objects were placed on table w/ associated sight word cards, pt touched each object and described tactile details w/ eyes open, pt then ID'ed object placed in L hand w/ eyes closed; pt was able to ID 2/3 w/ eyes closed, pt was able to ID the other w/ eyes open; pt searched for items in rice bin w/ eyes open, correctly decsribed tactile details w/ min vcs while manipulating in hand, then reburried the items  -SHILPI NULL,CHAKA2      Recorded by [SHILPI NULL,CHAKA2] NO Cooney (r) Karen Allan, OT Student (t) NO Cooney (c)      Positioning and Restraints    Pre-Treatment Position in bed  -SHILPI NULL AF2      Post Treatment Position chair   recliner  -SHILPI NULL AF2  wheelchair  -LB    In Chair encouraged to call for assist;sitting;call light within reach  -SHILPI NULL AF2      In Wheelchair   sitting;with SLP  -LB    Recorded by [SHILPI NULL,CHAKA2] NO Cooney (r) Karen Allan OT Student (t) NO Cooney (c)  [LB] Angelica Treadwell, PTA      08/01/17 1150 08/01/17 1100 08/01/17 0940    Rehab Assessment/Intervention    Discipline occupational therapist  -SHILPI NULL AF2 speech language pathologist  -LT physical therapy assistant  -LB    Document Type therapy note (daily note)  -SHILPI NULL AF2 therapy note (daily note)  -LT therapy note (daily note)  -LB    Subjective Information agree to therapy;no complaints  -SHILPI NULL AF2 agree to therapy  -LT agree to therapy  -LB    Patient Effort, Rehab Treatment good  -SHILPI NULL AF2 good  -LT good  -LB    Symptoms Noted During/After Treatment shortness of breath   w/ tsf  -SHILPI NULL AF2      Symptoms Noted Comment rest breaks as needed; monitered SpO2  -SHILPI NULL,CHAKA2      Precautions/Limitations fall precautions  -SHILPI NULL AF2  fall precautions  -LB    Recorded by [SHILPI NULL,AF2] NO Cooney (r) Karen Allan OT Student (t) NO Cooney (c) [LT] Yvonne Miles, MS CCC-SLP [LB] Angelica Treadwell, PTA    Vital Signs    Intra SpO2 (%) 98  -AF,SHILPI,AF2      Recorded by [AF,SHILPI,AF2]  Adelia Salamanca OTR (r) Karen Allan OT Student (t) NO Cooney (c)      Pain Assessment    Pain Assessment No/denies pain  -AF,SHILPI,AF2  No/denies pain  -LB    Recorded by [AF,SHILPI,AF2] NO Cooney (r) Karen Allan, OT Student (t) NO Cooney (c)  [LB] Angelica Treadwell PTA    Cognitive Assessment/Intervention    Current Cognitive/Communication Assessment functional  -AF,SHILPI,AF2      Orientation Status oriented x 4  -AF,SHILPI,AF2      Follows Commands/Answers Questions 100% of the time;able to follow single-step instructions;needs cueing;needs repetition  -AF,SHILPI,AF2      Personal Safety mild impairment;decreased insight to deficits;decreased awareness, need for safety  -AF,SHILPI,AF2      Personal Safety Interventions fall prevention program maintained;gait belt;nonskid shoes/slippers when out of bed;supervised activity  -AF,SHILPI,AF2  fall prevention program maintained;gait belt;muscle strengthening facilitated;nonskid shoes/slippers when out of bed  -LB    Recorded by [AF,SHILPI,AF2] NO Cooney (r) Karen Allan, OT Student (t) NO Cooney (c)  [LB] Angelica Treadwell PTA    Improve memory skills    Memory Skills Progress  60%;with inconsistent cues  -LT     Recorded by  [LT] Yvonne Miles MS CCC-SLP     Improve functional problem solving    Functional Problem Solving Progress  50%;with inconsistent cues  -LT     Recorded by  [LT] Yvonne Miles MS CCC-SLP     Improve executive function skills    Executive Function Skills Progress  80%;without cues  -LT     Recorded by  [LT] Yvonne Miles MS CCC-SLP     Bed Mobility, Assessment/Treatment    Bed Mobility, Assistive Device   --   assessed on txment mat  -LB    Bed Mobility, Roll Left, [place]   contact guard assist  -LB    Bed Mobility, Roll Right, [place]   contact guard assist;verbal cues required  -LB    Bed Mobility, Scoot/Bridge, [place]   supervision required  -LB    Bed Mob, Supine to Sit,  [place] contact guard assist  -AF,SHILPI,AF2  contact guard assist;verbal cues required  -LB    Bed Mob, Sit to Supine, [place]   contact guard assist;verbal cues required  -LB    Recorded by [AF,SHILPI,AF2] Adelia Salamanca, OTR (r) Karen Allan, OT Student (t) Adelia Salamanca OTR (c)  [LB] Angelica Treadwell PTA    Transfer Assessment/Treatment    Transfers, Bed-Chair [place] contact guard assist;verbal cues required  -AF,SHILPI,AF2  minimum assist (75% patient effort);contact guard assist;verbal cues required  -LB    Transfers, Chair-Bed [place]   minimum assist (75% patient effort);contact guard assist;verbal cues required  -LB    Transfers, Sit-Stand [place] contact guard assist;verbal cues required   vcs to maintain wide base of support  -AF,SHILPI,AF2  contact guard assist  -LB    Transfers, Stand-Sit [place] verbal cues required;contact guard assist   vcs to reach back for w/c  -AF,SHILPI,AF2  contact guard assist  -LB    Transfers, Sit-Stand-Sit, Assist Device   rolling walker  -LB    Toilet Transfer, [place] verbal cues required;contact guard assist   vcs for hand placement   -AF,SHILPI,AF2      Toilet Transfer, Assistive Device rolling walker;elevated toilet seat   grab bars  -AF,SHILPI,AF2      Transfer, Comment EOM tsf to and from w/c; CGA, VCs/gestural prompts for hand placement   -AF,SHILPI,AF2  car tsf, rwx, CGA, vc's  -LB    Recorded by [AF,SHILPI,AF2] Adelia Salamanca OTR (r) Karen Allan, OT Student (t) Adelia Salamanca OTR (c)  [LB] Angelica Treadwell PTA    Gait Assessment/Treatment    Gait, [place] Level   contact guard assist;verbal cues required   cues for upright posture  -LB    Gait, Assistive Device   rolling walker  -LB    Gait, Distance (Feet)   200  -LB    Gait, Gait Deviations   rojelio decreased;forward flexed posture;left:;decreased heel strike  -LB    Recorded by   [LB] Angelica Treadwell PTA    Stairs Assessment/Treatment    Number of Stairs   4  -LB    Stairs, Handrail  Location   both sides  -LB    Stairs, [place] Level   minimum assist (75% patient effort);contact guard assist  -LB    Stairs, Technique Used   step to step (ascending);step to step (descending)  -LB    Stairs, Comment   curb, rwx, min/CGA  -LB    Recorded by   [LB] Angelica Treadwell PTA    Functional Mobility    Functional Mobility- Ind. Level contact guard assist  -AF,SHILPI,AF2      Functional Mobility- Device rolling walker  -AF,SHILPI,AF2      Functional Mobility-Maintain WBing able to maintain weight bearing status  -AF,SHILPI,AF2      Functional Mobility- Comment pt was able to walk into bathroom from bed and maneuver around bathroom w/ rwx w/ CGA   -AF,SHILPI,AF2      Recorded by [AF,SHILPI,AF2] NO Cooney (r) Karen Allan, OT Student (t) NO Cooney (c)      Upper Body Dressing Assessment/Training    UB Dressing Assess/Train, Clothing Type doffing:;donning:;t-shirt  -AF,SHILPI,AF2      UB Dressing Assess/Train, Position sitting;edge of bed  -AF,SHILPI,AF2      UB Dressing Assess/Train, [place] minimum assist (75% patient effort)   min a to thread R arm through correct hole   -AF,SHILPI,AF2      Recorded by [AF,SHILPI,AF2] NO Cooney (r) Karen Allan, OT Student (t) NO Cooney (c)      Lower Body Dressing Assessment/Training    LB Dressing Assess/Train, Clothing Type doffing:;donning:;pants;shoes;socks  -AF,SHILPI,AF2      LB Dressing Assess/Train, Position sitting;edge of bed;standing  -AF,SHILPI,AF2      LB Dressing Assess/Train, [place] minimum assist (75% patient effort);contact guard assist  -AF,SHILPI,AF2      LB Dressing Assess/Train, Comment CGA while standing to pull up pants; min a to adjust and tie shoes due to decreased sensation in LUE  -AF,SHILPI,AF2      Recorded by [AF,SHILPI,AF2] NO Cooney (r) Karen Allan OT Student (t) Adelia Mattie Salamanca, OTR (c)      Toileting Assessment/Training    Toileting Assess/Train, Assistive Device riley zapata  -CHAKA,SHILPI,AF2      Toileting  Assess/Train, Position sitting  -AF,SHILPI,AF2      Toileting Assess/Train, Indepen Level contact guard assist  -AF,SHILPI,AF2      Toileting Assess/Train, Comment pt able to manage clothes and perform hygiene IND; CGA while standing to adjust clothes  -AF,SHILPI,AF2      Recorded by [AF,SHILPI,AF2] NO Cooney (r) Karen Allan, OT Student (t) NO Cooney (c)      Therapy Exercises    Bilateral Lower Extremities   AROM:;10 reps;standing;supine  -LB    Recorded by   [LB] Angelica Treadwell, PTA    Sensory Treatment    Sensory Reeducation Techniques sensory training, visual reinforcement  -AF,SHILPI,AF2      Sensory Reeduction Treatment pt matched objects placed in hand w/ sight word cards w/ eyes closed, pt was unable to ID any items but was able to ID items w/ eyes open and tactile sensation; pt stated some items felt heavy, some soft w/o visual input;   -CHAKA,SHILPI,AF2      Sensory Treatment Comment pt selected small foam beads from a pile and put on a string using BLE while standing at countertop, pt displayed difficulty w/ using appropraite grasp to pickup and maintain hold on beads; pt found and picked out 10 buttons from yellow theraputty while standing at countertop w/ BUE, pt reported having a difficult time; pt was provided w/ sponge to squeeze and rub on LUE to increase sensory input to regain functional grasp and sensation necessary for self care tasks  -AF,SHILPI,AF2      Recorded by [AF,SHILPI,AF2] NO Cooney (r) Karen Allan OT Student (t) NO Cooney (c)      Positioning and Restraints    Pre-Treatment Position in bed  -AF,SHILPI,AF2      Post Treatment Position chair  -AF,SHILPI,AF2  wheelchair  -LB    In Chair encouraged to call for assist;call light within reach;sitting  -AF,SHILPI,AF2      In Wheelchair   sitting;with SLP  -LB    Recorded by [AF,SHILPI,AF2] NO Cooney (r) Karen Allan OT Student (t) NO Cooney (c)  [LB] Angelica Treadwell PTA      07/31/17 1551          Rehab  Assessment/Intervention    Discipline occupational therapist  -AF,SHILPI,AF2      Document Type therapy note (daily note)  -AF,SHILPI,AF2      Subjective Information agree to therapy;no complaints  -AF,SHILPI,AF2      Patient Effort, Rehab Treatment good  -AF,SHILPI,AF2      Precautions/Limitations fall precautions  -AF,SHILPI,AF2      Recorded by [AF,SHILPI,AF2] NO Cooney (r) Karen Allan OT Student (t) NO Cooney (c)      Pain Assessment    Pain Assessment No/denies pain  -AF,SHILPI,AF2      Recorded by [AF,SHILPI,AF2] NO Cooney (r) Karen Allan OT Student (t) NO Cooney (c)      Cognitive Assessment/Intervention    Current Cognitive/Communication Assessment functional  -AF,SHILPI,AF2      Orientation Status oriented x 4  -AF,SHILPI,AF2      Follows Commands/Answers Questions 100% of the time;able to follow single-step instructions;needs cueing;needs repetition  -AF,SHILPI,AF2      Personal Safety mild impairment;decreased insight to deficits  -AF,SHILPI,AF2      Personal Safety Interventions fall prevention program maintained;gait belt;nonskid shoes/slippers when out of bed  -AF,SHILPI,AF2      Recorded by [AF,SHILPI,AF2] NO Cooney (r) Karen Allan OT Student (t) NO Cooney (c)      General UE Assessment    ROM RUE ROM was WNL;shoulder, left: UE ROM deficit;scapula, left: UE ROM deficit   AROM  -AF,SHILPI,AF2      ROM Detail PROM shoulder flexion/extension WNL, PROM shoulder external rotation WNL  -AF,SHILPI,AF2      Recorded by [AF,SHILPI,AF2] NO Cooney (r) Karen Allan OT Student (t) NO Cooney (c)      General Hand Assessment    ROM other (see comments)  -AF,SHILPI,AF2      ROM Detail able to complete L opposition w/ increased time and attention ; no opposition deficits noted on R hand   -AF,SHILPI,AF2      Recorded by [AF,SHILPI,AF2] Adelia Salamanca, OTR (r) Karen Allan OT Student (t) Adelia Salamanca, OTR (c)      MMT (Manual Muscle Testing)    General MMT Assessment no strength  deficits identified   WFL for age   -AF,SHILPI,AF2      General MMT Assessment Detail  R 45, L 25; 3 pt pinch R 8, L 5; lateral pinch R 8, L 6   pt reports right hand dominance   -AF,SHILPI,AF2      Recorded by [AF,SHILPI,AF2] NO Cooney (r) Karen Allan, OT Student (t) NO Cooney (c)      Transfer Assessment/Treatment    Transfer, Comment to and from EOM from w/c CGA; from w/c to recliner CGA w/ vcs for hand placement   -AF,SHILPI,AF2      Recorded by [AF,SHILPI,AF2] NO Cooney (r) Karen Allan OT Student (t) NO Cooney (c)      Fine Motor Coordination Training    9-Hole Peg Right 25 secs  -AF,SHILPI,AF2      9-Hole Peg Left 61 secs  -AF,SHILPI,AF2      Box and Blocks Right 56 blocks  -AF,SHILPI,AF2      Box and Blocks Left 39 blocks  -AF,SHILPI,AF2      Recorded by [AF,SHILPI,AF2] NO Cooney (r) Karen Allan OT Student (t) NO Cooney (c)      Sensory Assessment/Intervention    Sensory Impairment numbness;dull;sharp  -AF,SHILPI,AF2      Light Touch LUE  -AF,SHILPI,AF2      LUE Light Touch mild impairment   unable to recognize light touch w/ eyes closed   -AF,SHILPI,AF2      RUE Light Touch WNL  -AF,SHILPI,AF2      Sharp/Dull Discrimination LUE   sharp 1/3 trials correct; dull 2/3 trials correct  -AF,SHILPI,AF2      LUE Sharp/Dull Discrimination severe impairment  -AF,SHILPI,AF2      Stereognosis LUE   unable to recognize 3/3 objects; dropped objects w/o noticin  -AF,SHILPI,AF2      LUE Stereognosis severe impairment  -AF,SHILPI,AF2      Recorded by [AF,SHILPI,AF2] NO Cooney (r) Karen Allan OT Student (t) Daelia Mattie Salamanca, OTR (c)      Sensory Treatment    Sensory Reeducation Techniques sensory training, visual reinforcement  -CHAKA,SHILPI,AF2      Sensory Reeduction Treatment pt was unable to find various objects w/ L hand from box of beans w/ eyes closed; pt dug to find objects w/ eyes open   -CHAKA,SHILPI,AF2      Recorded by [CHAKA,SHILPI,AF2] Adelia Salamanca, OTR (r) Karen Allan OT Student (t) Adelia Phelps  Cheikh, OTR (c)      Positioning and Restraints    Pre-Treatment Position sitting in chair/recliner  -AF,SHILPI,AF2      Post Treatment Position wheelchair  -AF,SHILPI,AF2      In Chair encouraged to call for assist;call light within reach  -AF,SHILPI,AF2      Recorded by [AF,SHILPI,AF2] Adelia Salamanca, OTR (r) Karen Allan, OT Student (t) Adelia Salamanca, OTR (c)        User Key  (r) = Recorded By, (t) = Taken By, (c) = Cosigned By    Initials Name Effective Dates    LT Yvonne Ginger, MS CCC-SLP 04/13/15 -     LB Angelica Treadwell, PTA 02/18/16 -     AF Adelia Salamanca, OTR 12/01/15 -     SHILPI Allan, OT Student 07/11/17 -                 OT Goals       08/02/17 1558 07/29/17 1054 07/25/17 1443    Transfer Training OT STG    Transfer Training OT STG, Date Established 08/02/17  -AF (r) SHILPI (t) AF (c) 07/29/17  -RE     Transfer Training OT STG, Time to Achieve 1 wk  -AF (r) SHILPI (t) AF (c) 3 days  -RE     Transfer Training OT STG, Activity Type  toilet  -RE     Transfer Training OT STG, [place] Level  supervision required;verbal cues required  -RE     Transfer Training OT STG, Assist Device walker, rolling   grab bars  -AF (r) SHILPI (t) AF (c)      Transfer Training OT STG, Date Goal Reviewed 08/02/17  -AF (r) SHILPI (t) AF (c)      Transfer Training OT STG, Outcome  goal ongoing  -RE     Transfer Training OT LTG    Transfer Training OT LTG, Date Established  07/29/17  -RE 07/25/17  -SO    Transfer Training OT LTG, Time to Achieve  1 wk  -RE 1 wk  -SO    Transfer Training OT LTG, Activity Type  toilet  -RE sit to stand/stand to sit;toilet  -SO    Transfer Training OT LTG, [place] Level  conditional independence  -RE contact guard assist;supervision required  -SO    Transfer Training OT LTG, Assist Device   walker, rolling  -SO    Transfer Training OT LTG, Date Goal Reviewed 08/02/17  -AF (r) SHILPI (t) AF (c)      Transfer Training OT LTG, Outcome  goal ongoing  -RE     Transfer Training 2 OT STG    Transfer Training 2 OT  STG, Date Established 08/02/17  -AF (r) SHILPI (t) AF (c)      Transfer Training 2 OT STG, Time to Achieve 1 wk  -AF (r) SHILPI (t) AF (c) 3 days  -RE     Transfer Training 2 OT STG, Activity Type shower chair   rwx  -AF (r) SHILPI (t) AF (c) shower chair  -RE     Transfer Training 2 OT STG, [place] Level supervision required  -AF (r) SHILPI (t) AF (c) contact guard assist  -RE     Transfer Training 2 OT STG, Outcome goal revised  -AF (r) SHILPI (t) AF (c) goal ongoing  -RE     Transfer Training 2 OT LTG    Transfer Training 2 OT LTG, Date Established 08/02/17  -AF (r) SHILPI (t) AF (c)      Transfer Training 2 OT LTG, Time to Achieve by discharge  -AF (r) SHILPI (t) AF (c) 2 wks  -RE     Transfer Training 2 OT LTG, Activity Type  shower chair;tub  -RE     Transfer Training 2 OT LTG, [place] Level  supervision required  -RE     Transfer Training 2 OT LTG, Date Goal Reviewed 08/02/17  -AF (r) SHILPI (t) AF (c)      Transfer Training 2 OT LTG, Outcome  goal ongoing  -RE     Bathing OT STG    Bathing Goal OT STG, Date Established 08/02/17  -AF (r) SHILPI (t) AF (c) 07/29/17  -RE     Bathing Goal OT STG, Time to Achieve 1 wk  -AF (r) SHILPI (t) AF (c) 3 days  -RE     Bathing Goal OT STG, Activity Type  upper body bathing;lower body bathing  -RE     Bathing Goal OT STG, [place] Level contact guard assist  -AF (r) SHILPI (t) AF (c) set up required  -RE     Bathing Goal OT STG, Assist Device grab bars;shower head, detachable;shower chair with back  -AF (r) SHILPI (t) AF (c)      Bathing Goal OT STG, Date Goal Reviewed 08/02/17  -AF (r) SHILPI (t) AF (c)      Bathing Goal OT STG, Outcome  goal ongoing  -RE     Bathing OT LTG    Bathing Goal OT LTG, Date Established 08/02/17  -AF (r) SHILPI (t) AF (c)      Bathing Goal OT LTG, Time to Achieve by discharge  -CHAKA (r) SHILPI (t) AF (c) 2 wks  -RE     Bathing Goal OT LTG, Activity Type  upper body bathing;lower body bathing  -RE     Bathing Goal OT LTG, [place] Level supervision required  -CHAKA (r) SHILPI (t) CHAKA (c)  conditional independence  -RE     Bathing Goal OT LTG, Date Goal Reviewed 08/02/17  -AF (r) SHILPI (t) AF (c)      Bathing Goal OT LTG, Outcome  goal ongoing  -RE     Grooming OT STG    Grooming Goal OT STG, Date Established 08/02/17  -AF (r) SHILPI (t) AF (c)      Grooming Goal OT STG, Time to Achieve 1 wk  -AF (r) SHILPI (t) AF (c) 3 days  -RE     Grooming Goal OT STG, [place] Level  conditional independence  -RE     Grooming Goal OT STG, Date Goal Reviewed 08/02/17  -AF (r) SHILPI (t) AF (c)      Grooming Goal OT STG, Outcome  goal ongoing  -RE     Grooming OT LTG    Grooming Goal OT LTG, Date Established 08/02/17  -AF (r) SHILPI (t) AF (c)      Grooming Goal OT LTG, Time to Achieve by discharge  -AF (r) SHILPI (t) AF (c) 1 wk  -RE     Grooming Goal OT LTG, [place] Level  conditional independence  -RE     Grooming Goal OT LTG, Date Goal Reviewed 08/02/17  -AF (r) SHILPI (t) AF (c)      Grooming Goal OT LTG, Outcome  goal ongoing  -RE     LB Dressing OT STG    LB Dressing Goal OT STG, Date Established 08/02/17  -AF (r) SHILPI (t) AF (c) 07/29/17  -RE     LB Dressing Goal OT STG, Time to Achieve 1 wk  -AF (r) SHILPI (t) AF (c) 5 - 7 days  -RE     LB Dressing Goal OT STG, [place] Level contact guard assist   tying shoes   -AF (r) SHILPI (t) AF (c) supervision required;verbal cues required  -RE     LB Dressing Goal OT STG, Adaptive Equipment --   elastic laces  -AF (r) SHILPI (t) AF (c)      LB Dressing Goal OT STG, Date Goal Reviewed 08/02/17  -AF (r) SHILPI (t) AF (c)      LB Dressing Goal OT STG, Outcome  goal ongoing  -RE     LB Dressing OT LTG    LB Dressing Goal OT LTG, Date Established 08/02/17  -AF (r) SHILPI (t) AF (c) 07/29/17  -RE     LB Dressing Goal OT LTG, Time to Achieve by discharge  -AF (r) SHILPI (t) AF (c) 2 wks  -RE     LB Dressing Goal OT LTG, [place] Level supervision required  -AF (r) SHILPI (t) AF (c) conditional independence  -RE     LB Dressing Goal OT LTG, Adaptive Equipment --   w/ shoe strings  -AF (r) SHILPI (t) AF (c)      LB  Dressing Goal OT LTG, Date Goal Reviewed 08/02/17  -AF (r) SHILPI (t) AF (c)      LB Dressing Goal OT LTG, Outcome  goal ongoing  -RE     UB Dressing OT STG    UB Dressing Goal OT STG, Date Established 08/02/17  -AF (r) SHILPI (t) AF (c) 07/29/17  -RE     UB Dressing Goal OT STG, Time to Achieve 1 wk  -AF (r) SHILPI (t) AF (c) 3 days  -RE     UB Dressing Goal OT STG, [place] Level  set up required  -RE     UB Dressing Goal OT STG, Date Goal Reviewed 08/02/17  -AF (r) SHILPI (t) AF (c)      UB Dressing Goal OT STG, Outcome  goal ongoing  -RE     UB Dressing OT LTG    UB Dressing Goal OT LTG, Date Established 08/02/17  -AF (r) SHILPI (t) AF (c) 07/29/17  -RE     UB Dressing Goal OT LTG, Time to Achieve by discharge  -AF (r) SHILPI (t) AF (c) 2 wks  -RE     UB Dressing Goal OT LTG, [place] Level  conditional independence  -RE     UB Dressing Goal OT LTG, Date Goal Reviewed 08/02/17  -AF (r) SHILPI (t) AF (c)      UB Dressing Goal OT LTG, Outcome  goal ongoing  -RE     Isolated Movement OT LTG    Isolated Movement OT LTG, Date Established   07/25/17  -SO    Isolated Movement OT LTG, Time to Achieve   1 wk  -SO    Isolated Movement OT LTG, Body Area   left scapula;left shoulder;left elbow;left forearm;left wrist;left fingers  -SO    Isolated Movement OT LTG, Level   WFL  -SO    ADL OT LTG    ADL OT LTG, Date Established   07/25/17  -SO    ADL OT LTG, Time to Achieve   1 wk  -SO    ADL OT LTG, Activity Type   ADL skills  -SO    ADL OT LTG, [place] Level   contact guard;standby assist;assistive device  -SO      User Key  (r) = Recorded By, (t) = Taken By, (c) = Cosigned By    Initials Name Provider Type    RE Maya Apodaca, OTR Occupational Therapist    SO Bindu Matamoros, OTR Occupational Therapist    AF Adelia Salamanca, OTR Occupational Therapist    SHILPI Allan, OT Student OT Student                OT Recommendation and Plan  Anticipated Equipment Needs At Discharge: tub bench  Planned Therapy Interventions: adaptive  equipment training, ADL retraining, activity intolerance, energy conservation, fine motor coordination training, neuromuscular re-education  Therapy Frequency: 5 times/wk          Time Calculation:         Time Calculation- OT       08/03/17 1522 08/03/17 1520 08/03/17 0854    Time Calculation- OT    OT Start Time (P)  1100  -SHILPI (P)  0830  -SHILPI     OT Stop Time (P)  1130  -SHILPI (P)  0900  -SHILPI     OT Time Calculation (min) (P)  30 min  -SHILPI (P)  30 min  -SHILPI     OT Non-Billable Time (min)   15 min   team rounds  -AF      User Key  (r) = Recorded By, (t) = Taken By, (c) = Cosigned By    Initials Name Provider Type    AF Adelia Salamanca, OTR Occupational Therapist    SHILPI Allan, OT Student OT Student           Therapy Charges for Today     Code Description Service Date Service Provider Modifiers Qty    59874998207 HC OT SELF CARE/MGMT/TRAIN EA 15 MIN 8/2/2017 Karen Allan OT Student GO 2    74419337148 HC OT NEUROMUSC RE EDUCATION EA 15 MIN 8/2/2017 Karen Allan OT Student GO 2    26353491480 HC OT SELF CARE/MGMT/TRAIN EA 15 MIN 8/3/2017 Karen Allan OT Student GO 2    63555907540 HC OT SELF CARE/MGMT/TRAIN EA 15 MIN 8/3/2017 Karen Allan OT Student GO 3    40164251451 HC OT NEUROMUSC RE EDUCATION EA 15 MIN 8/3/2017 Karen Allan OT Student GO 1               Karen Allan OT Student  8/3/2017

## 2017-08-03 NOTE — PROGRESS NOTES
Treatment goals and discharge date discussed with pt. She is in good spirits and is happy with her progress.  Will contact pt's son and schedule family conference.

## 2017-08-03 NOTE — PROGRESS NOTES
Case Management  Inpatient Rehabilitation Team Conference    Conference Date/Time: 8/3/2017 7:32:34 AM    Team Conference Attendees:  Dr. Bayron Burgos, Elizabeth.tra Reynolds, NEFTALIW  Angelica Treadwell, YARA Salamanca, OT  Alex Amezquita, CTRS  SHELBY Camacho, SLP  Karen Allan, OT Student    Demographics            Age: 88Y            Gender: Female    Admission Date: 7/28/2017 4:32:00 PM  Rehabilitation Diagnosis:  CVA left eliu  Past Medical History: HLD, HTN; asthma; arthritis, osteoporosis, ORIF elbow,  TKR; stress incont, urethral vag prolapse, oophrectomy; constipation;  dermatitis; DM      Plan of Care  Anticipated Discharge Date/Estimated Length of Stay: ELOS: 2 - 3 weeks  Anticipated Discharge Destination: Community discharge with assistance  Discharge Plan : To son's home with 24/7 assist  Medical Necessity Expected Level Rationale: CGA with HH  Intensity and Duration: an average of 3 hours/5 days per week  Medical Supervision and 24 Hour Rehab Nursing: x  Physical Therapy: x  PT Intensity/Duration: 60 minutes/day, 5 days/week for 17 days  Occupational Therapy: x  OT Intensity/Duration: 60 minutes/day, 5 days/week for 17 days  Speech and Language Therapy: x  SLP Intensity/Duration: 60 minutes/day, 5 days/week for 17 days  Social Work: x  Therapeutic Recreation: x  Psychology: x  Updated (if changes indicated)    Anticipated Discharge Date/Estimated Length of Stay:   ELOS: DC 8/11    Based on the patient's medical and functional status, their prognosis and  expected level of functional improvement is: CGA with HH      Interdisciplinary Problem/Goals/Status    All Rehab Problems:  Cognition    [ST] Memory(Active)  Current Status(08/02/2017): mild-mod impaired memory  Weekly Goal(08/09/2017): use daily schedule I'ly  Discharge Goal: functional memory for home w/intermittent supervision    [ST] Problem Solving(Active)  Current Status(08/02/2017): mild-mod impaired  cognition  Weekly Goal(08/09/2017): complete ADL's with min cues  Discharge Goal: functional problem solving for home with intermittent  supervision        Mobility    [PT] Bed/Chair/Wheelchair(Active)  Current Status(08/02/2017): Min/CGA, rwx  Weekly Goal(08/11/2017): CGA  Discharge Goal: SBA    [PT] Bed Mobility(Active)  Current Status(08/02/2017): supervision  Weekly Goal(08/11/2017): Indep  Discharge Goal: Indep    [PT] Walk(Active)  Current Status(08/02/2017): 200 Rwx CGA  Weekly Goal(08/11/2017): BR Rwx SBA  Discharge Goal: 200 ft AAD SBA    [PT] Stairs(Active)  Current Status(08/02/2017): 4 HR Min/CGA  Weekly Goal(08/11/2017): PT only  Discharge Goal: 4 HR CGA    [PT] Car Transfers(Active)  Current Status(08/02/2017): CGA, rwx  Weekly Goal(08/11/2017): PT only  Discharge Goal: Car tsf CGA, AAD    [OT] Toilet Transfers(Active)  Current Status(08/02/2017): CGA  Weekly Goal(08/09/2017): SBA  Discharge Goal: MOD I    [OT] Tub/Shower Transfers(Active)  Current Status(08/02/2017): CGA  Weekly Goal(08/09/2017): SBA  Discharge Goal: SUP        Psychosocial    [RN] Coping/Adjustment(Active)  Current Status(07/28/2017): Patient is at risk for pyschosocial issues related  to recent stroke.  Weekly Goal(08/04/2017): Patient will demonstrate approrpiate coping techniques.    Discharge Goal: Patient will be free from any psychosocial issues.        Safety    [RN] Potential for Injury(Active)  Current Status(07/28/2017): Patient is at risk for falls related to recent  stroke.  Weekly Goal(08/04/2017): Patient will be free from falls and demonstrate  appropriate safety techniques.  Discharge Goal: Same as weekly.        Self Care    [OT] Bathing(Active)  Current Status(08/02/2017): MIN  Weekly Goal(08/09/2017): CGA  Discharge Goal: SUP    [OT] Dressing (Lower)(Active)  Current Status(08/02/2017): CGA/MIN  Weekly Goal(08/09/2017): CGA (w/ elastic laces)  Discharge Goal: SUP (w/ shoe strings)    [OT] Dressing  (Upper)(Active)  Current Status(08/02/2017): MIN  Weekly Goal(08/09/2017): CGA  Discharge Goal: MOD I    [OT] Grooming(Active)  Current Status(08/02/2017): SBA  Weekly Goal(08/09/2017): MOD I  Discharge Goal: MOD I    [OT] Toileting(Active)  Current Status(08/02/2017): CGA/MIN  Weekly Goal(08/09/2017): CGA  Discharge Goal: SUP        Sphincter Control    [RN] Bowel and Bladder control(Active)  Current Status(07/28/2017): Patient is continent of bowel and bladder 100% of  the time.  Weekly Goal(08/04/2017): Patient will continue to be continent of bowel and  bladder and be free from any elimintaion issues.  Discharge Goal: Same as weekly.        Comments: 8/3: impaired sensation left hand - needs assist with shoes; good  carryover; impulsive;    Signed by: Davian Yang RN    Physician CoSigned By: Bayron Nathan 08/03/2017 08:11:04

## 2017-08-03 NOTE — PROGRESS NOTES
Inpatient Rehabilitation Functional Measures Assessment    Functional Measures  BETH Eating:  [facility] Grooming: [facility] Bathing:  [facility] Upper Body Dressing:  [facility] Lower Body Dressing:  [facility] Toileting:  [facility] Bladder Management  Level of Assistance:  [place]  Frequency/Number of Accidents this Shift:  [facility] Bowel Management  Level of Assistance: [place]  Frequency/Number of Accidents this Shift: [facility] Bed/Chair/Wheelchair Transfer:  [facility] Toilet Transfer:  [facility] Tub/Shower Transfer:  [place]    Previously Documented Mode of Locomotion at Discharge: Field  BEHT Expected Mode of Locomotion at Discharge: [facility] Walk/Wheelchair:  [facility] Stairs:  [facility] Comprehension:  Auditory comprehension is the usual mode. Comprehension  Score = 6, Modified [place].  Patient comprehends complex/abstract  information in their primary language, requiring: Additional time.  BETH Expression:  Vocal expression is the usual mode. Expression Score = 6,  Modified Independent.  Patient expresses complex/abstract information in their  primary language, requiring:  BETH Social Interaction:  Social Interaction Score = 7, Independent. Patient is  completely independent for social interaction.  There are no activity  limitations.  BETH Problem Solving:  Problem Solving Score = 6, Modified [place].  Patient  makes appropriate decisions in order to solve complex problems, but requires  extra time.  BETH Memory:  Memory Score = 6, Modified [place].  Patient is modified  independent for memory, requiring: Requires additional time.    Therapy Mode Minutes  Occupational Therapy: [place]  Physical Therapy: [place]  Speech Language Pathology:  [place]    Signed by: Carmela Cuellar RN

## 2017-08-04 LAB
GLUCOSE BLDC GLUCOMTR-MCNC: 104 MG/DL (ref 70–130)
GLUCOSE BLDC GLUCOMTR-MCNC: 137 MG/DL (ref 70–130)
INR PPP: 2.37 (ref 0.9–1.1)
PROTHROMBIN TIME: 25.1 SECONDS (ref 11.7–14.2)

## 2017-08-04 PROCEDURE — 97112 NEUROMUSCULAR REEDUCATION: CPT

## 2017-08-04 PROCEDURE — 97535 SELF CARE MNGMENT TRAINING: CPT

## 2017-08-04 PROCEDURE — 85610 PROTHROMBIN TIME: CPT | Performed by: HOSPITALIST

## 2017-08-04 PROCEDURE — 94799 UNLISTED PULMONARY SVC/PX: CPT

## 2017-08-04 PROCEDURE — 82962 GLUCOSE BLOOD TEST: CPT

## 2017-08-04 PROCEDURE — 97110 THERAPEUTIC EXERCISES: CPT

## 2017-08-04 PROCEDURE — 97532: CPT

## 2017-08-04 RX ORDER — WARFARIN SODIUM 3 MG/1
3 TABLET ORAL
Status: DISCONTINUED | OUTPATIENT
Start: 2017-08-04 | End: 2017-08-05

## 2017-08-04 RX ADMIN — DOCUSATE SODIUM -SENNOSIDES 2 TABLET: 50; 8.6 TABLET, COATED ORAL at 20:27

## 2017-08-04 RX ADMIN — ATORVASTATIN CALCIUM 80 MG: 80 TABLET, FILM COATED ORAL at 20:27

## 2017-08-04 RX ADMIN — METOPROLOL TARTRATE 25 MG: 25 TABLET ORAL at 20:27

## 2017-08-04 RX ADMIN — PREDNISOLONE ACETATE 1 DROP: 10 SUSPENSION/ DROPS OPHTHALMIC at 08:17

## 2017-08-04 RX ADMIN — BUDESONIDE AND FORMOTEROL FUMARATE DIHYDRATE 2 PUFF: 80; 4.5 AEROSOL RESPIRATORY (INHALATION) at 07:09

## 2017-08-04 RX ADMIN — PREDNISOLONE ACETATE 1 DROP: 10 SUSPENSION/ DROPS OPHTHALMIC at 20:27

## 2017-08-04 RX ADMIN — METOPROLOL TARTRATE 25 MG: 25 TABLET ORAL at 08:17

## 2017-08-04 RX ADMIN — BUDESONIDE AND FORMOTEROL FUMARATE DIHYDRATE 2 PUFF: 80; 4.5 AEROSOL RESPIRATORY (INHALATION) at 20:51

## 2017-08-04 RX ADMIN — WARFARIN SODIUM 3 MG: 3 TABLET ORAL at 17:26

## 2017-08-04 NOTE — PROGRESS NOTES
Occupational Therapy: Individual: 60 minutes.    Physical Therapy: [place]    Speech Language Pathology:  [place]    Signed by: Adelia Salamanca OT

## 2017-08-04 NOTE — THERAPY TREATMENT NOTE
Inpatient Rehabilitation - Occupational Therapy Treatment Note  [facility]     Patient Name: Magnus Hartley  : 1928  MRN: [id number]  Today's Date: 2017  Onset of Illness/Injury or Date of Surgery Date: 17     Referring Physician: Jac      Admit Date: 2017    Visit Dx:     ICD-10-CM ICD-9-CM   1. Left hemiparesis G81.94 342.90   2. Dysarthria R47.1 784.51     Patient Active Problem List   Diagnosis   • Foot pain   • Asthma   • Benign essential hypertension   • Controlled type 2 diabetes mellitus without complication   • Dyslipidemia   • Macrocytosis without anemia   • Senile osteoporosis   • Post-menopausal osteoporosis   • Sinus bradycardia   • Stress incontinence in female   • Urge incontinence of urine   • Atrophic vaginitis   • Encounter for fitting and adjustment of other specified devices   • Incomplete emptying of bladder   • Urethral caruncle   • Incomplete uterovaginal prolapse   • Cerebrovascular accident (CVA) due to occlusion of right middle cerebral artery   • Atrial fibrillation   • Warfarin anticoagulation (goal INR 2-3)   • CVA (cerebrovascular accident due to intracerebral hemorrhage)             Adult Rehabilitation Note       17 1419 17 0950 17 1456    Rehab Assessment/Intervention    Discipline (P)  occupational therapist  -SHILPI physical therapy assistant  -LB occupational therapist  -SHILPI NULL AF2    Document Type (P)  therapy note (daily note)  -SHILPI therapy note (daily note)  -LB therapy note (daily note)  -SHILPI NULL AF2    Subjective Information (P)  agree to therapy;complains of;pain   am, pm  -SHILPI agree to therapy;complains of;pain  -LB no complaints;agree to therapy  -SHILPI NULL AF2    Patient Effort, Rehab Treatment (P)  good  -SHILPI good  -LB excellent  -SHILPI NULL AF2    Symptoms Noted During/After Treatment   shortness of breath   w/ tsfs, muscle strengthening tasks  -SHILPI NULL AF2    Symptoms Noted Comment   rest breaks, monitered SpO2, vcs for breathing   -AF,SHILPI,AF2    Precautions/Limitations (P)  fall precautions  -SHILPI fall precautions  -LB fall precautions  -AF,SHILPI,AF2    Recorded by [SHILPI] Karen Allan OT Student [LB] Angelica Treadwell PTA [AF,SHILPI,AF2] NO Cooney (r) Karen Allan OT Student (t) NO Cooney (c)    Vital Signs    Intra SpO2 (%)   97   see symptoms noted comment   -AF,SHILPI,AF2    Recorded by   [AF,SHILPI,AF2] NO Cooney (r) Karen Allan OT Student (t) NO Cooney (c)    Pain Assessment    Pain Assessment (P)  0-10  -SHILPI 0-10  -LB No/denies pain  -AF,SHILPI,AF2    Pain Score (P)  9  -SHILPI 9  -LB     Post Pain Score (P)  --   am,pm  -SHILPI      Pain Type (P)  Acute pain  -SHILPI      Pain Location (P)  Toe (Comment which one)  -SHILPI Toe (Comment which one)   Left 4 th and 5th toes  -LB     Pain Orientation (P)  --   L 4,5  -SHILPI      Pain Intervention(s) (P)  Repositioned;Elevated   denied for me to request pain meds   -SHILPI Repositioned;Elevated   declined for me to request pain meds  -LB     Recorded by [SHILPI] Karen Allan OT Student [LB] Angelica Treadwell, PTA [AF,SHILPI,AF2] NO Cooney (r) Karen Allan OT Student (t) NO Cooney (c)    Cognitive Assessment/Intervention    Current Cognitive/Communication Assessment (P)  impaired  -SHILPI  impaired  -AF,SHILPI,AF2    Orientation Status (P)  oriented x 4  -SHILPI  oriented x 4  -AF,SHILPI,AF2    Follows Commands/Answers Questions (P)  100% of the time;able to follow single-step instructions;needs repetition;needs increased time  -SHILPI  100% of the time;able to follow single-step instructions;needs cueing;needs increased time;needs repetition  -AF,SHILPI,AF2    Personal Safety (P)  mild impairment  -SHILPI  mild impairment;decreased awareness, need for assist;decreased awareness, need for safety;decreased insight to deficits   some impulsivity when iniating tsfs   -AF,SHILPI,AF2    Personal Safety Interventions (P)  fall prevention program maintained;gait belt;nonskid shoes/slippers when  out of bed;supervised activity  -SHILPI fall prevention program maintained;gait belt;muscle strengthening facilitated;nonskid shoes/slippers when out of bed  -LB fall prevention program maintained;gait belt;nonskid shoes/slippers when out of bed;supervised activity  -AF,SHILPI,AF2    Recorded by [SHILPI] Karen Allan OT Student [LB] Angelica Treadwell PTA [AF,SHILPI,AF2] NO Cooney (r) Karen Allan OT Student (t) NO Cooney (c)    Bed Mobility, Assessment/Treatment    Bed Mob, Supine to Sit, [place] (P)  supervision required  -SHILPI supervision required  -LB     Bed Mob, Sit to Supine, [place]  supervision required  -LB     Recorded by [SHILPI] Karen Allan OT Student [LB] Angelica Treadwell PTA     Transfer Assessment/Treatment    Transfers, Bed-Chair [place] (P)  contact guard assist;stand by assist  -SHILPI contact guard assist;stand by assist  -LB contact guard assist  -AF,SHILPI,AF2    Transfers, Chair-Bed [place]  contact guard assist;stand by assist  -LB     Transfers, Bed-Chair-Bed, Assist Device  rolling walker  -LB     Transfers, Sit-Stand [place] (P)  contact guard assist;stand by assist  -SHILPI contact guard assist;stand by assist  -LB contact guard assist;verbal cues required  -AF,SHILPI,AF2    Transfers, Stand-Sit [place] (P)  contact guard assist;stand by assist  -SHILPI contact guard assist;stand by assist  -LB contact guard assist  -AF,SHILPI,AF2    Transfers, Sit-Stand-Sit, Assist Device  rolling walker  -LB     Transfer, Comment (P)  tsf to and from EOM from w/c w/ CGA, vcs for hand placement   -SHILPI      Recorded by [SHILPI] Karen Allan OT Student [LB] Angelica Treadwell PTA [AF,SHILPI,AF2] NO Cooney (r) Kraen Allan OT Student (t) NO Cooney (c)    Gait Assessment/Treatment    Gait, [place] Level  contact guard assist;stand by assist  -LB     Gait, Assistive Device  rolling walker  -LB     Gait, Distance (Feet)  160  -LB     Gait, Gait Deviations  forward  flexed posture;rojelio decreased  -LB     Recorded by  [LB] Angelica Treadwell PTA     Stairs Assessment/Treatment    Number of Stairs  4  -LB     Stairs, Handrail Location  both sides  -LB     Stairs, [place] Level  verbal cues required;contact guard assist  -LB     Stairs, Technique Used  step to step (ascending);step to step (descending)  -LB     Stairs, Comment   curb, ramp, rwx CGA,vc's  -LB     Recorded by  [LB] Angelica Treadwell PTA     Upper Body Bathing Assessment/Training    UB Bathing Assess/Train, Position (P)  sink side;sitting  -SHILPI  sitting;sink side  -AF,SHILPI,AF2    UB Bathing Assess/Train, [place] Level (P)  set up required;stand by assist  -SHILPI  verbal cues required;stand by assist   sequencing vcs  -AF,SHILPI,AF2    UB Bathing Assess/Train, Comment (P)  completed at w/c level   -SHILPI      Recorded by [SHILPI] Karen Allan, OT Student  [AF,SHILPI,AF2] Adelia Salamanca, CHERELLER (r) Karen Allan, OT Student (t) NO Cooney (c)    Lower Body Bathing Assessment/Training    LB Bathing Assess/Train, Position (P)  standing;sitting;sink side  -SHILPI  sitting;sink side;standing  -AF,SHILPI,AF2    LB Bathing Assess/Train, [place] Level (P)  contact guard assist;set up required   pt had trouble grasping and managing obects w/ L hand  -SHILPI  contact guard assist;verbal cues required  -AF,SHILPI,AF2    LB Bathing Assess/Train, Comment   vcs for sequening, cga for balance when standing to wash buttocks/elsi area  -AF,SHILPI,AF2    Recorded by [SHILPI] Karen Allan, OT Student  [AF,SHILPI,AF2] NO Cooney (r) Karen Allan, OT Student (t) NO Cooney (c)    Upper Body Dressing Assessment/Training    UB Dressing Assess/Train, Clothing Type (P)  doffing:;donning:;hospital gown;bra;t-shirt  -SHILPI  doffing:;donning:;hospital gown;bra;t-shirt   dof gown; don bra, shirt   -AF,SHILPI,AF2    UB Dressing Assess/Train, Position (P)  sitting  -SHILPI  sitting;sink side  -AF,SHILPI,AF2    UB Dressing Assess/Train, [place] (P)   set up required   to gather clothing items   -SHILPI  set up required   to gather clothing items   -AF,SHILPI,AF2    Recorded by [SHILIP] Karen Allan, OT Student  [AF,SHILPI,AF2] Adelia Salamanca OTBETO (r) Karen Allan, OT Student (t) NO Cooney (c)    Lower Body Dressing Assessment/Training    LB Dressing Assess/Train, Clothing Type (P)  doffing:;donning:;pants;shoes;socks   underwear   -SHILPI  doffing:;donning:;socks;shoes;pants   dof underwear; don underwear, pants, socks, shoes   -AF,SHILPI,AF2    LB Dressing Assess/Train, Position (P)  standing;sitting;sink side  -SHILPI  standing;sitting;sink side  -AF,SHILPI,AF2    LB Dressing Assess/Train, [place] (P)  set up required;minimum assist (75% patient effort)  -SHILPI  minimum assist (75% patient effort)  -AF,SHILPI,AF2    LB Dressing Assess/Train, Comment (P)  pt required assist to gather clothing and tie shoes; pt required assist donning L sock due to L 4,5 toe pain   -SHILPI  pt required assist to gather clothes, adjust pants over hips, tie shoes   -AF,SHILPI,AF2    Recorded by [SHILPI] Karen Allan, OT Student  [AF,SHILPI,AF2] Adelia Salamanca, NO (r) Karen Allan OT Student (t) NO Cooney (c)    Toileting Assessment/Training    Toileting Assess/Train, Assistive Device (P)  grab bars;raised toilet seat  -SHILPI  grab bars;raised toilet seat  -AF,SHILPI,AF2    Toileting Assess/Train, Position (P)  sitting;standing  -SHILPI  sitting;standing  -AF,SHILPI,AF2    Toileting Assess/Train, [place]ep[place] Level (P)  contact guard assist;verbal cues required  -SHILPI  verbal cues required;contact guard assist  -AF,SHILPI,AF2    Toileting Assess/Train, Comment (P)  vcs for hand placement; able to perform hygiene while seated; required cga while standing for clothing management   -SHILPI  vcs for clothing management before standing to pull up, CGA to maintain standing balance in am  -AF,SHILPI,AF2    Recorded by [SHILPI] Karen Allan OT Student  [AF,SHILPI,AF2] Adelia Salamanca, OTR (r) Karen Allan OT Student (t) Adelia Phelps  NO Salamanca (c)    Grooming Assessment/Training    Grooming Assess/Train, Position (P)  sitting;sink side  -SHILPI  sitting;sink side  -AF,SHILPI,AF2    Grooming Assess/Train, Indepen Level (P)  set up required  -SHILPI  supervision required  -AF,SHILPI,AF2    Grooming Assess/Train, Comment (P)  brushing hair   -SHILPI  combing hair   -AF,SHILPI,AF2    Recorded by [SHILPI] Karen Allan, OT Student  [AF,SHILPI,AF2] NO Cooney (r) Karen Allan, OT Student (t) NO Cooney (c)    Balance Skills Training    Gait Balance-Level of Assistance  Contact guard  -LB     Gait Balance Support  Right upper extremity supported;Left upper extremity supported;eliu bar  -LB     Gait Balance Activities  side-stepping  -LB     Recorded by  [LB] Angelica Treadwell PTA     Therapy Exercises    Left Lower Extremity  AROM:;15 reps;supine;standing  -LB     Recorded by  [LB] Angelica Treadwell PTA     Fine Motor Coordination Training    Detail (Fine Motor Coordination Training)   pt completed simulated fastener task (tie laces, zip/unzip, button/unbutton) to increase sensory input/fine motor coordination for functional ADLs while seated in w/c at table; pt required increased time   -AF,SHILPI,AF2    Recorded by   [AF,SHILPI,AF2] NO Cooney (r) Karen Allan, OT Student (t) NO Cooney (c)    Sensory Treatment    Sensory Reeducation Techniques (P)  sensory training, visual reinforcement;sensory train, w/o visual reinforcement;comparison, tactile sensory information  -SHILPI  other (see comments)  -AF,SHILPI,AF2    Sensory Reeduction Treatment (P)  therapist facilitated hand over hand symbol drawing w/ eyes closed then guessed what symbol was drawn, if wrong therapist/pt slick the same symbol w/ eyes open; pt correctly identified 1/5 capital letters, 1/3 lower case letters, 1/4 numbers, and 2/4 shapes w/ eyes closed'; pt was presented w/ rough and soft fabric pieces w/ eyes open and ID which was rough and which was soft, pt was able to ID all  correctly  -SHILPI  pt pinched clothes pins and placed on/removed from horizontal mary carmen w/ LUE to improve sensory input to modulate grasp, completed standing at countertop; pt required hand over hand to complete the first clothes pin but was able to complete the rest w/o difficulty   -AF,SHILPI,AF2    Recorded by [SHILPI] Karen Allan OT Student  [AF,SHILPI,AF2] NO Cooney (r) Karen Allan OT Student (t) NO Cooney (c)    Positioning and Restraints    Pre-Treatment Position (P)  sitting in chair/recliner   bed am, reclincer pm   -SHILPI  in bed  -SHILPI NULL,AF2    Post Treatment Position (P)  chair  -SHILPI chair  -LB wheelchair  -CHAKA,SHILPI,AF2    In Chair (P)  sitting;call light within reach;encouraged to call for assist   am,pm   -SHILPI reclined;call light within reach;RLE elevated;LLE elevated  -LB encouraged to call for assist;call light within reach;sitting;with family/caregiver  -CHAKA,SHILPI,AF2    Recorded by [SHILPI] Karen Allan OT Student [LB] Angelica Treadwell PTA [CHAKA,SHILPI,AF2] NO Cooney (r) Karen Allan OT Student (t) NO Cooney (c)      08/03/17 1300 08/03/17 0944 08/02/17 1506    Rehab Assessment/Intervention    Discipline speech language pathologist  -LT physical therapy assistant  -LB occupational therapist  -SHILPI NULL,AF2    Document Type therapy note (daily note)  -LT therapy note (daily note)  -LB therapy note (daily note)  -CRYSTAL NULLD,AF2    Subjective Information agree to therapy  -LT agree to therapy  -LB agree to therapy;no complaints  -CHAKA,SHILPI,AF2    Patient Effort, Rehab Treatment excellent  -LT good  -LB good  -CHAKA,SHILPI,AF2    Precautions/Limitations  fall precautions  -LB fall precautions  -CHAKASHILPI,AF2    Recorded by [LT] Yvonne Miles MS CCC-SLP [LB] Angelica Treadwell PTA [CHAKA,SHILPI,AF2] NO Cooney (r) Karen Allan, OT Student (t) Adelia Salamanca, OTR (c)    Pain Assessment    Pain Assessment  No/denies pain  -LB No/denies pain  -AF,SHILPI,AF2    Recorded by  [LB] Angelica Treadwell, PTA  [AF,SHILPI,AF2] NO Cooney (r) Karen Allan OT Student (t) NO Cooney (c)    Cognitive Assessment/Intervention    Current Cognitive/Communication Assessment   functional  -AF,SHILPI,AF2    Orientation Status   oriented x 4  -AF,SHILPI,AF2    Follows Commands/Answers Questions   100% of the time;able to follow single-step instructions;able to follow multi-step instructions;needs repetition;needs cueing  -AF,SHILPI,AF2    Personal Safety   mild impairment  -AF,SHILPI,AF2    Personal Safety Interventions  fall prevention program maintained;gait belt;muscle strengthening facilitated;nonskid shoes/slippers when out of bed  -LB fall prevention program maintained;gait belt;nonskid shoes/slippers when out of bed;supervised activity  -AF,SHILPI,AF2    Recorded by  [LB] Angelica Treadwell PTA [AF,SHILPI,AF2] NO Cooney (r) Karen Allan OT Student (t) NO Cooney (c)    Improve memory skills    Memory Skills Progress 90%;with inconsistent cues  -LT      Recorded by [LT] Yvonne Miles MS CCC-SLP      Improve functional problem solving    Functional Problem Solving Progress 80%;with inconsistent cues  -LT      Recorded by [LT] Yvonne Miles MS CCC-SLP      Improve executive function skills    Executive Function Skills Progress 80%;without cues  -LT      Recorded by [LT] Yvonne Miles MS CCC-SLP      Bed Mobility, Assessment/Treatment    Bed Mobility, Assistive Device  --   assessed on txment mat  -LB     Bed Mobility, Roll Left, [place]  supervision required  -LB     Bed Mobility, Roll Right, [place]  supervision required  -LB     Bed Mobility, Scoot/Bridge, [place]  supervision required  -LB     Bed Mob, Supine to Sit, [place]  supervision required  -LB supervision required  -AF,SHILPI,AF2    Bed Mob, Sit to Supine, [place]  supervision required  -LB     Recorded by  [LB] Angelica Treadwell PTA [AF,SHILPI,AF2] NO Cooney (r) Karen Allan OT Student (t) NO Cooney  (c)    Transfer Assessment/Treatment    Transfers, Bed-Chair [place]  contact guard assist;stand by assist  -LB contact guard assist  -AF,SHILPI,AF2    Transfers, Chair-Bed [place]  contact guard assist;stand by assist  -LB     Transfers, Bed-Chair-Bed, Assist Device  rolling walker  -LB     Transfers, Sit-Stand [place]  contact guard assist;stand by assist  -LB contact guard assist  -AF,SHILPI,AF2    Transfers, Stand-Sit [place]  contact guard assist  -LB contact guard assist  -AF,SHILPI,AF2    Transfers, Sit-Stand-Sit, Assist Device  rolling walker  -LB     Toilet Transfer, [place]   contact guard assist  -AF,SHILPI,AF2    Toilet Transfer, Assistive Device   elevated toilet seat;rolling walker   grab bars  -AF,SHILPI,AF2    Walk-In Shower Transfer, [place]   contact guard assist;verbal cues required   vcs for rwx placement, sequencing, hand placement   -AF,SHILPI,AF2    Walk-In Shower Transfer, Assist Device   rolling walker;standard shower chair   grab bars  -AF,SHILPI,AF2    Recorded by  [LB] Angelica Treadwell PTA [AF,SHILPI,AF2] Adelia Salamanca, OTR (r) Karen Allan OT Student (t) Adelia Salamanca OTR (c)    Gait Assessment/Treatment    Gait, [place] Level  contact guard assist;stand by assist  -LB     Gait, Assistive Device  rolling walker  -LB     Gait, Distance (Feet)  250  -LB     Gait, Gait Deviations  forward flexed posture;rojelio decreased  -LB     Recorded by  [LB] Angelica Treadwell PTA     Stairs Assessment/Treatment    Number of Stairs  8  -LB     Stairs, Handrail Location  both sides  -LB     Stairs, [place] Level  minimum assist (75% patient effort);contact guard assist;verbal cues required  -LB     Stairs, Technique Used  step to step (ascending);step to step (descending)  -LB     Stairs, Comment  curb, ramp rwx cga, vc's  -LB     Recorded by  [LB] Angelica Treadwell PTA     Upper Body Bathing Assessment/Training    UB Bathing Assess/Train Assistive Device   grab bars;hand-held  shower head;shower chair with back  -AF,SHILPI,AF2    UB Bathing Assess/Train, Position   sitting  -AF,SHILPI,AF2    UB Bathing Assess/Train, [place] Level   set up required;contact guard assist  -AF,SHILPI,AF2    Recorded by   [AF,SHILPI,AF2] NO Cooney (r) Karen Allan, OT Student (t) NO Cooney (c)    Lower Body Bathing Assessment/Training    LB Bathing Assess/Train Assistive Device   grab bars;hand-held shower head;shower chair with back  -AF,SHILPI,AF2    LB Bathing Assess/Train, Position   sitting;standing  -AF,SHILPI,AF2    LB Bathing Assess/Train, [place] Level   contact guard assist;verbal cues required  -AF,SHILPI,AF2    LB Bathing Assess/Train, Comment   vcs for hand placement, CGA for balance when standing to wash buttocks/elsi  -AF,SHILPI,AF2    Recorded by   [AF,SHILPI,AF2] NO Cooney (r) Karen Allan, OT Student (t) NO Cooney (c)    Upper Body Dressing Assessment/Training    UB Dressing Assess/Train, Clothing Type   doffing:;donning:;t-shirt;hospital gown   dof gown, don shirt   -AF,SHILPI,AF2    UB Dressing Assess/Train, Position   sitting  -AF,SHILPI,AF2    UB Dressing Assess/Train, [place]   set up required;contact guard assist  -AF,SHILPI,AF2    UB Dressing Assess/Train, Comment   pt set up assist to orient shirt correctly to thread arm   -AF,SHILPI,AF2    Recorded by   [AF,SHILPI,AF2] NO Cooney (r) Karen Allan, OT Student (t) NO Cooney (c)    Lower Body Dressing Assessment/Training    LB Dressing Assess/Train, Clothing Type   doffing:;donning:;socks;shoes;pants   dof underwear; don underwear, capris, shoes, socks   -AF,SHILPI,AF2    LB Dressing Assess/Train, Position   sitting;standing  -AF,SHILPI,AF2    LB Dressing Assess/Train, [place]   minimum assist (75% patient effort)  -SHILPI NULL AF2    LB Dressing Assess/Train, Comment   CGA to stand for clothing management; required assist w/ tying shoes due to decreased sensation in LUE  -SHILPI NULL AF2    Recorded by    [AF,SHILPI,AF2] Adelia Salamanca OTR (r) Karen Allan, OT Student (t) NO Cooney (c)    Toileting Assessment/Training    Toileting Assess/Train, Assistive Device   grab bars;raised toilet seat  -AF,SHILPI,AF2    Toileting Assess/Train, Position   sitting;standing  -AF,SHILPI,AF2    Toileting Assess/Train, Indepen Level   contact guard assist  -AF,SHILPI,AF2    Toileting Assess/Train, Comment   CGA for balance during clothing management   -AF,SHILPI,AF2    Recorded by   [AF,SHILPI,AF2] NO Cooney (r) Karen Allan, OT Student (t) NO Cooney (c)    Grooming Assessment/Training    Grooming Assess/Train, Position   sitting   in recliner using tray table mirror  -AF,SHILPI,AF2    Grooming Assess/Train, [place] Level   set up required  -AF,SHILPI,AF2    Grooming Assess/Train, Comment   combing hair; dentures hygiene performed before session   -AF,SHILPI,AF2    Recorded by   [AF,SHILPI,AF2] NO Cooney (r) Karen Allan, OT Student (t) NO Cooney (c)    Balance Skills Training    Standing-Level of Assistance  Contact guard;Minimum assistance  -LB     Static Standing Balance Support  No upper extremity supported  -LB     Standing-Balance Activities  Compliant surfaces  -LB     Gait Balance-Level of Assistance  Contact guard;Minimum assistance  -LB     Gait Balance Support  Right upper extremity supported;Left upper extremity supported;parallel bars  -LB     Gait Balance Activities  --   side stepping on foam plank  -LB     Recorded by  [LB] Angelica Treadwell PTA     Therapy Exercises    Bilateral Lower Extremities  AROM:;10 reps;standing;heel raises;mini squats  -LB     Recorded by  [LB] Angelica Treadwell PTA     Sensory Treatment    Sensory Reeducation Techniques   comparison, tactile sensory information;sensory train, w/o visual reinforcement;sensory training, visual reinforcement  -AF,SHILPI,AF2    Sensory Reeduction Treatment   3 objects were placed on table w/ associated sight word cards, pt touched  each object and described tactile details w/ eyes open, pt then ID'ed object placed in L hand w/ eyes closed; pt was able to ID 2/3 w/ eyes closed, pt was able to ID the other w/ eyes open; pt searched for items in rice bin w/ eyes open, correctly decsribed tactile details w/ min vcs while manipulating in hand, then reburried the items  -AF,SHILPI,AF2    Recorded by   [AF,SHILPI,AF2] Adelia Salamanca OTR (r) Karen Allan, OT Student (t) Adelia Salamanca OTR (c)    Positioning and Restraints    Pre-Treatment Position   in bed  -AF,SHILPI,AF2    Post Treatment Position  chair  -LB chair   recliner  -AF,SHILPI,AF2    In Chair  sitting;call light within reach  -LB encouraged to call for assist;sitting;call light within reach  -AF,SHILPI,AF2    Recorded by  [LB] Angelica Treadwell PTA [AF,SHILPI,AF2] Adelia Salamanca OTR (r) Karen Allan, OT Student (t) Adelia Salamanca OTR (c)      08/02/17 1000 08/02/17 0941       Rehab Assessment/Intervention    Discipline speech language pathologist  -LT physical therapy assistant  -LB     Document Type therapy note (daily note)  -LT therapy note (daily note)  -LB     Subjective Information agree to therapy  -LT agree to therapy  -LB     Patient Effort, Rehab Treatment good  -LT good  -LB     Precautions/Limitations  fall precautions  -LB     Recorded by [LT] Yvonne Miles MS CCC-SLP [LB] Angelica Treadwell PTA     Pain Assessment    Pain Assessment  No/denies pain  -LB     Recorded by  [LB] Angelica Treadwell PTA     Cognitive Assessment/Intervention    Personal Safety Interventions  fall prevention program maintained;gait belt;muscle strengthening facilitated;nonskid shoes/slippers when out of bed  -LB     Recorded by  [LB] Angelica Treadwell PTA     Improve memory skills    Memory Skills Progress 80%;with inconsistent cues  -LT      Recorded by [LT] Yvonne Miles MS CCC-SLP      Improve functional problem solving    Functional Problem Solving Progress 70%;with inconsistent cues  -LT       Recorded by [LT] Yvonne Miles MS CCC-SLP      Improve executive function skills    Executive Function Skills Progress 70%;without cues  -LT      Recorded by [LT] Yvonne Miles MS CCC-SLP      Bed Mobility, Assessment/Treatment    Bed Mobility, Assistive Device  --   assessed on txment mat  -LB     Bed Mobility, Roll Left, [place]  supervision required  -LB     Bed Mobility, Roll Right, [place]  supervision required  -LB     Bed Mob, Supine to Sit, [place]  supervision required  -LB     Bed Mob, Sit to Supine, [place]  supervision required  -LB     Recorded by  [LB] Angelica Treadwell PTA     Transfer Assessment/Treatment    Transfers, Bed-Chair [place]  contact guard assist;minimum assist (75% patient effort)  -LB     Transfers, Chair-Bed [place]  contact guard assist;minimum assist (75% patient effort)  -LB     Transfers, Bed-Chair-Bed, Assist Device  rolling walker  -LB     Transfers, Sit-Stand [place]  contact guard assist  -LB     Transfers, Stand-Sit [place]  contact guard assist  -LB     Transfers, Sit-Stand-Sit, Assist Device  rolling walker  -LB     Transfer, Comment  car tsf CGA/min, Rwx  -LB     Recorded by  [LB] Angelica Treadwell PTA     Gait Assessment/Treatment    Gait, [place] Level  contact guard assist;verbal cues required  -LB     Gait, Assistive Device  rolling walker  -LB     Gait, Distance (Feet)  200  -LB     Gait, Gait Deviations  rojelio decreased;forward flexed posture  -LB     Recorded by  [LB] Angelica Treadwell PTA     Stairs Assessment/Treatment    Number of Stairs  4  -LB     Stairs, Handrail Location  both sides  -LB     Stairs, [place] Level  minimum assist (75% patient effort);contact guard assist;verbal cues required  -LB     Stairs, Technique Used  step to step (ascending);step to step (descending)  -LB     Stairs, Comment  curb rwx, cga, vc's  -LB     Recorded by  [LB] Angelica Treadwell PTA     Balance Skills Training     Standing-Level of Assistance  Minimum assistance  -LB     Static Standing Balance Support  Right upper extremity supported;Left upper extremity supported;eliu bar   UE support as needed  -LB     Standing-Balance Activities  Compliant surfaces  -LB     Gait Balance-Level of Assistance  Contact guard  -LB     Gait Balance Support  Right upper extremity supported;Left upper extremity supported;eliu bar  -LB     Gait Balance Activities  side-stepping  -LB     Recorded by  [LB] Angelica Treadwell PTA     Therapy Exercises    Bilateral Lower Extremities  AROM:;10 reps;standing  -LB     Recorded by  [LB] Angelica Treadwell PTA     Positioning and Restraints    Post Treatment Position  wheelchair  -LB     In Wheelchair  sitting;with SLP  -LB     Recorded by  [LB] Angelica Treadwell PTA       User Key  (r) = Recorded By, (t) = Taken By, (c) = Cosigned By    Initials Name Effective Dates    LT Yvonne Miles, MS CCC-SLP 04/13/15 -     LB Angelica Treadwell, PTA 02/18/16 -     AF Adelia Salamanca, OTR 12/01/15 -     SHILPI Karen Allan, OT Student 07/11/17 -                 OT Goals       08/02/17 1558 07/29/17 1054 07/25/17 1443    Transfer Training OT STG    Transfer Training OT STG, Date Established 08/02/17  -AF (r) SHILPI (t) AF (c) 07/29/17  -RE     Transfer Training OT STG, Time to Achieve 1 wk  -AF (r) SHILPI (t) AF (c) 3 days  -RE     Transfer Training OT STG, Activity Type  toilet  -RE     Transfer Training OT STG, [place] Level  supervision required;verbal cues required  -RE     Transfer Training OT STG, Assist Device walker, rolling   grab bars  -AF (r) SHILPI (t) AF (c)      Transfer Training OT STG, Date Goal Reviewed 08/02/17  -AF (r) SHILPI (t) AF (c)      Transfer Training OT STG, Outcome  goal ongoing  -RE     Transfer Training OT LTG    Transfer Training OT LTG, Date Established  07/29/17  -RE 07/25/17  -SO    Transfer Training OT LTG, Time to Achieve  1 wk  -RE 1 wk  -SO    Transfer Training OT LTG, Activity Type  toilet   -RE sit to stand/stand to sit;toilet  -SO    Transfer Training OT LTG, [place] Level  conditional independence  -RE contact guard assist;supervision required  -SO    Transfer Training OT LTG, Assist Device   walker, rolling  -SO    Transfer Training OT LTG, Date Goal Reviewed 08/02/17  -AF (r) SHILPI (t) AF (c)      Transfer Training OT LTG, Outcome  goal ongoing  -RE     Transfer Training 2 OT STG    Transfer Training 2 OT STG, Date Established 08/02/17  -AF (r) SHILPI (t) AF (c)      Transfer Training 2 OT STG, Time to Achieve 1 wk  -AF (r) SHILPI (t) AF (c) 3 days  -RE     Transfer Training 2 OT STG, Activity Type shower chair   rwx  -AF (r) SHILPI (t) AF (c) shower chair  -RE     Transfer Training 2 OT STG, [place] Level supervision required  -AF (r) SHILPI (t) AF (c) contact guard assist  -RE     Transfer Training 2 OT STG, Outcome goal revised  -AF (r) SHILPI (t) AF (c) goal ongoing  -RE     Transfer Training 2 OT LTG    Transfer Training 2 OT LTG, Date Established 08/02/17  -AF (r) SHILPI (t) AF (c)      Transfer Training 2 OT LTG, Time to Achieve by discharge  -AF (r) SHILPI (t) AF (c) 2 wks  -RE     Transfer Training 2 OT LTG, Activity Type  shower chair;tub  -RE     Transfer Training 2 OT LTG, [place] Level  supervision required  -RE     Transfer Training 2 OT LTG, Date Goal Reviewed 08/02/17  -AF (r) SHILPI (t) AF (c)      Transfer Training 2 OT LTG, Outcome  goal ongoing  -RE     Bathing OT STG    Bathing Goal OT STG, Date Established 08/02/17  -AF (r) SHILPI (t) AF (c) 07/29/17  -RE     Bathing Goal OT STG, Time to Achieve 1 wk  -AF (r) SHILPI (t) AF (c) 3 days  -RE     Bathing Goal OT STG, Activity Type  upper body bathing;lower body bathing  -RE     Bathing Goal OT STG, [place] Level contact guard assist  -AF (r) SHILPI (t) AF (c) set up required  -RE     Bathing Goal OT STG, Assist Device grab bars;shower head, detachable;shower chair with back  -AF (r) SHILPI (t) AF (c)      Bathing Goal OT STG, Date Goal Reviewed 08/02/17  -AF  (r) SHILPI (t) AF (c)      Bathing Goal OT STG, Outcome  goal ongoing  -RE     Bathing OT LTG    Bathing Goal OT LTG, Date Established 08/02/17  -AF (r) SHILPI (t) AF (c)      Bathing Goal OT LTG, Time to Achieve by discharge  -AF (r) SHILPI (t) AF (c) 2 wks  -RE     Bathing Goal OT LTG, Activity Type  upper body bathing;lower body bathing  -RE     Bathing Goal OT LTG, [place] Level supervision required  -AF (r) SHILPI (t) AF (c) conditional independence  -RE     Bathing Goal OT LTG, Date Goal Reviewed 08/02/17  -AF (r) SHILPI (t) AF (c)      Bathing Goal OT LTG, Outcome  goal ongoing  -RE     Grooming OT STG    Grooming Goal OT STG, Date Established 08/02/17  -AF (r) SHILPI (t) AF (c)      Grooming Goal OT STG, Time to Achieve 1 wk  -AF (r) SHILPI (t) AF (c) 3 days  -RE     Grooming Goal OT STG, [place] Level  conditional independence  -RE     Grooming Goal OT STG, Date Goal Reviewed 08/02/17  -AF (r) SHILPI (t) AF (c)      Grooming Goal OT STG, Outcome  goal ongoing  -RE     Grooming OT LTG    Grooming Goal OT LTG, Date Established 08/02/17  -AF (r) SHILPI (t) AF (c)      Grooming Goal OT LTG, Time to Achieve by discharge  -AF (r) SHILPI (t) AF (c) 1 wk  -RE     Grooming Goal OT LTG, [place] Level  conditional independence  -RE     Grooming Goal OT LTG, Date Goal Reviewed 08/02/17  -AF (r) SHILPI (t) AF (c)      Grooming Goal OT LTG, Outcome  goal ongoing  -RE     LB Dressing OT STG    LB Dressing Goal OT STG, Date Established 08/02/17  -AF (r) SHILPI (t) AF (c) 07/29/17  -RE     LB Dressing Goal OT STG, Time to Achieve 1 wk  -AF (r) SHILPI (t) AF (c) 5 - 7 days  -RE     LB Dressing Goal OT STG, [place] Level contact guard assist   tying shoes   -AF (r) SHILPI (t) AF (c) supervision required;verbal cues required  -RE     LB Dressing Goal OT STG, Adaptive Equipment --   elastic laces  -CHAKA (christine) SHILPI (t) CHAKA (c)      LB Dressing Goal OT STG, Date Goal Reviewed 08/02/17  -CHAKA (christine) SHILPI (t) CHAKA (c)      LB Dressing Goal OT STG, Outcome  goal ongoing  -RE     LB  Dressing OT LTG    LB Dressing Goal OT LTG, Date Established 08/02/17  -AF (r) SHILPI (t) AF (c) 07/29/17  -RE     LB Dressing Goal OT LTG, Time to Achieve by discharge  -AF (r) SHILPI (t) AF (c) 2 wks  -RE     LB Dressing Goal OT LTG, [place] Level supervision required  -AF (r) SHILPI (t) AF (c) conditional independence  -RE     LB Dressing Goal OT LTG, Adaptive Equipment --   w/ shoe strings  -AF (r) SHILPI (t) AF (c)      LB Dressing Goal OT LTG, Date Goal Reviewed 08/02/17  -AF (r) SHILPI (t) AF (c)      LB Dressing Goal OT LTG, Outcome  goal ongoing  -RE     UB Dressing OT STG    UB Dressing Goal OT STG, Date Established 08/02/17  -AF (r) SHILPI (t) AF (c) 07/29/17  -RE     UB Dressing Goal OT STG, Time to Achieve 1 wk  -AF (r) SHILPI (t) AF (c) 3 days  -RE     UB Dressing Goal OT STG, [place] Level  set up required  -RE     UB Dressing Goal OT STG, Date Goal Reviewed 08/02/17  -AF (r) SHILPI (t) AF (c)      UB Dressing Goal OT STG, Outcome  goal ongoing  -RE     UB Dressing OT LTG    UB Dressing Goal OT LTG, Date Established 08/02/17  -AF (r) SHILPI (t) AF (c) 07/29/17  -RE     UB Dressing Goal OT LTG, Time to Achieve by discharge  -AF (r) SHILPI (t) AF (c) 2 wks  -RE     UB Dressing Goal OT LTG, [place] Level  conditional independence  -RE     UB Dressing Goal OT LTG, Date Goal Reviewed 08/02/17  -AF (r) SHILPI (t) AF (c)      UB Dressing Goal OT LTG, Outcome  goal ongoing  -RE     Isolated Movement OT LTG    Isolated Movement OT LTG, Date Established   07/25/17  -SO    Isolated Movement OT LTG, Time to Achieve   1 wk  -SO    Isolated Movement OT LTG, Body Area   left scapula;left shoulder;left elbow;left forearm;left wrist;left fingers  -SO    Isolated Movement OT LTG, Level   WFL  -SO    ADL OT LTG    ADL OT LTG, Date Established   07/25/17  -SO    ADL OT LTG, Time to Achieve   1 wk  -SO    ADL OT LTG, Activity Type   ADL skills  -SO    ADL OT LTG, [place] Level   contact guard;standby assist;assistive device  -SO      User Key   (r) = Recorded By, (t) = Taken By, (c) = Cosigned By    Initials Name Provider Type    DELMER Apodaca, OTR Occupational Therapist    SHANI Matamoros, OTR Occupational Therapist    CHAKA Salamanca, OTR Occupational Therapist    SHILPI Allan, OT Student OT Student                OT Recommendation and Plan  Anticipated Equipment Needs At Discharge: tub bench  Planned Therapy Interventions: adaptive equipment training, ADL retraining, activity intolerance, energy conservation, fine motor coordination training, neuromuscular re-education  Therapy Frequency: 5 times/wk          Time Calculation:         Time Calculation- OT       08/04/17 1440 08/04/17 1439       Time Calculation- OT    OT Start Time (P)  1100  -SHILPI (P)  0830  -SHILPI     OT Stop Time (P)  1130  -SHILPI (P)  0900  -SHILPI     OT Time Calculation (min) (P)  30 min  -SHILPI (P)  30 min  -SHILPI       User Key  (r) = Recorded By, (t) = Taken By, (c) = Cosigned By    Initials Name Provider Type    SHILPI Allan, OT Student OT Student           Therapy Charges for Today     Code Description Service Date Service Provider Modifiers Qty    67557077962 HC OT SELF CARE/MGMT/TRAIN EA 15 MIN 8/3/2017 Karen Allan OT Student GO 2    31910106772 HC OT SELF CARE/MGMT/TRAIN EA 15 MIN 8/3/2017 Karen Allan OT Student GO 3    41881340512 HC OT NEUROMUSC RE EDUCATION EA 15 MIN 8/3/2017 Karen Allan OT Student GO 1    38262426889 HC OT SELF CARE/MGMT/TRAIN EA 15 MIN 8/4/2017 Karen Allan OT Student GO 2    79643013095 HC OT NEUROMUSC RE EDUCATION EA 15 MIN 8/4/2017 Karen Allan OT Student GO 2               Karen Allan OT Student  8/4/2017

## 2017-08-04 NOTE — NURSING NOTE
8/4/2017 Call placed at [phone] to son and returned call at 920am. Son Oswaldo notified about his mother's toes. TOMY crespo RN

## 2017-08-04 NOTE — PROGRESS NOTES
Inpatient Rehabilitation Functional Measures Assessment    Functional Measures  BETH Eating:  [facility] Grooming: [facility] Bathing:  [place]  [facility] Upper Body Dressing:  [facility] Lower Body Dressing:  [facility] Toileting:  Toileting Score = 4.  Patient requires minimal assistance for  toileting, such as steadying for balance while cleansing or adjusting clothes.  No assistive devices were required.    BETH Bladder Management  Level of Assistance:  Bladder Score = 5.  Patient is supervision/set-up for  bladder management, requiring: Stand by assistance. Patient requires the  following assistive device(s):  Absorbent pads.  Frequency/Number of Accidents this Shift:  Bladder accidents this shift:  0 .  Patient has not had an accident this shift. pads    BETH Bowel Management  Level of Assistance: Activity was not observed.  Frequency/Number of Accidents this Shift: [facility] Bed/Chair/Wheelchair Transfer:  [facility] Toilet Transfer:  Toilet Transfer Score = 4.  Patient performs 75% or more  of effort and minimal assistance (little/incidental help/steadying) for  transferring to and from the toilet/commode, requiring: Steadying. Patient  requires the following assistive device(s): Walker. Grab bars.  BETH Tub/Shower Transfer:  [place]    Previously Documented Mode of Locomotion at Discharge: [place]  BETH Expected Mode of Locomotion at Discharge: [facility] Walk/Wheelchair:  [facility] Stairs:  [facility] Comprehension:  [facility] Expression:  [facility] Social Interaction:  [facility] Problem Solving:  [facility] Memory:  [place]    Therapy Mode Minutes  Occupational Therapy: [place]  Physical Therapy: [place]  Speech Language Pathology:  [place]    Signed by: JULITA Song

## 2017-08-04 NOTE — PLAN OF CARE
Problem: Inpatient Physical Therapy  Goal: Bed Mobility Goal LTG- PT  Outcome: Ongoing (interventions implemented as appropriate)  Goal: Transfer Training Goal 1 LTG- PT  Outcome: Ongoing (interventions implemented as appropriate)    07/29/17 1211 08/04/17 1057   Transfer Training PT LTG   Transfer Training PT LTG, Date Established 07/29/17 --    Transfer Training PT LTG, Time to Achieve 2 wks --    Transfer Training PT LTG, Activity Type sit to stand/stand to sit --    Transfer Training PT LTG, [place] Level conditional independence --    Transfer Training PT LTG, Assist Device cane, straight --    Transfer Training PT LTG, Date Goal Reviewed --  08/04/17   Transfer Training PT LTG, Outcome --  goal ongoing       Goal: Transfer Training Goal 2 LTG- PT  Outcome: Ongoing (interventions implemented as appropriate)    07/29/17 1211 08/04/17 1057   Transfer Training 2 PT LTG   Transfer Training PT 2 LTG, Date Established 07/29/17 --    Transfer Training PT 2 LTG, Time to Achieve 2 wks --    Transfer Training PT 2 LTG, Activity Type (car transfer) --    Transfer Training PT 2 LTG, [place] Level contact guard assist --    Transfer Training PT 2 LTG, Assist Device cane, straight --    Transfer Training PT 2 LTG, Date Goal Reviewed --  08/04/17   Transfer Training PT 2 LTG, Outcome --  goal ongoing       Goal: Gait Training Goal LTG- PT  Outcome: Ongoing (interventions implemented as appropriate)    07/29/17 1211 08/04/17 1057   Gait Training PT LTG   Gait Training Goal PT LTG, Date Established 07/29/17 --    Gait Training Goal PT LTG, Time to Achieve 2 wks --    Gait Training Goal PT LTG, [place] Level conditional independence --    Gait Training Goal PT LTG, Assist Device cane, straight --    Gait Training Goal PT LTG, Distance to Achieve 150 --    Gait Training Goal PT LTG, Date Goal Reviewed --  08/04/17   Gait Training Goal PT LTG, Outcome --  goal not met       Goal: Stair Training Goal LTG-  PT  Outcome: Ongoing (interventions implemented as appropriate)    07/29/17 1211 08/04/17 1057   Stair Training PT LTG   Stair Training Goal PT LTG, Date Established 07/29/17 --    Stair Training Goal PT LTG, Time to Achieve 2 wks --    Stair Training Goal PT LTG, Number of Steps 4 --    Stair Training Goal PT LTG, [place] Level contact guard assist --    Stair Training Goal PT LTG, Assist Device 1 handrail --    Stair Training Goal PT LTG, Date Goal Reviewed --  08/04/17   Stair Training Goal PT LTG, Outcome --  goal ongoing

## 2017-08-04 NOTE — THERAPY TREATMENT NOTE
Inpatient Rehabilitation - Speech Language Pathology Treatment Note  [facility]     Patient Name: Magnus Hartley  : 1928  MRN: [id number]  Today's Date: 2017         Admit Date: 2017    Visit Dx:      ICD-10-CM ICD-9-CM   1. Left hemiparesis G81.94 342.90   2. Dysarthria R47.1 784.51     Patient Active Problem List   Diagnosis   • Foot pain   • Asthma   • Benign essential hypertension   • Controlled type 2 diabetes mellitus without complication   • Dyslipidemia   • Macrocytosis without anemia   • Senile osteoporosis   • Post-menopausal osteoporosis   • Sinus bradycardia   • Stress incontinence in female   • Urge incontinence of urine   • Atrophic vaginitis   • Encounter for fitting and adjustment of other specified devices   • Incomplete emptying of bladder   • Urethral caruncle   • Incomplete uterovaginal prolapse   • Cerebrovascular accident (CVA) due to occlusion of right middle cerebral artery   • Atrial fibrillation   • Warfarin anticoagulation (goal INR 2-3)   • CVA (cerebrovascular accident due to intracerebral hemorrhage)              Adult Rehabilitation Note       17 1500 17 1419 17 0950    Rehab Assessment/Intervention    Discipline speech language pathologist  -LT occupational therapist  -SHILPI NULL AF2 physical therapy assistant  -LB    Document Type therapy note (daily note)  -LT therapy note (daily note)  -SHILPI NULL AF2 therapy note (daily note)  -LB    Subjective Information agree to therapy;no complaints  -LT agree to therapy;complains of;pain   am, pm  -SHILPI NULL AF2 agree to therapy;complains of;pain  -LB    Patient Effort, Rehab Treatment good  -LT good  -SHILPI NULL AF2 good  -LB    Precautions/Limitations  fall precautions  -SHILPI NULL AF2 fall precautions  -LB    Recorded by [LT] Yvonne Miles MS CCC-SLP [SHILPI NULL AF2] NO Cooney (r) Karen Allan OT Student (t) Adelia Salamanca OTR (c) [LB] Angelica Treadwell PTA    Pain Assessment    Pain Assessment  0-10   -AF,SHILPI,AF2 0-10  -LB    Pain Score  9  -AF,SHILPI,AF2 9  -LB    Post Pain Score  --   am,pm  -AF,SHILPI,AF2     Pain Type  Acute pain  -AF,SHILPI,AF2     Pain Location  Toe (Comment which one)  -AF,SHILPI,AF2 Toe (Comment which one)   Left 4 th and 5th toes  -LB    Pain Orientation  --   L 4,5  -AF,SHILPI,AF2     Pain Intervention(s)  Repositioned;Elevated   denied for me to request pain meds   -AF,SHILPI,AF2 Repositioned;Elevated   declined for me to request pain meds  -LB    Recorded by  [AF,SHILPI,AF2] Adelia Salamanca OTR (r) Karen Allan, OT Student (t) Adelia Salamanca OTR (c) [LB] Angelica Treadwell, PTA    Cognitive Assessment/Intervention    Current Cognitive/Communication Assessment  impaired  -AF,SHILPI,AF2     Orientation Status  oriented x 4  -AF,SHILPI,AF2     Follows Commands/Answers Questions  100% of the time;able to follow single-step instructions;needs repetition;needs increased time  -AF,SHILPI,AF2     Personal Safety  mild impairment  -AF,SHILPI,AF2     Personal Safety Interventions  fall prevention program maintained;gait belt;nonskid shoes/slippers when out of bed;supervised activity  -AF,SHILPI,AF2 fall prevention program maintained;gait belt;muscle strengthening facilitated;nonskid shoes/slippers when out of bed  -LB    Recorded by  [AF,SHILPI,AF2] Adelia Salamanca OTR (r) Karen Allan, OT Student (t) Adelia Salamanca OTR (c) [LB] Angelica Treadwell, PTA    Improve attention    Attention Progress 80%;without cues  -LT      Recorded by [LT] Yvonne Miles MS CCC-SLP      Improve memory skills    Memory Skills Progress 80%;without cues  -LT      Recorded by [LT] Yvonne Miles MS CCC-SLP      Improve functional problem solving    Functional Problem Solving Progress 70%;with inconsistent cues  -LT      Recorded by [LT] Yvonne Miles MS CCC-SLP      Improve executive function skills    Executive Function Skills Progress 70%;without cues  -LT      Recorded by [LT] Yvonne Miles MS CCC-SLP      Bed Mobility, Assessment/Treatment    Bed Mob,  Supine to Sit, [place]  supervision required  -AF,SHILPI,AF2 supervision required  -LB    Bed Mob, Sit to Supine, [place]   supervision required  -LB    Recorded by  [AF,SHILPI,AF2] Adelia Salamanca OTR (r) Karen Allan, OT Student (t) NO Cooney (c) [LB] Angelica Treadwell PTA    Transfer Assessment/Treatment    Transfers, Bed-Chair [place]  contact guard assist;stand by assist  -AF,SHILPI,AF2 contact guard assist;stand by assist  -LB    Transfers, Chair-Bed [place]   contact guard assist;stand by assist  -LB    Transfers, Bed-Chair-Bed, Assist Device   rolling walker  -LB    Transfers, Sit-Stand [place]  contact guard assist;stand by assist  -AF,SHILPI,AF2 contact guard assist;stand by assist  -LB    Transfers, Stand-Sit [place]  contact guard assist;stand by assist  -AF,SHILPI,AF2 contact guard assist;stand by assist  -LB    Transfers, Sit-Stand-Sit, Assist Device   rolling walker  -LB    Transfer, Comment  tsf to and from EOM from w/c w/ CGA, vcs for hand placement   -AF,SHILPI,AF2     Recorded by  [AF,SHILPI,AF2] NO Cooney (r) Karen Allan, OT Student (t) NO Cooney (c) [LB] Angelica Treadwell PTA    Gait Assessment/Treatment    Gait, [place] Level   contact guard assist;stand by assist  -LB    Gait, Assistive Device   rolling walker  -LB    Gait, Distance (Feet)   160  -LB    Gait, Gait Deviations   forward flexed posture;rojelio decreased  -LB    Recorded by   [LB] Angelica Treadwell PTA    Stairs Assessment/Treatment    Number of Stairs   4  -LB    Stairs, Handrail Location   both sides  -LB    Stairs, [place] Level   verbal cues required;contact guard assist  -LB    Stairs, Technique Used   step to step (ascending);step to step (descending)  -LB    Stairs, Comment    curb, ramp, rwx CGA,vc's  -LB    Recorded by   [LB] Angelica Treadwell PTA    Upper Body Bathing Assessment/Training    UB Bathing Assess/Train, Position  sink side;sitting  -AF,SHILPI,AF2     UB  Bathing Assess/Train, [place] Level  set up required;stand by assist  -AF,SHILPI,AF2     UB Bathing Assess/Train, Comment  completed at w/c level   -AF,SHILPI,AF2     Recorded by  [AF,SHILPI,AF2] NO Cooney (r) Karen Allan, OT Student (t) NO Cooney (c)     Lower Body Bathing Assessment/Training    LB Bathing Assess/Train, Position  standing;sitting;sink side  -AF,SHILPI,AF2     LB Bathing Assess/Train, [place] Level  contact guard assist;set up required   pt had trouble grasping and managing obects w/ L hand  -AF,SHILPI,AF2     Recorded by  [AF,SHILPI,AF2] NO Cooney (r) Karen Allan, OT Student (t) NO Cooney (c)     Upper Body Dressing Assessment/Training    UB Dressing Assess/Train, Clothing Type  doffing:;donning:;hospital gown;bra;t-shirt  -AF,SHILPI,AF2     UB Dressing Assess/Train, Position  sitting  -AF,SHILPI,AF2     UB Dressing Assess/Train, [place]  set up required   to gather clothing items   -AF,SHILPI,AF2     Recorded by  [AF,SHILPI,AF2] NO Cooney (r) Karen Allan, OT Student (t) NO Cooney (c)     Lower Body Dressing Assessment/Training    LB Dressing Assess/Train, Clothing Type  doffing:;donning:;pants;shoes;socks   underwear   -AF,SHILPI,AF2     LB Dressing Assess/Train, Position  standing;sitting;sink side  -AF,SHILPI,AF2     LB Dressing Assess/Train, [place]  set up required;minimum assist (75% patient effort)  -AF,SHILPI,AF2     LB Dressing Assess/Train, Comment  pt required assist to gather clothing and tie shoes; pt required assist donning L sock due to L 4,5 toe pain   -AF,SHILPI,AF2     Recorded by  [AF,SHILPI,AF2] NO Cooney (r) Karen Allan, OT Student (t) Adelia Mattie Salamanca, OTR (c)     Toileting Assessment/Training    Toileting Assess/Train, Assistive Device  grab bars;raised toilet seat  -AF,SHILPI,AF2     Toileting Assess/Train, Position  sitting;standing  -AF,SHILPI,AF2     Toileting Assess/Train, [place] Level  contact guard assist;verbal cues  required  -AF,SHILPI,AF2     Toileting Assess/Train, Comment  vcs for hand placement; able to perform hygiene while seated; required cga while standing for clothing management   -AF,SIHLPI,AF2     Recorded by  [AF,SHILPI,AF2] NO Cooney (r) Karen Allan, OT Student (t) NO Cooney (c)     Grooming Assessment/Training    Grooming Assess/Train, Position  sitting;sink side  -AF,SHILPI,AF2     Grooming Assess/Train, [place] Level  set up required  -AF,SHILPI,AF2     Grooming Assess/Train, Comment  brushing hair   -AF,SHILPI,AF2     Recorded by  [AF,SHILPI,AF2] NO Cooney (r) Karen Allan, OT Student (t) NO Cooney (c)     Balance Skills Training    Gait Balance-Level of Assistance   Contact guard  -LB    Gait Balance Support   Right upper extremity supported;Left upper extremity supported;eliu bar  -LB    Gait Balance Activities   side-stepping  -LB    Recorded by   [LB] Angelica Treadwell PTA    Therapy Exercises    Left Lower Extremity   AROM:;15 reps;supine;standing  -LB    Recorded by   [LB] Angelica Treadwell PTA    Sensory Treatment    Sensory Reeducation Techniques  sensory training, visual reinforcement;sensory train, w/o visual reinforcement;comparison, tactile sensory information  -AF,SHILPI,AF2     Sensory Reeduction Treatment  therapist facilitated hand over hand symbol drawing w/ eyes closed then guessed what symbol was drawn, if wrong therapist/pt slick the same symbol w/ eyes open; pt correctly identified 1/5 capital letters, 1/3 lower case letters, 1/4 numbers, and 2/4 shapes w/ eyes closed'; pt was presented w/ rough and soft fabric pieces w/ eyes open and ID which was rough and which was soft, pt was able to ID all correctly  -AF,SHILPI,AF2     Recorded by  [AF,SHILPI,AF2] NO Cooney (r) Karen Allan, OT Student (t) NO Cooney (c)     Positioning and Restraints    Pre-Treatment Position  sitting in chair/recliner   bed am, reclincer pm   -AF,SHILPI,AF2     Post Treatment  Position  chair  -AF,SHILPI,AF2 chair  -LB    In Chair  sitting;call light within reach;encouraged to call for assist   am,pm   -AF,SHILPI,AF2 reclined;call light within reach;RLE elevated;LLE elevated  -LB    Recorded by  [AF,SHILPI,AF2] NO Cooney (r) Karen Allan, OT Student (t) NO Cooney (c) [LB] Angelica Treadwell, [facility]      08/03/17 1456 08/03/17 1300 08/03/17 0944    Rehab Assessment/Intervention    Discipline occupational therapist  -SHILPI NULL,AF2 speech language pathologist  -LT physical therapy assistant  -LB    Document Type therapy note (daily note)  -CRYSTAL NULLD,AF2 therapy note (daily note)  -LT therapy note (daily note)  -LB    Subjective Information no complaints;agree to therapy  -CHAKASHILPI,AF2 agree to therapy  -LT agree to therapy  -LB    Patient Effort, Rehab Treatment excellent  -AF,SHILPI,AF2 excellent  -LT good  -LB    Symptoms Noted During/After Treatment shortness of breath   w/ tsfs, muscle strengthening tasks  -AF,SHILPI,AF2      Symptoms Noted Comment rest breaks, monitered SpO2, vcs for breathing  -AF,SHILPI,AF2      Precautions/Limitations fall precautions  -AF,SHILPI,AF2  fall precautions  -LB    Recorded by [AF,SHILPI,AF2] NO Cooney (r) Karen Allan, OT Student (t) NO Cooney (c) [LT] Yvonne Miles MS CCC-SLP [LB] Angelica Treadwell, PTA    Vital Signs    Intra SpO2 (%) 97   see symptoms noted comment   -AF,SHILPI,AF2      Recorded by [AF,SHILPI,AF2] NO Cooney (r) Karen Allan OT Student (t) NO Cooney (c)      Pain Assessment    Pain Assessment No/denies pain  -AF,SHILPI,AF2  No/denies pain  -LB    Recorded by [AF,SHILPI,AF2] NO Cooney (r) Karen Allan OT Student (t) NO Cooney (c)  [LB] Angelica Treadwell, PTA    Cognitive Assessment/Intervention    Current Cognitive/Communication Assessment impaired  -AF,SHILPI,AF2      Orientation Status oriented x 4  -AF,SHILPI,AF2      Follows Commands/Answers Questions 100% of the time;able to follow single-step  instructions;needs cueing;needs increased time;needs repetition  -AF,SHILPI,AF2      Personal Safety mild impairment;decreased awareness, need for assist;decreased awareness, need for safety;decreased insight to deficits   some impulsivity when iniating tsfs   -AF,SHILPI,AF2      Personal Safety Interventions fall prevention program maintained;gait belt;nonskid shoes/slippers when out of bed;supervised activity  -AF,SHILPI,AF2  fall prevention program maintained;gait belt;muscle strengthening facilitated;nonskid shoes/slippers when out of bed  -LB    Recorded by [AF,SHILPI,AF2] Adelia Salamanca, OTR (r) Karen Allan OT Student (t) Adelia Salamanca, OTR (c)  [LB] Angelica Treadwell PTA    Improve memory skills    Memory Skills Progress  90%;with inconsistent cues  -LT     Recorded by  [LT] Yvonne Miles MS CCC-SLP     Improve functional problem solving    Functional Problem Solving Progress  80%;with inconsistent cues  -LT     Recorded by  [LT] Yvonne Miles MS CCC-SLP     Improve executive function skills    Executive Function Skills Progress  80%;without cues  -LT     Recorded by  [LT] Yvonne Miles MS CCC-SLP     Bed Mobility, Assessment/Treatment    Bed Mobility, Assistive Device   --   assessed on txment mat  -LB    Bed Mobility, Roll Left, [place]   supervision required  -LB    Bed Mobility, Roll Right, [place]   supervision required  -LB    Bed Mobility, Scoot/Bridge, [place]   supervision required  -LB    Bed Mob, Supine to Sit, [place]   supervision required  -LB    Bed Mob, Sit to Supine, [place]   supervision required  -LB    Recorded by   [LB] Angelica Treadwell PTA    Transfer Assessment/Treatment    Transfers, Bed-Chair [place] contact guard assist  -AF,SHILPI,AF2  contact guard assist;stand by assist  -LB    Transfers, Chair-Bed [place]   contact guard assist;stand by assist  -LB    Transfers, Bed-Chair-Bed, Assist Device   rolling walker  -LB    Transfers, Sit-Stand [place]  contact guard assist;verbal cues required  -AF,SHILPI,AF2  contact guard assist;stand by assist  -LB    Transfers, Stand-Sit [place] contact guard assist  -AF,SHILPI,AF2  contact guard assist  -LB    Transfers, Sit-Stand-Sit, Assist Device   rolling walker  -LB    Recorded by [AF,SHILPI,AF2] Adelia Salamanca, OTR (r) Karen Allan, OT Student (t) NO Cooney (c)  [LB] Angelica Treadwell PTA    Gait Assessment/Treatment    Gait, [place] Level   contact guard assist;stand by assist  -LB    Gait, Assistive Device   rolling walker  -LB    Gait, Distance (Feet)   250  -LB    Gait, Gait Deviations   forward flexed posture;rojelio decreased  -LB    Recorded by   [LB] Angelica Treadwell PTA    Stairs Assessment/Treatment    Number of Stairs   8  -LB    Stairs, Handrail Location   both sides  -LB    Stairs, [place] Level   minimum assist (75% patient effort);contact guard assist;verbal cues required  -LB    Stairs, Technique Used   step to step (ascending);step to step (descending)  -LB    Stairs, Comment   curb, ramp rwx cga, vc's  -LB    Recorded by   [LB] Angelica Treadwell PTA    Upper Body Bathing Assessment/Training    UB Bathing Assess/Train, Position sitting;sink side  -AF,SHILPI,AF2      UB Bathing Assess/Train, [place] Level verbal cues required;stand by assist   sequencing vcs  -AF,SHILPI,AF2      Recorded by [AF,SHILPI,AF2] NO Cooney (r) Karen Allan, OT Student (t) NO Cooney (c)      Lower Body Bathing Assessment/Training    LB Bathing Assess/Train, Position sitting;sink side;standing  -AF,SHILPI,AF2      LB Bathing Assess/Train, [place] Level contact guard assist;verbal cues required  -AF,SHILPI,AF2      LB Bathing Assess/Train, Comment vcs for sequening, cga for balance when standing to wash buttocks/elsi area  -AF,SHILPI,AF2      Recorded by [AF,SHILPI,AF2] NO Cooney (r) Karen Allan, OT Student (t) Adelia Mattie Salamanca, OTR (c)      Upper Body Dressing Assessment/Training     UB Dressing Assess/Train, Clothing Type doffing:;donning:;hospital gown;bra;t-shirt   dof gown; don bra, shirt   -AF,SHILPI,AF2      UB Dressing Assess/Train, Position sitting;sink side  -AF,SHILPI,AF2      UB Dressing Assess/Train, [place] set up required   to gather clothing items   -AF,SHILPI,AF2      Recorded by [AF,SHILPI,AF2] Adelia Salamanca OTR (r) Karen Allan, OT Student (t) NO Cooney (c)      Lower Body Dressing Assessment/Training    LB Dressing Assess/Train, Clothing Type doffing:;donning:;socks;shoes;pants   dof underwear; don underwear, pants, socks, shoes   -AF,SHILPI,AF2      LB Dressing Assess/Train, Position standing;sitting;sink side  -AF,SHILPI,AF2      LB Dressing Assess/Train, [place] minimum assist (75% patient effort)  -AF,SHILPI,AF2      LB Dressing Assess/Train, Comment pt required assist to gather clothes, adjust pants over hips, tie shoes   -AF,SHILPI,AF2      Recorded by [AF,SHILPI,AF2] NO Cooney (r) Karen Allan, OT Student (t) NO Cooney (c)      Toileting Assessment/Training    Toileting Assess/Train, Assistive Device grab bars;raised toilet seat  -AF,SHILPI,AF2      Toileting Assess/Train, Position sitting;standing  -AF,SHILPI,AF2      Toileting Assess/Train, [place] Level verbal cues required;contact guard assist  -AF,SHILPI,AF2      Toileting Assess/Train, Comment vcs for clothing management before standing to pull up, CGA to maintain standing balance in am  -AF,SHILPI,AF2      Recorded by [AF,SHILPI,AF2] NO Cooney (r) Karen Allan, OT Student (t) NO Cooney (c)      Grooming Assessment/Training    Grooming Assess/Train, Position sitting;sink side  -AF,SHILPI,AF2      Grooming Assess/Train, [place]ep[place] Level supervision required  -AF,SHILPI,AF2      Grooming Assess/Train, Comment combing hair   -SHILPI NULL,AF2      Recorded by [SHILPI NULL,AF2] Adelia Salamanca, OTR (r) Karen Allan OT Student (t) Adelia Salamanca, OTR (c)      Balance Skills Training    Standing-Level of  Assistance   Contact guard;Minimum assistance  -LB    Static Standing Balance Support   No upper extremity supported  -LB    Standing-Balance Activities   Compliant surfaces  -LB    Gait Balance-Level of Assistance   Contact guard;Minimum assistance  -LB    Gait Balance Support   Right upper extremity supported;Left upper extremity supported;parallel bars  -LB    Gait Balance Activities   --   side stepping on foam plank  -LB    Recorded by   [LB] Angelica Treadwell PTA    Therapy Exercises    Bilateral Lower Extremities   AROM:;10 reps;standing;heel raises;mini squats  -LB    Recorded by   [LB] Angelica Treadwell PTA    Fine Motor Coordination Training    Detail (Fine Motor Coordination Training) pt completed simulated fastener task (tie laces, zip/unzip, button/unbutton) to increase sensory input/fine motor coordination for functional ADLs while seated in w/c at table; pt required increased time   -AF,SHILPI,AF2      Recorded by [AF,SHILPI,AF2] NO Cooney (r) Karen Allan OT Student (t) NO Cooney (c)      Sensory Treatment    Sensory Reeducation Techniques other (see comments)  -CHAKA,SHILPI,AF2      Sensory Reeduction Treatment pt pinched clothes pins and placed on/removed from horizontal mary carmen w/ LUE to improve sensory input to modulate grasp, completed standing at countertop; pt required hand over hand to complete the first clothes pin but was able to complete the rest w/o difficulty   -AF,SHILPI,AF2      Recorded by [AF,SHILPI,AF2] NO Cooney (r) Karen Allan OT Student (t) NO Cooney (c)      Positioning and Restraints    Pre-Treatment Position in bed  -AF,SHILPI,AF2      Post Treatment Position wheelchair  -AF,SHILPI,AF2  chair  -LB    In Chair encouraged to call for assist;call light within reach;sitting;with family/caregiver  -AF,SHILPI,AF2  sitting;call light within reach  -LB    Recorded by [AF,SHILPI,AF2] NO Cooney (r) Karen Allan OT Student (t) NO Cooney (c)  [LB]  Angelica Treadwell PTA      08/02/17 1506 08/02/17 1000 08/02/17 0941    Rehab Assessment/Intervention    Discipline occupational therapist  -SHIPLI NULL,CHAKA2 speech language pathologist  -LT physical therapy assistant  -LB    Document Type therapy note (daily note)  -CHAKA,SHILPI,AF2 therapy note (daily note)  -LT therapy note (daily note)  -LB    Subjective Information agree to therapy;no complaints  -CHAKA,SHILPI,AF2 agree to therapy  -LT agree to therapy  -LB    Patient Effort, Rehab Treatment good  -CHAKA,SHILPI,AF2 good  -LT good  -LB    Precautions/Limitations fall precautions  -AF,SHILPI,AF2  fall precautions  -LB    Recorded by [AF,SHILPI,AF2] NO Cooney (r) Karen Allan OT Student (t) NO Cooney (c) [LT] Yvonne Miles MS CCC-SLP [LB] Angelica Treadwell PTA    Pain Assessment    Pain Assessment No/denies pain  -AF,SHILPI,AF2  No/denies pain  -LB    Recorded by [CHAKA,SHILPI,AF2] NO Cooney (r) Karen Allan OT Student (t) NO Cooney (c)  [LB] Angelica Treadwell PTA    Cognitive Assessment/Intervention    Current Cognitive/Communication Assessment functional  -AF,SHILPI,AF2      Orientation Status oriented x 4  -AF,SHILPI,AF2      Follows Commands/Answers Questions 100% of the time;able to follow single-step instructions;able to follow multi-step instructions;needs repetition;needs cueing  -AF,SHILPI,AF2      Personal Safety mild impairment  -AF,SHILPI,AF2      Personal Safety Interventions fall prevention program maintained;gait belt;nonskid shoes/slippers when out of bed;supervised activity  -AF,SHILPI,AF2  fall prevention program maintained;gait belt;muscle strengthening facilitated;nonskid shoes/slippers when out of bed  -LB    Recorded by [AF,SIHLPI,AF2] NO Cooney (r) Karen Allan OT Student (t) NO Cooney (c)  [LB] Angelica Treadwell PTA    Improve memory skills    Memory Skills Progress  80%;with inconsistent cues  -LT     Recorded by  [LT] Yvonne Miles MS CCC-SLP     Improve functional problem  solving    Functional Problem Solving Progress  70%;with inconsistent cues  -LT     Recorded by  [LT] Yvonne Miles MS CCC-SLP     Improve executive function skills    Executive Function Skills Progress  70%;without cues  -LT     Recorded by  [LT] Yvonne Miles MS CCC-SLP     Bed Mobility, Assessment/Treatment    Bed Mobility, Assistive Device   --   assessed on txment mat  -LB    Bed Mobility, Roll Left, [place]   supervision required  -LB    Bed Mobility, Roll Right, [place]   supervision required  -LB    Bed Mob, Supine to Sit, [place] supervision required  -AF,SHILPI,AF2  supervision required  -LB    Bed Mob, Sit to Supine, [place]   supervision required  -LB    Recorded by [AF,SHILPI,AF2] Adelia Salamanca, OTR (r) Karen Allan, OT Student (t) Adelia Salamanca, OTR (c)  [LB] Angelica Treadwell, PTA    Transfer Assessment/Treatment    Transfers, Bed-Chair [place] contact guard assist  -AF,SHILPI,AF2  contact guard assist;minimum assist (75% patient effort)  -LB    Transfers, Chair-Bed [place]   contact guard assist;minimum assist (75% patient effort)  -LB    Transfers, Bed-Chair-Bed, Assist Device   rolling walker  -LB    Transfers, Sit-Stand [place] contact guard assist  -AF,SHILPI,AF2  contact guard assist  -LB    Transfers, Stand-Sit [place] contact guard assist  -AF,SHILPI,AF2  contact guard assist  -LB    Transfers, Sit-Stand-Sit, Assist Device   rolling walker  -LB    Toilet Transfer, [place] contact guard assist  -AF,SHILPI,AF2      Toilet Transfer, Assistive Device elevated toilet seat;rolling walker   grab bars  -AF,SHILPI,AF2      Walk-In Shower Transfer, [place] contact guard assist;verbal cues required   vcs for rwx placement, sequencing, hand placement   -AF,SHILPI,AF2      Walk-In Shower Transfer, Assist Device rolling walker;standard shower chair   grab bars  -AF,SHILPI,AF2      Transfer, Comment   car tsf CGA/min, Rwx  -LB    Recorded by [AF,SHILPI,AF2] Adelia Salamanca, OTR (r)  Karen Allan, OT Student (t) NO Cooney (c)  [LB] Angelica Treadwell PTA    Gait Assessment/Treatment    Gait, [place] Level   contact guard assist;verbal cues required  -LB    Gait, Assistive Device   rolling walker  -LB    Gait, Distance (Feet)   200  -LB    Gait, Gait Deviations   rojelio decreased;forward flexed posture  -LB    Recorded by   [LB] Angelica Treadwell PTA    Stairs Assessment/Treatment    Number of Stairs   4  -LB    Stairs, Handrail Location   both sides  -LB    Stairs, [place] Level   minimum assist (75% patient effort);contact guard assist;verbal cues required  -LB    Stairs, Technique Used   step to step (ascending);step to step (descending)  -LB    Stairs, Comment   curb rwx, cga, vc's  -LB    Recorded by   [LB] Angelica Treadwell PTA    Upper Body Bathing Assessment/Training    UB Bathing Assess/Train Assistive Device grab bars;hand-held shower head;shower chair with back  -AF,SHILPI,AF2      UB Bathing Assess/Train, Position sitting  -AF,SHILPI,AF2      UB Bathing Assess/Train, [place] Level set up required;contact guard assist  -AF,SHILPI,AF2      Recorded by [AF,SHILPI,AF2] NO Cooney (r) Karen Allan, OT Student (t) NO Cooney (c)      Lower Body Bathing Assessment/Training    LB Bathing Assess/Train Assistive Device grab bars;hand-held shower head;shower chair with back  -AF,SHILPI,AF2      LB Bathing Assess/Train, Position sitting;standing  -AF,SHILPI,AF2      LB Bathing Assess/Train, [place] Level contact guard assist;verbal cues required  -AF,SHILPI,AF2      LB Bathing Assess/Train, Comment vcs for hand placement, CGA for balance when standing to wash buttocks/elsi  -AF,SHILPI,AF2      Recorded by [AF,SHILPI,AF2] NO Cooney (r) Karen Allan OT Student (t) NO Cooney (c)      Upper Body Dressing Assessment/Training    UB Dressing Assess/Train, Clothing Type doffing:;donning:;t-shirt;hospital gown   dof gown, don shirt   -CHAKA,SHILPI,AF2      UB  Dressing Assess/Train, Position sitting  -AF,SHILPI,AF2      UB Dressing Assess/Train, [place] set up required;contact guard assist  -AF,SHILPI,AF2      UB Dressing Assess/Train, Comment pt set up assist to orient shirt correctly to thread arm   -AF,SHILPI,AF2      Recorded by [AF,SHILPI,AF2] Adelia Salamanca OTR (r) Karen Allan, OT Student (t) NO Cooney (c)      Lower Body Dressing Assessment/Training    LB Dressing Assess/Train, Clothing Type doffing:;donning:;socks;shoes;pants   dof underwear; don underwear, capris, shoes, socks   -AF,SHILPI,AF2      LB Dressing Assess/Train, Position sitting;standing  -AF,SHILPI,AF2      LB Dressing Assess/Train, [place] minimum assist (75% patient effort)  -AF,SHILPI,AF2      LB Dressing Assess/Train, Comment CGA to stand for clothing management; required assist w/ tying shoes due to decreased sensation in LUE  -AF,SHILPI,AF2      Recorded by [AF,SHILPI,AF2] Adelia Salamanca OTR (r) Karen Allan, OT Student (t) NO Cooney (c)      Toileting Assessment/Training    Toileting Assess/Train, Assistive Device grab bars;raised toilet seat  -AF,SHILPI,AF2      Toileting Assess/Train, Position sitting;standing  -AF,SHILPI,AF2      Toileting Assess/Train, [place]ep[place] Level contact guard assist  -AF,SHILPI,AF2      Toileting Assess/Train, Comment CGA for balance during clothing management   -AF,SHILPI,AF2      Recorded by [AF,SHILPI,AF2] NO Cooney (r) Karen Allan, OT Student (t) NO Cooney (c)      Grooming Assessment/Training    Grooming Assess/Train, Position sitting   in recliner using tray table mirror  -AF,SHILPI,AF2      Grooming Assess/Train, [place]ep[place] Level set up required  -AF,SHILPI,AF2      Grooming Assess/Train, Comment combing hair; dentures hygiene performed before session   -AF,SHILPI,AF2      Recorded by [AF,SHILPI,AF2] Adelia Salamanca, OTR (r) Karen Allan, OT Student (t) Adelia Salamanca OTR (c)      Balance Skills Training    Standing-Level of Assistance   Minimum assistance   -LB    Static Standing Balance Support   Right upper extremity supported;Left upper extremity supported;eliu bar   UE support as needed  -LB    Standing-Balance Activities   Compliant surfaces  -LB    Gait Balance-Level of Assistance   Contact guard  -LB    Gait Balance Support   Right upper extremity supported;Left upper extremity supported;eliu bar  -LB    Gait Balance Activities   side-stepping  -LB    Recorded by   [LB] Angelica Treadwell PTA    Therapy Exercises    Bilateral Lower Extremities   AROM:;10 reps;standing  -LB    Recorded by   [LB] Angelica Treadwell PTA    Sensory Treatment    Sensory Reeducation Techniques comparison, tactile sensory information;sensory train, w/o visual reinforcement;sensory training, visual reinforcement  -AF,SHILPI,AF2      Sensory Reeduction Treatment 3 objects were placed on table w/ associated sight word cards, pt touched each object and described tactile details w/ eyes open, pt then ID'ed object placed in L hand w/ eyes closed; pt was able to ID 2/3 w/ eyes closed, pt was able to ID the other w/ eyes open; pt searched for items in rice bin w/ eyes open, correctly decsribed tactile details w/ min vcs while manipulating in hand, then reburried the items  -AF,SHILPI,AF2      Recorded by [AF,SHILPI,AF2] NO Cooney (r) Karen Allan, CHERELLE Student (t) NO Cooney (c)      Positioning and Restraints    Pre-Treatment Position in bed  -AF,SHILPI,AF2      Post Treatment Position chair   recliner  -AF,SHILPI,AF2  wheelchair  -LB    In Chair encouraged to call for assist;sitting;call light within reach  -AF,SHILPI,AF2      In Wheelchair   sitting;with SLP  -LB    Recorded by [AF,SHILPI,AF2] NO Cooney (r) Karen Allan, CHERELLE Student (t) NO Cooney (c)  [LB] Angelica Treadwell PTA      User Key  (r) = Recorded By, (t) = Taken By, (c) = Cosigned By    Initials Name Effective Dates    LT Yvonne MS Ginger CCC-SLP 04/13/15 -     LB Angelica Treadwell PTA 02/18/16 -     AF Adelia  Mattie Salamanca, OTR 12/01/15 -     SHILPI Karen Allan, OT Student 07/11/17 -               IP SLP Goals       07/31/17 1102 07/27/17 1501 07/26/17 0930    Safely Consume Diet    Safely Consume Diet- SLP, Date Established   07/26/17  -OC    Safely Consume Diet- SLP, Time to Achieve  by discharge  -JT by discharge  -OC    Safely Consume Diet- SLP, Activity Level  Patient will improve oral skills for more efficient eating  -JT     Safely Consume Diet- SLP, Outcome  goal ongoing  -JT goal ongoing  -OC    Cognitive Linguistic- Optimal Participation in Care    Cognitive Linguistic Optimal Participation in Care- SLP, Date Established 07/31/17  -LT      Cognitive Linguistic Optimal Participation in Care- SLP, Time to Achieve by discharge  -LT      Cognitive Linguistic Optimal Participation in Care- SLP, Outcome goal ongoing  -LT      Dysarthria- Optimal Particpation in Care    Dysarthria Optimal Participation in Care- SLP, Date Established 07/31/17  -LT      Dysarthria Optimal Participation in Care- SLP, Time to Achieve by discharge  -LT      Dysarthria Optimal Participation in Care- SLP, Outcome goal ongoing  -LT        07/24/17 1817          Safely Consume Diet    Safely Consume Diet- SLP, Date Established 07/24/17  -SA      Safely Consume Diet- SLP, Time to Achieve by discharge  -SA        User Key  (r) = Recorded By, (t) = Taken By, (c) = Cosigned By    Initials Name Provider Type    SA Julia Beach, MS CCC-SLP Speech and Language Pathologist    OC Kaylah Vasquez MA,CCC-SLP Speech and Language Pathologist    LT Yvonne Miles MS CCC-SLP Speech and Language Pathologist    CRYSTALT Karen Ha Speech and Language Pathologist          EDUCATION  The patient has been educated in the following areas:   Cognitive Impairment.    SLP Recommendation and Plan                                          Time Calculation:         Time Calculation- SLP       08/04/17 1509 08/04/17 0930       Time Calculation- SLP    SLP Start Time  1400  -LT 0900  -LT     SLP Stop Time 1430  -LT 0930  -LT     SLP Time Calculation (min) 30 min  -LT 30 min  -LT       User Key  (r) = Recorded By, (t) = Taken By, (c) = Cosigned By    Initials Name Provider Type     Yvonne Miles MS CCC-SLP Speech and Language Pathologist          Therapy Charges for Today     Code Description Service Date Service Provider Modifiers Qty    22234625121  ST DEV OF COGN SKILLS EACH 15 MIN 8/3/2017 Yvonne Miles MS CCC-SLP GN 4    [id number]  ST DEV OF COGN SKILLS EACH 15 MIN 8/4/2017 Yvonne Miles MS CCC-SLP GN 4               Yvonne Miles MS CCC-YUNG  8/4/2017
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
## 2017-08-04 NOTE — PROGRESS NOTES
Inpatient Rehabilitation Functional Measures Assessment    Functional Measures  BETH Eating:  [facility] Grooming: [facility] Bathing:  [facility] Upper Body Dressing:  [facility] Lower Body Dressing:  [facility] Toileting:  [facility] Bladder Management  Level of Assistance:  [place]  Frequency/Number of Accidents this Shift:  [facility] Bowel Management  Level of Assistance: [place]  Frequency/Number of Accidents this Shift: [facility] Bed/Chair/Wheelchair Transfer:  [facility] Toilet Transfer:  [facility] Tub/Shower Transfer:  [place]    Previously Documented Mode of Locomotion at Discharge: Field  BETH Expected Mode of Locomotion at Discharge: [facility] Walk/Wheelchair:  [facility] Stairs:  [facility] Comprehension:  Auditory comprehension is the usual mode. Comprehension  Score = 7, Independent.  Patient comprehends complex/abstract information in  their primary language.  Patient is completely independent for auditory  comprehension.  There are no activity limitations.  BETH Expression:  Vocal expression is the usual mode. Expression Score = 7,  Independent.  Patient expresses complex/abstract information in their primary  language.  Patient is completely independent for vocal expression.  There are no  activity limitations.  BETH Social Interaction:  Social Interaction Score = 7, Independent. Patient is  completely independent for social interaction.  There are no activity  limitations.  BETH Problem Solving:  Problem Solving Score = 6, Modified [place].  Patient  makes appropriate decisions in order to solve complex problems, but requires  extra time.  BETH Memory:  Memory Score = 6, Modified [place].  Patient is modified  independent for memory, requiring: Requires additional time.    Therapy Mode Minutes  Occupational Therapy: [place]  Physical Therapy: [place]  Speech Language Pathology:  [place]    Signed by: Carmela Cuellar RN

## 2017-08-04 NOTE — PLAN OF CARE
Problem: Stroke (Ischemic) (Adult)  Goal: Signs and Symptoms of Listed Potential Problems Will be Absent or Manageable (Stroke)  Outcome: Ongoing (interventions implemented as appropriate)    08/04/17 0136   Stroke (Ischemic)   Problems Assessed (Stroke (Ischemic)/TIA) all   Problems Present (Stroke (Ischemic)/TIA) motor/sensory impairment         Problem: Fall Risk (Adult)  Goal: Absence of Falls  Outcome: Ongoing (interventions implemented as appropriate)    08/04/17 0136   Fall Risk (Adult)   Absence of Falls making progress toward outcome         Problem: Patient Care Overview (Adult)  Goal: Plan of Care Review  Outcome: Ongoing (interventions implemented as appropriate)    08/04/17 0136   Coping/Psychosocial Response Interventions   Plan Of Care Reviewed With patient   Patient Care Overview   Progress improving   Outcome Evaluation   Outcome Summary/Follow up Plan Patient calm, cooperative, and pleasant. Very motivated, very appreciative. Ambulates CGA x 1 with the walker to the bathroom. No c/o pain, no unsafe behaviors.

## 2017-08-04 NOTE — THERAPY TREATMENT NOTE
Inpatient Rehabilitation - Physical Therapy Treatment Note  [facility]     Patient Name: Magnus Hartley  : 1928  MRN: [id number]  Today's Date: 2017  Onset of Illness/Injury or Date of Surgery Date: 17  Date of Referral to PT: 17  Referring Physician: Jac    Admit Date: 2017    Visit Dx:    ICD-10-CM ICD-9-CM   1. Left hemiparesis G81.94 342.90   2. Dysarthria R47.1 784.51     Patient Active Problem List   Diagnosis   • Foot pain   • Asthma   • Benign essential hypertension   • Controlled type 2 diabetes mellitus without complication   • Dyslipidemia   • Macrocytosis without anemia   • Senile osteoporosis   • Post-menopausal osteoporosis   • Sinus bradycardia   • Stress incontinence in female   • Urge incontinence of urine   • Atrophic vaginitis   • Encounter for fitting and adjustment of other specified devices   • Incomplete emptying of bladder   • Urethral caruncle   • Incomplete uterovaginal prolapse   • Cerebrovascular accident (CVA) due to occlusion of right middle cerebral artery   • Atrial fibrillation   • Warfarin anticoagulation (goal INR 2-3)   • CVA (cerebrovascular accident due to intracerebral hemorrhage)               Adult Rehabilitation Note       17 1500 17 1419 17 0950    Rehab Assessment/Intervention    Discipline speech language pathologist  -LT occupational therapist  -SHILPI NULL AF2 physical therapy assistant  -LB    Document Type therapy note (daily note)  -LT therapy note (daily note)  -SHILPI NULL AF2 therapy note (daily note)  -LB    Subjective Information agree to therapy;no complaints  -LT agree to therapy;complains of;pain   am, pm  -SHILPI NULL AF2 agree to therapy;complains of;pain  -LB    Patient Effort, Rehab Treatment good  -LT good  -SHILPI NULL AF2 good  -LB    Precautions/Limitations  fall precautions  -SHILPI NULL AF2 fall precautions  -LB    Recorded by [LT] Yvonne Miles MS CCC-SLP [SHILPI NULL AF2] Adelia Salamanca, OTR (r) Karen Allan OT Student  (t) Adelia Salamanca OTR (c) [LB] Angelica Treadwell PTA    Pain Assessment    Pain Assessment  0-10  -AF,SHILPI,AF2 0-10  -LB    Pain Score  9  -AF,SHILPI,AF2 9  -LB    Post Pain Score  --   am,pm  -AF,SHILPI,AF2     Pain Type  Acute pain  -AF,SHILPI,AF2     Pain Location  Toe (Comment which one)  -AF,SHILPI,AF2 Toe (Comment which one)   Left 4 th and 5th toes  -LB    Pain Orientation  --   L 4,5  -AF,SHILPI,AF2     Pain Intervention(s)  Repositioned;Elevated   denied for me to request pain meds   -AF,SHILPI,AF2 Repositioned;Elevated   declined for me to request pain meds  -LB    Recorded by  [AF,SHILPI,AF2] Adelia Salamanca OTR (r) Karen Allan, OT Student (t) NO Cooney (c) [LB] Angelica Treadwell PTA    Cognitive Assessment/Intervention    Current Cognitive/Communication Assessment  impaired  -AF,SHILPI,AF2     Orientation Status  oriented x 4  -AF,SHILPI,AF2     Follows Commands/Answers Questions  100% of the time;able to follow single-step instructions;needs repetition;needs increased time  -AF,SHILPI,AF2     Personal Safety  mild impairment  -AF,SHILPI,AF2     Personal Safety Interventions  fall prevention program maintained;gait belt;nonskid shoes/slippers when out of bed;supervised activity  -AF,SHILPI,AF2 fall prevention program maintained;gait belt;muscle strengthening facilitated;nonskid shoes/slippers when out of bed  -LB    Recorded by  [AF,SHILPI,AF2] Adelia Salamanca OTR (r) Karen Allan, OT Student (t) NO Cooney (c) [LB] Angelica Treadwell PTA    Improve attention    Attention Progress 80%;without cues  -LT      Recorded by [LT] Yvonne Miles MS CCC-SLP      Improve memory skills    Memory Skills Progress 80%;without cues  -LT      Recorded by [LT] Yvonne Miles MS CCC-SLP      Improve functional problem solving    Functional Problem Solving Progress 70%;with inconsistent cues  -LT      Recorded by [LT] Yvonne Miles MS CCC-SLP      Improve executive function skills    Executive Function Skills Progress 70%;without cues   -LT      Recorded by [LT] Yvonne Miles MS CCC-SLP      Bed Mobility, Assessment/Treatment    Bed Mob, Supine to Sit, [place]  supervision required  -AF,SHILPI,AF2 supervision required  -LB    Bed Mob, Sit to Supine, [place]   supervision required  -LB    Recorded by  [AF,SHILPI,AF2] Adelia Salamanca, OTR (r) Karen Allan, OT Student (t) Adelia Salamanca OTR (c) [LB] Angelica Treadwell PTA    Transfer Assessment/Treatment    Transfers, Bed-Chair [place]  contact guard assist;stand by assist  -AF,SHILPI,AF2 contact guard assist;stand by assist  -LB    Transfers, Chair-Bed [place]   contact guard assist;stand by assist  -LB    Transfers, Bed-Chair-Bed, Assist Device   rolling walker  -LB    Transfers, Sit-Stand [place]  contact guard assist;stand by assist  -AF,SHILPI,AF2 contact guard assist;stand by assist  -LB    Transfers, Stand-Sit [place]  contact guard assist;stand by assist  -AF,SHILPI,AF2 contact guard assist;stand by assist  -LB    Transfers, Sit-Stand-Sit, Assist Device   rolling walker  -LB    Transfer, Comment  tsf to and from EOM from w/c w/ CGA, vcs for hand placement   -AF,SHILPI,AF2     Recorded by  [AF,SHILPI,AF2] Adelia Salamanca, CHERELLER (r) Karen Allan, OT Student (t) Adelia Salamanca OTR (c) [LB] Angelica Treadwell PTA    Gait Assessment/Treatment    Gait, [place] Level   contact guard assist;stand by assist  -LB    Gait, Assistive Device   rolling walker  -LB    Gait, Distance (Feet)   160   amb as far as 240 ft  -LB    Gait, Gait Deviations   forward flexed posture;rojelio decreased  -LB    Recorded by   [LB] Angelica Treadwell PTA    Stairs Assessment/Treatment    Number of Stairs   4  -LB    Stairs, Handrail Location   both sides  -LB    Stairs, [place] Level   verbal cues required;contact guard assist  -LB    Stairs, Technique Used   step to step (ascending);step to step (descending)  -LB    Stairs, Comment    curb, ramp, rwx CGA,vc's  -LB    Recorded by   [LB] Angelica LOPES  Mile, PTA    Upper Body Bathing Assessment/Training    UB Bathing Assess/Train, Position  sink side;sitting  -AF,SHILPI,AF2     UB Bathing Assess/Train, [place] Level  set up required;stand by assist  -AF,SHILPI,AF2     UB Bathing Assess/Train, Comment  completed at w/c level   -AF,SHILPI,AF2     Recorded by  [AF,SHILPI,AF2] Adelia Salamanca OTR (r) Karen Allan, OT Student (t) NO Cooney (c)     Lower Body Bathing Assessment/Training    LB Bathing Assess/Train, Position  standing;sitting;sink side  -AF,SHILPI,AF2     LB Bathing Assess/Train, [place] Level  contact guard assist;set up required   pt had trouble grasping and managing obects w/ L hand  -AF,SHILPI,AF2     Recorded by  [AF,SHILPI,AF2] NO Cooney (r) Karen Allan, OT Student (t) NO Cooney (c)     Upper Body Dressing Assessment/Training    UB Dressing Assess/Train, Clothing Type  doffing:;donning:;hospital gown;bra;t-shirt  -AF,SHILPI,AF2     UB Dressing Assess/Train, Position  sitting  -AF,SHILPI,AF2     UB Dressing Assess/Train, [place]  set up required   to gather clothing items   -AF,SHILPI,AF2     Recorded by  [AF,SHILPI,AF2] NO Cooney (r) Karen Allan, OT Student (t) NO Cooney (c)     Lower Body Dressing Assessment/Training    LB Dressing Assess/Train, Clothing Type  doffing:;donning:;pants;shoes;socks   underwear   -AF,SHILPI,AF2     LB Dressing Assess/Train, Position  standing;sitting;sink side  -AF,SHILPI,AF2     LB Dressing Assess/Train, [place]  set up required;minimum assist (75% patient effort)  -AF,SHILPI,AF2     LB Dressing Assess/Train, Comment  pt required assist to gather clothing and tie shoes; pt required assist donning L sock due to L 4,5 toe pain   -AF,SHILPI,AF2     Recorded by  [AF,SHILPI,AF2] Adelia Salamanca, OTR (r) Karen Allan, OT Student (t) Adelia Salamanca, OTR (c)     Toileting Assessment/Training    Toileting Assess/Train, Assistive Device  grab bars;raised toilet seat  -AF,SHILPI,AF2     Toileting  Assess/Train, Position  sitting;standing  -AF,SHILPI,AF2     Toileting Assess/Train, [place] Level  contact guard assist;verbal cues required  -AF,SHILPI,AF2     Toileting Assess/Train, Comment  vcs for hand placement; able to perform hygiene while seated; required cga while standing for clothing management   -AF,SHILPI,AF2     Recorded by  [AF,SHILPI,AF2] NO Cooney (r) Karen Allan, OT Student (t) NO Cooney (c)     Grooming Assessment/Training    Grooming Assess/Train, Position  sitting;sink side  -AF,SHILPI,AF2     Grooming Assess/Train, [place] Level  set up required  -AF,SHILPI,AF2     Grooming Assess/Train, Comment  brushing hair   -AF,SHILPI,AF2     Recorded by  [AF,SHILPI,AF2] NO Cooney (r) Karen Allan, OT Student (t) NO Cooney (c)     Balance Skills Training    Gait Balance-Level of Assistance   Contact guard  -LB    Gait Balance Support   Right upper extremity supported;Left upper extremity supported;eliu bar  -LB    Gait Balance Activities   side-stepping  -LB    Recorded by   [LB] Angelica Treadwell PTA    Therapy Exercises    Left Lower Extremity   AROM:;15 reps;supine;standing  -LB    Recorded by   [LB] Angelica Treadwell PTA    Sensory Treatment    Sensory Reeducation Techniques  sensory training, visual reinforcement;sensory train, w/o visual reinforcement;comparison, tactile sensory information  -AF,SHILPI,AF2     Sensory Reeduction Treatment  therapist facilitated hand over hand symbol drawing w/ eyes closed then guessed what symbol was drawn, if wrong therapist/pt slick the same symbol w/ eyes open; pt correctly identified 1/5 capital letters, 1/3 lower case letters, 1/4 numbers, and 2/4 shapes w/ eyes closed'; pt was presented w/ rough and soft fabric pieces w/ eyes open and ID which was rough and which was soft, pt was able to ID all correctly  -AF,SHILPI,AF2     Recorded by  [AF,SHILPI,AF2] NO Cooney (r) Karen Allan, OT Student (t) NO Cooney (c)     Positioning  and Restraints    Pre-Treatment Position  sitting in chair/recliner   bed am, reclincer pm   -CHAKA,SHILPI,AF2     Post Treatment Position  chair  -CHAKA,SHILPI,AF2 chair  -LB    In Chair  sitting;call light within reach;encouraged to call for assist   am,pm   -CHAKA,SHILPI,AF2 reclined;call light within reach;RLE elevated;LLE elevated  -LB    Recorded by  [AF,SHILPI,AF2] NO Cooney (r) Karen Allan, OT Student (t) NO Cooney (c) [LB] Angelica Treadwell, PTA      08/03/17 1456 08/03/17 1300 08/03/17 0944    Rehab Assessment/Intervention    Discipline occupational therapist  -SHILPI NULL AF2 speech language pathologist  -LT physical therapy assistant  -LB    Document Type therapy note (daily note)  -SHILPI NULL,AF2 therapy note (daily note)  -LT therapy note (daily note)  -LB    Subjective Information no complaints;agree to therapy  -CRYSTAL NULLD,AF2 agree to therapy  -LT agree to therapy  -LB    Patient Effort, Rehab Treatment excellent  -CHAKA,SHILPI,AF2 excellent  -LT good  -LB    Symptoms Noted During/After Treatment shortness of breath   w/ tsfs, muscle strengthening tasks  -AF,SHILPI,AF2      Symptoms Noted Comment rest breaks, monitered SpO2, vcs for breathing  -AF,SHILPI,AF2      Precautions/Limitations fall precautions  -AF,SHILPI,AF2  fall precautions  -LB    Recorded by [AF,SHILPI,AF2] NO Cooney (r) Karen Allan OT Student (t) NO Cooney (c) [LT] Yvonne Miles MS CCC-SLP [LB] Angelica Treadwell, PTA    Vital Signs    Intra SpO2 (%) 97   see symptoms noted comment   -AF,SHILPI,AF2      Recorded by [AF,SHILPI,AF2] NO Cooney (r) Karen Allan OT Student (t) NO Cooney (c)      Pain Assessment    Pain Assessment No/denies pain  -AF,SHILPI,AF2  No/denies pain  -LB    Recorded by [AF,SHILPI,AF2] Adelia Salamanca OTR (r) Karen Allan OT Student (t) Adelia Salamanca, OTR (c)  [LB] Angelica Treadwell, YARA    Cognitive Assessment/Intervention    Current Cognitive/Communication Assessment impaired  -CHAKA,SHILPI,AF2       Orientation Status oriented x 4  -AF,SHILPI,AF2      Follows Commands/Answers Questions 100% of the time;able to follow single-step instructions;needs cueing;needs increased time;needs repetition  -AF,SHILPI,AF2      Personal Safety mild impairment;decreased awareness, need for assist;decreased awareness, need for safety;decreased insight to deficits   some impulsivity when iniating tsfs   -AF,SHILPI,AF2      Personal Safety Interventions fall prevention program maintained;gait belt;nonskid shoes/slippers when out of bed;supervised activity  -CHAKA,SHILPI,AF2  fall prevention program maintained;gait belt;muscle strengthening facilitated;nonskid shoes/slippers when out of bed  -LB    Recorded by [AF,SHILPI,AF2] Adelia Salamanca, OTR (r) Karen Allan OT Student (t) Adelia Salamanca, OTR (c)  [LB] Angelica Treadwell PTA    Improve memory skills    Memory Skills Progress  90%;with inconsistent cues  -LT     Recorded by  [LT] Yvonne Miles MS CCC-SLP     Improve functional problem solving    Functional Problem Solving Progress  80%;with inconsistent cues  -LT     Recorded by  [LT] Yvonne Miles MS CCC-SLP     Improve executive function skills    Executive Function Skills Progress  80%;without cues  -LT     Recorded by  [LT] Yvonne Miles MS CCC-SLP     Bed Mobility, Assessment/Treatment    Bed Mobility, Assistive Device   --   assessed on txment mat  -LB    Bed Mobility, Roll Left, [place]   supervision required  -LB    Bed Mobility, Roll Right, [place]   supervision required  -LB    Bed Mobility, Scoot/Bridge, [place]   supervision required  -LB    Bed Mob, Supine to Sit, [place]   supervision required  -LB    Bed Mob, Sit to Supine, [place]   supervision required  -LB    Recorded by   [LB] Angelica Treadwell PTA    Transfer Assessment/Treatment    Transfers, Bed-Chair [place] contact guard assist  -AF,SHILPI,AF2  contact guard assist;stand by assist  -LB    Transfers, Chair-Bed [place]   contact guard  assist;stand by assist  -LB    Transfers, Bed-Chair-Bed, Assist Device   rolling walker  -LB    Transfers, Sit-Stand [place] contact guard assist;verbal cues required  -AF,SIHLPI,AF2  contact guard assist;stand by assist  -LB    Transfers, Stand-Sit [place] contact guard assist  -AF,SHILPI,AF2  contact guard assist  -LB    Transfers, Sit-Stand-Sit, Assist Device   rolling walker  -LB    Recorded by [AF,SHLIPI,AF2] Adelia Salamanca, OTR (r) Karen Allan, OT Student (t) Adelia Salamanca OTR (c)  [LB] Angelica Treadwell PTA    Gait Assessment/Treatment    Gait, [place] Level   contact guard assist;stand by assist  -LB    Gait, Assistive Device   rolling walker  -LB    Gait, Distance (Feet)   250  -LB    Gait, Gait Deviations   forward flexed posture;rojelio decreased  -LB    Recorded by   [LB] Angelica Treadwell PTA    Stairs Assessment/Treatment    Number of Stairs   8  -LB    Stairs, Handrail Location   both sides  -LB    Stairs, [place] Level   minimum assist (75% patient effort);contact guard assist;verbal cues required  -LB    Stairs, Technique Used   step to step (ascending);step to step (descending)  -LB    Stairs, Comment   curb, ramp rwx cga, vc's  -LB    Recorded by   [LB] Angelica Treadwell PTA    Upper Body Bathing Assessment/Training    UB Bathing Assess/Train, Position sitting;sink side  -AF,SHILPI,AF2      UB Bathing Assess/Train, [place] Level verbal cues required;stand by assist   sequencing vcs  -AF,SHILPI,AF2      Recorded by [AF,SHILPI,AF2] Adelia Salamanca, OTR (r) Karen Allan, OT Student (t) Adelia Salamanca OTR (c)      Lower Body Bathing Assessment/Training    LB Bathing Assess/Train, Position sitting;sink side;standing  -AF,SHILPI,AF2      LB Bathing Assess/Train, [place] Level contact guard assist;verbal cues required  -AF,SHILPI,AF2      LB Bathing Assess/Train, Comment vcs for sequening, cga for balance when standing to wash buttocks/elsi area  -AF,SHILPI,AF2      Recorded by [AF,SHILPI,AF2]  NO Cooney (r) Karen Allan OT Student (t) NO Cooney (c)      Upper Body Dressing Assessment/Training    UB Dressing Assess/Train, Clothing Type doffing:;donning:;hospital gown;bra;t-shirt   dof gown; don bra, shirt   -AF,SHILPI,AF2      UB Dressing Assess/Train, Position sitting;sink side  -AF,SHILPI,AF2      UB Dressing Assess/Train, [place] set up required   to gather clothing items   -AF,SHILPI,AF2      Recorded by [AF,SHILPI,AF2] NO Cooney (r) Karen Allan, OT Student (t) NO Cooney (c)      Lower Body Dressing Assessment/Training    LB Dressing Assess/Train, Clothing Type doffing:;donning:;socks;shoes;pants   dof underwear; don underwear, pants, socks, shoes   -AF,SHILPI,AF2      LB Dressing Assess/Train, Position standing;sitting;sink side  -AF,SHILPI,AF2      LB Dressing Assess/Train, [place] minimum assist (75% patient effort)  -AF,SHILPI,AF2      LB Dressing Assess/Train, Comment pt required assist to gather clothes, adjust pants over hips, tie shoes   -AF,SHILPI,AF2      Recorded by [AF,SHILPI,AF2] NO Cooney (r) Karen Allan, OT Student (t) NO Cooney (c)      Toileting Assessment/Training    Toileting Assess/Train, Assistive Device grab bars;raised toilet seat  -AF,SHILPI,AF2      Toileting Assess/Train, Position sitting;standing  -AF,SHILPI,AF2      Toileting Assess/Train, [place] Level verbal cues required;contact guard assist  -AF,SHILPI,AF2      Toileting Assess/Train, Comment vcs for clothing management before standing to pull up, CGA to maintain standing balance in am  -AF,SHILPI,AF2      Recorded by [AF,SHILPI,AF2] NO Cooney (r) Karen Allan OT Student (t) Adelia Mattie Salamanca, OTR (c)      Grooming Assessment/Training    Grooming Assess/Train, Position sitting;sink side  -SHILPI NULL,AF2      Grooming Assess/Train, [place] Level supervision required  -SHILPI NULL,CHAKA2      Grooming Assess/Train, Comment combing hair   -SHILPI NULL,AF2      Recorded by [CHAKA,SHILPI,AF2] Adelia Phelps  NO [place] (r) Karen Allan OT Student (t) NO Cooney (c)      Balance Skills Training    Standing-Level of Assistance   Contact guard;Minimum assistance  -LB    Static Standing Balance Support   No upper extremity supported  -LB    Standing-Balance Activities   Compliant surfaces  -LB    Gait Balance-Level of Assistance   Contact guard;Minimum assistance  -LB    Gait Balance Support   Right upper extremity supported;Left upper extremity supported;parallel bars  -LB    Gait Balance Activities   --   side stepping on foam plank  -LB    Recorded by   [LB] Angelica Treadwell PTA    Therapy Exercises    Bilateral Lower Extremities   AROM:;10 reps;standing;heel raises;mini squats  -LB    Recorded by   [LB] Angelica Treadwell PTA    Fine Motor Coordination Training    Detail (Fine Motor Coordination Training) pt completed simulated fastener task (tie laces, zip/unzip, button/unbutton) to increase sensory input/fine motor coordination for functional ADLs while seated in w/c at table; pt required increased time   -CHAKA,SHILPI,AF2      Recorded by [AF,SHILPI,AF2] NO Cooney (r) Karen Allan OT Student (t) NO Cooney (c)      Sensory Treatment    Sensory Reeducation Techniques other (see comments)  -CHAKA,SHILPI,AF2      Sensory Reeduction Treatment pt pinched clothes pins and placed on/removed from horizontal mary carmen w/ LUE to improve sensory input to modulate grasp, completed standing at countertop; pt required hand over hand to complete the first clothes pin but was able to complete the rest w/o difficulty   -AF,SHILPI,AF2      Recorded by [AF,SHILPI,AF2] NO Cooney (r) Karen Allan OT Student (t) NO Cooney (c)      Positioning and Restraints    Pre-Treatment Position in bed  -CHAKA,SHILPI,AF2      Post Treatment Position wheelchair  -CHAKASHILPI,AF2  chair  -LB    In Chair encouraged to call for assist;call light within reach;sitting;with family/caregiver  -CHAKA,SHILPI,AF2  sitting;call light within  reach  -LB    Recorded by [CHAKA,SHILPI,AF2] NO Cooney (r) Karen Allan OT Student (t) NO Cooney (c)  [LB] Angelica Treadwell PTA      08/02/17 1506 08/02/17 1000 08/02/17 0941    Rehab Assessment/Intervention    Discipline occupational therapist  -SHILPI NULL,AF2 speech language pathologist  -LT physical therapy assistant  -LB    Document Type therapy note (daily note)  -CHAKA,SHILPI,AF2 therapy note (daily note)  -LT therapy note (daily note)  -LB    Subjective Information agree to therapy;no complaints  -CHAKA,SHILPI,AF2 agree to therapy  -LT agree to therapy  -LB    Patient Effort, Rehab Treatment good  -CHAKASHILPI,AF2 good  -LT good  -LB    Precautions/Limitations fall precautions  -CHAKA,SHILPI,AF2  fall precautions  -LB    Recorded by [CHAKA,SHILPI,AF2] NO Cooney (r) Karen Allan OT Student (t) NO Cooney (c) [LT] Yvonne Miles MS CCC-SLP [LB] Angelica Treadwell PTA    Pain Assessment    Pain Assessment No/denies pain  -CHAKA,SHILPI,AF2  No/denies pain  -LB    Recorded by [CHAKA,SHILPI,AF2] NO Cooney (r) Karne Allan OT Student (t) NO Cooney (c)  [LB] Angelica Treadwell PTA    Cognitive Assessment/Intervention    Current Cognitive/Communication Assessment functional  -AF,SHILPI,AF2      Orientation Status oriented x 4  -AF,SHILPI,AF2      Follows Commands/Answers Questions 100% of the time;able to follow single-step instructions;able to follow multi-step instructions;needs repetition;needs cueing  -AF,SHILPI,AF2      Personal Safety mild impairment  -AF,SHILPI,AF2      Personal Safety Interventions fall prevention program maintained;gait belt;nonskid shoes/slippers when out of bed;supervised activity  -AF,SHILPI,AF2  fall prevention program maintained;gait belt;muscle strengthening facilitated;nonskid shoes/slippers when out of bed  -LB    Recorded by [AF,SHILPI,AF2] Adelia Salamanca, OTR (r) Karen Allan OT Student (t) Adelia Salamanca OTR (c)  [LB] Angelica Treadwell, PTA    Improve memory skills    Memory  Skills Progress  80%;with inconsistent cues  -LT     Recorded by  [LT] Yvonne Miles MS CCC-SLP     Improve functional problem solving    Functional Problem Solving Progress  70%;with inconsistent cues  -LT     Recorded by  [LT] Yvonne Miles MS CCC-SLP     Improve executive function skills    Executive Function Skills Progress  70%;without cues  -LT     Recorded by  [LT] Yvonne Miles MS CCC-SLP     Bed Mobility, Assessment/Treatment    Bed Mobility, Assistive Device   --   assessed on txment mat  -LB    Bed Mobility, Roll Left, [place]   supervision required  -LB    Bed Mobility, Roll Right, [place]   supervision required  -LB    Bed Mob, Supine to Sit, [place] supervision required  -AF,SHILPI,AF2  supervision required  -LB    Bed Mob, Sit to Supine, [place]   supervision required  -LB    Recorded by [AF,SHILPI,AF2] Adelia Salamanca, OTR (r) Karen Allan, OT Student (t) Adelia Salamanca, OTR (c)  [LB] Angelica Treadwell, PTA    Transfer Assessment/Treatment    Transfers, Bed-Chair [place] contact guard assist  -AF,SHILPI,AF2  contact guard assist;minimum assist (75% patient effort)  -LB    Transfers, Chair-Bed [place]   contact guard assist;minimum assist (75% patient effort)  -LB    Transfers, Bed-Chair-Bed, Assist Device   rolling walker  -LB    Transfers, Sit-Stand [place] contact guard assist  -AF,SHILPI,AF2  contact guard assist  -LB    Transfers, Stand-Sit [place] contact guard assist  -AF,SHILPI,AF2  contact guard assist  -LB    Transfers, Sit-Stand-Sit, Assist Device   rolling walker  -LB    Toilet Transfer, [place] contact guard assist  -AF,SHILPI,AF2      Toilet Transfer, Assistive Device elevated toilet seat;rolling walker   grab bars  -AF,SHILPI,AF2      Walk-In Shower Transfer, [place] contact guard assist;verbal cues required   vcs for rwx placement, sequencing, hand placement   -AF,SHILPI,AF2      Walk-In Shower Transfer, Assist Device rolling walker;standard shower chair    grab bars  -AF,SHILPI,AF2      Transfer, Comment   car tsf CGA/min, Rwx  -LB    Recorded by [AF,SHILPI,AF2] NO Cooney (r) Karen Allan, OT Student (t) NO Cooney (c)  [LB] Angelica Treadwell PTA    Gait Assessment/Treatment    Gait, [place] Level   contact guard assist;verbal cues required  -LB    Gait, Assistive Device   rolling walker  -LB    Gait, Distance (Feet)   200  -LB    Gait, Gait Deviations   rojelio decreased;forward flexed posture  -LB    Recorded by   [LB] Angelica Treadwell PTA    Stairs Assessment/Treatment    Number of Stairs   4  -LB    Stairs, Handrail Location   both sides  -LB    Stairs, [place] Level   minimum assist (75% patient effort);contact guard assist;verbal cues required  -LB    Stairs, Technique Used   step to step (ascending);step to step (descending)  -LB    Stairs, Comment   curb rwx, cga, vc's  -LB    Recorded by   [LB] Angelica Treadwell PTA    Upper Body Bathing Assessment/Training    UB Bathing Assess/Train Assistive Device grab bars;hand-held shower head;shower chair with back  -AF,SHILPI,AF2      UB Bathing Assess/Train, Position sitting  -AF,SHILPI,AF2      UB Bathing Assess/Train, [place] Level set up required;contact guard assist  -AF,SHILPI,AF2      Recorded by [AF,SHILPI,AF2] NO Cooney (r) Karen Allan, OT Student (t) NO Cooney (c)      Lower Body Bathing Assessment/Training    LB Bathing Assess/Train Assistive Device grab bars;hand-held shower head;shower chair with back  -AF,SHILPI,AF2      LB Bathing Assess/Train, Position sitting;standing  -AF,SHILPI,AF2      LB Bathing Assess/Train, [place] Level contact guard assist;verbal cues required  -AF,SHILPI,AF2      LB Bathing Assess/Train, Comment vcs for hand placement, CGA for balance when standing to wash buttocks/elsi  -AF,SHILPI,AF2      Recorded by [AF,SHILPI,AF2] NO Cooney (r) Karen Allan, OT Student (t) Adelia Salamanca, OTR (c)      Upper Body Dressing Assessment/Training     UB Dressing Assess/Train, Clothing Type doffing:;donning:;t-shirt;hospital gown   dof gown, don shirt   -AF,SHILPI,AF2      UB Dressing Assess/Train, Position sitting  -AF,SHILPI,AF2      UB Dressing Assess/Train, [place] set up required;contact guard assist  -AF,SHILPI,AF2      UB Dressing Assess/Train, Comment pt set up assist to orient shirt correctly to thread arm   -AF,SHILPI,AF2      Recorded by [AF,SHILPI,AF2] Adelia Salamanca, OTR (r) Karen Allan, OT Student (t) NO Cooney (c)      Lower Body Dressing Assessment/Training    LB Dressing Assess/Train, Clothing Type doffing:;donning:;socks;shoes;pants   dof underwear; don underwear, capris, shoes, socks   -AF,SHILPI,AF2      LB Dressing Assess/Train, Position sitting;standing  -AF,SHILPI,AF2      LB Dressing Assess/Train, [place] minimum assist (75% patient effort)  -AF,SHILPI,AF2      LB Dressing Assess/Train, Comment CGA to stand for clothing management; required assist w/ tying shoes due to decreased sensation in LUE  -AF,SHILPI,AF2      Recorded by [AF,SHILPI,AF2] Adelia Salamanca, OTR (r) Karen Allan, OT Student (t) NO Cooney (c)      Toileting Assessment/Training    Toileting Assess/Train, Assistive Device grab bars;raised toilet seat  -AF,SHILPI,AF2      Toileting Assess/Train, Position sitting;standing  -AF,SHILPI,AF2      Toileting Assess/Train, [place] Level contact guard assist  -AF,SHILPI,AF2      Toileting Assess/Train, Comment CGA for balance during clothing management   -AF,SHILPI,AF2      Recorded by [AF,SHILPI,AF2] Adelia Salamanca OTR (r) Karne Allan, OT Student (t) NO Cooney (c)      Grooming Assessment/Training    Grooming Assess/Train, Position sitting   in recliner using tray table mirror  -AF,SHILPI,AF2      Grooming Assess/Train, [place] Level set up required  -SHILPI NULL,AF2      Grooming Assess/Train, Comment combing hair; dentures hygiene performed before session   -SHILPI NULL,AF2      Recorded by [SHILPI NULL,AF2] Adelia Salamanca, OTR (r) Karen Allan,  OT Student (t) NO Cooney (c)      Balance Skills Training    Standing-Level of Assistance   Minimum assistance  -LB    Static Standing Balance Support   Right upper extremity supported;Left upper extremity supported;eliu bar   UE support as needed  -LB    Standing-Balance Activities   Compliant surfaces  -LB    Gait Balance-Level of Assistance   Contact guard  -LB    Gait Balance Support   Right upper extremity supported;Left upper extremity supported;eliu bar  -LB    Gait Balance Activities   side-stepping  -LB    Recorded by   [LB] Angelica Treadwell PTA    Therapy Exercises    Bilateral Lower Extremities   AROM:;10 reps;standing  -LB    Recorded by   [LB] Angelica Treadwell PTA    Sensory Treatment    Sensory Reeducation Techniques comparison, tactile sensory information;sensory train, w/o visual reinforcement;sensory training, visual reinforcement  -AF,SHILPI,AF2      Sensory Reeduction Treatment 3 objects were placed on table w/ associated sight word cards, pt touched each object and described tactile details w/ eyes open, pt then ID'ed object placed in L hand w/ eyes closed; pt was able to ID 2/3 w/ eyes closed, pt was able to ID the other w/ eyes open; pt searched for items in rice bin w/ eyes open, correctly decsribed tactile details w/ min vcs while manipulating in hand, then reburried the items  -AF,SHILPI,AF2      Recorded by [AF,SHILPI,AF2] NO Cooney (r) Karen Allan OT Student (t) NO Cooney (c)      Positioning and Restraints    Pre-Treatment Position in bed  -AF,SHILPI,AF2      Post Treatment Position chair   recliner  -AF,SHILPI,AF2  wheelchair  -LB    In Chair encouraged to call for assist;sitting;call light within reach  -AF,SHILPI,AF2      In Wheelchair   sitting;with SLP  -LB    Recorded by [AF,SHILPI,AF2] NO Cooney (r) Karen Allan OT Student (t) NO Cooney (c)  [LB] Angelica Treadwell PTA      User Key  (r) = Recorded By, (t) = Taken By, (c) = Cosigned By     Initials Name Effective Dates    LT Yvonne Ginger, MS CCC-SLP 04/13/15 -     LB Angelica MOSES Treadwell, PTA 02/18/16 -     AF Adelia Salamanca, OTR 12/01/15 -     SHILPI Karen Allan, OT Student 07/11/17 -                 IP PT Goals       08/04/17 1057 07/29/17 1211 07/24/17 1605    Bed Mobility PT LTG    Bed Mobility PT LTG, Date Established  07/29/17  -LB 07/24/17  -LH (r) KS (t) LH (c)    Bed Mobility PT LTG, Time to Achieve  2 wks  -LB 5 - 7 days  -LH (r) KS (t) LH (c)    Bed Mobility PT LTG, Activity Type  roll left/roll right;supine to sit/sit to supine  -LB all bed mobility  -LH (r) KS (t) LH (c)    Bed Mobility PT LTG, [place] Level  independent  -LB contact guard assist  -LH (r) KS (t) LH (c)    Bed Mobility PT LTG, Date Goal Reviewed 08/04/17  -LBA      Bed Mobility PT LTG, Outcome goal ongoing  -LBA      Transfer Training PT LTG    Transfer Training PT LTG, Date Established  07/29/17  -LB     Transfer Training PT LTG, Time to Achieve  2 wks  -LB     Transfer Training PT LTG, Activity Type  sit to stand/stand to sit  -LB     Transfer Training PT LTG, [place] Level  conditional independence  -LB     Transfer Training PT LTG, Assist Device  cane, straight  -LB     Transfer Training PT  LTG, Date Goal Reviewed 08/04/17  -LBA      Transfer Training PT LTG, Outcome goal ongoing  -LBA      Transfer Training 2 PT LTG    Transfer Training PT 2 LTG, Date Established  07/29/17  -LB     Transfer Training PT 2 LTG, Time to Achieve  2 wks  -LB     Transfer Training PT 2 LTG, Activity Type  --   car transfer  -LB     Transfer Training PT 2 LTG, [place] Level  contact guard assist  -LB     Transfer Training PT 2 LTG, Assist Device  cane, straight  -LB     Transfer Training PT 2 LTG, Date Goal Reviewed 08/04/17  -LBA      Transfer Training PT 2 LTG, Outcome goal ongoing  -LBA      Gait Training PT LTG    Gait Training Goal PT LTG, Date Established  07/29/17  -LB 07/24/17  -LH (r) KS (t) LH (c)    Gait Training  Goal PT LTG, Time to Achieve  2 wks  -LB 5 - 7 days  -LH (r) KS (t) LH (c)    Gait Training Goal PT LTG, [place] Level  conditional independence  -LB minimum assist (75% patient effort)  -LH (r) KS (t) LH (c)    Gait Training Goal PT LTG, Assist Device  cane, straight  -LB walker, rolling  -LH (r) KS (t) LH (c)    Gait Training Goal PT LTG, Distance to Achieve  150  -  -LH (r) KS (t) LH (c)    Gait Training Goal PT LTG, Date Goal Reviewed 08/04/17  -LBA      Gait Training Goal PT LTG, Outcome goal not met  -LBA      Stair Training PT LTG    Stair Training Goal PT LTG, Date Established  07/29/17  -LB     Stair Training Goal PT LTG, Time to Achieve  2 wks  -LB     Stair Training Goal PT LTG, Number of Steps  4  -LB     Stair Training Goal PT LTG, [place] Level  contact guard assist  -LB     Stair Training Goal PT LTG, Assist Device  1 handrail  -LB     Stair Training Goal PT LTG, Date Goal Reviewed 08/04/17  -LBA      Stair Training Goal PT LTG, Outcome goal ongoing  -LBA        07/24/17 1546          Bed Mobility PT LTG    Bed Mobility PT LTG, Date Established 07/24/17  -LH (r) KS (t) LH (c)      Bed Mobility PT LTG, Time to Achieve 5 - 7 days  -LH (r) KS (t) LH (c)      Bed Mobility PT LTG, Activity Type all bed mobility  -LH (r) KS (t) LH (c)      Bed Mobility PT LTG, [place] Level independent  -LH (r) KS (t) LH (c)      Transfer Training PT LTG    Transfer Training PT LTG, Date Established 07/24/17  -LH (r) KS (t) LH (c)      Transfer Training PT LTG, Time to Achieve 5 - 7 days  -LH (r) KS (t) LH (c)      Transfer Training PT LTG, Activity Type all transfers  -LH (r) KS (t) LH (c)      Transfer Training PT LTG, [place] Level independent  -LH (r) KS (t) LH (c)      Transfer Training PT LTG, Assist Device walker, rolling  -LH (r) KS (t) LH (c)      Gait Training PT LTG    Gait Training Goal PT LTG, Date Established 07/24/17  -LH (r) KS (t) LH (c)      Gait Training Goal PT LTG, Time to  Achieve 5 - 7 days  -LH (r) KS (t) LH (c)      Gait Training Goal PT LTG, [place] Level conditional independence  -LH (r) KS (t) LH (c)      Gait Training Goal PT LTG, Assist Device walker, rolling  -LH (r) KS (t) LH (c)      Gait Training Goal PT LTG, Distance to Achieve 150  -LH (r) KS (t) LH (c)      Static Sitting Balance PT LTG    Static Sitting Balance PT LTG, Date Established 07/24/17  -LH (r) KS (t) LH (c)      Static Sitting Balance PT LTG, Time to Achieve 5 - 7 days  -LH (r) KS (t) LH (c)      Static Sitting Balance PT LTG, [place] Level independent  -LH (r) KS (t) LH (c)      Static Sitting Balance PT LTG, Assist Device UE Support  -LH (r) KS (t) LH (c)      Static Sitting Balance PT LTG, Additional Goal 7 min w/o L lean  -LH (r) KS (t) LH (c)        User Key  (r) = Recorded By, (t) = Taken By, (c) = Cosigned By    Initials Name Provider Type    LB Rosemary Hoffmann, PT Physical Therapist     Angelica Pearson, PT Physical Therapist    [facility] Angelica Treadwell, YARA Physical Therapy Assistant    KS Jessica Germain, PT Student PT Student          Physical Therapy Education     Title: PT OT SLP Therapies (Active)     Topic: Physical Therapy (Active)     Point: Mobility training (Done)    Learning Progress Summary    Learner Readiness Method Response Comment Documented by Status   Patient Acceptance E VU,NR   08/04/17 1358 Done    Acceptance E VU,NR   08/03/17 1103 Done    Acceptance E VU,NR car, stairs, curb LB 08/02/17 1031 Done    Acceptance E VU,NR  LB 08/01/17 0939 Done    Acceptance E VU,NR   07/31/17 0947 Done    Acceptance E VU,NR  LB1 07/29/17 1210 Done                      User Key     Initials Effective Dates Name Provider Type Discipline    [facility] 10/06/15 -  Rosemary Hoffmann, PT Physical Therapist PT     02/18/16 -  Angelica Treadwell, YARA Physical Therapy Assistant PT                    PT Recommendation and Plan  Anticipated Equipment Needs At Discharge: standard cane  Anticipated Discharge  Disposition: home with outpatient services, home with assist  Planned Therapy Interventions: bed mobility training, balance training, neuromuscular re-education, transfer training, strengthening, stair training  PT Frequency: 2 times/day  Plan of Care Review  Plan Of Care Reviewed With: patient  Progress: improving  Outcome Summary/Follow up Plan: pnt has improved with functional mobility. Quite pleasant and highly motivated.         Time Calculation:         PT Charges       08/04/17 1341 08/04/17 0950       Time Calculation    Start Time 1330  -LB 0930  -LB     Stop Time 1400  -LB 1000  -LB     Time Calculation (min) 30 min  -LB 30 min  -LB       User Key  (r) = Recorded By, (t) = Taken By, (c) = Cosigned By    Initials Name Provider Type     Angelica Treadwell PTA Physical Therapy Assistant          Therapy Charges for Today     Code Description Service Date Service Provider Modifiers Qty    86133604098 HC PT THER PROC EA 15 MIN 8/3/2017 Angelica Treadwell PTA GP 4    83041540029 HC PT THER PROC EA 15 MIN 8/4/2017 Angelica Treadwell PTA GP 4               Angelica Treadwell PTA  8/4/2017

## 2017-08-04 NOTE — PLAN OF CARE
Problem: Patient Care Overview (Adult)  Goal: Plan of Care Review  Outcome: Ongoing (interventions implemented as appropriate)    08/04/17 1059   Coping/Psychosocial Response Interventions   Plan Of Care Reviewed With patient   Patient Care Overview   Progress improving   Outcome Evaluation   Outcome Summary/Follow up Plan pnt has improved with functional mobility. Quite pleasant and highly motivated.

## 2017-08-04 NOTE — PROGRESS NOTES
Inpatient Rehabilitation Functional Measures Assessment and Plan of Care    Plan of Care  Updated Problems/Interventions  Field    Functional Measures  BETH Eating:  Branch  BETH Grooming: Branch  [facility] Bathing:  Branch  [facility] Upper Body Dressing:  [place]  [facility] Lower Body Dressing:  [facility] Toileting:  [facility] Bladder Management  Level of Assistance:  [place]  Frequency/Number of Accidents this Shift:  [facility] Bowel Management  Level of Assistance: [place]  Frequency/Number of Accidents this Shift: [place]    [facility] Bed/Chair/Wheelchair Transfer:  Bed/chair/wheelchair Transfer Score = 4.  Patient performs 75% or more of effort and minimal assistance (little/incidental  help/lifting of one limb/steadying) for transferring to and from the  bed/chair/wheelchair, requiring: Contact guard. Patient requires the following  assistive device(s): Walker.  BETH Toilet Transfer:  [facility] Tub/Shower Transfer:  [place]    Previously Documented Mode of Locomotion at Discharge: Field  BETH Expected Mode of Locomotion at Discharge: [facility] Walk/Wheelchair:  WHEELCHAIR OBSERVATION   Activity was not observed.    WALK OBSERVATION   Walk Distance Scale = 3.  Distance walked is greater than 150 feet. Walk Score  = 4.  Patient performs 75% or more of effort and requires minimal assistance.  Incidental help/contact guard/steadying was provided. Patient walked a distance  of  160 feet. Patient requires the following assistive device(s): Rolling  walker.  BETH Stairs:  Stairs Score = 2.  Incidental assistance with lifting or lowering,  contact guard or steadying was provided. Patient performs 75% or more of effort  and requires minimal contact assistance. Patient negotiated  4 stairs. Patient  requires the following assistive device(s): Handrail(s).    BETH Comprehension:  [facility] Expression:  [facility] Social Interaction:  [facility] Problem Solving:  [facility] Memory:  [place]    Therapy Mode Minutes  Occupational Therapy:  Branch  Physical Therapy: Individual: 60 minutes.  Speech Language Pathology:  [place]    Signed by: Angelica Treadwell PTA

## 2017-08-04 NOTE — PROGRESS NOTES
Treatment goals and discharge date reviewed with pt's son by phone.  Family conference planned for Tuesday, 8/8.

## 2017-08-04 NOTE — NURSING NOTE
"Called to pt's room by nursing assistant. Pt was in wc at sink in BR cleaning dentures before breakfast. Pt says  came to draw blood and moved her in her wheelchair so she could get to her and \"ran over my toes\" pointing to last 2 toes on left foot with wheelchair. No injury noted at this time, but c/o pain in those 2 toes. Dr. Nathan was on unit and saw pt, and logan taped those 2 toes with 2x2 and tape. TOMY Vinson RN  "

## 2017-08-04 NOTE — PROGRESS NOTES
Inpatient Rehabilitation Functional Measures Assessment    Functional Measures  [place] Eating:  Eating Score = 5. Patient is supervision/set-up for eating,  requiring: Opening containers. No assistive devices were required.  [place] Grooming: [facility] Bathing:  [facility] Upper Body Dressing:  [facility] Lower Body Dressing:  [facility] Toileting:  Activity was not observed.    BETH Bladder Management  Level of Assistance:  Activity was not observed.  Frequency/Number of Accidents this Shift:  [facility] Bowel Management  Level of Assistance: Activity was not observed.  Frequency/Number of Accidents this Shift: [facility] Bed/Chair/Wheelchair Transfer:  Bed/chair/wheelchair Transfer Score = 4.  Patient performs 75% or more of effort and minimal assistance (little/incidental  help/lifting of one limb/steadying) for transferring to and from the  bed/chair/wheelchair, requiring: Contact guard. Patient requires the following  assistive device(s): Bed rails. Grab bars. Arm rest.  BETH Toilet Transfer:  Activity was not observed.  [place] Tub/Shower Transfer:  [place]    Previously Documented Mode of Locomotion at Discharge: Field  BETH Expected Mode of Locomotion at Discharge: [facility] Walk/Wheelchair:  [facility] Stairs:  [facility] Comprehension:  [facility] Expression:  [facility] Social Interaction:  [facility] Problem Solving:  [facility] Memory:  [place]    Therapy Mode Minutes  Occupational Therapy: Branch  Physical Therapy: Branch  Speech Language Pathology:  [place]anch    Signed by: JULITA Lea

## 2017-08-04 NOTE — PROGRESS NOTES
Inpatient Rehabilitation Plan of Care Note    Plan of Care  Care Plan Reviewed - No updates at this time.    Psychosocial    [RN] Coping/Adjustment(Active)  Current Status(07/28/2017): Patient is at risk for pyschosocial issues related  to recent stroke.  Weekly Goal(08/11/2017): Patient will demonstrate approrpiate coping techniques.    Discharge Goal: Patient will be free from any psychosocial issues.    Performed Intervention(s)  encourage expression of feelings and concerns      Safety    [RN] Potential for Injury(Active)  Current Status(07/28/2017): Patient is at risk for falls related to recent  stroke.  Weekly Goal(08/11/2017): Patient will be free from falls and demonstrate  appropriate safety techniques.  Discharge Goal: Same as weekly.    Performed Intervention(s)  falls precautions  hourly rounding, items within reach  bed alarm      Sphincter Control    [RN] Bowel and Bladder control(Active)  Current Status(07/28/2017): Patient is continent of bowel and bladder 100% of  the time.  Weekly Goal(08/11/2017): Patient will continue to be continent of bowel and  bladder and be free from any elimintaion issues.  Discharge Goal: Same as weekly.    Performed Intervention(s)  monitor intake and output  stool softners as ordered    Signed by: Carmela Cuellar RN

## 2017-08-04 NOTE — PLAN OF CARE
Problem: Stroke (Ischemic) (Adult)  Goal: Signs and Symptoms of Listed Potential Problems Will be Absent or Manageable (Stroke)  Outcome: Ongoing (interventions implemented as appropriate)    Problem: Fall Risk (Adult)  Goal: Absence of Falls  Outcome: Ongoing (interventions implemented as appropriate)    Problem: Patient Care Overview (Adult)  Goal: Plan of Care Review  Outcome: Ongoing (interventions implemented as appropriate)    08/04/17 1503   Coping/Psychosocial Response Interventions   Plan Of Care Reviewed With patient   Outcome Evaluation   Outcome Summary/Follow up Plan Ambulating to BR with walker CGA of 1 . No unsafe behavior.

## 2017-08-05 LAB
GLUCOSE BLDC GLUCOMTR-MCNC: 132 MG/DL (ref 70–130)
GLUCOSE BLDC GLUCOMTR-MCNC: 99 MG/DL (ref 70–130)
INR PPP: 2.29 (ref 0.9–1.1)
PROTHROMBIN TIME: 24.4 SECONDS (ref 11.7–14.2)

## 2017-08-05 PROCEDURE — 85610 PROTHROMBIN TIME: CPT | Performed by: HOSPITALIST

## 2017-08-05 PROCEDURE — 94799 UNLISTED PULMONARY SVC/PX: CPT

## 2017-08-05 PROCEDURE — 82962 GLUCOSE BLOOD TEST: CPT

## 2017-08-05 PROCEDURE — 97110 THERAPEUTIC EXERCISES: CPT

## 2017-08-05 RX ADMIN — PREDNISOLONE ACETATE 1 DROP: 10 SUSPENSION/ DROPS OPHTHALMIC at 21:09

## 2017-08-05 RX ADMIN — PREDNISOLONE ACETATE 1 DROP: 10 SUSPENSION/ DROPS OPHTHALMIC at 08:56

## 2017-08-05 RX ADMIN — BUDESONIDE AND FORMOTEROL FUMARATE DIHYDRATE 2 PUFF: 80; 4.5 AEROSOL RESPIRATORY (INHALATION) at 20:38

## 2017-08-05 RX ADMIN — BUDESONIDE AND FORMOTEROL FUMARATE DIHYDRATE 2 PUFF: 80; 4.5 AEROSOL RESPIRATORY (INHALATION) at 07:10

## 2017-08-05 RX ADMIN — ATORVASTATIN CALCIUM 80 MG: 80 TABLET, FILM COATED ORAL at 21:08

## 2017-08-05 RX ADMIN — METOPROLOL TARTRATE 25 MG: 25 TABLET ORAL at 21:09

## 2017-08-05 RX ADMIN — METOPROLOL TARTRATE 25 MG: 25 TABLET ORAL at 08:56

## 2017-08-05 RX ADMIN — DOCUSATE SODIUM -SENNOSIDES 2 TABLET: 50; 8.6 TABLET, COATED ORAL at 21:09

## 2017-08-05 NOTE — PROGRESS NOTES
LOS: 8 days   Patient Care Team:  JOSHUA Chaudhari as PCP - General  Mario Mata MD as PCP - Claims Attributed  Hortencia Lisa MD as Consulting Physician (Cardiology)  Pierre Walker MD as Consulting Physician (Gastroenterology)    No chief complaint on file.        Subjective   Denies any complaints or problems     Objective     Vital Signs  Temp:  [97.5 °F (36.4 °C)-97.8 °F (36.6 °C)] 97.5 °F (36.4 °C)  Heart Rate:  [72-99] 72  Resp:  [17-20] 18  BP: (139-171)/(78-89) 139/78    Physical Exam:  WDWN female in NAD  Skin warm and dry  Lungs clear  Heart irregular, rate controlled  Extremities no edema     Results Review:     I reviewed the patient's new clinical results.    Medication Review:       LABS    Results from last 7 days  Lab Units 07/30/17  0645   CREATININE mg/dL 0.49*       Results from last 7 days  Lab Units 07/30/17  0645   PLATELETS 10*3/mm3 190       Results from last 7 days  Lab Units 08/05/17  0538 08/04/17  0729 08/03/17  0424   INR  2.29* 2.37* 2.95*           Assessment/Plan     Active Problems:    Benign essential hypertension    Controlled type 2 diabetes mellitus without complication    Cerebrovascular accident (CVA) due to occlusion of right middle cerebral artery    Atrial fibrillation    Warfarin anticoagulation (goal INR 2-3) - I have had to make changes in her Coumadin dose pretty much every day.  Her dietary intake has reportedly been very erratic.  She is on the Coumadin because of atrial fibrillation which was thought to be the cause of her stroke.  Her PT note showed that she negotiated 8 stay years and that she performed to 75% or more of effort required minimal contact assistance.  At her age she is going to be more difficult to keep regulated on Coumadin.  I'm going to stop the Coumadin and start her on Eliquis once her INR gets down below 2.  Her fall risk, I think, is less than her risk of Coumadin toxicity and bleeding complications related to such.            Lori Serrano MD  08/05/17  4:49 PM      Time:

## 2017-08-05 NOTE — PLAN OF CARE
Problem: Stroke (Ischemic) (Adult)  Goal: Signs and Symptoms of Listed Potential Problems Will be Absent or Manageable (Stroke)  Outcome: Ongoing (interventions implemented as appropriate)    08/05/17 0131   Stroke (Ischemic)   Problems Assessed (Stroke (Ischemic)/TIA) all   Problems Present (Stroke (Ischemic)/TIA) motor/sensory impairment         Problem: Fall Risk (Adult)  Goal: Absence of Falls  Outcome: Ongoing (interventions implemented as appropriate)    08/05/17 0131   Fall Risk (Adult)   Absence of Falls making progress toward outcome

## 2017-08-05 NOTE — PROGRESS NOTES
Inpatient Rehabilitation Functional Measures Assessment    Functional Measures  BETH Eating:  Branch  BETH Grooming: [place]  BETH Bathing:  [place]anch  BETH Upper Body Dressing:  [place]  [facility] Lower Body Dressing:  [facility] Toileting:  [facility] Bladder Management  Level of Assistance:  [place]  Frequency/Number of Accidents this Shift:  [facility] Bowel Management  Level of Assistance: [place]  Frequency/Number of Accidents this Shift: Branch    [facility] Bed/Chair/Wheelchair Transfer:  Activity was not observed.  BETH Toilet Transfer:  [facility] Tub/Shower Transfer:  [place]    Previously Documented Mode of Locomotion at Discharge: Field  BETH Expected Mode of Locomotion at Discharge: [facility] Walk/Wheelchair:  WHEELCHAIR OBSERVATION   Activity was not observed.    WALK OBSERVATION   Walk Distance Scale = 3.  Distance walked is greater than 150 feet. Walk Score  = 4.  Patient performs 75% or more of effort and requires minimal assistance.  Incidental help/contact guard/steadying was provided. Patient walked a distance  of  300 feet. Patient requires the following assistive device(s): Rolling  walker.  BETH Stairs:  Stairs Score = 2.  Incidental assistance with lifting or lowering,  contact guard or steadying was provided. Patient performs 75% or more of effort  and requires minimal contact assistance. Patient negotiated  8 stairs. Patient  requires the following assistive device(s): Handrail(s).    BETH Comprehension:  [place]  BETH Expression:  [facility] Social Interaction:  [facility] Problem Solving:  [facility] Memory:  [place]    Therapy Mode Minutes  Occupational Therapy: [place]  Physical Therapy: Individual: 30 minutes.  Speech Language Pathology:  [place]    Signed by: Danielle Arroyo PT

## 2017-08-05 NOTE — THERAPY TREATMENT NOTE
Inpatient Rehabilitation - Physical Therapy Treatment Note  [facility]     Patient Name: Magnus Hartley  : 1928  MRN: [id number]  Today's Date: 2017  Onset of Illness/Injury or Date of Surgery Date: 17  Date of Referral to PT: 17  Referring Physician: Jac    Admit Date: 2017    Visit Dx:    ICD-10-CM ICD-9-CM   1. Left hemiparesis G81.94 342.90   2. Dysarthria R47.1 784.51     Patient Active Problem List   Diagnosis   • Foot pain   • Asthma   • Benign essential hypertension   • Controlled type 2 diabetes mellitus without complication   • Dyslipidemia   • Macrocytosis without anemia   • Senile osteoporosis   • Post-menopausal osteoporosis   • Sinus bradycardia   • Stress incontinence in female   • Urge incontinence of urine   • Atrophic vaginitis   • Encounter for fitting and adjustment of other specified devices   • Incomplete emptying of bladder   • Urethral caruncle   • Incomplete uterovaginal prolapse   • Cerebrovascular accident (CVA) due to occlusion of right middle cerebral artery   • Atrial fibrillation   • Warfarin anticoagulation (goal INR 2-3)   • CVA (cerebrovascular accident due to intracerebral hemorrhage)               Adult Rehabilitation Note       17 1100 17 1500 17 1419    Rehab Assessment/Intervention    Discipline physical therapist  -JT speech language pathologist  -LT occupational therapist  -SHILPI NULL AF2    Document Type therapy note (daily note)  -JT therapy note (daily note)  -LT therapy note (daily note)  -SHILPI NULL AF2    Subjective Information agree to therapy;no complaints  -JT agree to therapy;no complaints  -LT agree to therapy;complains of;pain   am, pm  -SHILPI NULL AF2    Patient Effort, Rehab Treatment good  -JT good  -LT good  -SHILPI NULL AF2    Precautions/Limitations fall precautions  -JT  fall precautions  -SHILPI NULL AF2    Precautions/Limitations, Vision WFL with corrective lenses  -JT      Recorded by [JT] Danielle Arroyo, PT [LT]  Yvonne Miles MS CCC-SLP [AF,SHILPI,AF2] Adelia Salamanca OTR (r) Karen Allan OT Student (t) NO Cooney (c)    Pain Assessment    Pain Assessment No/denies pain  -JT  0-10  -AF,SHILPI,AF2    Pain Score   9  -AF,SHILPI,AF2    Post Pain Score   --   am,pm  -AF,SHILPI,AF2    Pain Type   Acute pain  -AF,SHILPI,AF2    Pain Location   Toe (Comment which one)  -AF,SHILPI,AF2    Pain Orientation   --   L 4,5  -AF,SHILPI,AF2    Pain Intervention(s)   Repositioned;Elevated   denied for me to request pain meds   -AF,SHILPI,AF2    Recorded by [JT] Danielle Arroyo, PT  [AF,SHILPI,AF2] Adelia Salamanca OTR (r) Karen Allan, OT Student (t) NO Cooney (c)    Cognitive Assessment/Intervention    Current Cognitive/Communication Assessment impaired  -JT  impaired  -AF,SHILPI,AF2    Orientation Status oriented x 4  -JT  oriented x 4  -AF,SHILPI,AF2    Follows Commands/Answers Questions 100% of the time  -JT  100% of the time;able to follow single-step instructions;needs repetition;needs increased time  -AF,SHILPI,AF2    Personal Safety mild impairment  -JT  mild impairment  -AF,SHILPI,AF2    Personal Safety Interventions fall prevention program maintained;gait belt;muscle strengthening facilitated;nonskid shoes/slippers when out of bed  -JT  fall prevention program maintained;gait belt;nonskid shoes/slippers when out of bed;supervised activity  -AF,SHILPI,AF2    Recorded by [JT] Danielle Arroyo, PT  [AF,SHILPI,AF2] NO Cooney (r) Karen Allan OT Student (t) NO Cooney (c)    Improve attention    Attention Progress  80%;without cues  -LT     Recorded by  [LT] Yvonne Miles MS CCC-SLP     Improve memory skills    Memory Skills Progress  80%;without cues  -LT     Recorded by  [LT] Yvonne Miles MS CCC-SLP     Improve functional problem solving    Functional Problem Solving Progress  70%;with inconsistent cues  -LT     Recorded by  [LT] Yvonne Miles MS CCC-SLP     Improve executive function skills    Executive Function Skills  Progress  70%;without cues  -LT     Recorded by  [LT] Yvonne Miles MS CCC-SLP     Bed Mobility, Assessment/Treatment    Bed Mob, Supine to Sit, [place]   supervision required  -AF,SHILPI,AF2    Bed Mobility, Comment up in chair  -JT      Recorded by [JT] Danielle Arroyo, PT  [AF,SHILPI,AF2] Adelia Salamanca OTR (r) Karen Allan, OT Student (t) NO Cooney (c)    Transfer Assessment/Treatment    Transfers, Bed-Chair [place] contact guard assist;stand by assist  -JT  contact guard assist;stand by assist  -AF,SHILPI,AF2    Transfers, Chair-Bed [place] contact guard assist;stand by assist  -JT      Transfers, Bed-Chair-Bed, Assist Device rolling walker  -JT      Transfers, Sit-Stand [place] contact guard assist;stand by assist  -JT  contact guard assist;stand by assist  -AF,SHILPI,AF2    Transfers, Stand-Sit [place] contact guard assist;stand by assist  -JT  contact guard assist;stand by assist  -AF,SHILPI,AF2    Transfers, Sit-Stand-Sit, Assist Device rolling walker  -JT      Transfer, Safety Issues balance decreased during turns;sequencing ability decreased;step length decreased;weight-shifting ability decreased  -JT      Transfer, Impairments strength decreased;impaired balance  -JT      Transfer, Comment   tsf to and from EOM from w/c w/ CGA, vcs for hand placement   -AF,SHILPI,AF2    Recorded by [JT] Danielle Arroyo, PT  [AF,SHILPI,AF2] NO Cooney (r) Karen Allan, OT Student (t) Adelia Salamanca OTR (c)    Gait Assessment/Treatment    Gait, [place] Level contact guard assist;stand by assist  -JT      Gait, Assistive Device rolling walker  -JT      Gait, Distance (Feet) 300   x1; 200ft x 3  -JT      Gait, Gait Deviations rojelio decreased;forward flexed posture;step length decreased  -JT      Gait, Safety Issues step length decreased;weight-shifting ability decreased  -JT      Gait, Impairments strength decreased;impaired balance  -JT      Recorded by [JT] Danielle Barba  Cruz, PT      Stairs Assessment/Treatment    Number of Stairs 8  -JT      Stairs, Handrail Location both sides  -JT      Stairs, [place] Level verbal cues required;contact guard assist  -JT      Stairs, Assistive Device other (see comments)   hand rails  -JT      Stairs, Technique Used step to step (ascending);step to step (descending)  -JT      Stairs, Safety Issues weight-shifting ability decreased  -JT      Stairs, Impairments strength decreased  -JT      Recorded by [JT] Danielle Arroyo, PT      Upper Body Bathing Assessment/Training    UB Bathing Assess/Train, Position   sink side;sitting  -AF,SHILPI,AF2    UB Bathing Assess/Train, [place] Level   set up required;stand by assist  -AF,SHILPI,AF2    UB Bathing Assess/Train, Comment   completed at w/c level   -AF,SHILPI,AF2    Recorded by   [AF,SHILPI,AF2] Adelia Salamanca OTR (r) Karen Allan, OT Student (t) NO Cooney (c)    Lower Body Bathing Assessment/Training    LB Bathing Assess/Train, Position   standing;sitting;sink side  -AF,SHILPI,AF2    LB Bathing Assess/Train, [place] Level   contact guard assist;set up required   pt had trouble grasping and managing obects w/ L hand  -AF,SHILPI,AF2    Recorded by   [AF,SHILPI,AF2] NO Cooney (r) Karen Allan, OT Student (t) NO Cooney (c)    Upper Body Dressing Assessment/Training    UB Dressing Assess/Train, Clothing Type   doffing:;donning:;hospital gown;bra;t-shirt  -AF,SHILPI,AF2    UB Dressing Assess/Train, Position   sitting  -AF,SHILPI,AF2    UB Dressing Assess/Train, [place]   set up required   to gather clothing items   -AF,SHILPI,AF2    Recorded by   [AF,SHILPI,AF2] NO Cooney (r) Karen Allan, OT Student (t) NO Cooney (c)    Lower Body Dressing Assessment/Training    LB Dressing Assess/Train, Clothing Type   doffing:;donning:;pants;shoes;socks   underwear   -AF,SHILPI,AF2    LB Dressing Assess/Train, Position   standing;sitting;sink side  -SHILPI NULL,AF2    LB  Dressing Assess/Train, [place]   set up required;minimum assist (75% patient effort)  -AF,SHILPI,AF2    LB Dressing Assess/Train, Comment   pt required assist to gather clothing and tie shoes; pt required assist donning L sock due to L 4,5 toe pain   -AF,SHILPI,AF2    Recorded by   [AF,SHILPI,AF2] Adelia Salamanca OTR (r) Karen Allan, OT Student (t) NO Cooney (c)    Toileting Assessment/Training    Toileting Assess/Train, Assistive Device   grab bars;raised toilet seat  -AF,SHILPI,AF2    Toileting Assess/Train, Position   sitting;standing  -AF,SHILPI,AF2    Toileting Assess/Train, [place] Level   contact guard assist;verbal cues required  -AF,SHILPI,AF2    Toileting Assess/Train, Comment   vcs for hand placement; able to perform hygiene while seated; required cga while standing for clothing management   -AF,SHILPI,AF2    Recorded by   [AF,SHILPI,AF2] NO Cooney (r) Karen Allan, OT Student (t) ON Cooney (c)    Grooming Assessment/Training    Grooming Assess/Train, Position   sitting;sink side  -AF,SHILPI,AF2    Grooming Assess/Train, [place] Level   set up required  -AF,SHILPI,AF2    Grooming Assess/Train, Comment   brushing hair   -AF,SHILPI,AF2    Recorded by   [AF,SHILPI,AF2] NO Cooney (r) Karen Allan, OT Student (t) NO Cooney (c)    Therapy Exercises    Bilateral Lower Extremities AROM:;10 reps;standing;heel raises;mini squats;hip abduction/adduction   side stepping  -JT      Recorded by [JT] Danielle Arroyo, PT      Sensory Treatment    Sensory Reeducation Techniques   sensory training, visual reinforcement;sensory train, w/o visual reinforcement;comparison, tactile sensory information  -AF,SHILPI,AF2    Sensory Reeduction Treatment   therapist facilitated hand over hand symbol drawing w/ eyes closed then guessed what symbol was drawn, if wrong therapist/pt slick the same symbol w/ eyes open; pt correctly identified 1/5 capital letters, 1/3 lower case letters, 1/4 numbers, and 2/4  shapes w/ eyes closed'; pt was presented w/ rough and soft fabric pieces w/ eyes open and ID which was rough and which was soft, pt was able to ID all correctly  -AF,SHILPI,AF2    Recorded by   [AF,SHILPI,AF2] NO Cooney (r) Karen Allan, OT Student (t) NO Cooney (c)    Positioning and Restraints    Pre-Treatment Position   sitting in chair/recliner   bed am, reclincer pm   -AF,SHILPI,AF2    Post Treatment Position   chair  -AF,SHILPI,AF2    In Chair   sitting;call light within reach;encouraged to call for assist   am,pm   -AF,SHILPI,AF2    In Wheelchair sitting;call light within reach;encouraged to call for assist;notified nsg  -JT      Recorded by [JT] Danielle Arroyo PT  [AF,SHILPI,AF2] NO Cooney (r) Karen Allan, OT Student (t) NO Cooney (c)      08/04/17 0950 08/03/17 1456 08/03/17 1300    Rehab Assessment/Intervention    Discipline physical therapy assistant  -LB occupational therapist  -AF,SHILPI,AF2 speech language pathologist  -LT    Document Type therapy note (daily note)  -LB therapy note (daily note)  -AF,SHILPI,AF2 therapy note (daily note)  -LT    Subjective Information agree to therapy;complains of;pain  -LB no complaints;agree to therapy  -AF,SHILPI,AF2 agree to therapy  -LT    Patient Effort, Rehab Treatment good  -LB excellent  -AF,SHILPI,AF2 excellent  -LT    Symptoms Noted During/After Treatment  shortness of breath   w/ tsfs, muscle strengthening tasks  -AF,SHILPI,AF2     Symptoms Noted Comment  rest breaks, monitered SpO2, vcs for breathing  -AF,SHILPI,AF2     Precautions/Limitations fall precautions  -LB fall precautions  -AF,SHILPI,AF2     Recorded by [LB] Angelica Treadwell PTA [AF,SHILPI,AF2] NO Cooney (r) Karen Allan, OT Student (t) NO Cooney (c) [LT] Yvonne Miles MS CCC-SLP    Vital Signs    Intra SpO2 (%)  97   see symptoms noted comment   -AF,SHILPI,AF2     Recorded by  [AF,SHILPI,AF2] Adelia Salamanca, OTR (r) Karen Allan OT Student (t) Adelia Salamanca, OTR  (c)     Pain Assessment    Pain Assessment 0-10  -LB No/denies pain  -AF,SHILPI,AF2     Pain Score 9  -LB      Pain Location Toe (Comment which one)   Left 4 th and 5th toes  -LB      Pain Intervention(s) Repositioned;Elevated   declined for me to request pain meds  -LB      Recorded by [LB] Angelica Treadwell PTA [AF,SHILPI,AF2] Adelia Salamanca OTR (r) Karen Allan, OT Student (t) Adelia Salamanca OTR (c)     Cognitive Assessment/Intervention    Current Cognitive/Communication Assessment  impaired  -AF,SHILPI,AF2     Orientation Status  oriented x 4  -AF,SHILPI,AF2     Follows Commands/Answers Questions  100% of the time;able to follow single-step instructions;needs cueing;needs increased time;needs repetition  -AF,SHILPI,AF2     Personal Safety  mild impairment;decreased awareness, need for assist;decreased awareness, need for safety;decreased insight to deficits   some impulsivity when iniating tsfs   -AF,SHILPI,AF2     Personal Safety Interventions fall prevention program maintained;gait belt;muscle strengthening facilitated;nonskid shoes/slippers when out of bed  -LB fall prevention program maintained;gait belt;nonskid shoes/slippers when out of bed;supervised activity  -AF,SHILPI,AF2     Recorded by [LB] Angelica Treadwell PTA [AF,SHILPI,AF2] Adelia Salamanca, OTR (r) Karen Allan, OT Student (t) Adelia Salamanca OTR (c)     Improve memory skills    Memory Skills Progress   90%;with inconsistent cues  -LT    Recorded by   [LT] Yvonne Miles MS CCC-SLP    Improve functional problem solving    Functional Problem Solving Progress   80%;with inconsistent cues  -LT    Recorded by   [LT] Yvonne Miles MS CCC-SLP    Improve executive function skills    Executive Function Skills Progress   80%;without cues  -LT    Recorded by   [LT] Yvonne Miles MS CCC-SLP    Bed Mobility, Assessment/Treatment    Bed Mob, Supine to Sit, [place] supervision required  -LB      Bed Mob, Sit to Supine, [place] supervision required  -LB       Recorded by [LB] Angelica Treadwell PTA      Transfer Assessment/Treatment    Transfers, Bed-Chair [place] contact guard assist;stand by assist  -LB contact guard assist  -AF,SHILPI,AF2     Transfers, Chair-Bed [place] contact guard assist;stand by assist  -LB      Transfers, Bed-Chair-Bed, Assist Device rolling walker  -LB      Transfers, Sit-Stand [place] contact guard assist;stand by assist  -LB contact guard assist;verbal cues required  -AF,SHILPI,AF2     Transfers, Stand-Sit [place] contact guard assist;stand by assist  -LB contact guard assist  -AF,SHILPI,AF2     Transfers, Sit-Stand-Sit, Assist Device rolling walker  -LB      Recorded by [LB] Angelica Treadwell PTA [AF,SHILPI,AF2] Adelia Salamanca, OTR (r) Karen Allan, OT Student (t) Adelia Salamanca, CHERELLER (c)     Gait Assessment/Treatment    Gait, [place] Level contact guard assist;stand by assist  -LB      Gait, Assistive Device rolling walker  -LB      Gait, Distance (Feet) 160   amb as far as 240 ft  -LB      Gait, Gait Deviations forward flexed posture;rojelio decreased  -LB      Recorded by [LB] Angelica Treadwell PTA      Stairs Assessment/Treatment    Number of Stairs 4  -LB      Stairs, Handrail Location both sides  -LB      Stairs, [place] Level verbal cues required;contact guard assist  -LB      Stairs, Technique Used step to step (ascending);step to step (descending)  -LB      Stairs, Comment  curb, ramp, rwx CGA,vc's  -LB      Recorded by [LB] Angelica Treadwell PTA      Upper Body Bathing Assessment/Training    UB Bathing Assess/Train, Position  sitting;sink side  -AF,SHILPI,AF2     UB Bathing Assess/Train, [place] Level  verbal cues required;stand by assist   sequencing vcs  -AF,SHILPI,AF2     Recorded by  [AF,SHILPI,AF2] Adelia Salamanca, OTR (r) Karen Allan, OT Student (t) Adelia Salamanca OTR (c)     Lower Body Bathing Assessment/Training    LB Bathing Assess/Train, Position  sitting;sink side;standing  -AF,SHILPI,AF2     LB Bathing  Assess/Train, [place] Level  contact guard assist;verbal cues required  -AF,SHILPI,AF2     LB Bathing Assess/Train, Comment  vcs for sequening, cga for balance when standing to wash buttocks/elsi area  -AF,SHILPI,AF2     Recorded by  [AF,SHILPI,AF2] NO Cooney (r) Karen Allan, OT Student (t) NO Cooney (c)     Upper Body Dressing Assessment/Training    UB Dressing Assess/Train, Clothing Type  doffing:;donning:;hospital gown;bra;t-shirt   dof gown; don bra, shirt   -AF,SHILPI,AF2     UB Dressing Assess/Train, Position  sitting;sink side  -AF,SHILPI,AF2     UB Dressing Assess/Train, [place]  set up required   to gather clothing items   -AF,SHILPI,AF2     Recorded by  [AF,SHILPI,AF2] NO Cooney (r) Karen Allan, OT Student (t) NO Cooney (c)     Lower Body Dressing Assessment/Training    LB Dressing Assess/Train, Clothing Type  doffing:;donning:;socks;shoes;pants   dof underwear; don underwear, pants, socks, shoes   -AF,SHILPI,AF2     LB Dressing Assess/Train, Position  standing;sitting;sink side  -AF,SHILPI,AF2     LB Dressing Assess/Train, [place]  minimum assist (75% patient effort)  -AF,SHILPI,AF2     LB Dressing Assess/Train, Comment  pt required assist to gather clothes, adjust pants over hips, tie shoes   -AF,SHILPI,AF2     Recorded by  [AF,SHILPI,AF2] NO Cooney (r) Karen Allan, OT Student (t) NO Cooney (c)     Toileting Assessment/Training    Toileting Assess/Train, Assistive Device  grab bars;raised toilet seat  -AF,SHILPI,AF2     Toileting Assess/Train, Position  sitting;standing  -AF,SHILPI,AF2     Toileting Assess/Train, [place] Level  verbal cues required;contact guard assist  -AF,SHLIPI,AF2     Toileting Assess/Train, Comment  vcs for clothing management before standing to pull up, CGA to maintain standing balance in am  -AF,SHILPI,AF2     Recorded by  [AF,SHILPI,AF2] Adelia Salamanca, OTR (r) Karen Allan OT Student (t) Adelia Salamanca, CHERELLER (c)     Grooming Assessment/Training     Grooming Assess/Train, Position  sitting;sink side  -CHAKA,SHILPI,AF2     Grooming Assess/Train, [place] Level  supervision required  -AF,SHILPI,AF2     Grooming Assess/Train, Comment  combing hair   -AF,SHILPI,AF2     Recorded by  [AF,SHILPI,AF2] NO Cooney (r) Karen Allan OT Student (t) NO Cooney (c)     Balance Skills Training    Gait Balance-Level of Assistance Contact guard  -LB      Gait Balance Support Right upper extremity supported;Left upper extremity supported;eliu bar  -LB      Gait Balance Activities side-stepping  -LB      Recorded by [LB] Angelica Treadwell PTA      Therapy Exercises    Left Lower Extremity AROM:;15 reps;supine;standing  -LB      Recorded by [LB] Angelica Treadwell PTA      Fine Motor Coordination Training    Detail (Fine Motor Coordination Training)  pt completed simulated fastener task (tie laces, zip/unzip, button/unbutton) to increase sensory input/fine motor coordination for functional ADLs while seated in w/c at table; pt required increased time   -CHAKA,SHILPI,AF2     Recorded by  [AF,SHILPI,AF2] NO Cooney (r) Karen Allan, CHERELLE Student (t) NO Cooney (c)     Sensory Treatment    Sensory Reeducation Techniques  other (see comments)  -CHAKA,SHILPI,AF2     Sensory Reeduction Treatment  pt pinched clothes pins and placed on/removed from horizontal mary carmen w/ LUE to improve sensory input to modulate grasp, completed standing at countertop; pt required hand over hand to complete the first clothes pin but was able to complete the rest w/o difficulty   -AF,SHILPI,AF2     Recorded by  [AF,SHILPI,AF2] NO Cooney (r) Karen Allan, CHERELLE Student (t) NO Cooney (c)     Positioning and Restraints    Pre-Treatment Position  in bed  -AF,SHILPI,AF2     Post Treatment Position chair  -LB wheelchair  -CHAKA,SHILPI,AF2     In Chair reclined;call light within reach;RLE elevated;LLE elevated  -LB encouraged to call for assist;call light within reach;sitting;with family/caregiver   -AF,SHILPI,AF2     Recorded by [LB] Angelica Treadwell PTA [AF,SHILPI,AF2] NO Cooney (r) Karen Allan OT Student (t) NO Cooney (c)       08/03/17 0944 08/02/17 1506       Rehab Assessment/Intervention    Discipline physical therapy assistant  -LB occupational therapist  -AF,SHILPI,AF2     Document Type therapy note (daily note)  -LB therapy note (daily note)  -AF,SHILPI,AF2     Subjective Information agree to therapy  -LB agree to therapy;no complaints  -AF,SHILPI,AF2     Patient Effort, Rehab Treatment good  -LB good  -AF,SHILPI,AF2     Precautions/Limitations fall precautions  -LB fall precautions  -AF,SHILPI,AF2     Recorded by [LB] Angelica Treadwell PTA [AF,SHILPI,AF2] NO Cooney (r) Karen Allan OT Student (t) NO Cooney (c)     Pain Assessment    Pain Assessment No/denies pain  -LB No/denies pain  -AF,SHILPI,AF2     Recorded by [LB] Angelica Treadwell PTA [AF,SHILPI,AF2] NO [place] (r) Karen Allan OT Student (t) NO Cooney (c)     Cognitive Assessment/Intervention    Current Cognitive/Communication Assessment  functional  -AF,SHILPI,AF2     Orientation Status  oriented x 4  -AF,SHILPI,AF2     Follows Commands/Answers Questions  100% of the time;able to follow single-step instructions;able to follow multi-step instructions;needs repetition;needs cueing  -AF,SHILPI,AF2     Personal Safety  mild impairment  -AF,SHILPI,AF2     Personal Safety Interventions fall prevention program maintained;gait belt;muscle strengthening facilitated;nonskid shoes/slippers when out of bed  -LB fall prevention program maintained;gait belt;nonskid shoes/slippers when out of bed;supervised activity  -AF,SHILPI,AF2     Recorded by [LB] Angelica Treadwell PTA [AF,SHILPI,AF2] NO Cooney (r) Karen Allan OT Student (t) Adelia Salamanca, OTR (c)     Bed Mobility, Assessment/Treatment    Bed Mobility, Assistive Device --   assessed on txment mat  -LB      Bed Mobility, Roll Left, [place] supervision  required  -LB      Bed Mobility, Roll Right, [place] supervision required  -LB      Bed Mobility, Scoot/Bridge, [place] supervision required  -LB      Bed Mob, Supine to Sit, [place] supervision required  -LB supervision required  -AF,SHILPI,AF2     Bed Mob, Sit to Supine, [place] supervision required  -LB      Recorded by [LB] Angelica Treadwell PTA [AF,SHILPI,AF2] Adelia Salamanca, OTR (r) Karen Allan, OT Student (t) NO Cooney (c)     Transfer Assessment/Treatment    Transfers, Bed-Chair [place] contact guard assist;stand by assist  -LB contact guard assist  -AF,SHILPI,AF2     Transfers, Chair-Bed [place] contact guard assist;stand by assist  -LB      Transfers, Bed-Chair-Bed, Assist Device rolling walker  -LB      Transfers, Sit-Stand [place] contact guard assist;stand by assist  -LB contact guard assist  -AF,SHILPI,AF2     Transfers, Stand-Sit [place] contact guard assist  -LB contact guard assist  -AF,SHILPI,AF2     Transfers, Sit-Stand-Sit, Assist Device rolling walker  -LB      Toilet Transfer, [place]  contact guard assist  -AF,SHILPI,AF2     Toilet Transfer, Assistive Device  elevated toilet seat;rolling walker   grab bars  -AF,SHILPI,AF2     Walk-In Shower Transfer, [place]  contact guard assist;verbal cues required   vcs for rwx placement, sequencing, hand placement   -AF,SHILPI,AF2     Walk-In Shower Transfer, Assist Device  rolling walker;standard shower chair   grab bars  -AF,SHILPI,AF2     Recorded by [LB] Angelica Treadwell PTA [AF,SHILPI,AF2] Adelia Salamanca OTR (r) Karen Allan, OT Student (t) NO Cooney (c)     Gait Assessment/Treatment    Gait, [place] Level contact guard assist;stand by assist  -LB      Gait, Assistive Device rolling walker  -LB      Gait, Distance (Feet) 250  -LB      Gait, Gait Deviations forward flexed posture;rojelio decreased  -LB      Recorded by [LB] Angelica Treadwell PTA      Stairs Assessment/Treatment    Number of Stairs 8   -LB      Stairs, Handrail Location both sides  -LB      Stairs, [place] Level minimum assist (75% patient effort);contact guard assist;verbal cues required  -LB      Stairs, Technique Used step to step (ascending);step to step (descending)  -LB      Stairs, Comment curb, ramp rwx cga, vc's  -LB      Recorded by [LB] Angelica Treadwell, PTA      Upper Body Bathing Assessment/Training    UB Bathing Assess/Train Assistive Device  grab bars;hand-held shower head;shower chair with back  -AF,SHILPI,AF2     UB Bathing Assess/Train, Position  sitting  -AF,SHILPI,AF2     UB Bathing Assess/Train, [place] Level  set up required;contact guard assist  -AF,SHILPI,AF2     Recorded by  [AF,SHILPI,AF2] Adelia [place], OTR (r) Karen Allan, OT Student (t) NO Cooney (c)     Lower Body Bathing Assessment/Training    LB Bathing Assess/Train Assistive Device  grab bars;hand-held shower head;shower chair with back  -AF,SHILPI,AF2     LB Bathing Assess/Train, Position  sitting;standing  -AF,SHILPI,AF2     LB Bathing Assess/Train, [place] Level  contact guard assist;verbal cues required  -AF,SHILPI,AF2     LB Bathing Assess/Train, Comment  vcs for hand placement, CGA for balance when standing to wash buttocks/elsi  -AF,SHILPI,AF2     Recorded by  [AF,SHILPI,AF2] NO Cooney (r) Karen Allan, OT Student (t) NO Cooney (c)     Upper Body Dressing Assessment/Training    UB Dressing Assess/Train, Clothing Type  doffing:;donning:;t-shirt;hospital gown   dof gown, don shirt   -AF,SHILPI,AF2     UB Dressing Assess/Train, Position  sitting  -AF,SHILPI,AF2     UB Dressing Assess/Train, [place]  set up required;contact guard assist  -AF,SHILPI,AF2     UB Dressing Assess/Train, Comment  pt set up assist to orient shirt correctly to thread arm   -AF,SHILPI,AF2     Recorded by  [AF,SHILPI,AF2] NO Cooney (r) Karen Allan, OT Student (t) Adelia Salamanca, OTR (c)     Lower Body Dressing Assessment/Training    LB Dressing Assess/Train,  Clothing Type  doffing:;donning:;socks;shoes;pants   dof underwear; don underwear, capris, shoes, socks   -AF,SHILPI,AF2     LB Dressing Assess/Train, Position  sitting;standing  -AF,SHILPI,AF2     LB Dressing Assess/Train, [place]  minimum assist (75% patient effort)  -AF,SHILPI,AF2     LB Dressing Assess/Train, Comment  CGA to stand for clothing management; required assist w/ tying shoes due to decreased sensation in LUE  -AF,SHILPI,AF2     Recorded by  [AF,SHILPI,AF2] Adelia Salamanca OTR (r) Karen Allan, OT Student (t) NO Cooney (c)     Toileting Assessment/Training    Toileting Assess/Train, Assistive Device  grab bars;raised toilet seat  -AF,SHILPI,AF2     Toileting Assess/Train, Position  sitting;standing  -AF,SHILPI,AF2     Toileting Assess/Train, [place]ep[place] Level  contact guard assist  -AF,SHILPI,AF2     Toileting Assess/Train, Comment  CGA for balance during clothing management   -AF,SHILPI,AF2     Recorded by  [AF,SHILPI,AF2] NO Cooney (r) Karen Allan, OT Student (t) NO Cooney (c)     Grooming Assessment/Training    Grooming Assess/Train, Position  sitting   in recliner using tray table mirror  -AF,SHILPI,AF2     Grooming Assess/Train, Indepen Level  set up required  -AF,SHILPI,AF2     Grooming Assess/Train, Comment  combing hair; dentures hygiene performed before session   -AF,SHILPI,AF2     Recorded by  [AF,SHILPI,AF2] NO Cooney (r) Karen Allan, OT Student (t) NO Cooney (c)     Balance Skills Training    Standing-Level of Assistance Contact guard;Minimum assistance  -LB      Static Standing Balance Support No upper extremity supported  -LB      Standing-Balance Activities Compliant surfaces  -LB      Gait Balance-Level of Assistance Contact guard;Minimum assistance  -LB      Gait Balance Support Right upper extremity supported;Left upper extremity supported;parallel bars  -LB      Gait Balance Activities --   side stepping on foam plank  -LB      Recorded by [LB] Angelica Treadwell, PTA       Therapy Exercises    Bilateral Lower Extremities AROM:;10 reps;standing;heel raises;mini squats  -LB      Recorded by [LB] Angelica Treadwell PTA      Sensory Treatment    Sensory Reeducation Techniques  comparison, tactile sensory information;sensory train, w/o visual reinforcement;sensory training, visual reinforcement  -AF,SHILPI,AF2     Sensory Reeduction Treatment  3 objects were placed on table w/ associated sight word cards, pt touched each object and described tactile details w/ eyes open, pt then ID'ed object placed in L hand w/ eyes closed; pt was able to ID 2/3 w/ eyes closed, pt was able to ID the other w/ eyes open; pt searched for items in rice bin w/ eyes open, correctly decsribed tactile details w/ min vcs while manipulating in hand, then reburried the items  -AF,SHILPI,AF2     Recorded by  [AF,SHILPI,AF2] Adelia Salamanca, OTR (r) Karen Allan, OT Student (t) NO Cooney (c)     Positioning and Restraints    Pre-Treatment Position  in bed  -AF,SHILPI,AF2     Post Treatment Position chair  -LB chair   recliner  -CHAKA,SHILPI,AF2     In Chair sitting;call light within reach  -LB encouraged to call for assist;sitting;call light within reach  -AF,SHILPI,AF2     Recorded by [LB] Angelica Treadwell PTA [AF,SHILPI,AF2] Adelia Salamanca, OTR (r) Karen Allan OT Student (t) NO Cooney (c)       User Key  (r) = Recorded By, (t) = Taken By, (c) = Cosigned By    Initials Name Effective Dates    LT Yvonne Miles MS CCC-SLP 04/13/15 -     LB Angelica Treadwell, PTA 02/18/16 -     AF Adelia Salamanca, OTR 12/01/15 -     JT Danielle Arroyo, PT 12/01/15 -     SHILPI Allan, OT Student 07/11/17 -                 IP PT Goals       08/04/17 1057 07/29/17 1211 07/24/17 1605    Bed Mobility PT LTG    Bed Mobility PT LTG, Date Established  07/29/17  -LB 07/24/17  -LH (r) KS (t) LH (c)    Bed Mobility PT LTG, Time to Achieve  2 wks  -LB 5 - 7 days  -LH (r) KS (t) LH (c)    Bed Mobility PT LTG, Activity Type  roll  left/roll right;supine to sit/sit to supine  -LB all bed mobility  -LH (r) KS (t) LH (c)    Bed Mobility PT LTG, [place] Level  independent  -LB contact guard assist  -LH (r) KS (t) LH (c)    Bed Mobility PT LTG, Date Goal Reviewed 08/04/17  -LBA      Bed Mobility PT LTG, Outcome goal ongoing  -LBA      Transfer Training PT LTG    Transfer Training PT LTG, Date Established  07/29/17  -LB     Transfer Training PT LTG, Time to Achieve  2 wks  -LB     Transfer Training PT LTG, Activity Type  sit to stand/stand to sit  -LB     Transfer Training PT LTG, [place] Level  conditional independence  -LB     Transfer Training PT LTG, Assist Device  cane, straight  -LB     Transfer Training PT  LTG, Date Goal Reviewed 08/04/17  -LBA      Transfer Training PT LTG, Outcome goal ongoing  -LBA      Transfer Training 2 PT LTG    Transfer Training PT 2 LTG, Date Established  07/29/17  -LB     Transfer Training PT 2 LTG, Time to Achieve  2 wks  -LB     Transfer Training PT 2 LTG, Activity Type  --   car transfer  -LB     Transfer Training PT 2 LTG, [place] Level  contact guard assist  -LB     Transfer Training PT 2 LTG, Assist Device  cane, straight  -LB     Transfer Training PT 2 LTG, Date Goal Reviewed 08/04/17  -LBA      Transfer Training PT 2 LTG, Outcome goal ongoing  -LBA      Gait Training PT LTG    Gait Training Goal PT LTG, Date Established  07/29/17  -LB 07/24/17  -LH (r) KS (t) LH (c)    Gait Training Goal PT LTG, Time to Achieve  2 wks  -LB 5 - 7 days  -LH (r) KS (t) LH (c)    Gait Training Goal PT LTG, [place] Level  conditional independence  -LB minimum assist (75% patient effort)  -LH (r) KS (t) LH (c)    Gait Training Goal PT LTG, Assist Device  cane, straight  -LB walker, rolling  -LH (r) KS (t) LH (c)    Gait Training Goal PT LTG, Distance to Achieve  150  -  -LH (r) KS (t) LH (c)    Gait Training Goal PT LTG, Date Goal Reviewed 08/04/17  -LBA      Gait Training Goal PT LTG, Outcome goal not  met  -LBA      Stair Training PT LTG    Stair Training Goal PT LTG, Date Established  07/29/17  -LB     Stair Training Goal PT LTG, Time to Achieve  2 wks  -LB     Stair Training Goal PT LTG, Number of Steps  4  -LB     Stair Training Goal PT LTG, [place] Level  contact guard assist  -LB     Stair Training Goal PT LTG, Assist Device  1 handrail  -LB     Stair Training Goal PT LTG, Date Goal Reviewed 08/04/17  -LBA      Stair Training Goal PT LTG, Outcome goal ongoing  -LBA        07/24/17 1546          Bed Mobility PT LTG    Bed Mobility PT LTG, Date Established 07/24/17  -LH (r) KS (t) LH (c)      Bed Mobility PT LTG, Time to Achieve 5 - 7 days  -LH (r) KS (t) LH (c)      Bed Mobility PT LTG, Activity Type all bed mobility  -LH (r) KS (t) LH (c)      Bed Mobility PT LTG, [place] Level independent  -LH (r) KS (t) LH (c)      Transfer Training PT LTG    Transfer Training PT LTG, Date Established 07/24/17  -LH (r) KS (t) LH (c)      Transfer Training PT LTG, Time to Achieve 5 - 7 days  -LH (r) KS (t) LH (c)      Transfer Training PT LTG, Activity Type all transfers  -LH (r) KS (t) LH (c)      Transfer Training PT LTG, [place] Level independent  -LH (r) KS (t) LH (c)      Transfer Training PT LTG, Assist Device walker, rolling  -LH (r) KS (t) LH (c)      Gait Training PT LTG    Gait Training Goal PT LTG, Date Established 07/24/17  -LH (r) KS (t) LH (c)      Gait Training Goal PT LTG, Time to Achieve 5 - 7 days  -LH (r) KS (t) LH (c)      Gait Training Goal PT LTG, [place] Level conditional independence  -LH (r) KS (t) LH (c)      Gait Training Goal PT LTG, Assist Device walker, rolling  -LH (r) KS (t) LH (c)      Gait Training Goal PT LTG, Distance to Achieve 150  -LH (r) KS (t) LH (c)      Static Sitting Balance PT LTG    Static Sitting Balance PT LTG, Date Established 07/24/17  -LH (r) KS (t) LH (c)      Static Sitting Balance PT LTG, Time to Achieve 5 - 7 days  -LH (r) KS (t) LH (c)      Static  Sitting Balance PT LTG, [place] Level independent  - (r) KS (t) LH (c)      Static Sitting Balance PT LTG, Assist Device UE Support  -LH (r) KS (t)  (c)      Static Sitting Balance PT LTG, Additional Goal 7 min w/o L lean  -LH (r) KS (t) LH (c)        User Key  (r) = Recorded By, (t) = Taken By, (c) = Cosigned By    Initials Name Provider Type    LB Rosemary Hoffmann, PT Physical Therapist     Angelica Paerson, PT Physical Therapist    MARY Treadwell, PTA Physical Therapy Assistant    KS Jessica Germain, PT Student PT Student          Physical Therapy Education     Title: PT OT SLP Therapies (Active)     Topic: Physical Therapy (Active)     Point: Mobility training (Done)    Learning Progress Summary    Learner Readiness Method Response Comment Documented by Status   Patient Eager E,TB CINDY,DU  JT 08/05/17 1432 Done    Acceptance E VU,NR  LB 08/04/17 1358 Done    Acceptance E VU,NR  LB 08/03/17 1103 Done    Acceptance E VU,NR car, stairs, curb LB 08/02/17 1031 Done    Acceptance E VU,NR  LB 08/01/17 0939 Done    Acceptance E VU,NR  LB 07/31/17 0947 Done    Acceptance E VU,NR  1 07/29/17 1210 Done               Point: Home exercise program (Done)    Learning Progress Summary    Learner Readiness Method Response Comment Documented by Status   Patient Eager E,TB CINDY,DU  JT 08/05/17 1432 Done                      User Key     Initials Effective Dates Name Provider Type Discipline    1 10/06/15 -  Rosemary Hoffmann, PT Physical Therapist PT     02/18/16 -  Angelica Treadwell, YARA Physical Therapy Assistant PT    JT 12/01/15 -  Danielle Arroyo, PT Physical Therapist PT                    PT Recommendation and Plan  Anticipated Equipment Needs At Discharge: standard cane  Anticipated Discharge Disposition: home with outpatient services, home with assist  Planned Therapy Interventions: bed mobility training, balance training, neuromuscular re-education, transfer training, strengthening, stair training  PT  Frequency: 2 times/day            Time Calculation:         PT Charges       08/05/17 1432          Time Calculation    Start Time 1100  -JT      Stop Time 1130  -JT      Time Calculation (min) 30 min  -JT      PT Received On 08/05/17  -JT      PT - Next Appointment 08/07/17  -JT        User Key  (r) = Recorded By, (t) = Taken By, (c) = Cosigned By    Initials Name Provider Type    JT Danielle Arroyo, PT Physical Therapist          Therapy Charges for Today     Code Description Service Date Service Provider Modifiers Qty    27832883225  PT THER SUPP EA 15 MIN 8/5/2017 Danielle Arroyo, PT GP 1    [id number] HC PT THER PROC EA 15 MIN 8/5/2017 Danielle Arroyo, PT GP 2               Danielle Arroyo, PT  8/5/2017

## 2017-08-05 NOTE — PROGRESS NOTES
LOS: 8 days   Patient Care Team:  JOSHUA Chaudhari as PCP - General  Mario Mata MD as PCP - Claims Attributed  Hortencia Lisa MD as Consulting Physician (Cardiology)  Pierre Walker MD as Consulting Physician (Gastroenterology)    Chief Complaint: same    Subjective     History of Present Illness    Subjective Pt is awake and alert. No new issues.     History taken from: patient    Objective     Vital Signs  Temp:  [97.7 °F (36.5 °C)-98.3 °F (36.8 °C)] 97.7 °F (36.5 °C)  Heart Rate:  [79-99] 82  Resp:  [16-20] 18  BP: (134-171)/(71-89) 148/86    Objective exam unchanged    Results Review:     I reviewed the patient's new clinical results.    Medication Review:     Assessment/Plan     Active Problems:    Benign essential hypertension    Controlled type 2 diabetes mellitus without complication    Cerebrovascular accident (CVA) due to occlusion of right middle cerebral artery    Atrial fibrillation    Warfarin anticoagulation (goal INR 2-3)    CVA (cerebrovascular accident due to intracerebral hemorrhage)      Assessment & Plan Continue to prepare for dc.    Bayron Nathan MD  08/05/17  11:41 AM    Time:

## 2017-08-05 NOTE — PROGRESS NOTES
Inpatient Rehabilitation Plan of Care Note    Plan of Care  Care Plan Reviewed - No updates at this time.    Safety    Performed Intervention(s)  hourly rounding, items within reach      Psychosocial    Performed Intervention(s)  encourage expression of feelings and concerns    Signed by: Jethro Schmidt RN

## 2017-08-05 NOTE — PROGRESS NOTES
Inpatient Rehabilitation Functional Measures Assessment    Functional Measures  BETH Eating:  [facility] Grooming: [facility] Bathing:  [facility] Upper Body Dressing:  [facility] Lower Body Dressing:  [facility] Toileting:  [facility] Bladder Management  Level of Assistance:  [place]  Frequency/Number of Accidents this Shift:  [facility] Bowel Management  Level of Assistance: [place]  Frequency/Number of Accidents this Shift: [facility] Bed/Chair/Wheelchair Transfer:  [facility] Toilet Transfer:  [facility] Tub/Shower Transfer:  [place]    Previously Documented Mode of Locomotion at Discharge: Field  BETH Expected Mode of Locomotion at Discharge: [facility] Walk/Wheelchair:  [facility] Stairs:  [facility] Comprehension:  Auditory comprehension is the usual mode. Comprehension  Score = 6, Modified [place].  Patient comprehends complex/abstract  information in their primary language with only mild difficulty.  BETH Expression:  Vocal expression is the usual mode. Expression Score = 6,  Modified Independent.  Patient expresses complex/abstract information in their  primary language with only mild difficulty with tasks.  BETH Social Interaction:  Social Interaction Score = 6, Modified Independent.  Patient is modified independent for social interaction, requiring: Requires  additional time.  BETH Problem Solving:  Problem Solving Score = 6, Modified [place].  Patient  makes appropriate decisions in order to solve complex problems with mild  difficulty but self-corrects.  BETH Memory:  Memory Score = 6, Modified [place].  Patient is modified  independent for memory, having only mild difficulty and using self-initiated or  environmental cues to remember.    Therapy Mode Minutes  Occupational Therapy: Branch  Physical Therapy: Branch  Speech Language Pathology:  Branch    Signed by: Jethro Schmidt RN

## 2017-08-05 NOTE — PROGRESS NOTES
Inpatient Rehabilitation Functional Measures Assessment    Functional Measures  BETH Eating:  Branch  [facility] Grooming: Branch  [facility] Bathing:  Branch  [facility] Upper Body Dressing:  Branch  [facility] Lower Body Dressing:  Branch  [facility] Toileting:  Toileting Score = 5.  Patient is supervision/set-up for  toileting, requiring: Stand by assistance. Setting out toileting equipment.  Patient requires the following assistive device(s): Grab bar.    BETH Bladder Management  Level of Assistance:  Bladder Score = 5.  Patient is supervision/set-up for  bladder management, requiring: Setting out equipment. Stand by assistance.  Patient requires the following assistive device(s): Pad .  Frequency/Number of Accidents this Shift:  Bladder accidents this shift:  0 .  Patient has not had an accident, but used a device/medication this shift  requiring: Pad .    BETH Bowel Management  Level of Assistance: Activity was not observed.  Frequency/Number of Accidents this Shift: Branch    [facility] Bed/Chair/Wheelchair Transfer:  Bed/chair/wheelchair Transfer Score = 5.  Patient is supervision/set-up for transferring to and from the  bed/chair/wheelchair, requiring: Stand by assistance. Set up (positioning  equipment, lock brakes and/or adjust foot rest). Verbal cuing, prompting, or  instructing. Patient requires the following assistive device(s): Bed rails. Grab  bars.  BETH Toilet Transfer:  Branch  [facility] Tub/Shower Transfer:  [place]    Previously Documented Mode of Locomotion at Discharge: Field  BETH Expected Mode of Locomotion at Discharge: Branch  [facility] Walk/Wheelchair:  Branch  [facility] Stairs:  Branch    [facility] Comprehension:  Branch  [facility] Expression:  Branch  [facility] Social Interaction:  Branch  [facility] Problem Solving:  [facility] Memory:  [place]    Therapy Mode Minutes  Occupational Therapy: Branch  Physical Therapy: Branch  Speech Language Pathology:  Branch    Signed by: JULITA Lemos

## 2017-08-05 NOTE — PROGRESS NOTES
Inpatient Rehabilitation Functional Measures Assessment    Functional Measures  BETH Eating:  [facility] Grooming: [facility] Bathing:  [facility] Upper Body Dressing:  [facility] Lower Body Dressing:  [facility] Toileting:  [facility] Bladder Management  Level of Assistance:  [place]  Frequency/Number of Accidents this Shift:  [facility] Bowel Management  Level of Assistance: [place]  Frequency/Number of Accidents this Shift: [facility] Bed/Chair/Wheelchair Transfer:  [facility] Toilet Transfer:  [facility] Tub/Shower Transfer:  [place]    Previously Documented Mode of Locomotion at Discharge: Field  BETH Expected Mode of Locomotion at Discharge: [facility] Walk/Wheelchair:  [facility] Stairs:  [facility] Comprehension:  Auditory comprehension is the usual mode. Comprehension  Score = 6, Modified [place].  Patient comprehends complex/abstract  information in their primary language, requiring:  [facility] Expression:  Vocal expression is the usual mode. Expression Score = 6,  Modified Independent.  Patient expresses complex/abstract information in their  primary language, requiring:  [facility] Social Interaction:  Social Interaction Score = 6, Modified Independent.  Patient is modified independent for social interaction, requiring:  [facility] Problem Solving:  Activity was not observed.  BETH Memory:  Memory Score = 6, Modified [place].  Patient is modified  independent for memory, requiring:    Therapy Mode Minutes  Occupational Therapy: Branch  Physical Therapy: Branch  Speech Language Pathology:  Branch    Signed by: Mila Flores RN

## 2017-08-06 LAB
GLUCOSE BLDC GLUCOMTR-MCNC: 135 MG/DL (ref 70–130)
GLUCOSE BLDC GLUCOMTR-MCNC: 97 MG/DL (ref 70–130)
INR PPP: 1.69 (ref 0.9–1.1)
PROTHROMBIN TIME: 19.3 SECONDS (ref 11.7–14.2)

## 2017-08-06 PROCEDURE — 85610 PROTHROMBIN TIME: CPT | Performed by: HOSPITALIST

## 2017-08-06 PROCEDURE — 94799 UNLISTED PULMONARY SVC/PX: CPT

## 2017-08-06 PROCEDURE — 82962 GLUCOSE BLOOD TEST: CPT

## 2017-08-06 RX ADMIN — ATORVASTATIN CALCIUM 80 MG: 80 TABLET, FILM COATED ORAL at 20:23

## 2017-08-06 RX ADMIN — BUDESONIDE AND FORMOTEROL FUMARATE DIHYDRATE 2 PUFF: 80; 4.5 AEROSOL RESPIRATORY (INHALATION) at 06:56

## 2017-08-06 RX ADMIN — APIXABAN 5 MG: 5 TABLET, FILM COATED ORAL at 20:22

## 2017-08-06 RX ADMIN — APIXABAN 5 MG: 5 TABLET, FILM COATED ORAL at 08:38

## 2017-08-06 RX ADMIN — METOPROLOL TARTRATE 25 MG: 25 TABLET ORAL at 20:23

## 2017-08-06 RX ADMIN — PREDNISOLONE ACETATE 1 DROP: 10 SUSPENSION/ DROPS OPHTHALMIC at 08:37

## 2017-08-06 RX ADMIN — METOPROLOL TARTRATE 25 MG: 25 TABLET ORAL at 08:37

## 2017-08-06 RX ADMIN — PREDNISOLONE ACETATE 1 DROP: 10 SUSPENSION/ DROPS OPHTHALMIC at 20:23

## 2017-08-06 RX ADMIN — BUDESONIDE AND FORMOTEROL FUMARATE DIHYDRATE 2 PUFF: 80; 4.5 AEROSOL RESPIRATORY (INHALATION) at 20:50

## 2017-08-06 RX ADMIN — DOCUSATE SODIUM -SENNOSIDES 2 TABLET: 50; 8.6 TABLET, COATED ORAL at 20:23

## 2017-08-06 NOTE — PROGRESS NOTES
Inpatient Rehabilitation Functional Measures Assessment    Functional Measures  BETH Eating:  [facility] Grooming: [facility] Bathing:  [facility] Upper Body Dressing:  [facility] Lower Body Dressing:  [facility] Toileting:  [facility] Bladder Management  Level of Assistance:  [place]  Frequency/Number of Accidents this Shift:  [facility] Bowel Management  Level of Assistance: [place]  Frequency/Number of Accidents this Shift: [facility] Bed/Chair/Wheelchair Transfer:  [facility] Toilet Transfer:  [facility] Tub/Shower Transfer:  [place]    Previously Documented Mode of Locomotion at Discharge: Field  BETH Expected Mode of Locomotion at Discharge: [facility] Walk/Wheelchair:  [facility] Stairs:  [facility] Comprehension:  Auditory comprehension is the usual mode. Comprehension  Score = 6, Modified [place].  Patient comprehends complex/abstract  information in their primary language, requiring:  [facility] Expression:  Vocal expression is the usual mode. Expression Score = 6,  Modified Independent.  Patient expresses complex/abstract information in their  primary language, requiring:  [facility] Social Interaction:  Social Interaction Score = 6, Modified Independent.  Patient is modified independent for social interaction, requiring:  [facility] Problem Solving:  Activity was not observed.  BETH Memory:  Memory Score = 6, Modified [place].  Patient is modified  independent for memory, requiring:    Therapy Mode Minutes  Occupational Therapy: Branch  Physical Therapy: Branch  Speech Language Pathology:  Branch    Signed by: Mila Flores RN

## 2017-08-06 NOTE — PLAN OF CARE
Problem: Stroke (Ischemic) (Adult)  Goal: Signs and Symptoms of Listed Potential Problems Will be Absent or Manageable (Stroke)  Outcome: Ongoing (interventions implemented as appropriate)    08/06/17 7432   Stroke (Ischemic)   Problems Assessed (Stroke (Ischemic)/TIA) all   Problems Present (Stroke (Ischemic)/TIA) motor/sensory impairment

## 2017-08-06 NOTE — PROGRESS NOTES
Inpatient Rehabilitation Functional Measures Assessment    Functional Measures  BETH Eating:  [facility] Grooming: [facility] Bathing:  [facility] Upper Body Dressing:  [facility] Lower Body Dressing:  [facility] Toileting:  [facility] Bladder Management  Level of Assistance:  [place]  Frequency/Number of Accidents this Shift:  [facility] Bowel Management  Level of Assistance: [place]  Frequency/Number of Accidents this Shift: [facility] Bed/Chair/Wheelchair Transfer:  [facility] Toilet Transfer:  [facility] Tub/Shower Transfer:  [place]    Previously Documented Mode of Locomotion at Discharge: Field  BETH Expected Mode of Locomotion at Discharge: [facility] Walk/Wheelchair:  [facility] Stairs:  [facility] Comprehension:  Auditory comprehension is the usual mode. Comprehension  Score = 6, Modified Counselor.  Patient comprehends complex/abstract  information in their primary language with only mild difficulty.  BETH Expression:  Vocal expression is the usual mode. Expression Score = 6,  Modified Independent.  Patient expresses complex/abstract information in their  primary language with only mild difficulty with tasks.  BETH Social Interaction:  Social Interaction Score = 6, Modified Independent.  Patient is modified independent for social interaction, occasionally losing  control, but self-corrects.  BETH Problem Solving:  Problem Solving Score = 7, Independent.  Patient makes  appropriate decisions in order to solve complex problems.  Patient is completely  independent for problem solving.  There are no activity limitations.  BETH Memory:  Memory Score = 6, Modified Counselor.  Patient is modified  independent for memory, having only mild difficulty and using self-initiated or  environmental cues to remember.    Therapy Mode Minutes  Occupational Therapy: Branch  Physical Therapy: Branch  Speech Language Pathology:  [place]    Signed by: Jethro Schmidt RN

## 2017-08-06 NOTE — PROGRESS NOTES
Inpatient Rehabilitation Functional Measures Assessment    Functional Measures  BETH Eating:  Branch  [facility] Grooming: Branch  [facility] Bathing:  Branch  [facility] Upper Body Dressing:  Branch  [facility] Lower Body Dressing:  Branch  [facility] Toileting:  Toileting Score = 5.  Patient is supervision/set-up for  toileting, requiring: Setting out toileting equipment. Patient requires the  following assistive device(s): Grab bar.    BETH Bladder Management  Level of Assistance:  Bladder Score = 5.  Patient is supervision/set-up for  bladder management, requiring: Setting out equipment. No assistive devices were  required.  Frequency/Number of Accidents this Shift:  Bladder accidents this shift:  0 .  Patient has not had an accident this shift.    BETH Bowel Management  Level of Assistance: Activity was not observed.  Frequency/Number of Accidents this Shift: Branch    [facility] Bed/Chair/Wheelchair Transfer:  Bed/chair/wheelchair Transfer Score = 5.  Patient is supervision/set-up for transferring to and from the  bed/chair/wheelchair, requiring: Stand by assistance. Set up (positioning  equipment, lock brakes and/or adjust foot rest). Patient requires the following  assistive device(s): Grab bars.  BETH Toilet Transfer:  Branch  [facility] Tub/Shower Transfer:  [place]    Previously Documented Mode of Locomotion at Discharge: Field  BETH Expected Mode of Locomotion at Discharge: [facility] Walk/Wheelchair:  Branch  [facility] Stairs:  [facility] Comprehension:  [facility] Expression:  [facility] Social Interaction:  [facility] Problem Solving:  [facility] Memory:  [place]    Therapy Mode Minutes  Occupational Therapy: Branch  Physical Therapy: Branch  Speech Language Pathology:  Branch    Signed by: JULITA Lemos

## 2017-08-06 NOTE — PLAN OF CARE
Problem: Stroke (Ischemic) (Adult)  Goal: Signs and Symptoms of Listed Potential Problems Will be Absent or Manageable (Stroke)  Outcome: Ongoing (interventions implemented as appropriate)    08/06/17 1452   Stroke (Ischemic)   Problems Assessed (Stroke (Ischemic)/TIA) motor/sensory impairment   Problems Present (Stroke (Ischemic)/TIA) motor/sensory impairment         08/06/17 1452   Stroke (Ischemic)   Problems Assessed (Stroke (Ischemic)/TIA) motor/sensory impairment   Problems Present (Stroke (Ischemic)/TIA) motor/sensory impairment         Problem: Fall Risk (Adult)  Goal: Absence of Falls  Outcome: Ongoing (interventions implemented as appropriate)    08/06/17 1452   Fall Risk (Adult)   Absence of Falls making progress toward outcome         Problem: Patient Care Overview (Adult)  Goal: Plan of Care Review  Outcome: Ongoing (interventions implemented as appropriate)    08/06/17 1452   Coping/Psychosocial Response Interventions   Plan Of Care Reviewed With patient   Patient Care Overview   Progress improving   Outcome Evaluation   Outcome Summary/Follow up Plan Arely Mas in favor of zeynep

## 2017-08-06 NOTE — PLAN OF CARE
Problem: Stroke (Ischemic) (Adult)  Goal: Signs and Symptoms of Listed Potential Problems Will be Absent or Manageable (Stroke)    08/06/17 0977   Stroke (Ischemic)   Problems Assessed (Stroke (Ischemic)/TIA) all   Problems Present (Stroke (Ischemic)/TIA) motor/sensory impairment

## 2017-08-06 NOTE — PLAN OF CARE
Problem: Fall Risk (Adult)  Goal: Absence of Falls  Outcome: Ongoing (interventions implemented as appropriate)    08/06/17 0447   Fall Risk (Adult)   Absence of Falls making progress toward outcome

## 2017-08-06 NOTE — PROGRESS NOTES
LOS: 9 days   Patient Care Team:  JOSHUA Chaudhari as PCP - General  Mario Mata MD as PCP - Claims Attributed  Hortencia Lisa MD as Consulting Physician (Cardiology)  Pierre Walker MD as Consulting Physician (Gastroenterology)    Chief Complaint: same    Subjective     History of Present Illness    Subjective Pt is awake and alert. No c/o.     History taken from: patient    Objective     Vital Signs  Temp:  [97.5 °F (36.4 °C)-98.3 °F (36.8 °C)] 97.9 °F (36.6 °C)  Heart Rate:  [72-95] 80  Resp:  [18-20] 18  BP: (139-173)/(78-93) 173/83    Objective exam unchanged    Results Review:     I reviewed the patient's new clinical results.    Medication Review:     Assessment/Plan     Active Problems:    Benign essential hypertension    Controlled type 2 diabetes mellitus without complication    Cerebrovascular accident (CVA) due to occlusion of right middle cerebral artery    Atrial fibrillation    Warfarin anticoagulation (goal INR 2-3)      Assessment & Plan Continue to prepare for dc.     Bayron Nathan MD  08/06/17  12:03 PM    Time:

## 2017-08-07 LAB
GLUCOSE BLDC GLUCOMTR-MCNC: 100 MG/DL (ref 70–130)
GLUCOSE BLDC GLUCOMTR-MCNC: 191 MG/DL (ref 70–130)
INR PPP: 1.5 (ref 0.9–1.1)
PROTHROMBIN TIME: 17.6 SECONDS (ref 11.7–14.2)

## 2017-08-07 PROCEDURE — 97110 THERAPEUTIC EXERCISES: CPT

## 2017-08-07 PROCEDURE — 94799 UNLISTED PULMONARY SVC/PX: CPT

## 2017-08-07 PROCEDURE — 82962 GLUCOSE BLOOD TEST: CPT

## 2017-08-07 PROCEDURE — 97112 NEUROMUSCULAR REEDUCATION: CPT

## 2017-08-07 PROCEDURE — 85610 PROTHROMBIN TIME: CPT | Performed by: HOSPITALIST

## 2017-08-07 PROCEDURE — 63710000001 INSULIN ASPART PER 5 UNITS: Performed by: PHYSICAL MEDICINE & REHABILITATION

## 2017-08-07 PROCEDURE — 97532: CPT

## 2017-08-07 PROCEDURE — 97535 SELF CARE MNGMENT TRAINING: CPT

## 2017-08-07 RX ADMIN — APIXABAN 5 MG: 5 TABLET, FILM COATED ORAL at 08:35

## 2017-08-07 RX ADMIN — BUDESONIDE AND FORMOTEROL FUMARATE DIHYDRATE 2 PUFF: 80; 4.5 AEROSOL RESPIRATORY (INHALATION) at 06:58

## 2017-08-07 RX ADMIN — ATORVASTATIN CALCIUM 80 MG: 80 TABLET, FILM COATED ORAL at 20:35

## 2017-08-07 RX ADMIN — BUDESONIDE AND FORMOTEROL FUMARATE DIHYDRATE 2 PUFF: 80; 4.5 AEROSOL RESPIRATORY (INHALATION) at 19:27

## 2017-08-07 RX ADMIN — APIXABAN 5 MG: 5 TABLET, FILM COATED ORAL at 20:35

## 2017-08-07 RX ADMIN — METOPROLOL TARTRATE 25 MG: 25 TABLET ORAL at 08:35

## 2017-08-07 RX ADMIN — PREDNISOLONE ACETATE 1 DROP: 10 SUSPENSION/ DROPS OPHTHALMIC at 20:35

## 2017-08-07 RX ADMIN — INSULIN ASPART 2 UNITS: 100 INJECTION, SOLUTION INTRAVENOUS; SUBCUTANEOUS at 16:43

## 2017-08-07 RX ADMIN — METOPROLOL TARTRATE 25 MG: 25 TABLET ORAL at 20:35

## 2017-08-07 RX ADMIN — DOCUSATE SODIUM -SENNOSIDES 2 TABLET: 50; 8.6 TABLET, COATED ORAL at 20:35

## 2017-08-07 RX ADMIN — PREDNISOLONE ACETATE 1 DROP: 10 SUSPENSION/ DROPS OPHTHALMIC at 08:35

## 2017-08-07 NOTE — PROGRESS NOTES
Inpatient Rehabilitation Plan of Care Note    Plan of Care  Care Plan Reviewed - No updates at this time.    Psychosocial    [RN] Coping/Adjustment(Active)  Current Status(08/07/2017): Patient is at risk for pyschosocial issues related  to recent stroke.  Weekly Goal(08/11/2017): Patient will demonstrate approrpiate coping techniques.    Discharge Goal: Patient will be free from any psychosocial issues.    Performed Intervention(s)  encourage expression of feelings and concerns      Safety    [RN] Potential for Injury(Active)  Current Status(08/07/2017): Patient is at risk for falls related to recent  stroke.  Weekly Goal(08/11/2017): Patient will be free from falls and demonstrate  appropriate safety techniques.  Discharge Goal: Same as weekly.    Performed Intervention(s)  falls precautions  hourly rounding, items within reach  bed alarm      Sphincter Control    [RN] Bowel and Bladder control(Active)  Current Status(08/07/2017): Patient is continent of bowel and bladder 100% of  the time.  Weekly Goal(08/11/2017): Patient will continue to be continent of bowel and  bladder and be free from any elimintaion issues.  Discharge Goal: Same as weekly.    Performed Intervention(s)  monitor intake and output  stool softners as ordered    Signed by: Angela Cope RN

## 2017-08-07 NOTE — PROGRESS NOTES
Inpatient Rehabilitation Functional Measures Assessment and Plan of Care    Plan of Care  Updated Problems/Interventions  Field    Functional Measures  BETH Eating:  [place]  BETH Grooming: [facility] Bathing:  [facility] Upper Body Dressing:  [facility] Lower Body Dressing:  [facility] Toileting:  [facility] Bladder Management  Level of Assistance:  [place]  Frequency/Number of Accidents this Shift:  [facility] Bowel Management  Level of Assistance: [place]  Frequency/Number of Accidents this Shift: [facility] Bed/Chair/Wheelchair Transfer:  Activity was not observed.  BETH Toilet Transfer:  [facility] Tub/Shower Transfer:  [place]    Previously Documented Mode of Locomotion at Discharge: Field  BETH Expected Mode of Locomotion at Discharge: [facility] Walk/Wheelchair:  WHEELCHAIR OBSERVATION   Activity was not observed.    WALK OBSERVATION   Walk Distance Scale = 3.  Distance walked is greater than 150 feet. Walk Score  = 5.  Patient requires supervision or set up for walking. Patient walked a  distance of  200 feet. Patient requires the following assistive device(s):  Rolling walker.  BETH Stairs:  Stairs Score = 2.  Incidental assistance with lifting or lowering,  contact guard or steadying was provided. Patient performs 75% or more of effort  and requires minimal contact assistance. Patient negotiated  8 stairs. Patient  requires the following assistive device(s): Handrail(s).    BETH Comprehension:  [place]  BETH Expression:  [facility] Social Interaction:  [facility] Problem Solving:  [facility] Memory:  [place]    Therapy Mode Minutes  Occupational Therapy: [place]  Physical Therapy: Individual: 60 minutes.  Speech Language Pathology:  [place]    Signed by: Angelica Treadwell PTA

## 2017-08-07 NOTE — PROGRESS NOTES
Inpatient Rehabilitation Functional Measures Assessment    Functional Measures  BETH Eating:  Eating Score = 5. Patient is supervision/set-up for eating,  requiring: No assistive devices were required.  BETH Grooming: Branch  [facility] Bathing:  Branch  [facility] Upper Body Dressing:  Branch  [facility] Lower Body Dressing:  Branch  [facility] Toileting:  Toileting Score = 4.  Patient requires minimal assistance for  toileting, such as steadying for balance while cleansing or adjusting clothes.  Patient requires the following assistive device(s): Grab bar. Adaptive device to  maintain balance.    BETH Bladder Management  Level of Assistance:  Bladder Score = 7.  Patient is completely independent for  bladder management. There are no activity limitations.  Frequency/Number of Accidents this Shift:  Bladder accidents this shift:  0 .  Patient has not had an accident this shift.    BETH Bowel Management  Level of Assistance: Activity was not observed.  Frequency/Number of Accidents this Shift: Branch    [facility] Bed/Chair/Wheelchair Transfer:  Bed/chair/wheelchair Transfer Score = 4.  Patient performs 75% or more of effort and minimal assistance (little/incidental  help/lifting of one limb/steadying) for transferring to and from the  bed/chair/wheelchair, requiring: Contact guard. Patient requires the following  assistive device(s): Bed rails. Grab bars. Arm rest.  BETH Toilet Transfer:  Toilet Transfer Score = 4.  Patient performs 75% or more  of effort and minimal assistance (little/incidental help/steadying) for  transferring to and from the toilet/commode, requiring: Contact guard. Patient  requires the following assistive device(s): Walker. Grab bars.  BETH Tub/Shower Transfer:  [place]    Previously Documented Mode of Locomotion at Discharge: Field  BETH Expected Mode of Locomotion at Discharge: [facility] Walk/Wheelchair:  [facility] Stairs:  [facility] Comprehension:  [facility] Expression:  [facility] Social Interaction:  [facility] Problem  Solving:  Branch  BETH Memory:  [place]    Therapy Mode Minutes  Occupational Therapy: Branch  Physical Therapy: [place]  Speech Language Pathology:  [place]    Signed by: JULITA Lea

## 2017-08-07 NOTE — THERAPY TREATMENT NOTE
Inpatient Rehabilitation - Speech Language Pathology Treatment Note  Psychiatric     Patient Name: Magnus Hartley  : 1928  MRN: [id number]  Today's Date: 2017         Admit Date: 2017    Visit Dx:      ICD-10-CM ICD-9-CM   1. Left hemiparesis G81.94 342.90   2. Dysarthria R47.1 784.51     Patient Active Problem List   Diagnosis   • Foot pain   • Asthma   • Benign essential hypertension   • Controlled type 2 diabetes mellitus without complication   • Dyslipidemia   • Macrocytosis without anemia   • Senile osteoporosis   • Post-menopausal osteoporosis   • Sinus bradycardia   • Stress incontinence in female   • Urge incontinence of urine   • Atrophic vaginitis   • Encounter for fitting and adjustment of other specified devices   • Incomplete emptying of bladder   • Urethral caruncle   • Incomplete uterovaginal prolapse   • Cerebrovascular accident (CVA) due to occlusion of right middle cerebral artery   • Atrial fibrillation   • Warfarin anticoagulation (goal INR 2-3)              Adult Rehabilitation Note       17 1530 17 1143 17 0943    Rehab Assessment/Intervention    Discipline speech language pathologist  -ISAAC occupational therapist  -SHILPI NULL AF2 physical therapy assistant  -LB    Document Type therapy note (daily note)  -ISAAC therapy note (daily note)  -SHILPI NULL AF2 therapy note (daily note)  -LB    Subjective Information no complaints;agree to therapy  -AW agree to therapy;no complaints  -SHILPI NULL AF2 agree to therapy  -LB    Patient Effort, Rehab Treatment good  -AW good  -SHILPI NULL AF2 good  -LB    Symptoms Noted During/After Treatment none  -AW shortness of breath   w/ tsfs, bending down from seated level   -SHILPI NULL AF2     Symptoms Noted Comment  rest breaks and vcs for deep breaths as necessary   -SHILPI NULL AF2     Precautions/Limitations fall precautions;swallowing precautions  -AW fall precautions  -SHILPI NULL AF2 fall precautions  -LB    Recorded by [AW] dAelia Martinez MS CCC-SLP [SHILPI NULL AF2] Adelia  Mattie Salamanca OTR (r) Karen Allan OT Student (t) Adelia Salamanca OTR (c) [LB] Angelica Treadwell PTA    Pain Assessment    Pain Assessment  No/denies pain  -AF,SHILPI,AF2 No/denies pain  -LB    Recorded by  [AF,SHILPI,AF2] Adelia Salamanca OTR (r) Karen Allan, OT Student (t) Adelia Salamanca OTR (c) [LB] Angelica Treadwell PTA    Cognitive Assessment/Intervention    Current Cognitive/Communication Assessment  impaired  -AF,SHILPI,AF2     Orientation Status  oriented x 4  -AF,SHILPI,AF2     Follows Commands/Answers Questions  100% of the time;able to follow single-step instructions;needs cueing;needs increased time;needs repetition  -AF,SHILPI,AF2     Personal Safety  mild impairment;decreased awareness, need for safety;decreased insight to deficits  -AF,SHILPI,AF2     Personal Safety Interventions  fall prevention program maintained;gait belt;nonskid shoes/slippers when out of bed;supervised activity  -AF,SHILPI,AF2 fall prevention program maintained;gait belt;muscle strengthening facilitated;nonskid shoes/slippers when out of bed  -LB    Recorded by  [AF,SHILPI,AF2] Adelia Salamanca OTR (r) Karen Allan, OT Student (t) Adelia Salamanca OTR (c) [LB] Angelica Treadwell PTA    Improve attention    Attention Progress 60%;without cues;100%;with inconsistent cues  -AW      Comments: Improve attention working memory - repeating 3 words in reverse order  -AW      Recorded by [AW] Adelia Martinez MS CCC-SLP      Improve memory skills    Memory Skills Progress 0%;without cues;100%;with consistent cues  -AW      Comments: Improve memory skills after a 10 min delay, pt recalled 0/3 words; 3/3 w/ cues  -AW      Recorded by [AW] Adelia Martinez MS CCC-SLP      Improve functional problem solving    Functional Problem Solving Progress 80%;without cues;100%;with inconsistent cues  -AW      Comments: Improve functional problem solving word problems; repetitions beneficial  -AW      Recorded by [AW] Adelia Martinez MS CCC-SLP      Improve executive function  skills    Executive Function Skills Progress 70%;without cues;100%;with inconsistent cues  -AW      Comments: Improve executive function skills deductive reasoning grid puzzle  -AW      Recorded by [AW] Adelia Martinez MS CCC-SLP      Bed Mobility, Assessment/Treatment    Bed Mob, Supine to Sit, [place]  supervision required  -AF,SHILPI,AF2     Bed Mobility, Comment   up in chair  -LB    Recorded by  [AF,SHILPI,AF2] Adelia Salamanca, OTR (r) Karen Allan, OT Student (t) Adelia Salamanca OTR (c) [LB] Angelica Treadwell, PTA    Transfer Assessment/Treatment    Transfers, Bed-Chair [place]  stand by assist  -AF,SHILPI,AF2     Transfers, Sit-Stand [place]  contact guard assist   while showeringto wash elsi area   -AF,SHILPI,AF2 stand by assist  -LB    Transfers, Stand-Sit [place]  contact guard assist   while showering to wash elsi area  -AF,SHILPI,AF2 stand by assist  -LB    Transfers, Sit-Stand-Sit, Assist Device   rolling walker  -LB    Toilet Transfer, [place]  contact guard assist;verbal cues required   vcs for hand placement   -AF,SHILPI,AF2     Toilet Transfer, Assistive Device  elevated toilet seat;rolling walker   grab bars   -AF,SHILPI,AF2     Walk-In Shower Transfer, [place]  contact guard assist;verbal cues required   vcs for hand placement   -AF,SHILPI,AF2     Walk-In Shower Transfer, Assist Device  standard shower chair   grab bars; tsf from toilet   -AF,SHILPI,AF2     Bathtub Transfer, [place]  verbal cues required;contact guard assist   pt practiced tub tsf w/ demo to simulate home environment   -AF,SHILPI,AF2     Bathtub Transfer, Assistive Device  transfer tub bench;rolling platform walker   grab bars  -AF,SHILPI,AF2     Transfer, Comment  tsf to and from EOM from w/c w/ SBA, vcs for hand placement   -AF,SHILPI,AF2 car tsf CGA, rwx  -LB    Recorded by  [AF,SHILPI,AF2] Adelia Salamanca, OTR (r) Karen Allan, OT Student (t) NO [place]oney (c) [LB] Angelica Treadwell, PTA    Gait Assessment/Treatment    Gait,  [place] Level   stand by assist  -LB    Gait, Assistive Device   rolling walker  -LB    Gait, Distance (Feet)   200  -LB    Gait, Gait Deviations   forward flexed posture;step length decreased  -LB    Recorded by   [LB] Angelica Treadwell PTA    Stairs Assessment/Treatment    Number of Stairs   8  -LB    Stairs, Handrail Location   both sides  -LB    Stairs, [place] Level   contact guard assist  -LB    Stairs, Technique Used   step to step (ascending);step to step (descending)  -LB    Recorded by   [LB] Angelica Treadwell PTA    Functional Mobility    Functional Mobility- Ind. Level   contact guard assist;minimum assist (75% patient effort)  -LB    Functional Mobility- Device   straight cane   w tripod base  -LB    Functional Mobility-Distance (Feet)   180  -LB    Functional Mobility- Safety Issues   sequencing ability decreased   LOB x 1  -LB    Recorded by   [LB] Angelica Treadwell PTA    ADL Assessment/Intervention    Additional Documentation  --   pt practiced tying shoes from lap level w/ min a   -AF,SHILPI,AF2     Recorded by  [AF,SHILPI,AF2] Adelia Salamanca OTR (r) Karen Allan, OT Student (t) NO Cooney (c)     Upper Body Bathing Assessment/Training    UB Bathing Assess/Train Assistive Device  grab bars;shower chair with back;hand-held shower head  -AF,SHILPI,AF2     UB Bathing Assess/Train, Position  sitting  -AF,SHILPI,AF2     UB Bathing Assess/Train, [place] Level  minimum assist (75% patient effort);verbal cues required  -AF,SHILPI,AF2     UB Bathing Assess/Train, Comment  min a w/ wetting down hair; vcs to recall that pt had already washed hair   -AF,SHILPI,AF2     Recorded by  [AF,SHILPI,AF2] NO Cooney (r) Karen Allan, OT Student (t) NO [place]oney (c)     Lower Body Bathing Assessment/Training    LB Bathing Assess/Train Assistive Device  shower chair with back;hand-held shower head;grab bars  -AF,SHILPI,AF2     LB Bathing Assess/Train, Position  sitting;standing  -AF,SHILPI,AF2     LB  Bathing Assess/Train, [place] Level  verbal cues required;contact guard assist  -AF,SHILPI,AF2     LB Bathing Assess/Train, Comment  vcs for sequencing; cga to maintain balance while pt washed elsi area   -AF,SHILPI,AF2     Recorded by  [AF,SHILPI,AF2] Adelia Salamanca OTR (r) Karen Allan, OT Student (t) NO Cooney (c)     Upper Body Dressing Assessment/Training    UB Dressing Assess/Train, Clothing Type  donning:;doffing:;bra;t-shirt  -AF,SHILPI,AF2     UB Dressing Assess/Train, Position  sitting  -AF,SHILPI,AF2     UB Dressing Assess/Train, [place]  supervision required;set up required   set up to gather clothing   -AF,SHILPI,AF2     Recorded by  [AF,SHILPI,AF2] NO Cooney (r) Karen Allan, OT Student (t) NO Cooney (c)     Lower Body Dressing Assessment/Training    LB Dressing Assess/Train, Clothing Type  donning:;doffing:;pants;shoes;socks   underwear   -AF,SHILPI,AF2     LB Dressing Assess/Train, Position  sitting;standing;edge of bed   socks, shoes completed at EOB  -AF,SHILPI,AF2     LB Dressing Assess/Train, [place]  set up required;verbal cues required;minimum assist (75% patient effort)  -AF,SHILPI,AF2     LB Dressing Assess/Train, Comment  cga while standing to pull up underwear/pants; vcs for sequencin; required assist w/ tying shoes   -AF,SHILPI,AF2     Recorded by  [AF,SHILPI,AF2] NO Cooney (r) Karen Allan, OT Student (t) NO Cooney (c)     Toileting Assessment/Training    Toileting Assess/Train, Assistive Device  grab bars;raised toilet seat  -AF,SHILPI,AF2     Toileting Assess/Train, Position  sitting  -AF,SHILPI,AF2     Toileting Assess/Train, [place] Level  contact guard assist  -AF,SHILPI,AF2     Toileting Assess/Train, Comment  pt able to complete hygiene while seated; tsf to shower so no LBD required after toileting   -AF,SHILPI,AF2     Recorded by  [AF,SHILPI,AF2] Adelia Salamanca, OTR (r) Karen Allan OT Student (t) Adelia Salamanca, OTR (c)     Grooming Assessment/Training     Grooming Assess/Train, Position  sitting;sink side  -AF,SHILPI,AF2     Grooming Assess/Train, [place] Level  set up required  -AF,SHILPI,AF2     Grooming Assess/Train, Comment  brushing hair   -AF,SHILPI,AF2     Recorded by  [AF,SHILPI,AF2] NO Cooney (r) Karen Allan, OT Student (t) NO Cooney (c)     Sensory Treatment    Sensory Reeducation Techniques  localization of touch  -AF,SHILPI,AF2     Sensory Reeduction Treatment  pt pointed w/ RUE to location on LUE where tactile sensations of various textures was felt w/ difficulty; pt was able to localize w/ R hand when repeating touch w/ eyes open   -AF,SHILPI,AF2     Recorded by  [AF,SHILPI,AF2] NO Cooney (r) Karen Allan, OT Student (t) NO Cooney (c)     Positioning and Restraints    Pre-Treatment Position  in bed   in recliner second session   -AF,SHILPI,AF2     Post Treatment Position  chair   both sessions   -AF,SHILPI,AF2 wheelchair  -LB    In Chair  call light within reach;encouraged to call for assist;sitting  -AF,SHILPI,AF2     In Wheelchair   sitting;call light within reach  -LB    Recorded by  [AF,SHILPI,AF2] NO Cooney (r) Karen Allan, OT Student (t) NO Cooney (c) [LB] Angelica Treadwell, PTA      08/05/17 1100          Rehab Assessment/Intervention    Discipline physical therapist  -JT      Document Type therapy note (daily note)  -JT      Subjective Information agree to therapy;no complaints  -JT      Patient Effort, Rehab Treatment good  -JT      Precautions/Limitations fall precautions  -JT      Precautions/Limitations, Vision WFL with corrective lenses  -JT      Recorded by [JT] Danielle Arroyo, PT      Pain Assessment    Pain Assessment No/denies pain  -JT      Recorded by [JT] Danielle Arroyo, PT      Cognitive Assessment/Intervention    Current Cognitive/Communication Assessment impaired  -JT      Orientation Status oriented x 4  -JT      Follows Commands/Answers Questions 100% of the time  -JT       Personal Safety mild impairment  -JT      Personal Safety Interventions fall prevention program maintained;gait belt;muscle strengthening facilitated;nonskid shoes/slippers when out of bed  -JT      Recorded by [JT] Danielle Arroyo, PT      Bed Mobility, Assessment/Treatment    Bed Mobility, Comment up in chair  -JT      Recorded by [JT] Danielle Arroyo, PT      Transfer Assessment/Treatment    Transfers, Bed-Chair [place] contact guard assist;stand by assist  -JT      Transfers, Chair-Bed [place] contact guard assist;stand by assist  -JT      Transfers, Bed-Chair-Bed, Assist Device rolling walker  -JT      Transfers, Sit-Stand [place] contact guard assist;stand by assist  -JT      Transfers, Stand-Sit [place] contact guard assist;stand by assist  -JT      Transfers, Sit-Stand-Sit, Assist Device rolling walker  -JT      Transfer, Safety Issues balance decreased during turns;sequencing ability decreased;step length decreased;weight-shifting ability decreased  -JT      Transfer, Impairments strength decreased;impaired balance  -JT      Recorded by [JT] Danielle Arroyo, PT      Gait Assessment/Treatment    Gait, [place] Level contact guard assist;stand by assist  -JT      Gait, Assistive Device rolling walker  -JT      Gait, Distance (Feet) 300   x1; 200ft x 3  -JT      Gait, Gait Deviations rojelio decreased;forward flexed posture;step length decreased  -JT      Gait, Safety Issues step length decreased;weight-shifting ability decreased  -JT      Gait, Impairments strength decreased;impaired balance  -JT      Recorded by [JT] Danielle Arroyo, PT      Stairs Assessment/Treatment    Number of Stairs 8  -JT      Stairs, Handrail Location both sides  -JT      Stairs, [place] Level verbal cues required;contact guard assist  -JT      Stairs, Assistive Device other (see comments)   hand rails  -JT      Stairs, Technique Used step to step (ascending);step to step  (descending)  -JT      Stairs, Safety Issues weight-shifting ability decreased  -JT      Stairs, Impairments strength decreased  -JT      Recorded by [JT] Danielle Arroyo, PT      Therapy Exercises    Bilateral Lower Extremities AROM:;10 reps;standing;heel raises;mini squats;hip abduction/adduction   side stepping  -JT      Recorded by [JT] Danielle Arroyo, PT      Positioning and Restraints    In Wheelchair sitting;call light within reach;encouraged to call for assist;notified nsg  -JT      Recorded by [JT] Danielle Arroyo, PT        User Key  (r) = Recorded By, (t) = Taken By, (c) = Cosigned By    Initials Name Effective Dates    AW Adelia Martinez, MS CCC-SLP 04/13/15 -     LB Angelica Treadwell, PTA 02/18/16 -     AF Adelia Salamanca, OTR 12/01/15 -     JT Danielle Arroyo, PT 12/01/15 -     SHILPI Karen Allan, OT Student 07/11/17 -               IP SLP Goals       08/07/17 1701 07/31/17 1102 07/27/17 1501    Safely Consume Diet    Safely Consume Diet- SLP, Time to Achieve   by discharge  -JT    Safely Consume Diet- SLP, Activity Level   Patient will improve oral skills for more efficient eating  -JT    Safely Consume Diet- SLP, Outcome   goal ongoing  -JT    Cognitive Linguistic- Optimal Participation in Care    Cognitive Linguistic Optimal Participation in Care- SLP, Date Established  07/31/17  -LT     Cognitive Linguistic Optimal Participation in Care- SLP, Time to Achieve by discharge  -AW by discharge  -LT     Cognitive Linguistic Optimal Participation in Care- SLP, Activity Level Patient will improve Executive functioning skills for increased safety in environment  -AW      Cognitive Linguistic Optimal Participation in Care- SLP, Outcome goal ongoing  -AW goal ongoing  -LT     Dysarthria- Optimal Particpation in Care    Dysarthria Optimal Participation in Care- SLP, Date Established  07/31/17  -LT     Dysarthria Optimal Participation in Care- SLP, Time to Achieve  by discharge  -LT      Dysarthria Optimal Participation in Care- SLP, Outcome goal ongoing  -AW goal ongoing  -LT       07/26/17 0930          Safely Consume Diet    Safely Consume Diet- SLP, Date Established 07/26/17  -OC      Safely Consume Diet- SLP, Time to Achieve by discharge  -OC      Safely Consume Diet- SLP, Outcome goal ongoing  -OC        User Key  (r) = Recorded By, (t) = Taken By, (c) = Cosigned By    Initials Name Provider Type    OC Kaylah Vasquez MA,[facility]-SLP Speech and Language Pathologist    LT Yvonne Miles MS CCC-SLP Speech and Language Pathologist    ISAAC Martinez MS CCC-SLP Speech and Language Pathologist    CRYSTALT Karen Ha Speech and Language Pathologist          EDUCATION  The patient has been educated in the following areas:   Cognitive Impairment.    SLP Recommendation and Plan                               Plan of Care Review  Plan Of Care Reviewed With: patient  Progress: improving          Time Calculation:         Time Calculation- SLP       08/07/17 1700          Time Calculation- SLP    SLP Start Time 1430  -AW      SLP Stop Time 1530  -AW      SLP Time Calculation (min) 60 min  -AW        User Key  (r) = Recorded By, (t) = Taken By, (c) = Cosigned By    Initials Name Provider Type    ISAAC Martinez MS CCC-SLP Speech and Language Pathologist          Therapy Charges for Today     Code Description Service Date Service Provider Modifiers Qty    [id number]  ST DEV OF COGN SKILLS EACH 15 MIN 8/7/2017 Adelia Martinez MS CCC-SLP GN 4               Adelia Martinez MS CCC-YUNG  8/7/2017

## 2017-08-07 NOTE — PROGRESS NOTES
"[facility]    PROGRESS NOTE      [facility]  10 days  Patient Identification:  Name: Magnus Hartley  Age: 88 y.o.  Sex: female  :  1928  MRN: [id number]         Primary Care Physician: JOSHUA Crow            Subjective: No complaints doing well denies any new weakness.  No problems or issues with Eliquis  Interval History: See initial consultation note.     Objective:    Scheduled Meds:  apixaban 5 mg Oral Q12H   atorvastatin 80 mg Oral Nightly   budesonide-formoterol 2 puff Inhalation BID - RT   insulin aspart 0-9 Units Subcutaneous BID AC   metoprolol tartrate 25 mg Oral Q12H   prednisoLONE acetate 1 drop Both Eyes Q12H   sennosides-docusate sodium 2 tablet Oral Nightly     Continuous Infusions:     Vital signs in last 24 hours:  Temp:  [98.1 °F (36.7 °C)-98.5 °F (36.9 °C)] 98.5 °F (36.9 °C)  Heart Rate:  [79-88] 81  Resp:  [16-18] 16  BP: (114-151)/(69-85) 151/72    Intake/Output:    Intake/Output Summary (Last 24 hours) at 17 1237  Last data filed at 17 0825   Gross per 24 hour   Intake              360 ml   Output                0 ml   Net              360 ml       Exam:  /72 (BP Location: Left arm, Patient Position: Lying)  Pulse 81  Temp 98.5 °F (36.9 °C) (Oral)   Resp 16  Ht 64\" (162.6 cm)  Wt 141 lb 11.2 oz (64.3 kg)  SpO2 97%  BMI 24.32 kg/m2    General Appearance:    Alert, cooperative, no distress, AAOx3                          Head:    Normocephalic, without obvious abnormality, atraumatic                           Eyes:    PERRL, conjunctiva/corneas clear, EOM's intact, both eyes                         Throat:   Lips, tongue, gums normal; oral mucosa pink and moist                           Neck:   Supple, symmetrical, trachea midline, no JVD                         Lungs:    Clear to auscultation bilaterally, respirations unlabored                 Chest Wall:    No tenderness or " deformity                          Heart:    Irregularly irregular, S1 and S2 normal, no murmur,no  Rub                                      or gallop                  Abdomen:     Soft, non-tender, bowel sounds active, no masses, no                                                        organomegaly                  Extremities:   Extremities normal, atraumatic, no cyanosis or edema                        Pulses:   Pulses palpable in all extremities                            Skin:   Skin is warm and dry,  no rashes or palpable lesions                  Neurologic:  Alert oriented sitting in the chair       Data Review:     I reviewed the patient's new clinical results.     Results for EFFIE RAO (MRN [id number]) as of 8/7/2017 12:37   Ref. Range 8/6/2017 05:35 8/6/2017 07:05 8/6/2017 16:21 8/7/2017 07:06 8/7/2017 07:12   Glucose Latest Ref Range: 70 - 130 mg/dL  97 135 (H)  100   Protime Latest Ref Range: 11.7 - 14.2 Seconds 19.3 (H)   17.6 (H)    INR Latest Ref Range: 0.90 - 1.10  1.69 (H)   1.50 (H)            Assessment:  Active Problems:    Benign essential hypertension    Controlled type 2 diabetes mellitus without complication    Cerebrovascular accident (CVA) due to occlusion of right middle cerebral artery    Atrial fibrillation - On ELIQUIS since 8/6/2017 because of the difficulty in regulating her INR      Plan:  Continue present treatment.  We'll follow her on as-needed basis.    Stephanie Hernandez MD  8/7/2017  12:37 PM  Much of this encounter note is an electronic transcription/translation of spoken language to printed text. The electronic translation of spoken language may permit erroneous, or at times, nonsensical words or phrases to be inadvertently transcribed; Although I have reviewed the note for such errors, some may still exist

## 2017-08-07 NOTE — THERAPY TREATMENT NOTE
Inpatient Rehabilitation - Occupational Therapy Treatment Note  [facility]     Patient Name: Magnus Hartley  : 1928  MRN: [id number]  Today's Date: 2017  Onset of Illness/Injury or Date of Surgery Date: 17     Referring Physician: Jac      Admit Date: 2017    Visit Dx:     ICD-10-CM ICD-9-CM   1. Left hemiparesis G81.94 342.90   2. Dysarthria R47.1 784.51     Patient Active Problem List   Diagnosis   • Foot pain   • Asthma   • Benign essential hypertension   • Controlled type 2 diabetes mellitus without complication   • Dyslipidemia   • Macrocytosis without anemia   • Senile osteoporosis   • Post-menopausal osteoporosis   • Sinus bradycardia   • Stress incontinence in female   • Urge incontinence of urine   • Atrophic vaginitis   • Encounter for fitting and adjustment of other specified devices   • Incomplete emptying of bladder   • Urethral caruncle   • Incomplete uterovaginal prolapse   • Cerebrovascular accident (CVA) due to occlusion of right middle cerebral artery   • Atrial fibrillation   • Warfarin anticoagulation (goal INR 2-3)             Adult Rehabilitation Note       17 1143 17 0943 17 1100    Rehab Assessment/Intervention    Discipline (P)  occupational therapist  -SHILPI physical therapy assistant  -LB physical therapist  -JT    Document Type (P)  therapy note (daily note)  -SHILPI therapy note (daily note)  -LB therapy note (daily note)  -JT    Subjective Information (P)  agree to therapy;no complaints  -SHILPI agree to therapy  -LB agree to therapy;no complaints  -JT    Patient Effort, Rehab Treatment (P)  good  -SHILPI good  -LB good  -JT    Symptoms Noted During/After Treatment (P)  shortness of breath   w/ tsfs, bending down from seated level   -SHILPI      Symptoms Noted Comment (P)  rest breaks and vcs for deep breaths as necessary   -SHILPI      Precautions/Limitations (P)  fall precautions  -SHILPI fall precautions  -LB fall precautions  -JT    Precautions/Limitations,  Vision   WFL with corrective lenses  -JT    Recorded by [SHILPI] Karen Allan, OT Student [LB] Angelica Treadwell PTA [JT] Danielle Arroyo, PT    Pain Assessment    Pain Assessment (P)  No/denies pain  -SHILPI No/denies pain  -LB No/denies pain  -JT    Recorded by [SHILPI] Karen Allan, OT Student [LB] Angelica Treadwell PTA [JT] Danielle Arroyo, PT    Cognitive Assessment/Intervention    Current Cognitive/Communication Assessment (P)  impaired  -SHILPI  impaired  -JT    Orientation Status (P)  oriented x 4  -SHILPI  oriented x 4  -JT    Follows Commands/Answers Questions (P)  100% of the time;able to follow single-step instructions;needs cueing;needs increased time;needs repetition  -SHILPI  100% of the time  -JT    Personal Safety (P)  mild impairment;decreased awareness, need for safety;decreased insight to deficits  -SHILPI  mild impairment  -JT    Personal Safety Interventions (P)  fall prevention program maintained;gait belt;nonskid shoes/slippers when out of bed;supervised activity  -SHILPI fall prevention program maintained;gait belt;muscle strengthening facilitated;nonskid shoes/slippers when out of bed  -LB fall prevention program maintained;gait belt;muscle strengthening facilitated;nonskid shoes/slippers when out of bed  -JT    Recorded by [SHILPI] Karen Allan, OT Student [LB] Angelica Treadwell PTA [JT] Danielle Arroyo, PT    Bed Mobility, Assessment/Treatment    Bed Mob, Supine to Sit, [place] (P)  supervision required  -SHILPI      Bed Mobility, Comment   up in chair  -JT    Recorded by [SHILPI] Karen Allan, OT Student  [JT] Danielle Arroyo, PT    Transfer Assessment/Treatment    Transfers, Bed-Chair [place] (P)  stand by assist  -SHILPI  contact guard assist;stand by assist  -JT    Transfers, Chair-Bed [place]   contact guard assist;stand by assist  -JT    Transfers, Bed-Chair-Bed, Assist Device   rolling walker  -JT    Transfers, Sit-Stand [place] (P)  contact guard assist   while showeringto  wash elsi area   -SHILPI stand by assist  -LB contact guard assist;stand by assist  -JT    Transfers, Stand-Sit [place] (P)  contact guard assist   while showering to wash elsi area  -SHILPI stand by assist  -LB contact guard assist;stand by assist  -JT    Transfers, Sit-Stand-Sit, Assist Device  rolling walker  -LB rolling walker  -JT    Toilet Transfer, [place] (P)  contact guard assist;verbal cues required   vcs for hand placement   -SHILPI      Toilet Transfer, Assistive Device (P)  elevated toilet seat;rolling walker   grab bars   -SHILPI      Walk-In Shower Transfer, [place] (P)  contact guard assist;verbal cues required   vcs for hand placement   -SHILPI      Walk-In Shower Transfer, Assist Device (P)  standard shower chair   grab bars; tsf from toilet   -SHILPI      Bathtub Transfer, [place] (P)  verbal cues required;contact guard assist   pt practiced tub tsf w/ demo to simulate home environment   -SHILPI      Bathtub Transfer, Assistive Device (P)  transfer tub bench;rolling platform walker   grab bars  -SHILPI      Transfer, Safety Issues   balance decreased during turns;sequencing ability decreased;step length decreased;weight-shifting ability decreased  -JT    Transfer, Impairments   strength decreased;impaired balance  -JT    Transfer, Comment (P)  tsf to and from EOM from w/c w/ SBA, vcs for hand placement   -SHILPI      Recorded by [SHILPI] Karen Allan OT Student [LB] Angelica Treadwell PTA [JT] Danielle Arroyo PT    Gait Assessment/Treatment    Gait, [place] Level  stand by assist  -LB contact guard assist;stand by assist  -JT    Gait, Assistive Device  rolling walker  -LB rolling walker  -JT    Gait, Distance (Feet)  200  -   x1; 200ft x 3  -JT    Gait, Gait Deviations   rojelio decreased;forward flexed posture;step length decreased  -JT    Gait, Safety Issues   step length decreased;weight-shifting ability decreased  -JT    Gait, Impairments   strength decreased;impaired balance  -JT    Recorded  by  [LB] Angelica Treadwell PTA [JT] Danielle Arroyo, PT    Stairs Assessment/Treatment    Number of Stairs  8  -LB 8  -JT    Stairs, Handrail Location  both sides  -LB both sides  -JT    Stairs, [place] Level  contact guard assist  -LB verbal cues required;contact guard assist  -JT    Stairs, Assistive Device   other (see comments)   hand rails  -JT    Stairs, Technique Used  step to step (ascending);step to step (descending)  -LB step to step (ascending);step to step (descending)  -JT    Stairs, Safety Issues   weight-shifting ability decreased  -JT    Stairs, Impairments   strength decreased  -JT    Recorded by  [LB] Angelica Treadwell PTA [JT] Danielle Arroyo, PT    Functional Mobility    Functional Mobility- Ind. Level  contact guard assist;minimum assist (75% patient effort)  -LB     Functional Mobility- Device  straight cane   w tripod base  -LB     Functional Mobility-Distance (Feet)  180  -LB     Functional Mobility- Safety Issues  sequencing ability decreased   LOB x 1  -LB     Recorded by  [LB] Angelica Treadwell PTA     ADL Assessment/Intervention    Additional Documentation (P)  --   pt practiced tying shoes from lap level w/ min a   -SHILPI      Recorded by [SHILPI] Karen Allan OT Student      Upper Body Bathing Assessment/Training    UB Bathing Assess/Train Assistive Device (P)  grab bars;shower chair with back;hand-held shower head  -SHILPI      UB Bathing Assess/Train, Position (P)  sitting  -SHILPI      UB Bathing Assess/Train, [place] Level (P)  minimum assist (75% patient effort);verbal cues required  -SHILPI      UB Bathing Assess/Train, Comment (P)  min a w/ wetting down hair; vcs to recall that pt had already washed hair   -SHILPI      Recorded by [SHILPI] Karen Allan OT Student      Lower Body Bathing Assessment/Training    LB Bathing Assess/Train Assistive Device (P)  shower chair with back;hand-held shower head;grab bars  -SHILPI      LB Bathing Assess/Train, Position (P)  sitting;standing   -SHILPI      LB Bathing Assess/Train, [place] Level (P)  verbal cues required;contact guard assist  -SHILPI      LB Bathing Assess/Train, Comment (P)  vcs for sequencing; cga to maintain balance while pt washed elsi area   -SHILPI      Recorded by [SHILPI] Karen Allan, OT Student      Upper Body Dressing Assessment/Training    UB Dressing Assess/Train, Clothing Type (P)  donning:;doffing:;bra;t-shirt  -SHILPI      UB Dressing Assess/Train, Position (P)  sitting  -SHILPI      UB Dressing Assess/Train, [place] (P)  supervision required;set up required   set up to gather clothing   -SHILPI      Recorded by [SHILPI] Karen Allan OT Student      Lower Body Dressing Assessment/Training    LB Dressing Assess/Train, Clothing Type (P)  donning:;doffing:;pants;shoes;socks   underwear   -SHILPI      LB Dressing Assess/Train, Position (P)  sitting;standing;edge of bed   socks, shoes completed at EOB  -SHILPI      LB Dressing Assess/Train, [place] (P)  set up required;verbal cues required;minimum assist (75% patient effort)  -SHILPI      LB Dressing Assess/Train, Comment (P)  cga while standing to pull up underwear/pants; vcs for sequencin; required assist w/ tying shoes   -SHILPI      Recorded by [SHILPI] Karen Allan OT Student      Toileting Assessment/Training    Toileting Assess/Train, Assistive Device (P)  grab bars;raised toilet seat  -SHILPI      Toileting Assess/Train, Position (P)  sitting  -SHILPI      Toileting Assess/Train, [place] Level (P)  contact guard assist  -SHILPI      Toileting Assess/Train, Comment (P)  pt able to complete hygiene while seated; tsf to shower so no LBD required after toileting   -SHILPI      Recorded by [SHILPI] Karen Allan OT Student      Grooming Assessment/Training    Grooming Assess/Train, Position (P)  sitting;sink side  -SHILPI      Grooming Assess/Train, [place] Level (P)  set up required  -SHILPI      Grooming Assess/Train, Comment (P)  brushing hair   -SHILPI      Recorded by [SHILPI] Karen Allan, OT Student      Therapy Exercises     Bilateral Lower Extremities   AROM:;10 reps;standing;heel raises;mini squats;hip abduction/adduction   side stepping  -JT    Recorded by   [JT] Danielle Arroyo, PT    Sensory Treatment    Sensory Reeducation Techniques (P)  localization of touch  -SHILPI      Sensory Reeduction Treatment (P)  pt pointed w/ RUE to location on LUE where tactile sensations of various textures was felt w/ difficulty; pt was able to localize w/ R hand when repeating touch w/ eyes open   -SHILPI      Recorded by [SHILPI] Karen Allan OT Student      Positioning and Restraints    Pre-Treatment Position (P)  in bed   in recliner second session   -SHILPI      Post Treatment Position (P)  chair   both sessions   -SHILPI wheelchair  -LB     In Chair (P)  call light within reach;encouraged to call for assist;sitting  -SHILPI      In Wheelchair  sitting;call light within reach  -LB sitting;call light within reach;encouraged to call for assist;notified nsg  -JT    Recorded by [SHILPI] Karen Allan, OT Student [LB] Angelica Treadwell PTA [JT] Danielle Arroyo, PT      08/04/17 1500 08/04/17 1419       Rehab Assessment/Intervention    Discipline speech language pathologist  -LT occupational therapist  -SHILPI NULL,AF2     Document Type therapy note (daily note)  -LT therapy note (daily note)  -CHAKASHILPI,AF2     Subjective Information agree to therapy;no complaints  -LT agree to therapy;complains of;pain   am, pm  -AF,SHILPI,AF2     Patient Effort, Rehab Treatment good  -LT good  -AF,SHILPI,AF2     Precautions/Limitations  fall precautions  -AF,SHILPI,AF2     Recorded by [LT] Yvonne Miles MS CCC-SLP [AF,SHILPI,AF2] Adelia Salamanca OTR (r) Karen Allan OT Student (t) NO Cooney (c)     Pain Assessment    Pain Assessment  0-10  -AF,SHILPI,AF2     Pain Score  9  -AF,SHILPI,AF2     Post Pain Score  --   am,pm  -AF,SHILPI,AF2     Pain Type  Acute pain  -AF,SHILPI,AF2     Pain Location  Toe (Comment which one)  -AF,SHILPI,AF2     Pain Orientation  --   L 4,5  -AF,SHILPI,AF2     Pain Intervention(s)   Repositioned;Elevated   denied for me to request pain meds   -AF,SHILPI,AF2     Recorded by  [AF,SHILPI,AF2] NO Cooney (r) Karen Allan OT Student (t) NO Cooney (c)     Cognitive Assessment/Intervention    Current Cognitive/Communication Assessment  impaired  -AF,SHILPI,AF2     Orientation Status  oriented x 4  -AF,SHILPI,AF2     Follows Commands/Answers Questions  100% of the time;able to follow single-step instructions;needs repetition;needs increased time  -AF,SHILPI,AF2     Personal Safety  mild impairment  -AF,SHILPI,AF2     Personal Safety Interventions  fall prevention program maintained;gait belt;nonskid shoes/slippers when out of bed;supervised activity  -AF,SHILPI,AF2     Recorded by  [AF,SHILPI,AF2] NO Cooney (r) Karen Allan OT Student (t) NO Cooney (c)     Improve attention    Attention Progress 80%;without cues  -LT      Recorded by [LT] Yvonne Miles MS CCC-SLP      Improve memory skills    Memory Skills Progress 80%;without cues  -LT      Recorded by [LT] Yvonne Miles MS CCC-SLP      Improve functional problem solving    Functional Problem Solving Progress 70%;with inconsistent cues  -LT      Recorded by [LT] Yvonne Miles MS CCC-SLP      Improve executive function skills    Executive Function Skills Progress 70%;without cues  -LT      Recorded by [LT] Yvonne Miles MS CCC-SLP      Bed Mobility, Assessment/Treatment    Bed Mob, Supine to Sit, [place]  supervision required  -AF,SHILPI,AF2     Recorded by  [AF,SHILPI,AF2] NO Cooney (r) Karen Allan OT Student (t) NO Cooney (c)     Transfer Assessment/Treatment    Transfers, Bed-Chair [place]  contact guard assist;stand by assist  -AF,SHILPI,AF2     Transfers, Sit-Stand [place]  contact guard assist;stand by assist  -AF,SHILPI,AF2     Transfers, Stand-Sit [place]  contact guard assist;stand by assist  -AF,SHILPI,AF2     Transfer, Comment  tsf to and from EOM from w/c w/ CGA, vcs for hand placement    -AF,SHILPI,AF2     Recorded by  [AF,SHILPI,AF2] NO Cooney (r) Karen Allan, OT Student (t) NO Cooney (c)     Upper Body Bathing Assessment/Training    UB Bathing Assess/Train, Position  sink side;sitting  -AF,SHILPI,AF2     UB Bathing Assess/Train, [place] Level  set up required;stand by assist  -AF,SHILPI,AF2     UB Bathing Assess/Train, Comment  completed at w/c level   -AF,SHILPI,AF2     Recorded by  [AF,SHILPI,AF2] Adelia Salamanca OTR (r) Karen Allan, OT Student (t) NO Cooney (c)     Lower Body Bathing Assessment/Training    LB Bathing Assess/Train, Position  standing;sitting;sink side  -AF,SHILPI,AF2     LB Bathing Assess/Train, [place] Level  contact guard assist;set up required   pt had trouble grasping and managing obects w/ L hand  -AF,SHILPI,AF2     Recorded by  [AF,SHILPI,AF2] NO Cooney (r) Karen Allan, OT Student (t) NO Cooney (c)     Upper Body Dressing Assessment/Training    UB Dressing Assess/Train, Clothing Type  doffing:;donning:;hospital gown;bra;t-shirt  -AF,SHILPI,AF2     UB Dressing Assess/Train, Position  sitting  -AF,SHILPI,AF2     UB Dressing Assess/Train, [place]  set up required   to gather clothing items   -AF,SHILPI,AF2     Recorded by  [AF,SHILPI,AF2] NO Cooney (r) Karen Allan, OT Student (t) NO Cooney (c)     Lower Body Dressing Assessment/Training    LB Dressing Assess/Train, Clothing Type  doffing:;donning:;pants;shoes;socks   underwear   -AF,SHILPI,AF2     LB Dressing Assess/Train, Position  standing;sitting;sink side  -AF,SHILPI,AF2     LB Dressing Assess/Train, [place]  set up required;minimum assist (75% patient effort)  -AF,SHILPI,AF2     LB Dressing Assess/Train, Comment  pt required assist to gather clothing and tie shoes; pt required assist donning L sock due to L 4,5 toe pain   -AF,SHILPI,AF2     Recorded by  [CHAKA,SHILPI,AF2] Adelia Salamanca, OTR (r) Karen Allan OT Student (t) Adelia Salamanca, OTR (c)     Toileting  Assessment/Training    Toileting Assess/Train, Assistive Device  grab bars;raised toilet seat  -AF,SHILPI,AF2     Toileting Assess/Train, Position  sitting;standing  -AF,SHILPI,AF2     Toileting Assess/Train, [place] Level  contact guard assist;verbal cues required  -AF,SHILPI,AF2     Toileting Assess/Train, Comment  vcs for hand placement; able to perform hygiene while seated; required cga while standing for clothing management   -AF,SHILPI,AF2     Recorded by  [AF,SHILPI,AF2] Adelia Salamanca OTR (r) Karen Allan, OT Student (t) NO Cooney (c)     Grooming Assessment/Training    Grooming Assess/Train, Position  sitting;sink side  -AF,SHILPI,AF2     Grooming Assess/Train, [place] Level  set up required  -AF,SHILPI,AF2     Grooming Assess/Train, Comment  brushing hair   -AF,SHILPI,AF2     Recorded by  [AF,SHILPI,AF2] NO Cooney (r) Karen Allan OT Student (t) NO Cooney (c)     Sensory Treatment    Sensory Reeducation Techniques  sensory training, visual reinforcement;sensory train, w/o visual reinforcement;comparison, tactile sensory information  -AF,SHILPI,AF2     Sensory Reeduction Treatment  therapist facilitated hand over hand symbol drawing w/ eyes closed then guessed what symbol was drawn, if wrong therapist/pt slick the same symbol w/ eyes open; pt correctly identified 1/5 capital letters, 1/3 lower case letters, 1/4 numbers, and 2/4 shapes w/ eyes closed'; pt was presented w/ rough and soft fabric pieces w/ eyes open and ID which was rough and which was soft, pt was able to ID all correctly  -AF,SHILPI,AF2     Recorded by  [AF,SHILPI,AF2] NO Cooney (r) Karen Allan, OT Student (t) NO Cooney (c)     Positioning and Restraints    Pre-Treatment Position  sitting in chair/recliner   bed am, reclincer pm   -AF,SHILPI,AF2     Post Treatment Position  chair  -AF,SHILPI,AF2     In Chair  sitting;call light within reach;encouraged to call for assist   am,pm   -AF,SHILPI,AF2     Recorded by  [AF,SHILPI,AF2] Adelia Phelps  [place], OTR (r) Karen Allan, OT Student (t) Adelia Salamanca, OTR (c)       User Key  (r) = Recorded By, (t) = Taken By, (c) = Cosigned By    Initials Name Effective Dates    LT Yvonne Miles, MS CCC-SLP 04/13/15 -     LB Angelica MOSES Treadwell, PTA 02/18/16 -     AF Adelia Salamanca, OTR 12/01/15 -     JT Danielle Arroyo, PT 12/01/15 -     SHILPI Karen Allan, OT Student 07/11/17 -                 OT Goals       08/02/17 1558 07/29/17 1054 07/25/17 1443    Transfer Training OT STG    Transfer Training OT STG, Date Established 08/02/17  -AF (r) SHILPI (t) AF (c) 07/29/17  -RE     Transfer Training OT STG, Time to Achieve 1 wk  -AF (r) SHILPI (t) AF (c) 3 days  -RE     Transfer Training OT STG, Activity Type  toilet  -RE     Transfer Training OT STG, [place] Level  supervision required;verbal cues required  -RE     Transfer Training OT STG, Assist Device walker, rolling   grab bars  -AF (r) SHILPI (t) AF (c)      Transfer Training OT STG, Date Goal Reviewed 08/02/17  -AF (r) SHILPI (t) AF (c)      Transfer Training OT STG, Outcome  goal ongoing  -RE     Transfer Training OT LTG    Transfer Training OT LTG, Date Established  07/29/17  -RE 07/25/17  -SO    Transfer Training OT LTG, Time to Achieve  1 wk  -RE 1 wk  -SO    Transfer Training OT LTG, Activity Type  toilet  -RE sit to stand/stand to sit;toilet  -SO    Transfer Training OT LTG, [place] Level  conditional independence  -RE contact guard assist;supervision required  -SO    Transfer Training OT LTG, Assist Device   walker, rolling  -SO    Transfer Training OT LTG, Date Goal Reviewed 08/02/17  -AF (r) SHILPI (t) AF (c)      Transfer Training OT LTG, Outcome  goal ongoing  -RE     Transfer Training 2 OT STG    Transfer Training 2 OT STG, Date Established 08/02/17  -AF (r) SHILPI (t) AF (c)      Transfer Training 2 OT STG, Time to Achieve 1 wk  -AF (r) SHILPI (t) AF (c) 3 days  -RE     Transfer Training 2 OT STG, Activity Type shower chair   rwx  -AF (r) SHILPI (t) AF (c) shower  chair  -RE     Transfer Training 2 OT STG, [place] Level supervision required  -AF (r) SHILPI (t) AF (c) contact guard assist  -RE     Transfer Training 2 OT STG, Outcome goal revised  -AF (r) SHILPI (t) AF (c) goal ongoing  -RE     Transfer Training 2 OT LTG    Transfer Training 2 OT LTG, Date Established 08/02/17  -AF (r) SHILPI (t) AF (c)      Transfer Training 2 OT LTG, Time to Achieve by discharge  -AF (r) SHILPI (t) AF (c) 2 wks  -RE     Transfer Training 2 OT LTG, Activity Type  shower chair;tub  -RE     Transfer Training 2 OT LTG, [place] Level  supervision required  -RE     Transfer Training 2 OT LTG, Date Goal Reviewed 08/02/17  -AF (r) SHILPI (t) AF (c)      Transfer Training 2 OT LTG, Outcome  goal ongoing  -RE     Bathing OT STG    Bathing Goal OT STG, Date Established 08/02/17  -AF (r) SHILPI (t) AF (c) 07/29/17  -RE     Bathing Goal OT STG, Time to Achieve 1 wk  -AF (r) SHILPI (t) AF (c) 3 days  -RE     Bathing Goal OT STG, Activity Type  upper body bathing;lower body bathing  -RE     Bathing Goal OT STG, [place] Level contact guard assist  -AF (r) SHILPI (t) AF (c) set up required  -RE     Bathing Goal OT STG, Assist Device grab bars;shower head, detachable;shower chair with back  -AF (r) SHILPI (t) AF (c)      Bathing Goal OT STG, Date Goal Reviewed 08/02/17  -AF (r) SHILPI (t) AF (c)      Bathing Goal OT STG, Outcome  goal ongoing  -RE     Bathing OT LTG    Bathing Goal OT LTG, Date Established 08/02/17  -AF (r) SHILPI (t) AF (c)      Bathing Goal OT LTG, Time to Achieve by discharge  -AF (r) SHILPI (t) AF (c) 2 wks  -RE     Bathing Goal OT LTG, Activity Type  upper body bathing;lower body bathing  -RE     Bathing Goal OT LTG, [place] Level supervision required  -AF (r) SHILPI (t) AF (c) conditional independence  -RE     Bathing Goal OT LTG, Date Goal Reviewed 08/02/17  -CHAKA (christine) SHILPI (t) CHAKA (c)      Bathing Goal OT LTG, Outcome  goal ongoing  -RE     Grooming OT STG    Grooming Goal OT STG, Date Established 08/02/17  -CHAKA (christine) SHILPI (t)  AF (c)      Grooming Goal OT STG, Time to Achieve 1 wk  -AF (r) SHILPI (t) AF (c) 3 days  -RE     Grooming Goal OT STG, [place] Level  conditional independence  -RE     Grooming Goal OT STG, Date Goal Reviewed 08/02/17  -AF (r) SHILPI (t) AF (c)      Grooming Goal OT STG, Outcome  goal ongoing  -RE     Grooming OT LTG    Grooming Goal OT LTG, Date Established 08/02/17  -AF (r) SHILPI (t) AF (c)      Grooming Goal OT LTG, Time to Achieve by discharge  -AF (r) SHILPI (t) AF (c) 1 wk  -RE     Grooming Goal OT LTG, [place] Level  conditional independence  -RE     Grooming Goal OT LTG, Date Goal Reviewed 08/02/17  -AF (r) SHILPI (t) AF (c)      Grooming Goal OT LTG, Outcome  goal ongoing  -RE     LB Dressing OT STG    LB Dressing Goal OT STG, Date Established 08/02/17  -AF (r) SHILPI (t) AF (c) 07/29/17  -RE     LB Dressing Goal OT STG, Time to Achieve 1 wk  -AF (r) SHILPI (t) AF (c) 5 - 7 days  -RE     LB Dressing Goal OT STG, [place] Level contact guard assist   tying shoes   -AF (r) SHILPI (t) AF (c) supervision required;verbal cues required  -RE     LB Dressing Goal OT STG, Adaptive Equipment --   elastic laces  -AF (r) SHILPI (t) AF (c)      LB Dressing Goal OT STG, Date Goal Reviewed 08/02/17  -AF (r) SHILPI (t) AF (c)      LB Dressing Goal OT STG, Outcome  goal ongoing  -RE     LB Dressing OT LTG    LB Dressing Goal OT LTG, Date Established 08/02/17  -AF (r) SHILPI (t) AF (c) 07/29/17  -RE     LB Dressing Goal OT LTG, Time to Achieve by discharge  -AF (r) SHILPI (t) AF (c) 2 wks  -RE     LB Dressing Goal OT LTG, [place] Level supervision required  -AF (r) SHILPI (t) AF (c) conditional independence  -RE     LB Dressing Goal OT LTG, Adaptive Equipment --   w/ shoe strings  -AF (r) SHILPI (t) AF (c)      LB Dressing Goal OT LTG, Date Goal Reviewed 08/02/17  -CHAKA (r) SHILPI (t) AF (c)      LB Dressing Goal OT LTG, Outcome  goal ongoing  -RE     UB Dressing OT STG    UB Dressing Goal OT STG, Date Established 08/02/17  -CHAKA (r) SHILPI (t) AF (c) 07/29/17  -RE      UB Dressing Goal OT STG, Time to Achieve 1 wk  -AF (r) SHILPI (t) AF (c) 3 days  -RE     UB Dressing Goal OT STG, [place] Level  set up required  -RE     UB Dressing Goal OT STG, Date Goal Reviewed 08/02/17  -AF (r) SHILPI (t) AF (c)      UB Dressing Goal OT STG, Outcome  goal ongoing  -RE     UB Dressing OT LTG    UB Dressing Goal OT LTG, Date Established 08/02/17  -AF (r) SHILPI (t) AF (c) 07/29/17  -RE     UB Dressing Goal OT LTG, Time to Achieve by discharge  -AF (r) SHILPI (t) AF (c) 2 wks  -RE     UB Dressing Goal OT LTG, [place] Level  conditional independence  -RE     UB Dressing Goal OT LTG, Date Goal Reviewed 08/02/17  -AF (r) SHILPI (t) AF (c)      UB Dressing Goal OT LTG, Outcome  goal ongoing  -RE     Isolated Movement OT LTG    Isolated Movement OT LTG, Date Established   07/25/17  -SO    Isolated Movement OT LTG, Time to Achieve   1 wk  -SO    Isolated Movement OT LTG, Body Area   left scapula;left shoulder;left elbow;left forearm;left wrist;left fingers  -SO    Isolated Movement OT LTG, Level   WFL  -SO    ADL OT LTG    ADL OT LTG, Date Established   07/25/17  -SO    ADL OT LTG, Time to Achieve   1 wk  -SO    ADL OT LTG, Activity Type   ADL skills  -SO    ADL OT LTG, [place] Level   contact guard;standby assist;assistive device  -SO      User Key  (r) = Recorded By, (t) = Taken By, (c) = Cosigned By    Initials Name Provider Type    RE Maya Apodaca, OTR Occupational Therapist    SO Bindu Matamoros, OTR Occupational Therapist    AF Adelia Salamanca, OTR Occupational Therapist    SHILPI Allan, OT Student OT Student                OT Recommendation and Plan  Anticipated Equipment Needs At Discharge: tub bench  Planned Therapy Interventions: adaptive equipment training, ADL retraining, activity intolerance, energy conservation, fine motor coordination training, neuromuscular re-education  Therapy Frequency: 5 times/wk          Time Calculation:         Time Calculation- OT       08/07/17 1212  08/07/17 1210       Time Calculation- OT    OT Start Time (P)  1100  -SHILPI (P)  0830  -SHILPI     OT Stop Time (P)  1130  -SHILPI (P)  0915  -SHILPI     OT Time Calculation (min) (P)  30 min  -SHILPI (P)  45 min  -SHILPI       User Key  (r) = Recorded By, (t) = Taken By, (c) = Cosigned By    Initials Name Provider Type    SHILPIJUSTIN Allan, OT Student OT Student           Therapy Charges for Today     Code Description Service Date Service Provider Modifiers Qty    [id number] HC OT SELF CARE/MGMT/TRAIN EA 15 MIN 8/7/2017 Karen Allan OT Student GO 4    [id number]  OT NEUROMUSC RE EDUCATION EA 15 MIN 8/7/2017 Karen Allan OT Student GO 1               Karen Allan, OT Student  8/7/2017

## 2017-08-07 NOTE — PROGRESS NOTES
Inpatient Rehabilitation Functional Measures Assessment    Functional Measures  BETH Eating:  [facility] Grooming: [facility] Bathing:  [facility] Upper Body Dressing:  [facility] Lower Body Dressing:  [facility] Toileting:  [facility] Bladder Management  Level of Assistance:  [place]  Frequency/Number of Accidents this Shift:  [facility] Bowel Management  Level of Assistance: [place]  Frequency/Number of Accidents this Shift: [facility] Bed/Chair/Wheelchair Transfer:  [facility] Toilet Transfer:  [facility] Tub/Shower Transfer:  [place]    Previously Documented Mode of Locomotion at Discharge: Field  BETH Expected Mode of Locomotion at Discharge: [facility] Walk/Wheelchair:  [facility] Stairs:  [facility] Comprehension:  Auditory comprehension is the usual mode. Comprehension  Score = 6, Modified [place].  Patient comprehends complex/abstract  information in their primary language, requiring:  [facility] Expression:  Vocal expression is the usual mode. Expression Score = 6,  Modified Independent.  Patient expresses complex/abstract information in their  primary language, requiring:  [facility] Social Interaction:  Social Interaction Score = 6, Modified Independent.  Patient is modified independent for social interaction, requiring:  [facility] Problem Solving:  Activity was not observed.  BETH Memory:  Memory Score = 6, Modified [place].  Patient is modified  independent for memory, requiring:    Therapy Mode Minutes  Occupational Therapy: Branch  Physical Therapy: Branch  Speech Language Pathology:  [place]    Signed by: Sheela Zamora RN

## 2017-08-07 NOTE — PLAN OF CARE
Problem: Inpatient SLP  Goal: Dysarthria-Patient will improve motor speech skills to allow optimal participation in care  Outcome: Ongoing (interventions implemented as appropriate)    07/31/17 1102 08/07/17 1701   Dysarthria- Optimal Particpation in Care   Dysarthria Optimal Participation in Care- SLP, Date Established 07/31/17 --    [place]ria Optimal Participation in Care- SLP, Time to Achieve by discharge --    [place] Optimal Participation in Care- SLP, Outcome --  goal ongoing       Goal: Cognitive-linguistic-Patient will improve Cognitive-linguistic skills to allow optimal participation in care  Outcome: Ongoing (interventions implemented as appropriate)    08/07/17 1701   Cognitive Linguistic- Optimal Participation in Care   Cognitive Linguistic Optimal Participation in Care- SLP, Time to Achieve by discharge   Cognitive Linguistic Optimal Participation in Care- SLP, Activity Level Patient will improve Executive functioning skills for increased safety in environment   Cognitive Linguistic Optimal Participation in Care- SLP, Outcome goal ongoing

## 2017-08-07 NOTE — PROGRESS NOTES
"   LOS: 10 days   Patient Care Team:  JOSHUA Chaudhari as PCP - General  Mario Mata MD as PCP - Claims Attributed  Hortencia Lisa MD as Consulting Physician (Cardiology)  Pierre Walker MD as Consulting Physician (Gastroenterology)    Chief Complaint: R MCA ischemic infarct    Subjective     History of Present Illness    Subjective    Overall stronger.  Tolerating therapies.    History taken from: patient    Objective     Vital Signs  Temp:  [98.1 °F (36.7 °C)-98.5 °F (36.9 °C)] 98.5 °F (36.9 °C)  Heart Rate:  [79-88] 81  Resp:  [16-18] 16  BP: (114-151)/(69-85) 151/72    Objective:  Vital signs: (most recent): Blood pressure 151/72, pulse 81, temperature 98.5 °F (36.9 °C), temperature source Oral, resp. rate 16, height 64\" (162.6 cm), weight 141 lb 11.2 oz (64.3 kg), SpO2 97 %.            MENTAL STATUS- A/A  HEENT-  lip blisters resolved  LUNGS-CTA  HEART-IRREG  ABD-NABS,SOFT,NT  EXT- NO EDEMA.  NEURO - TAKES RESISTANCE BUE AND BLE.     Results Review:     I reviewed the patient's new clinical results.                Invalid input(s): WALT SOTO    Medication Review: done    apixaban 5 mg Oral Q12H   atorvastatin 80 mg Oral Nightly   budesonide-formoterol 2 puff Inhalation BID - RT   insulin aspart 0-9 Units Subcutaneous BID AC   metoprolol tartrate 25 mg Oral Q12H   prednisoLONE acetate 1 drop Both Eyes Q12H   sennosides-docusate sodium 2 tablet Oral Nightly         Assessment/Plan     Active Problems:    Benign essential hypertension    Controlled type 2 diabetes mellitus without complication    Cerebrovascular accident (CVA) due to occlusion of right middle cerebral artery    Atrial fibrillation    Warfarin anticoagulation (goal INR 2-3)      Assessment & Plan  Cerebrovascular accident (CVA) due to occlusion of right middle cerebral artery  Benign essential hypertension  Controlled type 2 diabetes mellitus without complication  Atrial fibrillation  Anticoagulation - warfarin changed to " Eliquis  Fever blister- August 1- Valtrex ordered 2 gm q 12 hours for two doses.     Continue rehabilitation efforts.  Heraclio Paul MD  08/07/17  9:45 AM    Time:

## 2017-08-07 NOTE — PROGRESS NOTES
Inpatient Rehabilitation Functional Measures Assessment    Functional Measures  BETH Eating:  [facility] Grooming: [facility] Bathing:  [facility] Upper Body Dressing:  [facility] Lower Body Dressing:  [facility] Toileting:  [facility] Bladder Management  Level of Assistance:  [place]  Frequency/Number of Accidents this Shift:  [facility] Bowel Management  Level of Assistance: [place]  Frequency/Number of Accidents this Shift: [facility] Bed/Chair/Wheelchair Transfer:  [facility] Toilet Transfer:  [facility] Tub/Shower Transfer:  [place]    Previously Documented Mode of Locomotion at Discharge: Field  BETH Expected Mode of Locomotion at Discharge: [facility] Walk/Wheelchair:  [facility] Stairs:  [facility] Comprehension:  Auditory comprehension is the usual mode. Comprehension  Score = 6, Modified [place].  Patient comprehends complex/abstract  information in their primary language, requiring: Additional time.  BETH Expression:  Vocal expression is the usual mode. Expression Score = 6,  Modified Independent.  Patient expresses complex/abstract information in their  primary language, requiring: Additional time.  BETH Social Interaction:  Social Interaction Score = 6, Modified Independent.  Patient is modified independent for social interaction, requiring: Requires  additional time.  BETH Problem Solving:  Activity was not observed.  BETH Memory:  Memory Score = 6, Modified [place].  Patient is modified  independent for memory, requiring: Requires additional time.    Therapy Mode Minutes  Occupational Therapy: Branch  Physical Therapy: Branch  Speech Language Pathology:  [place]    Signed by: Angela Cope RN

## 2017-08-07 NOTE — PROGRESS NOTES
Inpatient Rehabilitation Plan of Care Note    Plan of Care  Care Plan Reviewed - No updates at this time.    Safety    Performed Intervention(s)  falls precautions  hourly rounding, items within reach  bed alarm      Sphincter Control    Performed Intervention(s)  monitor intake and output  stool softners as ordered      Psychosocial    Performed Intervention(s)  encourage expression of feelings and concerns    Signed by: Sheela Zamora RN

## 2017-08-07 NOTE — PROGRESS NOTES
Inpatient Rehabilitation Functional Measures Assessment    Functional Measures  BETH Eating:  [facility] Grooming: [facility] Bathing:  [facility] Upper Body Dressing:  [facility] Lower Body Dressing:  [facility] Toileting:  Toileting Score = 5.  Patient is supervision/set-up for  toileting, requiring: Stand by assistance. Setting out toileting equipment.  Patient requires the following assistive device(s): Grab bar.    BETH Bladder Management  Level of Assistance:  Bladder Score = 5.  Patient is supervision/set-up for  bladder management, requiring: Stand by assistance. Verbal cuing, prompting, or  instructing. No assistive devices were required.  Frequency/Number of Accidents this Shift:  Bladder accidents this shift:  0 .  Patient has not had an accident this shift.    BETH Bowel Management  Level of Assistance: Activity was not observed.  Frequency/Number of Accidents this Shift: [facility] Bed/Chair/Wheelchair Transfer:  [facility] Toilet Transfer:  Toilet Transfer Score = 4.  Patient performs 75% or more  of effort and minimal assistance (little/incidental help/steadying) for  transferring to and from the toilet/commode, requiring: Contact guard. Patient  requires the following assistive device(s): Walker. Grab bars.  [facility] Tub/Shower Transfer:  [place]    Previously Documented Mode of Locomotion at Discharge: [place]  BETH Expected Mode of Locomotion at Discharge: [facility] Walk/Wheelchair:  [facility] Stairs:  [facility] Comprehension:  [facility] Expression:  [facility] Social Interaction:  [facility] Problem Solving:  [facility] Memory:  [place]    Therapy Mode Minutes  Occupational Therapy: [place]  Physical Therapy: [place]  Speech Language Pathology:  [place]    Signed by: JULITA Song

## 2017-08-07 NOTE — PROGRESS NOTES
Occupational Therapy: Individual: 75 minutes.    Physical Therapy: Branch    Speech Language Pathology:  Branch    Signed by: Karen Allan OT Student     - CoSigned By: Adelia Salamanca OT 8/7/2017 3:04:41 PM

## 2017-08-07 NOTE — PLAN OF CARE
Problem: Stroke (Ischemic) (Adult)  Goal: Signs and Symptoms of Listed Potential Problems Will be Absent or Manageable (Stroke)  Outcome: Ongoing (interventions implemented as appropriate)    Problem: Fall Risk (Adult)  Goal: Absence of Falls  Outcome: Ongoing (interventions implemented as appropriate)    Problem: Patient Care Overview (Adult)  Goal: Plan of Care Review  Outcome: Ongoing (interventions implemented as appropriate)    08/07/17 2519   Coping/Psychosocial Response Interventions   Plan Of Care Reviewed With patient   Patient Care Overview   Progress improving   Outcome Evaluation   Outcome Summary/Follow up Plan no complaints of pain, progressing well with therapies. assist x1 with a walker.

## 2017-08-07 NOTE — PLAN OF CARE
Problem: Patient Care Overview (Adult)  Goal: Plan of Care Review  Outcome: Ongoing (interventions implemented as appropriate)    08/07/17 3045   Coping/Psychosocial Response Interventions   Plan Of Care Reviewed With patient   Patient Care Overview   Progress improving

## 2017-08-07 NOTE — PLAN OF CARE
Problem: Stroke (Ischemic) (Adult)  Goal: Signs and Symptoms of Listed Potential Problems Will be Absent or Manageable (Stroke)  Outcome: Ongoing (interventions implemented as appropriate)    08/07/17 0134   Stroke (Ischemic)   Problems Assessed (Stroke (Ischemic)/TIA) all   Problems Present (Stroke (Ischemic)/TIA) motor/sensory impairment         Problem: Fall Risk (Adult)  Goal: Absence of Falls  Outcome: Ongoing (interventions implemented as appropriate)    08/07/17 0134   Fall Risk (Adult)   Absence of Falls making progress toward outcome         Problem: Patient Care Overview (Adult)  Goal: Plan of Care Review  Outcome: Ongoing (interventions implemented as appropriate)    08/07/17 0134   Coping/Psychosocial Response Interventions   Plan Of Care Reviewed With patient   Outcome Evaluation   Outcome Summary/Follow up Plan Patient very pleasant and cooperative, no complaints of pain or discomfort. She seemed to sleep well, with no new issues.

## 2017-08-07 NOTE — THERAPY TREATMENT NOTE
Inpatient Rehabilitation - Physical Therapy Treatment Note  [facility]     Patient Name: Magnus Hartley  : 1928  MRN: [id number]  Today's Date: 2017  Onset of Illness/Injury or Date of Surgery Date: 17  Date of Referral to PT: 17  Referring Physician: Jac    Admit Date: 2017    Visit Dx:    ICD-10-CM ICD-9-CM   1. Left hemiparesis G81.94 342.90   2. Dysarthria R47.1 784.51     Patient Active Problem List   Diagnosis   • Foot pain   • Asthma   • Benign essential hypertension   • Controlled type 2 diabetes mellitus without complication   • Dyslipidemia   • Macrocytosis without anemia   • Senile osteoporosis   • Post-menopausal osteoporosis   • Sinus bradycardia   • Stress incontinence in female   • Urge incontinence of urine   • Atrophic vaginitis   • Encounter for fitting and adjustment of other specified devices   • Incomplete emptying of bladder   • Urethral caruncle   • Incomplete uterovaginal prolapse   • Cerebrovascular accident (CVA) due to occlusion of right middle cerebral artery   • Atrial fibrillation   • Warfarin anticoagulation (goal INR 2-3)               Adult Rehabilitation Note       17 1143 17 0943 17 1100    Rehab Assessment/Intervention    Discipline occupational therapist  -SHILPI NULL AF2 physical therapy assistant  -LB physical therapist  -JT    Document Type therapy note (daily note)  -SHILPI NULL AF2 therapy note (daily note)  -LB therapy note (daily note)  -JT    Subjective Information agree to therapy;no complaints  -SHILPI NULL AF2 agree to therapy  -LB agree to therapy;no complaints  -JT    Patient Effort, Rehab Treatment good  -SHILPI NULL AF2 good  -LB good  -JT    Symptoms Noted During/After Treatment shortness of breath   w/ tsfs, bending down from seated level   -SHILPI NULL AF2      Symptoms Noted Comment rest breaks and vcs for deep breaths as necessary   -SHILPI NULL AF2      Precautions/Limitations fall precautions  -SHILPI NULL AF2 fall precautions  -LB fall  precautions  -JT    Precautions/Limitations, Vision   WFL with corrective lenses  -JT    Recorded by [AF,SHILPI,AF2] NO Cooney (r) Karen Allan, OT Student (t) NO Cooney (c) [LB] Angelica Treadwell PTA [JT] Danielle Arroyo, PT    Pain Assessment    Pain Assessment No/denies pain  -AF,SHILPI,AF2 No/denies pain  -LB No/denies pain  -JT    Recorded by [AF,SHILPI,AF2] NO Cooney (r) Karen Allan, OT Student (t) NO Cooney (c) [LB] Angelica Treadwell PTA [JT] Danielle Arroyo, PT    Cognitive Assessment/Intervention    Current Cognitive/Communication Assessment impaired  -AF,SHILPI,AF2  impaired  -JT    Orientation Status oriented x 4  -AF,SHILPI,AF2  oriented x 4  -JT    Follows Commands/Answers Questions 100% of the time;able to follow single-step instructions;needs cueing;needs increased time;needs repetition  -AF,SHILPI,AF2  100% of the time  -JT    Personal Safety mild impairment;decreased awareness, need for safety;decreased insight to deficits  -AF,SHILPI,AF2  mild impairment  -JT    Personal Safety Interventions fall prevention program maintained;gait belt;nonskid shoes/slippers when out of bed;supervised activity  -AF,SHILPI,AF2 fall prevention program maintained;gait belt;muscle strengthening facilitated;nonskid shoes/slippers when out of bed  -LB fall prevention program maintained;gait belt;muscle strengthening facilitated;nonskid shoes/slippers when out of bed  -JT    Recorded by [AF,SHILPI,AF2] NO Cooney (r) Karen Allan, OT Student (t) NO Cooney (c) [LB] Angelica Treadwell PTA [JT] Danielle Arroyo, PT    Bed Mobility, Assessment/Treatment    Bed Mob, Supine to Sit, [place] supervision required  -AF,SHILPI,AF2      Bed Mobility, Comment  up in chair  -LB up in chair  -JT    Recorded by [AF,SHILPI,AF2] Adelia Salamanca, OTR (r) Karen Allan, OT Student (t) Adelia Salamanca, OTR (c) [LB] Angelica Treadwell, PTA [JT] Danielle Arroyo, PT    Transfer  Assessment/Treatment    Transfers, Bed-Chair [place] stand by assist  -AF,SHILPI,AF2  contact guard assist;stand by assist  -JT    Transfers, Chair-Bed [place]   contact guard assist;stand by assist  -JT    Transfers, Bed-Chair-Bed, Assist Device   rolling walker  -JT    Transfers, Sit-Stand [place] contact guard assist   while showeringto wash elsi area   -AF,SHILPI,AF2 stand by assist  -LB contact guard assist;stand by assist  -JT    Transfers, Stand-Sit [place] contact guard assist   while showering to wash elsi area  -AF,SHILPI,AF2 stand by assist  -LB contact guard assist;stand by assist  -JT    Transfers, Sit-Stand-Sit, Assist Device  rolling walker  -LB rolling walker  -JT    Toilet Transfer, [place] contact guard assist;verbal cues required   vcs for hand placement   -AF,SHILPI,AF2      Toilet Transfer, Assistive Device elevated toilet seat;rolling walker   grab bars   -AF,SHILPI,AF2      Walk-In Shower Transfer, [place] contact guard assist;verbal cues required   vcs for hand placement   -AF,SHILPI,AF2      Walk-In Shower Transfer, Assist Device standard shower chair   grab bars; tsf from toilet   -AF,SHILPI,AF2      Bathtub Transfer, [place] verbal cues required;contact guard assist   pt practiced tub tsf w/ demo to simulate home environment   -AF,SHILPI,AF2      Bathtub Transfer, Assistive Device transfer tub bench;rolling platform walker   grab bars  -AF,SHILPI,AF2      Transfer, Safety Issues   balance decreased during turns;sequencing ability decreased;step length decreased;weight-shifting ability decreased  -JT    Transfer, Impairments   strength decreased;impaired balance  -JT    Transfer, Comment tsf to and from EOM from w/c w/ SBA, vcs for hand placement   -AF,SHILPI,AF2 car tsf CGA, rwx  -LB     Recorded by [AF,SHILPI,AF2] Adelia Salamanca OTR (r) Karen Allan, OT Student (t) Adelia Salamanca OTR (c) [LB] Angelica Treadwell, PTA [JT] Danielle Arroyo, PT    Gait Assessment/Treatment    Gait,  [place] Level  stand by assist  -LB contact guard assist;stand by assist  -JT    Gait, Assistive Device  rolling walker  -LB rolling walker  -JT    Gait, Distance (Feet)  200  -   x1; 200ft x 3  -JT    Gait, Gait Deviations  forward flexed posture;step length decreased  -LB rojelio decreased;forward flexed posture;step length decreased  -JT    Gait, Safety Issues   step length decreased;weight-shifting ability decreased  -JT    Gait, Impairments   strength decreased;impaired balance  -JT    Recorded by  [LB] Angelica Treadwell PTA [JT] Danielle Arroyo, PT    Stairs Assessment/Treatment    Number of Stairs  8  -LB 8  -JT    Stairs, Handrail Location  both sides  -LB both sides  -JT    Stairs, [place] Level  contact guard assist  -LB verbal cues required;contact guard assist  -JT    Stairs, Assistive Device   other (see comments)   hand rails  -JT    Stairs, Technique Used  step to step (ascending);step to step (descending)  -LB step to step (ascending);step to step (descending)  -JT    Stairs, Safety Issues   weight-shifting ability decreased  -JT    Stairs, Impairments   strength decreased  -JT    Recorded by  [LB] Angelica Treadwell PTA [JT] Danielle Arroyo, PT    Functional Mobility    Functional Mobility- Ind. Level  contact guard assist;minimum assist (75% patient effort)  -LB     Functional Mobility- Device  straight cane   w tripod base  -LB     Functional Mobility-Distance (Feet)  180  -LB     Functional Mobility- Safety Issues  sequencing ability decreased   LOB x 1  -LB     Recorded by  [LB] Angelica Treadwell PTA     ADL Assessment/Intervention    Additional Documentation --   pt practiced tying shoes from lap level w/ min a   -AF,SHILPI,AF2      Recorded by [AF,SHILPI,AF2] NO Cooney (r) Karen Allan OT Student (t) NO Cooney (c)      Upper Body Bathing Assessment/Training    UB Bathing Assess/Train Assistive Device grab bars;shower chair with back;hand-held  shower head  -AF,SHILPI,AF2      UB Bathing Assess/Train, Position sitting  -AF,SHILPI,AF2      UB Bathing Assess/Train, [place] Level minimum assist (75% patient effort);verbal cues required  -AF,SHILPI,AF2      UB Bathing Assess/Train, Comment min a w/ wetting down hair; vcs to recall that pt had already washed hair   -AF,SHILPI,AF2      Recorded by [AF,SHILPI,AF2] NO Cooney (r) Karen Allan, OT Student (t) NO Cooney (c)      Lower Body Bathing Assessment/Training    LB Bathing Assess/Train Assistive Device shower chair with back;hand-held shower head;grab bars  -AF,SHILPI,AF2      LB Bathing Assess/Train, Position sitting;standing  -AF,SHILPI,AF2      LB Bathing Assess/Train, [place] Level verbal cues required;contact guard assist  -AF,SHILPI,AF2      LB Bathing Assess/Train, Comment vcs for sequencing; cga to maintain balance while pt washed elsi area   -AF,SHILPI,AF2      Recorded by [AF,SHILPI,AF2] NO Cooney (r) Karen Allan, OT Student (t) NO Cooney (c)      Upper Body Dressing Assessment/Training    UB Dressing Assess/Train, Clothing Type donning:;doffing:;bra;t-shirt  -AF,SHILPI,AF2      UB Dressing Assess/Train, Position sitting  -AF,SHILPI,AF2      UB Dressing Assess/Train, [place] supervision required;set up required   set up to gather clothing   -AF,SHILPI,AF2      Recorded by [AF,SHILPI,AF2] NO Cooney (r) Karen Allan, OT Student (t) NO Cooney (c)      Lower Body Dressing Assessment/Training    LB Dressing Assess/Train, Clothing Type donning:;doffing:;pants;shoes;socks   underwear   -AF,SHILPI,AF2      LB Dressing Assess/Train, Position sitting;standing;edge of bed   socks, shoes completed at EOB  -AF,SHILPI,AF2      LB Dressing Assess/Train, [place] set up required;verbal cues required;minimum assist (75% patient effort)  -AF,SHILPI,AF2      LB Dressing Assess/Train, Comment cga while standing to pull up underwear/pants; vcs for sequencin; required assist w/ tying shoes    -AF,SHILPI,AF2      Recorded by [AF,SHILPI,AF2] NO Cooney (r) Karen Allan OT Student (t) NO Cooney ([place])      Toileting Assessment/Training    Toileting Assess/Train, Assistive Device grab bars;raised toilet seat  -AF,SHILPI,AF2      Toileting Assess/Train, Position sitting  -AF,SHILPI,AF2      Toileting Assess/Train, Indepen Level contact guard assist  -AF,SHILPI,AF2      Toileting Assess/Train, Comment pt able to complete hygiene while seated; tsf to shower so no LBD required after toileting   -AF,SHILPI,AF2      Recorded by [AF,SHILPI,AF2] NO Cooney (r) Karen Allan OT Student (t) NO Cooney (c)      Grooming Assessment/Training    Grooming Assess/Train, Position sitting;sink side  -AF,SHILPI,AF2      Grooming Assess/Train, Indepen Level set up required  -AF,SHILPI,AF2      Grooming Assess/Train, Comment brushing hair   -AF,SHILPI,AF2      Recorded by [AF,SHILPI,AF2] NO Cooney (r) Karen Allan OT Student (t) NO Cooney ([place])      Therapy Exercises    Bilateral Lower Extremities   AROM:;10 reps;standing;heel raises;mini squats;hip abduction/adduction   side stepping  -JT    Recorded by   [JT] Danielle Arroyo, PT    Sensory Treatment    Sensory Reeducation Techniques localization of touch  -AF,SHILPI,AF2      Sensory Reeduction Treatment pt pointed w/ RUE to location on LUE where tactile sensations of various textures was felt w/ difficulty; pt was able to localize w/ R hand when repeating touch w/ eyes open   -AF,SHILPI,AF2      Recorded by [AF,SHILPI,AF2] NO Cooney (r) Karen Allan OT Student (t) NO Cooney (c)      Positioning and Restraints    Pre-Treatment Position in bed   in recliner second session   -AF,SHILPI,AF2      Post Treatment Position chair   both sessions   -AF,SHILPI,AF2 wheelchair  -LB     In Chair call light within reach;encouraged to call for assist;sitting  -SHILPI NULL AF2      In Wheelchair  sitting;call light within reach  -LB sitting;call light within  reach;encouraged to call for assist;notified nsg  -JT    Recorded by [AF,SHILPI,AF2] Adelia Salamanca, OTR (r) Karen Allan, OT Student (t) Adelia Salamanca, OTR (c) [LB] Angelica Treadwell, PTA [JT] Danielle Arroyo, PT      User Key  (r) = Recorded By, (t) = Taken By, (c) = Cosigned By    Initials Name Effective Dates    LB Angelica Treadwell, PTA 02/18/16 -     AF Adelia Salamanca, OTR 12/01/15 -     JT Danielle Arroyo, PT 12/01/15 -     SHILPI Karen Allan, OT Student 07/11/17 -                 IP PT Goals       08/04/17 1057 07/29/17 1211       Bed Mobility PT LTG    Bed Mobility PT LTG, Date Established  07/29/17  -LB     Bed Mobility PT LTG, Time to Achieve  2 wks  -LB     Bed Mobility PT LTG, Activity Type  roll left/roll right;supine to sit/sit to supine  -LB     Bed Mobility PT LTG, [place] Level  independent  -LB     Bed Mobility PT LTG, Date Goal Reviewed 08/04/17  -LBA      Bed Mobility PT LTG, Outcome goal ongoing  -LBA      Transfer Training PT LTG    Transfer Training PT LTG, Date Established  07/29/17  -LB     Transfer Training PT LTG, Time to Achieve  2 wks  -LB     Transfer Training PT LTG, Activity Type  sit to stand/stand to sit  -LB     Transfer Training PT LTG, [place] Level  conditional independence  -LB     Transfer Training PT LTG, Assist Device  cane, straight  -LB     Transfer Training PT  LTG, Date Goal Reviewed 08/04/17  -LBA      Transfer Training PT LTG, Outcome goal ongoing  -LBA      Transfer Training 2 PT LTG    Transfer Training PT 2 LTG, Date Established  07/29/17  -LB     Transfer Training PT 2 LTG, Time to Achieve  2 wks  -LB     Transfer Training PT 2 LTG, Activity Type  --   car transfer  -LB     Transfer Training PT 2 LTG, [place] Level  contact guard assist  -LB     Transfer Training PT 2 LTG, Assist Device  cane, straight  -LB     Transfer Training PT 2 LTG, Date Goal Reviewed 08/04/17  -LBA      Transfer Training PT 2 LTG, Outcome goal ongoing  -LBA       Gait Training PT LTG    Gait Training Goal PT LTG, Date Established  07/29/17  -LB     Gait Training Goal PT LTG, Time to Achieve  2 wks  -LB     Gait Training Goal PT LTG, [place] Level  conditional independence  -LB     Gait Training Goal PT LTG, Assist Device  cane, straight  -LB     Gait Training Goal PT LTG, Distance to Achieve  150  -LB     Gait Training Goal PT LTG, Date Goal Reviewed 08/04/17  -LBA      Gait Training Goal PT LTG, Outcome goal not met  -LBA      Stair Training PT LTG    Stair Training Goal PT LTG, Date Established  07/29/17  -LB     Stair Training Goal PT LTG, Time to Achieve  2 wks  -LB     Stair Training Goal PT LTG, Number of Steps  4  -LB     Stair Training Goal PT LTG, [place] Level  contact guard assist  -LB     Stair Training Goal PT LTG, Assist Device  1 handrail  -LB     Stair Training Goal PT LTG, Date Goal Reviewed 08/04/17  -LBA      Stair Training Goal PT LTG, Outcome goal ongoing  -LBA        User Key  (r) = Recorded By, (t) = Taken By, (c) = Cosigned By    Initials Name Provider Type    WINNIE Hoffmann, PT Physical Therapist    MARY Treadwell, PTA Physical Therapy Assistant          Physical Therapy Education     Title: PT OT SLP Therapies (Active)     Topic: Physical Therapy (Active)     Point: Mobility training (Done)    Learning Progress Summary    Learner Readiness Method Response Comment Documented by Status   Patient Acceptance E VU,NR  LB 08/07/17 1001 Done    Eager E,RHONA HARTMANN  JT 08/05/17 1432 Done    Acceptance E VU,NR  LB 08/04/17 1358 Done    Acceptance E VU,NR  LB 08/03/17 1103 Done    Acceptance E VU,NR car, stairs, curb LB 08/02/17 1031 Done    Acceptance E VU,NR  LB 08/01/17 0939 Done    Acceptance E VU,NR  LB 07/31/17 0947 Done    Acceptance E VU,NR  LB1 07/29/17 1210 Done               Point: Home exercise program (Done)    Learning Progress Summary    Learner Readiness Method Response Comment Documented by Status   Patient Eager E,RHONA HARTMANN   JT 08/05/17 1432 Done                      User Key     Initials Effective Dates Name Provider Type Discipline    LB1 10/06/15 -  Rosemary Hoffmann, PT Physical Therapist PT    LB 02/18/16 -  Angelica Treadwell PTA Physical Therapy Assistant PT    JT 12/01/15 -  Danielle Arroyo, PT Physical Therapist PT                    PT Recommendation and Plan  Anticipated Equipment Needs At Discharge: standard cane  Anticipated Discharge Disposition: home with outpatient services, home with assist  Planned Therapy Interventions: bed mobility training, balance training, neuromuscular re-education, transfer training, strengthening, stair training  PT Frequency: 2 times/day  Plan of Care Review  Plan Of Care Reviewed With: patient  Progress: improving  Outcome Summary/Follow up Plan: pnt has improved with functional mobility. Quite pleasant and highly motivated.         Time Calculation:         PT Charges       08/07/17 1555 08/07/17 1009       Time Calculation    Start Time 1230  -LB 0930  -LB     Stop Time 1300  -LB 1000  -LB     Time Calculation (min) 30 min  -LB 30 min  -LB       User Key  (r) = Recorded By, (t) = Taken By, (c) = Cosigned By    Initials Name Provider Type    LB Angelica Treadwell PTA Physical Therapy Assistant          Therapy Charges for Today     Code Description Service Date Service Provider Modifiers Qty    [id number] HC PT THER PROC EA 15 MIN 8/7/2017 Angelica Treadwell PTA GP 4               Angelica Treadwell PTA  8/7/2017

## 2017-08-08 LAB
GLUCOSE BLDC GLUCOMTR-MCNC: 106 MG/DL (ref 70–130)
GLUCOSE BLDC GLUCOMTR-MCNC: 113 MG/DL (ref 70–130)

## 2017-08-08 PROCEDURE — 97112 NEUROMUSCULAR REEDUCATION: CPT

## 2017-08-08 PROCEDURE — 97532: CPT

## 2017-08-08 PROCEDURE — 92610 EVALUATE SWALLOWING FUNCTION: CPT

## 2017-08-08 PROCEDURE — 97110 THERAPEUTIC EXERCISES: CPT

## 2017-08-08 PROCEDURE — 82962 GLUCOSE BLOOD TEST: CPT

## 2017-08-08 PROCEDURE — 94799 UNLISTED PULMONARY SVC/PX: CPT

## 2017-08-08 PROCEDURE — 97535 SELF CARE MNGMENT TRAINING: CPT

## 2017-08-08 RX ADMIN — APIXABAN 5 MG: 5 TABLET, FILM COATED ORAL at 09:00

## 2017-08-08 RX ADMIN — BUDESONIDE AND FORMOTEROL FUMARATE DIHYDRATE 2 PUFF: 80; 4.5 AEROSOL RESPIRATORY (INHALATION) at 23:26

## 2017-08-08 RX ADMIN — PREDNISOLONE ACETATE 1 DROP: 10 SUSPENSION/ DROPS OPHTHALMIC at 09:00

## 2017-08-08 RX ADMIN — APIXABAN 5 MG: 5 TABLET, FILM COATED ORAL at 19:49

## 2017-08-08 RX ADMIN — METOPROLOL TARTRATE 25 MG: 25 TABLET ORAL at 19:48

## 2017-08-08 RX ADMIN — METOPROLOL TARTRATE 25 MG: 25 TABLET ORAL at 09:00

## 2017-08-08 RX ADMIN — BUDESONIDE AND FORMOTEROL FUMARATE DIHYDRATE 2 PUFF: 80; 4.5 AEROSOL RESPIRATORY (INHALATION) at 06:43

## 2017-08-08 RX ADMIN — ATORVASTATIN CALCIUM 80 MG: 80 TABLET, FILM COATED ORAL at 19:48

## 2017-08-08 RX ADMIN — PREDNISOLONE ACETATE 1 DROP: 10 SUSPENSION/ DROPS OPHTHALMIC at 19:49

## 2017-08-08 RX ADMIN — DOCUSATE SODIUM -SENNOSIDES 2 TABLET: 50; 8.6 TABLET, COATED ORAL at 19:49

## 2017-08-08 NOTE — THERAPY EVALUATION
Inpatient Rehabilitation - Speech Language Pathology Initial Evaluation  [facility]     Patient Name: Magnus Hartley  : 1928  MRN: [id number]  Today's Date: 2017  Onset of Illness/Injury or Date of Surgery Date: 17            Admit Date: 2017     SPEECH-LANGUAGE PATHOLOGY EVALUATION - SWALLOW  Subjective: The patient was seen on this date for a Clinical Swallow evaluation.  Patient was alert and cooperative.    The patient's history is significant for stroke, L eliu.   Objective: Textures given included thin liquid, mechanical soft consistency and regular consistency.  Assessment: Pt had test tray of regular diet. Pt had one episode of coughing at the beginning of the meal, which she appeared to clear. No other s/s noted. Pt verbalized understanding of strategies.  SLP Findings:  Patient presents with mild oropharyngeal dysphagia, without esophageal component.   Recommendations: Diet Textures: thin liquid, regular consistency food.    Recommended Strategies: Upright for PO, small bites and sips, double swallow with all and no straw. Oral care before breakfast, after all meals and PRN.    Dysphagia therapy is recommended. Rationale: diet tolerance.  Visit Dx:    ICD-10-CM ICD-9-CM   1. Left hemiparesis G81.94 342.90   2. Dysarthria R47.1 784.51     Patient Active Problem List   Diagnosis   • Foot pain   • Asthma   • Benign essential hypertension   • Controlled type 2 diabetes mellitus without complication   • Dyslipidemia   • Macrocytosis without anemia   • Senile osteoporosis   • Post-menopausal osteoporosis   • Sinus bradycardia   • Stress incontinence in female   • Urge incontinence of urine   • Atrophic vaginitis   • Encounter for fitting and adjustment of other specified devices   • Incomplete emptying of bladder   • Urethral caruncle   • Incomplete uterovaginal prolapse   • Cerebrovascular accident (CVA) due to occlusion of right middle cerebral artery   • Atrial fibrillation   •  Warfarin anticoagulation (goal INR 2-3)     Past Medical History:   Diagnosis Date   • Arthritis    • Asthma    • Atopic dermatitis    • Back pain    • Diabetes mellitus    • Hyperkalemia    • Hyperlipidemia    • Hypertension    • Insomnia    • Macrocytosis    • Menopause     AT AGE 50   • Osteoarthritis    • Osteoporosis    • Prolapsed uterus    • Reactive airway disease    • Stress incontinence    • Vitamin D deficiency      Past Surgical History:   Procedure Laterality Date   • COLONOSCOPY  08/09/2010    WNL      • ELBOW PROCEDURE Left     OPEN REDUCTION   • OOPHORECTOMY     • OVARIAN CYST SURGERY     • TOTAL KNEE ARTHROPLASTY  03/11/2013              SLP EVALUATION (last 72 hours)      SLP Evaluation       08/08/17 1100                Clinical Impression    SLP Diagnosis Mild cognitive impairment  -AW        Functional Level At Time Of Evaluation impaired  -AW        Patient's Goals For Discharge return to all previous roles/activities  -AW        Criteria for Skilled Therapeutic Interventions Met skilled criteria for cognitive linguistic intervention met  -AW        Rehab Potential good, to achieve stated therapy goals  -AW        Therapy Frequency 3-5 times/wk  -AW        Predicted Duration of Therapy Intervention (days/wks) until discharge  -AW        Expected Duration of Therapy Session (min) 30-45 minutes  -AW        Anticipated Discharge Disposition home with assist  -AW          User Key  (r) = Recorded By, (t) = Taken By, (c) = Cosigned By    Initials Name Effective Dates    AW Adelia Martinez, MS [facility]-SLP 04/13/15 -            EDUCATION  The patient has been educated in the following areas:   Dysphagia (Swallowing Impairment) Oral Care/Hydration.    SLP Recommendation and Plan  SLP Diagnosis: Mild cognitive impairment     Rehab Potential: good, to achieve stated therapy goals  Criteria for Skilled Therapeutic Interventions Met: skilled criteria for cognitive linguistic intervention  met  Anticipated Discharge Disposition: home with assist     Therapy Frequency: 3-5 times/wk  Predicted Duration of Therapy Intervention (days/wks): until discharge  Expected Duration of Therapy Session (min): 30-45 minutes    Plan of Care Review  Plan Of Care Reviewed With: patient  Progress: improving  Outcome Summary/Follow up Plan: diet advanced to regular and thin          IP SLP Goals       08/08/17 1615 08/07/17 1701 07/31/17 1102    Safely Consume Diet    Safely Consume Diet- SLP, Date Established 08/08/17  -AW      Safely Consume Diet- SLP, Time to Achieve by discharge  -AW      Safely Consume Diet- SLP, Additional Goal Pt will tolerate diet upgrade to regular with no s/s.  -AW      Cognitive Linguistic- Optimal Participation in Care    Cognitive Linguistic Optimal Participation in Care- SLP, Date Established   07/31/17  -LT    Cognitive Linguistic Optimal Participation in Care- SLP, Time to Achieve  by discharge  -AW by discharge  -LT    Cognitive Linguistic Optimal Participation in Care- SLP, Activity Level  Patient will improve Executive functioning skills for increased safety in environment  -AW     Cognitive Linguistic Optimal Participation in Care- SLP, Outcome  goal ongoing  -AW goal ongoing  -LT    Dysarthria- Optimal Particpation in Care    Dysarthria Optimal Participation in Care- SLP, Date Established   07/31/17  -LT    Dysarthria Optimal Participation in Care- SLP, Time to Achieve   by discharge  -LT    Dysarthria Optimal Participation in Care- SLP, Outcome  goal ongoing  -AW goal ongoing  -LT      07/27/17 1501 07/26/17 0930       Safely Consume Diet    Safely Consume Diet- SLP, Date Established  07/26/17  -OC     Safely Consume Diet- SLP, Time to Achieve by discharge  -JT by discharge  -OC     Safely Consume Diet- SLP, Activity Level Patient will improve oral skills for more efficient eating  -JT      Safely Consume Diet- SLP, Outcome goal ongoing  -JT goal ongoing  -OC       User Key  (r) =  Recorded By, (t) = Taken By, (c) = Cosigned By    Initials Name Provider Type    BRUCE Vasquez MA,CCC-SLP Speech and Language Pathologist    LT Yvonne Miles MS CCC-SLP Speech and Language Pathologist    ISAAC Martinez, MS CCC-SLP Speech and Language Pathologist    CRYSTALT Karen Halllen Speech and Language Pathologist             SLP Outcome Measures (last 72 hours)      SLP Outcome Measures       08/08/17 1600          SLP Outcome Measures    Outcome Measure Used? Adult NOMS  -AW      FCM Scores    FCM Chosen Swallowing  -AW      Swallowing FCM Score 6  -AW        User Key  (r) = Recorded By, (t) = Taken By, (c) = Cosigned By    Initials Name Effective Dates    AW Adelia Martinez MS CCC-SLP 04/13/15 -           Time Calculation:         Time Calculation- SLP       08/08/17 1617 08/08/17 1549       Time Calculation- SLP    SLP Start Time 1130  -AW 1030  -AW     SLP Stop Time 1200  -AW 1100  -AW     SLP Time Calculation (min) 30 min  -AW 30 min  -AW       User Key  (r) = Recorded By, (t) = Taken By, (c) = Cosigned By    Initials Name Provider Type    ISAAC Adelia Juan MS CCC-SLP Speech and Language Pathologist          Therapy Charges for Today     Code Description Service Date Service Provider Modifiers Qty    87419265506 HC ST DEV OF COGN SKILLS EACH 15 MIN 8/7/2017 Adelia Martinez MS CCC-SLP GN 4    92628552400 HC ST DEV OF COGN SKILLS EACH 15 MIN 8/8/2017 Adelia Martinez MS CCC-SLP GN 2    76779234873 HC ST EVAL ORAL PHARYNG SWALLOW 2 8/8/2017 Adelia Martinez MS CCC-SLP GN 1             ADULT NOMS (last 72 hours)      Adult NOMS       08/08/17 1600                FCM Scores    FCM Chosen Swallowing  -AW        Swallowing FCM Score 6  -AW          User Key  (r) = Recorded By, (t) = Taken By, (c) = Cosigned By    Initials Name Effective Dates    ISAAC Martinez MS CCC-SLP 04/13/15 -                Adelia Martinez MS CCC-YUNG  8/8/2017

## 2017-08-08 NOTE — PLAN OF CARE
Problem: Patient Care Overview (Adult)  Goal: Plan of Care Review  Outcome: Ongoing (interventions implemented as appropriate)    08/08/17 1615   Coping/Psychosocial Response Interventions   Plan Of Care Reviewed With patient   Patient Care Overview   Progress improving   Outcome Evaluation   Outcome Summary/Follow up Plan diet advanced to regular and thin         Problem: Inpatient SLP  Goal: Dysphagia- Patient will safely consume diet as per recommendation with no signs/symptoms of aspiration  Outcome: Ongoing (interventions implemented as appropriate)    08/08/17 1615   Safely Consume Diet   Safely Consume Diet- SLP, Date Established 08/08/17   Safely Consume Diet- SLP, Time to Achieve by discharge   Safely Consume Diet- SLP, Additional Goal Pt will tolerate diet upgrade to regular with no s/s.

## 2017-08-08 NOTE — PROGRESS NOTES
Occupational Therapy: Individual: 60 minutes.    Physical Therapy: [place]    Speech Language Pathology:  [place]    Signed by: Karen Allan OT Student     - CoSigned By: Adelia Salamanca OT 8/8/2017 3:16:20 PM

## 2017-08-08 NOTE — PROGRESS NOTES
Inpatient Rehabilitation Functional Measures Assessment    Functional Measures  BETH Eating:  [facility] Grooming: [facility] Bathing:  [facility] Upper Body Dressing:  [facility] Lower Body Dressing:  [facility] Toileting:  [facility] Bladder Management  Level of Assistance:  [place]  Frequency/Number of Accidents this Shift:  [facility] Bowel Management  Level of Assistance: [place]  Frequency/Number of Accidents this Shift: [facility] Bed/Chair/Wheelchair Transfer:  [facility] Toilet Transfer:  [facility] Tub/Shower Transfer:  [place]    Previously Documented Mode of Locomotion at Discharge: Field  BETH Expected Mode of Locomotion at Discharge: [facility] Walk/Wheelchair:  [facility] Stairs:  [facility] Comprehension:  Auditory comprehension is the usual mode. Comprehension  Score = 6, Modified [place].  Patient comprehends complex/abstract  information in their primary language with only mild difficulty.  BETH Expression:  Vocal expression is the usual mode. Expression Score = 6,  Modified Independent.  Patient expresses complex/abstract information in their  primary language with only mild difficulty with tasks.  BETH Social Interaction:  Social Interaction Score = 7, Independent. Patient is  completely independent for social interaction.  There are no activity  limitations.  BETH Problem Solving:  Activity was not observed.  BETH Memory:  Memory Score = 6, Modified [place].  Patient is modified  independent for memory, having only mild difficulty and using self-initiated or  environmental cues to remember.    Therapy Mode Minutes  Occupational Therapy: [place]  Physical Therapy: [place]  Speech Language Pathology:  [place]    Signed by: Carol Vinson RN

## 2017-08-08 NOTE — PROGRESS NOTES
Inpatient Rehabilitation Plan of Care Note    Plan of Care  Care Plan Reviewed - No updates at this time.    Signed by: Carol Vinson RN

## 2017-08-08 NOTE — THERAPY TREATMENT NOTE
Inpatient Rehabilitation - Occupational Therapy Treatment Note  [facility]     Patient Name: Magnus Hartley  : 1928  MRN: [id number]  Today's Date: 2017  Onset of Illness/Injury or Date of Surgery Date: 17     Referring Physician: Jac      Admit Date: 2017    Visit Dx:     ICD-10-CM ICD-9-CM   1. Left hemiparesis G81.94 342.90   2. Dysarthria R47.1 784.51     Patient Active Problem List   Diagnosis   • Foot pain   • Asthma   • Benign essential hypertension   • Controlled type 2 diabetes mellitus without complication   • Dyslipidemia   • Macrocytosis without anemia   • Senile osteoporosis   • Post-menopausal osteoporosis   • Sinus bradycardia   • Stress incontinence in female   • Urge incontinence of urine   • Atrophic vaginitis   • Encounter for fitting and adjustment of other specified devices   • Incomplete emptying of bladder   • Urethral caruncle   • Incomplete uterovaginal prolapse   • Cerebrovascular accident (CVA) due to occlusion of right middle cerebral artery   • Atrial fibrillation   • Warfarin anticoagulation (goal INR 2-3)             Adult Rehabilitation Note       17 1114 17 0943 17 1530    Rehab Assessment/Intervention    Discipline (P)  occupational therapist  -SHILPI physical therapy assistant  -LB speech language pathologist  -AW    Document Type (P)  therapy note (daily note)  -SHILPI therapy note (daily note)  -LB therapy note (daily note)  -AW    Subjective Information (P)  agree to therapy;complains of;weakness  -SHILPI agree to therapy  -LB no complaints;agree to therapy  -AW    Patient Effort, Rehab Treatment (P)  good  -SHILPI good  -LB good  -AW    Symptoms Noted During/After Treatment (P)  shortness of breath   after tsfs, bending down for LBD;   -SHILPI  none  -AW    Symptoms Noted Comment (P)  allowed for rest breaks as necessary; vcs for deep breathing   -SHILPI      Precautions/Limitations (P)  fall precautions;swallowing precautions  -SHILPI fall  precautions;swallowing precautions  -LB fall precautions;swallowing precautions  -AW    Recorded by [SHILPI] Karen Allan, OT Student [LB] Angelica Treadwell PTA [AW] Adelia Martinez MS CCC-SLP    Pain Assessment    Pain Assessment (P)  No/denies pain  -SHILPI No/denies pain  -LB     Recorded by [SHILPI] Karen Allan, OT Student [LB] Angelica Treadwell PTA     Cognitive Assessment/Intervention    Current Cognitive/Communication Assessment (P)  impaired  -SHILPI      Orientation Status (P)  oriented x 4  -SHILPI      Follows Commands/Answers Questions (P)  100% of the time;able to follow single-step instructions;needs cueing;needs increased time;needs repetition  -SHILPI      Personal Safety (P)  mild impairment  -SHILPI      Personal Safety Interventions (P)  gait belt;nonskid shoes/slippers when out of bed;fall prevention program maintained  -SHILPI fall prevention program maintained;gait belt;muscle strengthening facilitated;nonskid shoes/slippers when out of bed  -LB     Recorded by [SHILPI] Karen Allan, OT Student [LB] Angelica Treadwell PTA     Improve attention    Attention Progress   60%;without cues;100%;with inconsistent cues  -AW    Comments: Improve attention   working memory - repeating 3 words in reverse order  -AW    Recorded by   [AW] Adelia Martinez MS CCC-SLP    Improve memory skills    Memory Skills Progress   0%;without cues;100%;with consistent cues  -AW    Comments: Improve memory skills   after a 10 min delay, pt recalled 0/3 words; 3/3 w/ cues  -AW    Recorded by   [AW] Adelia Martinez MS CCC-SLP    Improve functional problem solving    Functional Problem Solving Progress   80%;without cues;100%;with inconsistent cues  -AW    Comments: Improve functional problem solving   word problems; repetitions beneficial  -AW    Recorded by   [AW] Adelia Martinez MS CCC-SLP    Improve executive function skills    Executive Function Skills Progress   70%;without cues;100%;with inconsistent cues  -AW    Comments: Improve executive function skills    deductive reasoning grid puzzle  -AW    Recorded by   [AW] Adelia Martinez MS CCC-SLP    Bed Mobility, Assessment/Treatment    Bed Mobility, Assistive Device  bed rails;head of bed elevated  -LB     Bed Mob, Supine to Sit, [place] (P)  supervision required  -SHILPI supervision required  -LB     Recorded by [SHILPI] Karen Allan, OT Student [LB] Angelica Treadwell PTA     Transfer Assessment/Treatment    Transfers, Bed-Chair [place] (P)  stand by assist;contact guard assist  -SHILPI contact guard assist  -LB     Transfer, Comment (P)  tsf to and from EOM from w/c w/ CGA and vcs  -SHILPI      Recorded by [SHILPI] Karen Allan, OT Student [LB] Angelica Treadwell PTA     Gait Assessment/Treatment    Gait, Distance (Feet)  320  -LB     Recorded by  [LB] Angelica Treadwell PTA     Stairs Assessment/Treatment    Number of Stairs  8  -LB     Stairs, Handrail Location  both sides  -LB     Stairs, [place] Level  contact guard assist;verbal cues required  -LB     Stairs, Technique Used  step to step (ascending);step to step (descending)  -LB     Recorded by  [LB] Angelica Treadwell PTA     ADL Assessment/Intervention    IADL Assess/Train, Comment (P)  pt wrote a list of ingredients and amounts needed to make stir portillo dish, pt required some help w/ spelling; pt looked through old ads to find sales and compare the cheapest options, pt required min vcs, min gestural prompts to complete task   -SHILPI      Recorded by [SHILPI] Karen Allan, OT Student      Upper Body Bathing Assessment/Training    UB Bathing Assess/Train, Position (P)  sitting;sink side  -SHILPI      UB Bathing Assess/Train, [place] Level (P)  minimum assist (75% patient effort);verbal cues required   vcs for setup instructions; min a w/ back   -SHILPI      Recorded by [SHILPI] Karen Allan, OT Student      Lower Body Bathing Assessment/Training    LB Bathing Assess/Train, Position (P)  sitting;sink side;standing  -SHILPI      LB Bathing Assess/Train, [place] Level (P)  contact  guard assist;verbal cues required  -SHILPI      LB Bathing Assess/Train, Comment (P)  vcs for sequencing; cga to maintaing balance while pt washed elsi area  -SHILPI      Recorded by [SHILPI] Karen Allan OT Student      Upper Body Dressing Assessment/Training    UB Dressing Assess/Train, Clothing Type (P)  doffing:;donning:;bra;t-shirt   dof tshirt; don bra, tshirt  -SHILPI      UB Dressing Assess/Train, Position (P)  sitting  -SHILPI      UB Dressing Assess/Train, [place] (P)  minimum assist (75% patient effort);set up required  -SHILPI      UB Dressing Assess/Train, Comment (P)  set up required to gather clothes; min a to adjust bra over back   -SHILPI      Recorded by [SHILPI] Karen Allan, CHERELLE Student      Lower Body Dressing Assessment/Training    LB Dressing Assess/Train, Clothing Type (P)  donning:;shoes;pants;socks;slipper socks;doffing:   dof slipper socks; don underwear, pants, socks, shoes  -SHILPI      LB Dressing Assess/Train, Position (P)  sitting;sink side;standing;edge of bed  -SHILPI      LB Dressing Assess/Train, [place] (P)  minimum assist (75% patient effort);contact guard assist  -SHILPI      LB Dressing Assess/Train, Comment (P)  CGA to maintain balance while standing to pull up pants; min a to tighten shoe strings, pt was able to maintain grasp on shoe strings w/ LUE for increased ind w/ shoe tying  -SHILPI      Recorded by [SHILPI] Karen Allan, CHERELLE Student      Grooming Assessment/Training    Grooming Assess/Train, Position (P)  sitting;sink side  -SHILPI      Grooming Assess/Train, [place]ep[place] Level (P)  set up required   gather materials from room   -SHILPI      Recorded by [SHILPI] Karen Allan OT Student      Balance Skills Training    Gait Balance-Level of Assistance  Contact guard;Minimum assistance  -LB     Gait Balance Support  parallel bars;Right upper extremity supported;Left upper extremity supported  -LB     Gait Balance Activities  braiding / front;side-stepping  -LB     Recorded by  [LB] Angelica Treadwell, PTA     Therapy  Exercises    Bilateral Lower Extremities  AROM:;10 reps;heel raises;mini squats  -LB     Recorded by  [LB] Angelica Treadwell PTA     Sensory Treatment    Sensory Reeducation Techniques (P)  sensory training, visual reinforcement;sensory train, w/o visual reinforcement  -SHILPI      Sensory Reeduction Treatment (P)  pt was given an every day object to feel w/ BUE and observe; pt attempted to find object in rice bucket w/ LUE w/ vision occluded; pt was able to find spoon w/ eyes closed, required eyes open to find other objects; pt buried all the items in the rice at clean up   -SHILPI      Recorded by [SHILPI] Karen Allan OT Student      Positioning and Restraints    Pre-Treatment Position (P)  in bed   in w/c second session   -SHILPI      Post Treatment Position (P)  chair   w/c second session   -SHILPI wheelchair  -LB     In Wheelchair (P)  sitting;with SLP;call light within reach;encouraged to call for assist  -SHILPI sitting;with OT  -LB     Recorded by [SHILPI] Karen Allan OT Student [LB] Angelica Treadwell PTA       08/07/17 1143 08/07/17 0943       Rehab Assessment/Intervention    Discipline occupational therapist  -SHILPI NULL,AF2 physical therapy assistant  -LB     Document Type therapy note (daily note)  -SHILPI NULL AF2 therapy note (daily note)  -LB     Subjective Information agree to therapy;no complaints  -SHILPI NULL,AF2 agree to therapy  -LB     Patient Effort, Rehab Treatment good  -CRYSTAL NULLD,AF2 good  -LB     Symptoms Noted During/After Treatment shortness of breath   w/ tsfs, bending down from seated level   -SHILPI NULL,CHAKA2      Symptoms Noted Comment rest breaks and vcs for deep breaths as necessary   -SHILPI NULL,AF2      Precautions/Limitations fall precautions  -SHILPI NULL,AF2 fall precautions  -LB     Recorded by [SHILPI NULL,AF2] Adelia Salamanca OTBETO (r) Karen Allan OT Student (t) NO [place]oney (c) [LB] Angelica Treadwell PTA     Pain Assessment    Pain Assessment No/denies pain  -CHAKASHILPI,AF2 No/denies pain  -LB     Recorded by [SHILPI NULL,AF2] Adelia  NO [place] (r) Karen Allan OT Student (t) NO Cooney (c) [LB] Angelica Treadwell PTA     Cognitive Assessment/Intervention    Current Cognitive/Communication Assessment impaired  -AF,SHILPI,AF2      Orientation Status oriented x 4  -AF,SHILPI,AF2      Follows Commands/Answers Questions 100% of the time;able to follow single-step instructions;needs cueing;needs increased time;needs repetition  -AF,SHILPI,AF2      Personal Safety mild impairment;decreased awareness, need for safety;decreased insight to deficits  -AF,SHILPI,AF2      Personal Safety Interventions fall prevention program maintained;gait belt;nonskid shoes/slippers when out of bed;supervised activity  -AF,SHILPI,AF2 fall prevention program maintained;gait belt;muscle strengthening facilitated;nonskid shoes/slippers when out of bed  -LB     Recorded by [AF,SHILPI,AF2] NO [place]oney (r) Karen Allan OT Student (t) NO Cooney (c) [LB] Angelica Treadwell PTA     Bed Mobility, Assessment/Treatment    Bed Mob, Supine to Sit, [place] supervision required  -AF,SHILPI,AF2      Bed Mobility, Comment  up in chair  -LB     Recorded by [AF,SHILPI,AF2] NO [place]oney (r) Karen Allan OT Student (t) NO [place]oney (c) [LB] Angelica Treadwell PTA     Transfer Assessment/Treatment    Transfers, Bed-Chair [place] stand by assist  -AF,SHILPI,AF2      Transfers, Sit-Stand [place] contact guard assist   while showeringto wash elsi area   -AF,SHILPI,AF2 stand by assist  -LB     Transfers, Stand-Sit [place] contact guard assist   while showering to wash elsi area  -AF,SHILPI,AF2 stand by assist  -LB     Transfers, Sit-Stand-Sit, Assist Device  rolling walker  -LB     Toilet Transfer, [place] contact guard assist;verbal cues required   vcs for hand placement   -AF,SHILPI,AF2      Toilet Transfer, Assistive Device elevated toilet seat;rolling walker   grab bars   -AF,SHILPI,AF2      Walk-In Shower Transfer, [place] contact guard  assist;verbal cues required   vcs for hand placement   -AF,SHILPI,AF2      Walk-In Shower Transfer, Assist Device standard shower chair   grab bars; tsf from toilet   -AF,SHILPI,AF2      Bathtub Transfer, [place] verbal cues required;contact guard assist   pt practiced tub tsf w/ demo to simulate home environment   -AF,SHILPI,AF2      Bathtub Transfer, Assistive Device transfer tub bench;rolling platform walker   grab bars  -AF,SHILPI,AF2      Transfer, Comment tsf to and from EOM from w/c w/ SBA, vcs for hand placement   -AF,SHILPI,AF2 car tsf CGA, rwx  -LB     Recorded by [AF,SHILPI,AF2] Adelia Salamanca OTR (r) Karen Allan, OT Student (t) Adelia Salamanca OTR (c) [LB] Angelica Treadwell PTA     Gait Assessment/Treatment    Gait, [place] Level  stand by assist  -LB     Gait, Assistive Device  rolling walker  -LB     Gait, Distance (Feet)  200  -LB     Gait, Gait Deviations  forward flexed posture;step length decreased  -LB     Recorded by  [LB] Angelica Treadwell PTA     Stairs Assessment/Treatment    Number of Stairs  8  -LB     Stairs, Handrail Location  both sides  -LB     Stairs, [place] Level  contact guard assist  -LB     Stairs, Technique Used  step to step (ascending);step to step (descending)  -LB     Recorded by  [LB] Angelica Treadwell PTA     Functional Mobility    Functional Mobility- Ind. Level  contact guard assist;minimum assist (75% patient effort)  -LB     Functional Mobility- Device  straight cane   w tripod base  -LB     Functional Mobility-Distance (Feet)  180  -LB     Functional Mobility- Safety Issues  sequencing ability decreased   LOB x 1  -LB     Recorded by  [LB] Angelica Treadwell PTA     ADL Assessment/Intervention    Additional Documentation --   pt practiced tying shoes from lap level w/ min a   -AF,SHILPI,AF2      Recorded by [AF,SHILPI,AF2] Adelia Salamanca, OTR (r) Karen Allan, OT Student (t) Adelia Salamanca OTR (c)      Upper Body Bathing Assessment/Training    UB Bathing Assess/Train  Assistive Device grab bars;shower chair with back;hand-held shower head  -AF,SHILPI,AF2      UB Bathing Assess/Train, Position sitting  -AF,SHILPI,AF2      UB Bathing Assess/Train, [place] Level minimum assist (75% patient effort);verbal cues required  -AF,SHILPI,AF2      UB Bathing Assess/Train, Comment min a w/ wetting down hair; vcs to recall that pt had already washed hair   -AF,SHILPI,AF2      Recorded by [AF,SHILPI,AF2] Adelia Salamanca OTR (r) Karen Allan, OT Student (t) NO Cooney (c)      Lower Body Bathing Assessment/Training    LB Bathing Assess/Train Assistive Device shower chair with back;hand-held shower head;grab bars  -AF,SHILPI,AF2      LB Bathing Assess/Train, Position sitting;standing  -AF,SHILPI,AF2      LB Bathing Assess/Train, [place] Level verbal cues required;contact guard assist  -AF,SHILPI,AF2      LB Bathing Assess/Train, Comment vcs for sequencing; cga to maintain balance while pt washed elsi area   -AF,SHILPI,AF2      Recorded by [AF,SHILPI,AF2] NO Cooney (r) Karen Allan, OT Student (t) NO Cooney (c)      Upper Body Dressing Assessment/Training    UB Dressing Assess/Train, Clothing Type donning:;doffing:;bra;t-shirt  -AF,SHILPI,AF2      UB Dressing Assess/Train, Position sitting  -AF,SHILPI,AF2      UB Dressing Assess/Train, [place] supervision required;set up required   set up to gather clothing   -AF,SHILPI,AF2      Recorded by [AF,SHILPI,AF2] NO [place]oney (r) Karen Allan, OT Student (t) NO Cooney (c)      Lower Body Dressing Assessment/Training    LB Dressing Assess/Train, Clothing Type donning:;doffing:;pants;shoes;socks   underwear   -AF,SHILPI,AF2      LB Dressing Assess/Train, Position sitting;standing;edge of bed   socks, shoes completed at EOB  -AF,SHILPI,AF2      LB Dressing Assess/Train, [place] set up required;verbal cues required;minimum assist (75% patient effort)  -CHAKA,SHILPI,AF2      LB Dressing Assess/Train, Comment cga while standing to pull up  underwear/pants; vcs for sequencin; required assist w/ tying shoes   -AF,SHILPI,AF2      Recorded by [AF,SHILPI,AF2] NO Cooney (r) Karen Allan OT Student (t) NO Cooney (c)      Toileting Assessment/Training    Toileting Assess/Train, Assistive Device grab bars;raised toilet seat  -AF,SHILPI,AF2      Toileting Assess/Train, Position sitting  -AF,SHILPI,AF2      Toileting Assess/Train, Indepen Level contact guard assist  -AF,SHILPI,AF2      Toileting Assess/Train, Comment pt able to complete hygiene while seated; tsf to shower so no LBD required after toileting   -AF,SHILPI,AF2      Recorded by [AF,SHILPI,AF2] NO Cooney (r) Karen Allan, OT Student (t) NO Cooney (c)      Grooming Assessment/Training    Grooming Assess/Train, Position sitting;sink side  -AF,SHILPI,AF2      Grooming Assess/Train, [place]ep[place] Level set up required  -AF,SHILPI,AF2      Grooming Assess/Train, Comment brushing hair   -AF,SHILPI,AF2      Recorded by [AF,SHILPI,AF2] NO Cooney (r) Karen Allan, OT Student (t) NO Cooney (c)      Sensory Treatment    Sensory Reeducation Techniques localization of touch  -AF,SHILPI,AF2      Sensory Reeduction Treatment pt pointed w/ RUE to location on LUE where tactile sensations of various textures was felt w/ difficulty; pt was able to localize w/ R hand when repeating touch w/ eyes open   -AF,SHILPI,AF2      Recorded by [AF,SHILPI,AF2] NO Cooney (r) Karen Allan OT Student (t) NO Cooney (rhona)      Positioning and Restraints    Pre-Treatment Position in bed   in recliner second session   -AF,SHILPI,AF2      Post Treatment Position chair   both sessions   -AF,SHILPI,AF2 wheelchair  -LB     In Chair call light within reach;encouraged to call for assist;sitting  -AF,SHILPI,AF2      In Wheelchair  sitting;call light within reach  -LB     Recorded by [CHAKA,SHILPI,AF2] Adelia Salamanca, OTR (r) Karen Allan OT Student (t) Adelia Salamanca, OTR (c) [LB] Angelica Treadwell, PTA       User  Key  (r) = Recorded By, (t) = Taken By, (c) = Cosigned By    Initials Name Effective Dates    AW Adelia Martinez, MS CCC-SLP 04/13/15 -     LB Angelica Treadwell, PTA 02/18/16 -     AF Adelia Salamanca, OTR 12/01/15 -     SHILPI Karen Jerrell, OT Student 07/11/17 -                 OT Goals       08/02/17 1558 07/29/17 1054       Transfer Training OT STG    Transfer Training OT STG, Date Established 08/02/17  -AF (r) SHILPI (t) AF (c) 07/29/17  -RE     Transfer Training OT STG, Time to Achieve 1 wk  -AF (r) SHILPI (t) AF (c) 3 days  -RE     Transfer Training OT STG, Activity Type  toilet  -RE     Transfer Training OT STG, [place] Level  supervision required;verbal cues required  -RE     Transfer Training OT STG, Assist Device walker, rolling   grab bars  -AF (r) SHILPI (t) AF (c)      Transfer Training OT STG, Date Goal Reviewed 08/02/17  -AF (r) SHILPI (t) AF (c)      Transfer Training OT STG, Outcome  goal ongoing  -RE     Transfer Training OT LTG    Transfer Training OT LTG, Date Established  07/29/17  -RE     Transfer Training OT LTG, Time to Achieve  1 wk  -RE     Transfer Training OT LTG, Activity Type  toilet  -RE     Transfer Training OT LTG, [place] Level  conditional independence  -RE     Transfer Training OT LTG, Date Goal Reviewed 08/02/17  -AF (r) SHILPI (t) AF (c)      Transfer Training OT LTG, Outcome  goal ongoing  -RE     Transfer Training 2 OT STG    Transfer Training 2 OT STG, Date Established 08/02/17  -AF (r) SHILPI (t) AF (c)      Transfer Training 2 OT STG, Time to Achieve 1 wk  -AF (r) SHILPI (t) AF (c) 3 days  -RE     Transfer Training 2 OT STG, Activity Type shower chair   rwx  -AF (r) SHILPI (t) AF (c) shower chair  -RE     Transfer Training 2 OT STG, [place] Level supervision required  -AF (r) SHILPI (t) AF (c) contact guard assist  -RE     Transfer Training 2 OT STG, Outcome goal revised  -AF (r) SHILPI (t) AF (c) goal ongoing  -RE     Transfer Training 2 OT LTG    Transfer Training 2 OT LTG, Date Established 08/02/17   -AF (r) SHILPI (t) AF (c)      Transfer Training 2 OT LTG, Time to Achieve by discharge  -AF (r) SHILPI (t) AF (c) 2 wks  -RE     Transfer Training 2 OT LTG, Activity Type  shower chair;tub  -RE     Transfer Training 2 OT LTG, [place] Level  supervision required  -RE     Transfer Training 2 OT LTG, Date Goal Reviewed 08/02/17  -AF (r) SHILPI (t) AF (c)      Transfer Training 2 OT LTG, Outcome  goal ongoing  -RE     Bathing OT STG    Bathing Goal OT STG, Date Established 08/02/17  -AF (r) SHILPI (t) AF (c) 07/29/17  -RE     Bathing Goal OT STG, Time to Achieve 1 wk  -AF (r) SHILPI (t) AF (c) 3 days  -RE     Bathing Goal OT STG, Activity Type  upper body bathing;lower body bathing  -RE     Bathing Goal OT STG, [place] Level contact guard assist  -AF (r) SHILPI (t) AF (c) set up required  -RE     Bathing Goal OT STG, Assist Device grab bars;shower head, detachable;shower chair with back  -AF (r) SHILPI (t) AF (c)      Bathing Goal OT STG, Date Goal Reviewed 08/02/17  -AF (r) SHILPI (t) AF (c)      Bathing Goal OT STG, Outcome  goal ongoing  -RE     Bathing OT LTG    Bathing Goal OT LTG, Date Established 08/02/17  -AF (r) SHILPI (t) AF (c)      Bathing Goal OT LTG, Time to Achieve by discharge  -AF (r) SHILPI (t) AF (c) 2 wks  -RE     Bathing Goal OT LTG, Activity Type  upper body bathing;lower body bathing  -RE     Bathing Goal OT LTG, [place] Level supervision required  -AF (r) SHILPI (t) AF (c) conditional independence  -RE     Bathing Goal OT LTG, Date Goal Reviewed 08/02/17  -AF (r) SHILPI (t) AF (c)      Bathing Goal OT LTG, Outcome  goal ongoing  -RE     Grooming OT STG    Grooming Goal OT STG, Date Established 08/02/17  -AF (r) SHILPI (t) AF (c)      Grooming Goal OT STG, Time to Achieve 1 wk  -AF (r) SHILPI (t) CHAKA (c) 3 days  -RE     Grooming Goal OT STG, [place] Level  conditional independence  -RE     Grooming Goal OT STG, Date Goal Reviewed 08/02/17  -CHAKA eller) SHILPI (indra) CHAKA (c)      Grooming Goal OT STG, Outcome  goal ongoing  -RE     Grooming OT  LTG    Grooming Goal OT LTG, Date Established 08/02/17  -AF (r) SHILPI (t) AF (c)      Grooming Goal OT LTG, Time to Achieve by discharge  -AF (r) SHILPI (t) AF (c) 1 wk  -RE     Grooming Goal OT LTG, [place] Level  conditional independence  -RE     Grooming Goal OT LTG, Date Goal Reviewed 08/02/17  -AF (r) SHILPI (t) AF (c)      Grooming Goal OT LTG, Outcome  goal ongoing  -RE     LB Dressing OT STG    LB Dressing Goal OT STG, Date Established 08/02/17  -AF (r) SHILPI (t) AF (c) 07/29/17  -RE     LB Dressing Goal OT STG, Time to Achieve 1 wk  -AF (r) SHILPI (t) AF (c) 5 - 7 days  -RE     LB Dressing Goal OT STG, [place] Level contact guard assist   tying shoes   -AF (r) SHILPI (t) AF (c) supervision required;verbal cues required  -RE     LB Dressing Goal OT STG, Adaptive Equipment --   elastic laces  -AF (r) SHILPI (t) AF (c)      LB Dressing Goal OT STG, Date Goal Reviewed 08/02/17  -AF (r) SHILPI (t) AF (c)      LB Dressing Goal OT STG, Outcome  goal ongoing  -RE     LB Dressing OT LTG    LB Dressing Goal OT LTG, Date Established 08/02/17  -AF (r) SHILPI (t) AF (c) 07/29/17  -RE     LB Dressing Goal OT LTG, Time to Achieve by discharge  -AF (r) SHILPI (t) AF (c) 2 wks  -RE     LB Dressing Goal OT LTG, [place] Level supervision required  -AF (r) SHILPI (t) AF (c) conditional independence  -RE     LB Dressing Goal OT LTG, Adaptive Equipment --   w/ shoe strings  -AF (r) SHILPI (t) AF (c)      LB Dressing Goal OT LTG, Date Goal Reviewed 08/02/17  -AF (r) SHILPI (t) AF (c)      LB Dressing Goal OT LTG, Outcome  goal ongoing  -RE     UB Dressing OT STG    UB Dressing Goal OT STG, Date Established 08/02/17  -AF (r) SHILPI (t) AF (c) 07/29/17  -RE     UB Dressing Goal OT STG, Time to Achieve 1 wk  -AF (r) SHILPI (t) CHAKA (c) 3 days  -RE     UB Dressing Goal OT STG, [place] Level  set up required  -RE     UB Dressing Goal OT STG, Date Goal Reviewed 08/02/17  -CHAKA (christine) SHILPI (t) CHAKA (c)      UB Dressing Goal OT STG, Outcome  goal ongoing  -RE     UB Dressing OT LTG     UB Dressing Goal OT LTG, Date Established 08/02/17  -AF (r) SHILPI (t) AF (c) 07/29/17  -RE     UB Dressing Goal OT LTG, Time to Achieve by discharge  -AF (r) SHILPI (t) AF (c) 2 wks  -RE     UB Dressing Goal OT LTG, [place] Level  conditional independence  -RE     UB Dressing Goal OT LTG, Date Goal Reviewed 08/02/17  -AF (r) SHILPI (t) AF (c)      UB Dressing Goal OT LTG, Outcome  goal ongoing  -RE       User Key  (r) = Recorded By, (t) = Taken By, (c) = Cosigned By    Initials Name Provider Type    RE Maya Apodaca, OTR Occupational Therapist    CHAKA Salamanca, OTR Occupational Therapist    SHILPI Allan, OT Student OT Student                OT Recommendation and Plan  Anticipated Equipment Needs At Discharge: tub bench  Planned Therapy Interventions: adaptive equipment training, ADL retraining, activity intolerance, energy conservation, fine motor coordination training, neuromuscular re-education  Therapy Frequency: 5 times/wk          Time Calculation:         Time Calculation- OT       08/08/17 1131 08/08/17 1130       Time Calculation- OT    OT Start Time (P)  1000  -SHILPI (P)  0830  -SHILPI     OT Stop Time (P)  1030  -SHILPI (P)  0900  -SHILPI     OT Time Calculation (min) (P)  30 min  -SHILPI (P)  30 min  -SHILPI       User Key  (r) = Recorded By, (t) = Taken By, (c) = Cosigned By    Initials Name Provider Type    SHILPI Allan, OT Student OT Student           Therapy Charges for Today     Code Description Service Date Service Provider Modifiers Qty    93386694181 HC OT SELF CARE/MGMT/TRAIN EA 15 MIN 8/7/2017 Karen Allan OT Student GO 4    40274367275 HC OT NEUROMUSC RE EDUCATION EA 15 MIN 8/7/2017 Karen Allan OT Student GO 1    [id number] HC OT SELF CARE/MGMT/TRAIN EA 15 MIN 8/8/2017 Karen Allan OT Student GO 3    [id number] HC OT NEUROMUSC RE EDUCATION EA 15 MIN 8/8/2017 Karen Allan OT Student GO 1               Karen Allan OT Student  8/8/2017

## 2017-08-08 NOTE — PROGRESS NOTES
Occupational Therapy: Branch    Physical Therapy: Branch    Speech Language Pathology:  Individual: 60 minutes.    Signed by: Adelia Martinez, SLP

## 2017-08-08 NOTE — PLAN OF CARE
Problem: Stroke (Ischemic) (Adult)  Goal: Signs and Symptoms of Listed Potential Problems Will be Absent or Manageable (Stroke)  Outcome: Ongoing (interventions implemented as appropriate)    08/08/17 0241   Stroke (Ischemic)   Problems Assessed (Stroke (Ischemic)/TIA) all   Problems Present (Stroke (Ischemic)/TIA) motor/sensory impairment         Problem: Fall Risk (Adult)  Goal: Absence of Falls  Outcome: Ongoing (interventions implemented as appropriate)    08/08/17 0241   Fall Risk (Adult)   Absence of Falls making progress toward outcome         Problem: Patient Care Overview (Adult)  Goal: Plan of Care Review  Outcome: Ongoing (interventions implemented as appropriate)    08/08/17 0241   Coping/Psychosocial Response Interventions   Plan Of Care Reviewed With patient   Patient Care Overview   Progress improving   Outcome Evaluation   Outcome Summary/Follow up Plan Patient had no complaints tonight, able to sleep well, and no unsafe behavior. She is looking forward to discharge on Friday.

## 2017-08-08 NOTE — PLAN OF CARE
Problem: Stroke (Ischemic) (Adult)  Goal: Signs and Symptoms of Listed Potential Problems Will be Absent or Manageable (Stroke)  Outcome: Ongoing (interventions implemented as appropriate)    Problem: Fall Risk (Adult)  Goal: Absence of Falls  Outcome: Ongoing (interventions implemented as appropriate)    Problem: Patient Care Overview (Adult)  Goal: Plan of Care Review  Outcome: Ongoing (interventions implemented as appropriate)    08/08/17 3018   Coping/Psychosocial Response Interventions   Plan Of Care Reviewed With patient   Patient Care Overview   Progress improving   Outcome Evaluation   Outcome Summary/Follow up Plan Pleasant and cooperative. Ambulates to BR with walker CGA of 1. No unsafe behavior.

## 2017-08-08 NOTE — PROGRESS NOTES
Inpatient Rehabilitation Functional Measures Assessment and Plan of Care    Plan of Care  Updated Problems/Interventions  Mobility    [PT] Bed/Chair/Wheelchair(Active)  Current Status(08/02/2017): CGA/SBA  Weekly Goal(08/11/2017): SBA  Discharge Goal: SBA    [PT] Bed Mobility(Active)  Current Status(08/08/2017): supervision  Weekly Goal(08/11/2017): Indep  Discharge Goal: Indep    [PT] Walk(Active)  Current Status(08/08/2017): 300 Rwx SBA  Weekly Goal(08/11/2017): BR Rwx SBA  Discharge Goal: 300 ft AAD SBA    [PT] Stairs(Active)  Current Status(08/08/2017): 4 HR CGA  Weekly Goal(08/11/2017): PT only  Discharge Goal: 4 HR CGA    [PT] Car Transfers(Active)  Current Status(08/08/2017): CGA, rwx  Weekly Goal(08/11/2017): PT only  Discharge Goal: Car tsf CGA, AAD    Functional Measures  BETH Eating:  Branch  [facility] Grooming: [facility] Bathing:  Branch  [facility] Upper Body Dressing:  Branch  [facility] Lower Body Dressing:  Branch  [facility] Toileting:  [facility] Bladder Management  Level of Assistance:  Branch  Frequency/Number of Accidents this Shift:  [facility] Bowel Management  Level of Assistance: [place]  Frequency/Number of Accidents this Shift: Branch    [facility] Bed/Chair/Wheelchair Transfer:  Bed/chair/wheelchair Transfer Score = 4.  Patient performs 75% or more of effort and minimal assistance (little/incidental  help/lifting of one limb/steadying) for transferring to and from the  bed/chair/wheelchair, requiring: Contact guard. Patient requires the following  assistive device(s): Bed rails.  BETH Toilet Transfer:  Branch  [facility] Tub/Shower Transfer:  Branch    Previously Documented Mode of Locomotion at Discharge: Field  BETH Expected Mode of Locomotion at Discharge: [facility] Walk/Wheelchair:  WHEELCHAIR OBSERVATION   Activity was not observed.    WALK OBSERVATION   Walk Distance Scale = 3.  Distance walked is greater than 150 feet. Walk Score  = 5.  Patient requires supervision or set up for walking. Patient walked a  distance  of  300 feet. Patient requires the following assistive device(s):  Rolling walker.  BETH Stairs:  Stairs Score = 2.  Incidental assistance with lifting or lowering,  contact guard or steadying was provided. Patient performs 75% or more of effort  and requires minimal contact assistance. Patient negotiated  8 stairs. Patient  requires the following assistive device(s): Handrail(s).    BETH Comprehension:  Branch  BETH Expression:  Branch  BETH Social Interaction:  Branch  BETH Problem Solving:  Branch  BETH Memory:  Branch    Therapy Mode Minutes  Occupational Therapy: Branch  Physical Therapy: Individual: 60 minutes.  Speech Language Pathology:  Branch    Signed by: Angelica Treadwell PTA

## 2017-08-08 NOTE — PROGRESS NOTES
Inpatient Rehabilitation Functional Measures Assessment    Functional Measures  BETH Eating:  Branch  [facility] Grooming: Branch  [facility] Bathing:  Branch  [facility] Upper Body Dressing:  Branch  [facility] Lower Body Dressing:  Branch  [facility] Toileting:  Toileting Score = 5.  Patient is supervision/set-up for  toileting, requiring: Setting out toileting equipment. Stand by assistance.  Patient requires the following assistive device(s): Grab bar. Walker .    BETH Bladder Management  Level of Assistance:  Bladder Score = 5.  Patient is supervision/set-up for  bladder management, requiring: Setting out equipment. No assistive devices were  required.  Frequency/Number of Accidents this Shift:  Bladder accidents this shift:  0 .  Patient has not had an accident, but used a device/medication this shift  requiring: Pad .    BETH Bowel Management  Level of Assistance: Activity was not observed.  Frequency/Number of Accidents this Shift: Branch    [facility] Bed/Chair/Wheelchair Transfer:  Bed/chair/wheelchair Transfer Score = 5.  Patient is supervision/set-up for transferring to and from the  bed/chair/wheelchair, requiring: Stand by assistance. Set up (positioning  equipment, lock brakes and/or adjust foot rest). Verbal cuing, prompting, or  instructing. Patient requires the following assistive device(s): Bed rails. Grab  bars.  BETH Toilet Transfer:  Branch  [facility] Tub/Shower Transfer:  [place]    Previously Documented Mode of Locomotion at Discharge: Field  EBTH Expected Mode of Locomotion at Discharge: Branch  [facility] Walk/Wheelchair:  Branch  [facility] Stairs:  Branch    [facility] Comprehension:  Branch  [facility] Expression:  Branch  [facility] Social Interaction:  Branch  [facility] Problem Solving:  Branch  [facility] Memory:  [place]    Therapy Mode Minutes  Occupational Therapy: Branch  Physical Therapy: Branch  Speech Language Pathology:  Branch    Signed by: JULITA Lemos

## 2017-08-08 NOTE — THERAPY TREATMENT NOTE
Inpatient Rehabilitation - Physical Therapy Treatment Note  [facility]     Patient Name: Magnus Hartley  : 1928  MRN: [id number]  Today's Date: 2017  Onset of Illness/Injury or Date of Surgery Date: 17  Date of Referral to PT: 17  Referring Physician: Jac    Admit Date: 2017    Visit Dx:    ICD-10-CM ICD-9-CM   1. Left hemiparesis G81.94 342.90   2. Dysarthria R47.1 784.51     Patient Active Problem List   Diagnosis   • Foot pain   • Asthma   • Benign essential hypertension   • Controlled type 2 diabetes mellitus without complication   • Dyslipidemia   • Macrocytosis without anemia   • Senile osteoporosis   • Post-menopausal osteoporosis   • Sinus bradycardia   • Stress incontinence in female   • Urge incontinence of urine   • Atrophic vaginitis   • Encounter for fitting and adjustment of other specified devices   • Incomplete emptying of bladder   • Urethral caruncle   • Incomplete uterovaginal prolapse   • Cerebrovascular accident (CVA) due to occlusion of right middle cerebral artery   • Atrial fibrillation   • Warfarin anticoagulation (goal INR 2-3)               Adult Rehabilitation Note       17 1114 17 0943 17 1530    Rehab Assessment/Intervention    Discipline (P)  occupational therapist  -SHILPI physical therapy assistant  -LB speech language pathologist  -AW    Document Type (P)  therapy note (daily note)  -SHILPI therapy note (daily note)  -LB therapy note (daily note)  -AW    Subjective Information (P)  agree to therapy;complains of;weakness  -SHILPI agree to therapy  -LB no complaints;agree to therapy  -AW    Patient Effort, Rehab Treatment (P)  good  -SHILPI good  -LB good  -AW    Symptoms Noted During/After Treatment (P)  shortness of breath   after tsfs, bending down for LBD;   -SHILPI  none  -AW    Symptoms Noted Comment (P)  allowed for rest breaks as necessary; vcs for deep breathing   -SHILPI      Precautions/Limitations (P)  fall precautions;swallowing  precautions  -SHILPI fall precautions;swallowing precautions  -LB fall precautions;swallowing precautions  -AW    Recorded by [SHILPI] Karen Allan, OT Student [LB] Angelica Treadwell PTA [AW] Adelia Martinez MS CCC-SLP    Pain Assessment    Pain Assessment (P)  No/denies pain  -SHILPI No/denies pain  -LB     Recorded by [SHILPI] Karen Allan, OT Student [LB] Angelica Treadwell PTA     Cognitive Assessment/Intervention    Current Cognitive/Communication Assessment (P)  impaired  -SHILPI      Orientation Status (P)  oriented x 4  -SHILPI      Follows Commands/Answers Questions (P)  100% of the time;able to follow single-step instructions;needs cueing;needs increased time;needs repetition  -SHILPI      Personal Safety (P)  mild impairment  -SHILPI      Personal Safety Interventions (P)  gait belt;nonskid shoes/slippers when out of bed;fall prevention program maintained  -SHILPI fall prevention program maintained;gait belt;muscle strengthening facilitated;nonskid shoes/slippers when out of bed  -LB     Recorded by [SHILPI] Karen Allan, OT Student [LB] Angelica Treadwell PTA     Improve attention    Attention Progress   60%;without cues;100%;with inconsistent cues  -AW    Comments: Improve attention   working memory - repeating 3 words in reverse order  -AW    Recorded by   [AW] Adelia Martinez MS CCC-SLP    Improve memory skills    Memory Skills Progress   0%;without cues;100%;with consistent cues  -AW    Comments: Improve memory skills   after a 10 min delay, pt recalled 0/3 words; 3/3 w/ cues  -AW    Recorded by   [AW] Adelia Martinez MS CCC-SLP    Improve functional problem solving    Functional Problem Solving Progress   80%;without cues;100%;with inconsistent cues  -AW    Comments: Improve functional problem solving   word problems; repetitions beneficial  -AW    Recorded by   [AW] Adelia Martinez MS CCC-SLP    Improve executive function skills    Executive Function Skills Progress   70%;without cues;100%;with inconsistent cues  -AW    Comments: Improve  executive function skills   deductive reasoning grid puzzle  -AW    Recorded by   [AW] Adelia Martinez MS CCC-SLP    Bed Mobility, Assessment/Treatment    Bed Mobility, Assistive Device  bed rails;head of bed elevated  -LB     Bed Mob, Supine to Sit, [place] (P)  supervision required  -SHILPI supervision required  -LB     Bed Mob, Sit to Supine, [place]  supervision required  -LB     Recorded by [SHILPI] Karen Allan, OT Student [LB] Angelica Treadwell PTA     Transfer Assessment/Treatment    Transfers, Bed-Chair [place] (P)  stand by assist;contact guard assist  -SHILPI contact guard assist;stand by assist  -LB     Transfers, Chair-Bed [place]  contact guard assist;stand by assist  -LB     Transfers, Sit-Stand [place]  stand by assist  -LB     Transfers, Stand-Sit [place]  stand by assist  -LB     Transfers, Sit-Stand-Sit, Assist Device  rolling walker  -LB     Transfer, Comment (P)  tsf to and from EOM from w/c w/ CGA and vcs  -SHILPI      Recorded by [SHILPI] Karen Allan, OT Student [LB] Angelica Treadwell PTA     Gait Assessment/Treatment    Gait, [place] Level  stand by assist  -LB     Gait, Assistive Device  rolling walker  -LB     Gait, Distance (Feet)  320  -LB     Gait, Gait Deviations  forward flexed posture;step length decreased  -LB     Recorded by  [LB] Angelica Treadwell PTA     Stairs Assessment/Treatment    Number of Stairs  8  -LB     Stairs, Handrail Location  both sides  -LB     Stairs, [place] Level  contact guard assist;verbal cues required  -LB     Stairs, Technique Used  step to step (ascending);step to step (descending)  -LB     Recorded by  [LB] Angelica Treadwell PTA     ADL Assessment/Intervention    IADL Assess/Train, Comment (P)  pt wrote a list of ingredients and amounts needed to make stir portillo dish, pt required some help w/ spelling; pt looked through old ads to find sales and compare the cheapest options, pt required min vcs, min gestural prompts to complete task    -SHILPI      Recorded by [SHILPI] Karen Allan, CHERELLE Student      Upper Body Bathing Assessment/Training    UB Bathing Assess/Train, Position (P)  sitting;sink side  -SHILPI      UB Bathing Assess/Train, [place] Level (P)  minimum assist (75% patient effort);verbal cues required   vcs for setup instructions; min a w/ back   -SHILPI      Recorded by [SHILPI] Karen Allan, OT Student      Lower Body Bathing Assessment/Training    LB Bathing Assess/Train, Position (P)  sitting;sink side;standing  -SHILPI      LB Bathing Assess/Train, [place] Level (P)  contact guard assist;verbal cues required  -SHILPI      LB Bathing Assess/Train, Comment (P)  vcs for sequencing; cga to maintaing balance while pt washed elsi area  -SHILPI      Recorded by [SHILPI] Karen Allan, CHERELLE Student      Upper Body Dressing Assessment/Training    UB Dressing Assess/Train, Clothing Type (P)  doffing:;donning:;bra;t-shirt   dof tshirt; don bra, tshirt  -SHILPI      UB Dressing Assess/Train, Position (P)  sitting  -SHILPI      UB Dressing Assess/Train, [place] (P)  minimum assist (75% patient effort);set up required  -SHILPI      UB Dressing Assess/Train, Comment (P)  set up required to gather clothes; min a to adjust bra over back   -SHILPI      Recorded by [SHILPI] Karen Allan, OT Student      Lower Body Dressing Assessment/Training    LB Dressing Assess/Train, Clothing Type (P)  donning:;shoes;pants;socks;slipper socks;doffing:   dof slipper socks; don underwear, pants, socks, shoes  -SHILPI      LB Dressing Assess/Train, Position (P)  sitting;sink side;standing;edge of bed  -SHILPI      LB Dressing Assess/Train, [place] (P)  minimum assist (75% patient effort);contact guard assist  -SHILPI      LB Dressing Assess/Train, Comment (P)  CGA to maintain balance while standing to pull up pants; min a to tighten shoe strings, pt was able to maintain grasp on shoe strings w/ LUE for increased ind w/ shoe tying  -SHILPI      Recorded by [SHILPI] Karen Allan, CHERELLE Student      Grooming  Assessment/Training    Grooming Assess/Train, Position (P)  sitting;sink side  -SHILPI      Grooming Assess/Train, [place]ep[place] Level (P)  set up required   gather materials from room   -SHILPI      Recorded by [SHILPI] Karen Allan OT Student      Balance Skills Training    Gait Balance-Level of Assistance  Contact guard;Minimum assistance  -LB     Gait Balance Support  parallel bars;Right upper extremity supported;Left upper extremity supported  -LB     Gait Balance Activities  braiding / front;side-stepping  -LB     Recorded by  [LB] Angelica Treadwell PTA     Therapy Exercises    Bilateral Lower Extremities  AROM:;10 reps;heel raises;mini squats;supine;heel slides;hip abduction/adduction  -LB     Trunk Exercises  10 reps;supine;bridging  -LB     Recorded by  [LB] Angelica Treadwell PTA     Sensory Treatment    Sensory Reeducation Techniques (P)  sensory training, visual reinforcement;sensory train, w/o visual reinforcement  -SHILPI      Sensory Reeduction Treatment (P)  pt was given an every day object to feel w/ BUE and observe; pt attempted to find object in rice bucket w/ LUE w/ vision occluded; pt was able to find spoon w/ eyes closed, required eyes open to find other objects; pt buried all the items in the rice at clean up   -SHILPI      Recorded by [SHILPI] Karen Allan OT Student      Positioning and Restraints    Pre-Treatment Position (P)  in bed   in w/c second session   -SHILPI      Post Treatment Position (P)  chair   w/c second session   -SHILPI wheelchair  -LB     In Wheelchair (P)  sitting;with SLP;call light within reach;encouraged to call for assist  -SHILPI sitting;with OT  -LB     Recorded by [SHILPI] Karen Allan OT Student [LB] Angelica Treadwell PTA       08/07/17 1143 08/07/17 0943       Rehab Assessment/Intervention    Discipline occupational therapist  -SHILPI NULL,KIM physical therapy assistant  -WINNIE     Document Type therapy note (daily note)  -SHILPI NULL AF2 therapy note (daily note)  -WINNIE     Subjective Information agree to  therapy;no complaints  -AF,SHILPI,AF2 agree to therapy  -LB     Patient Effort, Rehab Treatment good  -AF,SHILPI,AF2 good  -LB     Symptoms Noted During/After Treatment shortness of breath   w/ tsfs, bending down from seated level   -AF,SHILPI,AF2      Symptoms Noted Comment rest breaks and vcs for deep breaths as necessary   -AF,SHILPI,AF2      Precautions/Limitations fall precautions  -AF,SHILPI,AF2 fall precautions  -LB     Recorded by [AF,SHILPI,AF2] NO Cooney (r) Karen Allan OT Student (t) NO Cooney (c) [LB] Angelica Treadwell PTA     Pain Assessment    Pain Assessment No/denies pain  -AF,SHILPI,AF2 No/denies pain  -LB     Recorded by [AF,SHILPI,AF2] NO Cooney (r) Karen Allan, OT Student (t) NO Cooney (c) [LB] Angelica Treadwell PTA     Cognitive Assessment/Intervention    Current Cognitive/Communication Assessment impaired  -AF,SHILPI,AF2      Orientation Status oriented x 4  -AF,SHILPI,AF2      Follows Commands/Answers Questions 100% of the time;able to follow single-step instructions;needs cueing;needs increased time;needs repetition  -AF,SHILPI,AF2      Personal Safety mild impairment;decreased awareness, need for safety;decreased insight to deficits  -AF,SHILPI,AF2      Personal Safety Interventions fall prevention program maintained;gait belt;nonskid shoes/slippers when out of bed;supervised activity  -AF,SHILPI,AF2 fall prevention program maintained;gait belt;muscle strengthening facilitated;nonskid shoes/slippers when out of bed  -LB     Recorded by [AF,SHILPI,AF2] NO Cooney (r) Karen Allan OT Student (t) NO Cooney (c) [LB] Angelica Treadwell PTA     Bed Mobility, Assessment/Treatment    Bed Mob, Supine to Sit, [place] supervision required  -AF,SHILPI,AF2      Bed Mobility, Comment  up in chair  -LB     Recorded by [AF,SHILPI,AF2] NO Cooney (r) Karen Allan, OT Student (t) Adelia Salamanca, OTR (c) [LB] Angelica Treadwell, PTA     Transfer Assessment/Treatment     Transfers, Bed-Chair [place] stand by assist  -AF,SHILPI,AF2      Transfers, Sit-Stand [place] contact guard assist   while showeringto wash elsi area   -AF,SHILPI,AF2 stand by assist  -LB     Transfers, Stand-Sit [place] contact guard assist   while showering to wash elsi area  -AF,SHILPI,AF2 stand by assist  -LB     Transfers, Sit-Stand-Sit, Assist Device  rolling walker  -LB     Toilet Transfer, [place] contact guard assist;verbal cues required   vcs for hand placement   -AF,SHILPI,AF2      Toilet Transfer, Assistive Device elevated toilet seat;rolling walker   grab bars   -AF,SHILPI,AF2      Walk-In Shower Transfer, [place] contact guard assist;verbal cues required   vcs for hand placement   -AF,SHILPI,AF2      Walk-In Shower Transfer, Assist Device standard shower chair   grab bars; tsf from toilet   -AF,SHILPI,AF2      Bathtub Transfer, [place] verbal cues required;contact guard assist   pt practiced tub tsf w/ demo to simulate home environment   -AF,SHILPI,AF2      Bathtub Transfer, Assistive Device transfer tub bench;rolling platform walker   grab bars  -AF,SHILPI,AF2      Transfer, Comment tsf to and from EOM from w/c w/ SBA, vcs for hand placement   -AF,SHILPI,AF2 car tsf CGA, rwx  -LB     Recorded by [AF,SHILPI,AF2] Adelia Salamanca, OTR (r) Karen Allan OT Student (t) Adelia Salamanca OTR (c) [LB] Angelica Treadwell PTA     Gait Assessment/Treatment    Gait, [place] Level  stand by assist  -LB     Gait, Assistive Device  rolling walker  -LB     Gait, Distance (Feet)  200  -LB     Gait, Gait Deviations  forward flexed posture;step length decreased  -LB     Recorded by  [LB] Angelica Treadwell PTA     Stairs Assessment/Treatment    Number of Stairs  8  -LB     Stairs, Handrail Location  both sides  -LB     Stairs, [place] Level  contact guard assist  -LB     Stairs, Technique Used  step to step (ascending);step to step (descending)  -LB     Recorded by  [LB] Angelica Treadwell PTA     Functional Mobility     Functional Mobility- Ind. Level  contact guard assist;minimum assist (75% patient effort)  -LB     Functional Mobility- Device  straight cane   w tripod base  -LB     Functional Mobility-Distance (Feet)  180  -LB     Functional Mobility- Safety Issues  sequencing ability decreased   LOB x 1  -LB     Recorded by  [LB] Angelica Treadwell PTA     ADL Assessment/Intervention    Additional Documentation --   pt practiced tying shoes from lap level w/ min a   -AF,SHILPI,AF2      Recorded by [AF,SHILPI,AF2] NO Cooney (r) Karen Allan, OT Student (t) NO Cooney (c)      Upper Body Bathing Assessment/Training    UB Bathing Assess/Train Assistive Device grab bars;shower chair with back;hand-held shower head  -AF,SHILPI,AF2      UB Bathing Assess/Train, Position sitting  -AF,SHILPI,AF2      UB Bathing Assess/Train, [place] Level minimum assist (75% patient effort);verbal cues required  -AF,SHILPI,AF2      UB Bathing Assess/Train, Comment min a w/ wetting down hair; vcs to recall that pt had already washed hair   -AF,SHILPI,AF2      Recorded by [AF,SHILPI,AF2] NO Cooney (r) Karen Allan, CHERELLE Student (t) NO Cooney (c)      Lower Body Bathing Assessment/Training    LB Bathing Assess/Train Assistive Device shower chair with back;hand-held shower head;grab bars  -AF,SHILPI,AF2      LB Bathing Assess/Train, Position sitting;standing  -AF,SHILPI,AF2      LB Bathing Assess/Train, [place] Level verbal cues required;contact guard assist  -AF,SHILPI,AF2      LB Bathing Assess/Train, Comment vcs for sequencing; cga to maintain balance while pt washed elsi area   -AF,SHILPI,AF2      Recorded by [AF,SHILPI,AF2] NO Cooney (r) Karen Allan, OT Student (t) NO Cooney (c)      Upper Body Dressing Assessment/Training    UB Dressing Assess/Train, Clothing Type donning:;doffing:;bra;t-shirt  -AF,SHILPI,AF2      UB Dressing Assess/Train, Position sitting  -CHAKA,SHILPI,AF2      UB Dressing Assess/Train, [place]  supervision required;set up required   set up to gather clothing   -AF,SHILPI,AF2      Recorded by [AF,SHILPI,AF2] NO Cooney (r) Karen Allan OT Student (t) NO Cooney (c)      Lower Body Dressing Assessment/Training    LB Dressing Assess/Train, Clothing Type donning:;doffing:;pants;shoes;socks   underwear   -AF,SHILPI,AF2      LB Dressing Assess/Train, Position sitting;standing;edge of bed   socks, shoes completed at EOB  -AF,SHILPI,AF2      LB Dressing Assess/Train, [place] set up required;verbal cues required;minimum assist (75% patient effort)  -AF,SHILPI,AF2      LB Dressing Assess/Train, Comment cga while standing to pull up underwear/pants; vcs for sequencin; required assist w/ tying shoes   -AF,SHILPI,AF2      Recorded by [AF,SHILPI,AF2] NO Cooney (r) Karen Allan, OT Student (t) NO Cooney (c)      Toileting Assessment/Training    Toileting Assess/Train, Assistive Device grab bars;raised toilet seat  -AF,SHILPI,AF2      Toileting Assess/Train, Position sitting  -AF,SHILPI,AF2      Toileting Assess/Train, Indepen Level contact guard assist  -AF,SHILPI,AF2      Toileting Assess/Train, Comment pt able to complete hygiene while seated; tsf to shower so no LBD required after toileting   -AF,SHILPI,AF2      Recorded by [AF,SHILPI,AF2] NO Cooney (r) Karen Allan OT Student (t) NO Cooney (c)      Grooming Assessment/Training    Grooming Assess/Train, Position sitting;sink side  -AF,SHILPI,AF2      Grooming Assess/Train, [place]ep[place] Level set up required  -AF,SHILPI,AF2      Grooming Assess/Train, Comment brushing hair   -AF,SHILPI,AF2      Recorded by [AF,SHILPI,AF2] NO Cooney (r) Karen Allan OT Student (t) Adelia Mattie Salamanca, OTR (c)      Sensory Treatment    Sensory Reeducation Techniques localization of touch  -AF,SHILPI,AF2      Sensory Reeduction Treatment pt pointed w/ RUE to location on LUE where tactile sensations of various textures was felt w/ difficulty; pt was able to localize  w/ R hand when repeating touch w/ eyes open   -AF,SHILPI,AF2      Recorded by [AF,SHILPI,AF2] Adelia Salamanca OTR (r) Karen Allan, OT Student (t) Adelia Salamanca, OTBETO (c)      Positioning and Restraints    Pre-Treatment Position in bed   in recliner second session   -CHAKA,SHILPI,AF2      Post Treatment Position chair   both sessions   -CHAKA,SHILPI,AF2 wheelchair  -LB     In Chair call light within reach;encouraged to call for assist;sitting  -CHAKA,SHILPI,AF2      In Wheelchair  sitting;call light within reach  -LB     Recorded by [AF,SHILPI,AF2] NO Cooney (r) Karen Allan, OT Student (t) NO Cooney (c) [LB] Angelica Treadwell, PTA       User Key  (r) = Recorded By, (t) = Taken By, (c) = Cosigned By    Initials Name Effective Dates    AW Adelia Martinez MS CCC-SLP 04/13/15 -     LB Angelica Treadwell, PTA 02/18/16 -     AF Adelia Salamanca OTR 12/01/15 -     SHILPI Allan, OT Student 07/11/17 -                 IP PT Goals       08/04/17 1057 07/29/17 1211       Bed Mobility PT LTG    Bed Mobility PT LTG, Date Established  07/29/17  -LB     Bed Mobility PT LTG, Time to Achieve  2 wks  -LB     Bed Mobility PT LTG, Activity Type  roll left/roll right;supine to sit/sit to supine  -LB     Bed Mobility PT LTG, [place] Level  independent  -LB     Bed Mobility PT LTG, Date Goal Reviewed 08/04/17  -LBA      Bed Mobility PT LTG, Outcome goal ongoing  -LBA      Transfer Training PT LTG    Transfer Training PT LTG, Date Established  07/29/17  -LB     Transfer Training PT LTG, Time to Achieve  2 wks  -LB     Transfer Training PT LTG, Activity Type  sit to stand/stand to sit  -LB     Transfer Training PT LTG, [place] Level  conditional independence  -LB     Transfer Training PT LTG, Assist Device  cane, straight  -LB     Transfer Training PT  LTG, Date Goal Reviewed 08/04/17  -LBA      Transfer Training PT LTG, Outcome goal ongoing  -LBA      Transfer Training 2 PT LTG    Transfer Training PT 2 LTG, Date Established   07/29/17  -LB     Transfer Training PT 2 LTG, Time to Achieve  2 wks  -LB     Transfer Training PT 2 LTG, Activity Type  --   car transfer  -LB     Transfer Training PT 2 LTG, [place] Level  contact guard assist  -LB     Transfer Training PT 2 LTG, Assist Device  cane, straight  -LB     Transfer Training PT 2 LTG, Date Goal Reviewed 08/04/17  -LBA      Transfer Training PT 2 LTG, Outcome goal ongoing  -LBA      Gait Training PT LTG    Gait Training Goal PT LTG, Date Established  07/29/17  -LB     Gait Training Goal PT LTG, Time to Achieve  2 wks  -LB     Gait Training Goal PT LTG, [place] Level  conditional independence  -LB     Gait Training Goal PT LTG, Assist Device  cane, straight  -LB     Gait Training Goal PT LTG, Distance to Achieve  150  -LB     Gait Training Goal PT LTG, Date Goal Reviewed 08/04/17  -LBA      Gait Training Goal PT LTG, Outcome goal not met  -LBA      Stair Training PT LTG    Stair Training Goal PT LTG, Date Established  07/29/17  -LB     Stair Training Goal PT LTG, Time to Achieve  2 wks  -LB     Stair Training Goal PT LTG, Number of Steps  4  -LB     Stair Training Goal PT LTG, [place] Level  contact guard assist  -LB     Stair Training Goal PT LTG, Assist Device  1 handrail  -LB     Stair Training Goal PT LTG, Date Goal Reviewed 08/04/17  -LBA      Stair Training Goal PT LTG, Outcome goal ongoing  -LBA        User Key  (r) = Recorded By, (t) = Taken By, (c) = Cosigned By    Initials Name Provider Type    WINNIE Hoffmann, PT Physical Therapist    MARY Treadwell, PTA Physical Therapy Assistant          Physical Therapy Education     Title: PT OT SLP Therapies (Active)     Topic: Physical Therapy (Active)     Point: Mobility training (Done)    Learning Progress Summary    Learner Readiness Method Response Comment Documented by Status   Patient Acceptance E CINDY,NR  LB 08/08/17 0955 Done    Acceptance E CINDY,NR  LB 08/07/17 1001 Done    BRANDI Ahn DU JT 08/05/17 9092  Done    Acceptance E VU,NR  LB 08/04/17 1358 Done    Acceptance E VU,NR  LB 08/03/17 1103 Done    Acceptance E VU,NR car, stairs, curb LB 08/02/17 1031 Done    Acceptance E VU,NR  LB 08/01/17 0939 Done    Acceptance E VU,NR  LB 07/31/17 0947 Done    Acceptance E VU,NR  LB1 07/29/17 1210 Done               Point: Home exercise program (Done)    Learning Progress Summary    Learner Readiness Method Response Comment Documented by Status   Patient Eager E,TB VU,DU  JT 08/05/17 1432 Done                      User Key     Initials Effective Dates Name Provider Type Discipline    LB1 10/06/15 -  Rosemary Hoffmann, PT Physical Therapist PT    LB 02/18/16 -  Angelica Treadwell PTA Physical Therapy Assistant PT    JT 12/01/15 -  Danielle Arroyo, PT Physical Therapist PT                    PT Recommendation and Plan  Anticipated Equipment Needs At Discharge: standard cane  Anticipated Discharge Disposition: home with outpatient services, home with assist  Planned Therapy Interventions: bed mobility training, balance training, neuromuscular re-education, transfer training, strengthening, stair training  PT Frequency: 2 times/day  Plan of Care Review  Plan Of Care Reviewed With: patient  Progress: improving  Outcome Summary/Follow up Plan: pnt has improved with functional mobility. Quite pleasant and highly motivated.         Time Calculation:         PT Charges       08/08/17 1445 08/08/17 0954       Time Calculation    Start Time 1300  -LB 0930  -LB     Stop Time 1330  -LB 1000  -LB     Time Calculation (min) 30 min  -LB 30 min  -LB       User Key  (r) = Recorded By, (t) = Taken By, (c) = Cosigned By    Initials Name Provider Type     Angelica Treadwell PTA Physical Therapy Assistant          Therapy Charges for Today     Code Description Service Date Service Provider Modifiers Qty    74419524229 HC PT THER PROC EA 15 MIN 8/7/2017 Angelica Treadwell PTA GP 4    03487380015 HC PT THER PROC EA 15 MIN 8/8/2017 Angelica LOPES  [place], PTA GP 4               Angelica Treadwell, PTA  8/8/2017

## 2017-08-08 NOTE — PROGRESS NOTES
Inpatient Rehabilitation Functional Measures Assessment    Functional Measures  BETH Eating:  [facility] Grooming: [facility] Bathing:  [facility] Upper Body Dressing:  [facility] Lower Body Dressing:  [facility] Toileting:  [facility] Bladder Management  Level of Assistance:  [place]  Frequency/Number of Accidents this Shift:  [facility] Bowel Management  Level of Assistance: [place]  Frequency/Number of Accidents this Shift: [facility] Bed/Chair/Wheelchair Transfer:  [facility] Toilet Transfer:  [facility] Tub/Shower Transfer:  [place]    Previously Documented Mode of Locomotion at Discharge: Field  BETH Expected Mode of Locomotion at Discharge: [facility] Walk/Wheelchair:  [facility] Stairs:  [facility] Comprehension:  Auditory comprehension is the usual mode. Comprehension  Score = 6, Modified [place].  Patient comprehends complex/abstract  information in their primary language, requiring:  BETH Expression:  Vocal expression is the usual mode. Expression Score = 6,  Modified Independent.  Patient expresses complex/abstract information in their  primary language, requiring:  BETH Social Interaction:  Social Interaction Score = 6, Modified Independent.  Patient is modified independent for social interaction, requiring:  BETH Problem Solving:  Problem Solving Score = 6, Modified [place].  Patient  makes appropriate decisions in order to solve complex problems, but requires  extra time.  BETH Memory:  Memory Score = 6, Modified [place].  Patient is modified  independent for memory, requiring:    Therapy Mode Minutes  Occupational Therapy: Branch  Physical Therapy: Branch  Speech Language Pathology:  Branch    Signed by: Sheela Zamora RN

## 2017-08-08 NOTE — THERAPY TREATMENT NOTE
Inpatient Rehabilitation - Speech Language Pathology Treatment Note  [facility]     Patient Name: Magnus Hartley  : 1928  MRN: [id number]  Today's Date: 2017         Admit Date: 2017    Visit Dx:      ICD-10-CM ICD-9-CM   1. Left hemiparesis G81.94 342.90   2. Dysarthria R47.1 784.51     Patient Active Problem List   Diagnosis   • Foot pain   • Asthma   • Benign essential hypertension   • Controlled type 2 diabetes mellitus without complication   • Dyslipidemia   • Macrocytosis without anemia   • Senile osteoporosis   • Post-menopausal osteoporosis   • Sinus bradycardia   • Stress incontinence in female   • Urge incontinence of urine   • Atrophic vaginitis   • Encounter for fitting and adjustment of other specified devices   • Incomplete emptying of bladder   • Urethral caruncle   • Incomplete uterovaginal prolapse   • Cerebrovascular accident (CVA) due to occlusion of right middle cerebral artery   • Atrial fibrillation   • Warfarin anticoagulation (goal INR 2-3)              Adult Rehabilitation Note       17 1114 17 1100 17 0943    Rehab Assessment/Intervention    Discipline occupational therapist  -SHILPI NULL AF2 speech language pathologist  -AW physical therapy assistant  -LB    Document Type therapy note (daily note)  -SHILPI NULL AF2 therapy note (daily note)  -AW therapy note (daily note)  -LB    Subjective Information agree to therapy;complains of;weakness  -SHILPI NULL AF2 no complaints;agree to therapy  -AW agree to therapy  -LB    Patient Effort, Rehab Treatment good  -SHILPI NULL AF2 good  -AW good  -LB    Symptoms Noted During/After Treatment shortness of breath   after tsfs, bending down for LBD;   -SHILPI NULL AF2 none  -AW     Symptoms Noted Comment allowed for rest breaks as necessary; vcs for deep breathing   -SHILPI NULL AF2      Precautions/Limitations fall precautions;swallowing precautions  -SHILPI NULL AF2 fall precautions  -AW fall precautions;swallowing precautions  -LB    Recorded by  [AF,SHILPI,AF2] Aedlia Salamanca OTR (r) Karen Allan OT Student (t) NO Cooney (c) [AW] Adelia Martinez MS CCC-SLP [LB] Angelica Treadwell PTA    Pain Assessment    Pain Assessment No/denies pain  -AF,SHILPI,AF2  No/denies pain  -LB    Recorded by [AF,SHILPI,AF2] NO Cooney (r) Karen Allan OT Student (t) NO Cooney (c)  [LB] Angelica Treadwell PTA    Cognitive Assessment/Intervention    Current Cognitive/Communication Assessment impaired  -AF,SHILPI,AF2      Orientation Status oriented x 4  -AF,SHILPI,AF2      Follows Commands/Answers Questions 100% of the time;able to follow single-step instructions;needs cueing;needs increased time;needs repetition  -AF,SHILPI,AF2      Personal Safety mild impairment  -AF,SHILPI,AF2      Personal Safety Interventions gait belt;nonskid shoes/slippers when out of bed;fall prevention program maintained  -AF,SHILPI,AF2  fall prevention program maintained;gait belt;muscle strengthening facilitated;nonskid shoes/slippers when out of bed  -LB    Recorded by [AF,SHILPI,AF2] NO Cooney (r) Karen Allan, OT Student (t) NO Cooney (c)  [LB] Angelica Treadwell PTA    Improve attention    Attention Progress  50%;without cues;80%;with consistent cues  -AW     Comments: Improve attention  working memory task - listening to 4 words x2 and recalling one word by placement  -AW     Recorded by  [AW] Adelia Martinez MS CCC-SLP     Improve memory skills    Memory Skills Progress  100%;without cues  -AW     Comments: Improve memory skills  visual memory - recalling details of picture after a 12 min delay  -AW     Recorded by  [AW] Adelia Martinez MS CCC-SLP     Improve functional problem solving    Functional Problem Solving Progress  80%;without cues;100%;with inconsistent cues  -AW     Comments: Improve functional problem solving  sequencing 5 step tasks  -AW     Recorded by  [AW] Adelia Martinez MS CCC-SLP     Bed Mobility, Assessment/Treatment    Bed Mobility, Assistive Device   bed  rails;head of bed elevated  -LB    Bed Mob, Supine to Sit, [place] supervision required  -AF,SHILPI,AF2  supervision required  -LB    Bed Mob, Sit to Supine, [place]   supervision required  -LB    Recorded by [AF,SHILPI,AF2] Adelia Salamanca, OTR (r) Karen Allan, OT Student (t) Adelia Salamanca OTR (c)  [LB] Angelica Treadwell PTA    Transfer Assessment/Treatment    Transfers, Bed-Chair [place] stand by assist;contact guard assist  -AF,SHILPI,AF2  contact guard assist;stand by assist  -LB    Transfers, Chair-Bed [place]   contact guard assist;stand by assist  -LB    Transfers, Sit-Stand [place]   stand by assist  -LB    Transfers, Stand-Sit [place]   stand by assist  -LB    Transfers, Sit-Stand-Sit, Assist Device   rolling walker  -LB    Transfer, Comment tsf to and from EOM from w/c w/ CGA and vcs  -AF,SHILPI,AF2      Recorded by [AF,SHILPI,AF2] NO Cooney (r) Karen Allan, OT Student (t) Adelia Salamanca OTR (c)  [LB] Angelica Treadwell PTA    Gait Assessment/Treatment    Gait, [place] Level   stand by assist  -LB    Gait, Assistive Device   rolling walker  -LB    Gait, Distance (Feet)   320  -LB    Gait, Gait Deviations   forward flexed posture;step length decreased  -LB    Recorded by   [LB] Angelica Treadwell PTA    Stairs Assessment/Treatment    Number of Stairs   8  -LB    Stairs, Handrail Location   both sides  -LB    Stairs, [place] Level   contact guard assist;verbal cues required  -LB    Stairs, Technique Used   step to step (ascending);step to step (descending)  -LB    Recorded by   [LB] Angelica Treadwell PTA    ADL Assessment/Intervention    IADL Assess/Train, Comment pt wrote a list of ingredients and amounts needed to make stir portillo dish, pt required some help w/ spelling; pt looked through old ads to find sales and compare the cheapest options, pt required min vcs, min gestural prompts to complete task   -AF,SHILPI,AF2      Recorded by [AF,SHILPI,AF2] Adelia Salamanca,  NO (r) Karen Allan, OT Student (t) NO Cooney (c)      Upper Body Bathing Assessment/Training    UB Bathing Assess/Train, Position sitting;sink side  -AF,SHILPI,AF2      UB Bathing Assess/Train, [place] Level minimum assist (75% patient effort);verbal cues required   vcs for setup instructions; min a w/ back   -AF,SHILPI,AF2      Recorded by [AF,SHILPI,AF2] NO Cooney (r) Karen Allan, OT Student (t) NO Cooney (c)      Lower Body Bathing Assessment/Training    LB Bathing Assess/Train, Position sitting;sink side;standing  -AF,SHILPI,AF2      LB Bathing Assess/Train, [place] Level contact guard assist;verbal cues required  -AF,SHILPI,AF2      LB Bathing Assess/Train, Comment vcs for sequencing; cga to maintaing balance while pt washed elsi area  -AF,SHILPI,AF2      Recorded by [AF,SHILPI,AF2] Adelia Salamanca OTR (r) Karen Allan, OT Student (t) NO Cooney (c)      Upper Body Dressing Assessment/Training    UB Dressing Assess/Train, Clothing Type doffing:;donning:;bra;t-shirt   dof tshirt; don bra, tshirt  -AF,SHILPI,AF2      UB Dressing Assess/Train, Position sitting  -AF,SHILPI,AF2      UB Dressing Assess/Train, [place] minimum assist (75% patient effort);set up required  -AF,SHILPI,AF2      UB Dressing Assess/Train, Comment set up required to gather clothes; min a to adjust bra over back   -AF,SHILPI,AF2      Recorded by [AF,SHILPI,AF2] NO Cooney (r) Karen Allan, OT Student (t) NO Cooney (c)      Lower Body Dressing Assessment/Training    LB Dressing Assess/Train, Clothing Type donning:;shoes;pants;socks;slipper socks;doffing:   dof slipper socks; don underwear, pants, socks, shoes  -AF,SHILPI,AF2      LB Dressing Assess/Train, Position sitting;sink side;standing;edge of bed  -CHAKA,SHILPI,AF2      LB Dressing Assess/Train, [place] minimum assist (75% patient effort);contact guard assist  -CHAKASHILPI,AF2      LB Dressing Assess/Train, Comment CGA to maintain balance while  standing to pull up pants; min a to tighten shoe strings, pt was able to maintain grasp on shoe strings w/ LUE for increased ind w/ shoe tying  -AF,SHILPI,AF2      Recorded by [AF,SHILPI,AF2] NO Cooney (r) Karen Allan, OT Student (t) NO Cooney (c)      Grooming Assessment/Training    Grooming Assess/Train, Position sitting;sink side  -AF,SHILPI,AF2      Grooming Assess/Train, [place]ep[place] Level set up required   gather materials from room   -AF,SHILPI,AF2      Recorded by [AF,SHILPI,AF2] NO Cooney (r) Karen Allan, OT Student (t) NO Cooney (c)      Balance Skills Training    Gait Balance-Level of Assistance   Contact guard;Minimum assistance  -LB    Gait Balance Support   parallel bars;Right upper extremity supported;Left upper extremity supported  -LB    Gait Balance Activities   braiding / front;side-stepping  -LB    Recorded by   [LB] Angelica Treadwell PTA    Therapy Exercises    Bilateral Lower Extremities   AROM:;10 reps;heel raises;mini squats;supine;heel slides;hip abduction/adduction  -LB    Trunk Exercises   10 reps;supine;bridging  -LB    Recorded by   [LB] Angelica Treadwell PTA    Sensory Treatment    Sensory Reeducation Techniques sensory training, visual reinforcement;sensory train, w/o visual reinforcement  -AF,SIHLPI,AF2      Sensory Reeduction Treatment pt was given an every day object to feel w/ BUE and observe; pt attempted to find object in rice bucket w/ LUE w/ vision occluded; pt was able to find spoon w/ eyes closed, required eyes open to find other objects; pt buried all the items in the rice at clean up   -AF,SHILPI,AF2      Recorded by [AF,SHILPI,AF2] NO Cooney (r) Karen Allan, OT Student (t) NO Cooney (c)      Positioning and Restraints    Pre-Treatment Position in bed   in w/c second session   -AF,SHILPI,AF2      Post Treatment Position chair   w/c second session   -CHAKA,SHILPI,AF2  wheelchair  -LB    In Wheelchair sitting;with SLP;call light within  reach;encouraged to call for assist  -CHAKA,SHILPI,AF2  sitting;with OT  -LB    Recorded by [AF,SHILPI,AF2] NO Cooney (r) Karen Allan OT Student (t) NO Cooney (c)  [LB] Angelica Treadwell PTA      08/07/17 1530 08/07/17 1143 08/07/17 0943    Rehab Assessment/Intervention    Discipline speech language pathologist  -AW occupational therapist  -SHILPI NULL,CHAKA2 physical therapy assistant  -LB    Document Type therapy note (daily note)  -AW therapy note (daily note)  -CHAKA,SHILPI,AF2 therapy note (daily note)  -LB    Subjective Information no complaints;agree to therapy  -AW agree to therapy;no complaints  -AF,SHILPI,AF2 agree to therapy  -LB    Patient Effort, Rehab Treatment good  -AW good  -AF,SHILPI,AF2 good  -LB    Symptoms Noted During/After Treatment none  -AW shortness of breath   w/ tsfs, bending down from seated level   -AF,SHILPI,AF2     Symptoms Noted Comment  rest breaks and vcs for deep breaths as necessary   -AF,SHILPI,AF2     Precautions/Limitations fall precautions;swallowing precautions  -AW fall precautions  -AF,SHILPI,AF2 fall precautions  -LB    Recorded by [AW] Adelia Martinez MS CCC-SLP [AF,SHILPI,AF2] NO Cooney (r) Karen Allan, OT Student (t) NO Cooney (c) [LB] Angelica Treadwell PTA    Pain Assessment    Pain Assessment  No/denies pain  -AF,SHILPI,AF2 No/denies pain  -LB    Recorded by  [AF,SHILPI,AF2] NO Cooney (r) Karen Allan OT Student (t) NO Cooney (c) [LB] Angelica Treadwell PTA    Cognitive Assessment/Intervention    Current Cognitive/Communication Assessment  impaired  -AF,SHILPI,AF2     Orientation Status  oriented x 4  -AF,SHILPI,AF2     Follows Commands/Answers Questions  100% of the time;able to follow single-step instructions;needs cueing;needs increased time;needs repetition  -AF,SHILPI,AF2     Personal Safety  mild impairment;decreased awareness, need for safety;decreased insight to deficits  -AF,SHILPI,AF2     Personal Safety Interventions  fall prevention program  maintained;gait belt;nonskid shoes/slippers when out of bed;supervised activity  -AF,SHILPI,AF2 fall prevention program maintained;gait belt;muscle strengthening facilitated;nonskid shoes/slippers when out of bed  -LB    Recorded by  [AF,SHILPI,AF2] NO Cooney (r) Karen Allan, OT Student (t) NO Cooney (c) [LB] Angelica Treadwell PTA    Improve attention    Attention Progress 60%;without cues;100%;with inconsistent cues  -AW      Comments: Improve attention working memory - repeating 3 words in reverse order  -AW      Recorded by [AW] Adelia Martinez MS CCC-SLP      Improve memory skills    Memory Skills Progress 0%;without cues;100%;with consistent cues  -AW      Comments: Improve memory skills after a 10 min delay, pt recalled 0/3 words; 3/3 w/ cues  -AW      Recorded by [AW] Adelia Martinez MS CCC-SLP      Improve functional problem solving    Functional Problem Solving Progress 80%;without cues;100%;with inconsistent cues  -AW      Comments: Improve functional problem solving word problems; repetitions beneficial  -AW      Recorded by [AW] Adelia Martinez MS CCC-SLP      Improve executive function skills    Executive Function Skills Progress 70%;without cues;100%;with inconsistent cues  -AW      Comments: Improve executive function skills deductive reasoning grid puzzle  -AW      Recorded by [AW] Adelia Martinez MS CCC-SLP      Bed Mobility, Assessment/Treatment    Bed Mob, Supine to Sit, [place]  supervision required  -AF,SHILPI,AF2     Bed Mobility, Comment   up in chair  -LB    Recorded by  [AF,SHILPI,AF2] NO Cooney (r) Karen Allan, OT Student (t) NO Cooney (c) [LB] Angelica Treadwell PTA    Transfer Assessment/Treatment    Transfers, Bed-Chair [place]  stand by assist  -CHAKA,SHILPI,AF2     Transfers, Sit-Stand [place]  contact guard assist   while showeringto wash elsi area   -AF,SHILPI,AF2 stand by assist  -LB    Transfers, Stand-Sit [place]  contact guard assist   while  showering to wash elsi area  -AF,SHILPI,AF2 stand by assist  -LB    Transfers, Sit-Stand-Sit, Assist Device   rolling walker  -LB    Toilet Transfer, [place]  contact guard assist;verbal cues required   vcs for hand placement   -AF,SHILPI,AF2     Toilet Transfer, Assistive Device  elevated toilet seat;rolling walker   grab bars   -AF,SHILPI,AF2     Walk-In Shower Transfer, [place]  contact guard assist;verbal cues required   vcs for hand placement   -AF,SHILPI,AF2     Walk-In Shower Transfer, Assist Device  standard shower chair   grab bars; tsf from toilet   -AF,SHILPI,AF2     Bathtub Transfer, [place]  verbal cues required;contact guard assist   pt practiced tub tsf w/ demo to simulate home environment   -AF,SHILPI,AF2     Bathtub Transfer, Assistive Device  transfer tub bench;rolling platform walker   grab bars  -AF,SHILPI,AF2     Transfer, Comment  tsf to and from EOM from w/c w/ SBA, vcs for hand placement   -AF,SHILPI,AF2 car tsf CGA, rwx  -LB    Recorded by  [AF,SHILPI,AF2] Adelia Salamanca, OTR (r) Karen Allan OT Student (t) Adelia Salamanca OTR (c) [LB] Angelica Treadwell PTA    Gait Assessment/Treatment    Gait, [place] Level   stand by assist  -LB    Gait, Assistive Device   rolling walker  -LB    Gait, Distance (Feet)   200  -LB    Gait, Gait Deviations   forward flexed posture;step length decreased  -LB    Recorded by   [LB] Angelica Treadwell PTA    Stairs Assessment/Treatment    Number of Stairs   8  -LB    Stairs, Handrail Location   both sides  -LB    Stairs, [place] Level   contact guard assist  -LB    Stairs, Technique Used   step to step (ascending);step to step (descending)  -LB    Recorded by   [LB] Angelica Treadwell PTA    Functional Mobility    Functional Mobility- Ind. Level   contact guard assist;minimum assist (75% patient effort)  -LB    Functional Mobility- Device   straight cane   w tripod base  -LB    Functional Mobility-Distance (Feet)   180  -LB    Functional Mobility- Safety Issues    sequencing ability decreased   LOB x 1  -LB    Recorded by   [LB] Angelica Treadwell, PTA    ADL Assessment/Intervention    Additional Documentation  --   pt practiced tying shoes from lap level w/ min a   -AF,SHILPI,AF2     Recorded by  [AF,SHILPI,AF2] NO Cooney (r) Karen Allan, OT Student (t) NO Cooney (c)     Upper Body Bathing Assessment/Training    UB Bathing Assess/Train Assistive Device  grab bars;shower chair with back;hand-held shower head  -AF,SHILPI,AF2     UB Bathing Assess/Train, Position  sitting  -AF,SHILPI,AF2     UB Bathing Assess/Train, [place] Level  minimum assist (75% patient effort);verbal cues required  -AF,SHILPI,AF2     UB Bathing Assess/Train, Comment  min a w/ wetting down hair; vcs to recall that pt had already washed hair   -AF,SHILPI,AF2     Recorded by  [AF,SHILPI,AF2] NO Cooney (r) Karen Allan, OT Student (t) NO Cooney (c)     Lower Body Bathing Assessment/Training    LB Bathing Assess/Train Assistive Device  shower chair with back;hand-held shower head;grab bars  -AF,SHILPI,AF2     LB Bathing Assess/Train, Position  sitting;standing  -AF,SHILPI,AF2     LB Bathing Assess/Train, [place] Level  verbal cues required;contact guard assist  -AF,SHILPI,AF2     LB Bathing Assess/Train, Comment  vcs for sequencing; cga to maintain balance while pt washed elsi area   -AF,SHILPI,AF2     Recorded by  [AF,SHILPI,AF2] NO Cooney (r) Karen Allan, OT Student (t) NO Cooney (c)     Upper Body Dressing Assessment/Training    UB Dressing Assess/Train, Clothing Type  donning:;doffing:;bra;t-shirt  -AF,SHILPI,AF2     UB Dressing Assess/Train, Position  sitting  -AF,SHILPI,AF2     UB Dressing Assess/Train, [place]  supervision required;set up required   set up to gather clothing   -AF,SHILPI,AF2     Recorded by  [AF,SHILPI,AF2] Adelia Salamanca, OTR (r) Karen Allan, OT Student (t) Adelia Salamanca OTR (c)     Lower Body Dressing Assessment/Training    LB Dressing  Assess/Train, Clothing Type  donning:;doffing:;pants;shoes;socks   underwear   -AF,SHILPI,AF2     LB Dressing Assess/Train, Position  sitting;standing;edge of bed   socks, shoes completed at EOB  -AF,SHILPI,AF2     LB Dressing Assess/Train, [place]  set up required;verbal cues required;minimum assist (75% patient effort)  -AF,SHILPI,AF2     LB Dressing Assess/Train, Comment  cga while standing to pull up underwear/pants; vcs for sequencin; required assist w/ tying shoes   -AF,SHILPI,AF2     Recorded by  [AF,SHILPI,AF2] NO Cooney (r) Karen Allan, OT Student (t) NO Cooney (c)     Toileting Assessment/Training    Toileting Assess/Train, Assistive Device  grab bars;raised toilet seat  -AF,SHILPI,AF2     Toileting Assess/Train, Position  sitting  -AF,SHILPI,AF2     Toileting Assess/Train, Indepen Level  contact guard assist  -AF,SHILPI,AF2     Toileting Assess/Train, Comment  pt able to complete hygiene while seated; tsf to shower so no LBD required after toileting   -AF,SHILPI,AF2     Recorded by  [AF,SHILPI,AF2] NO Cooney (r) Karen Allan, OT Student (t) NO Cooney (c)     Grooming Assessment/Training    Grooming Assess/Train, Position  sitting;sink side  -AF,SHILPI,AF2     Grooming Assess/Train, [place] Level  set up required  -AF,SHILPI,AF2     Grooming Assess/Train, Comment  brushing hair   -AF,SHILPI,AF2     Recorded by  [AF,SHILPI,AF2] NO Cooney (r) Karen Allan OT Student (t) NO Cooney (c)     Sensory Treatment    Sensory Reeducation Techniques  localization of touch  -AF,SHILPI,AF2     Sensory Reeduction Treatment  pt pointed w/ RUE to location on LUE where tactile sensations of various textures was felt w/ difficulty; pt was able to localize w/ R hand when repeating touch w/ eyes open   -AF,SHILPI,AF2     Recorded by  [CHAKA,SHILPI,AF2] NO Cooney (r) Karen Allan OT Student (t) NO Cooney (c)     Positioning and Restraints    Pre-Treatment Position  in bed   in recliner  second session   -CHAKA,SHILPI,AF2     Post Treatment Position  chair   both sessions   -CHAKA,SHILPI,AF2 wheelchair  -LB    In Chair  call light within reach;encouraged to call for assist;sitting  -SHILPI NULL,AF2     In Wheelchair   sitting;call light within reach  -LB    Recorded by  [CHAKA,SHILPI,AF2] Adelia Salamanca, OTR (r) Karen Allan, OT Student (t) Adelia Salamanca, OTR (c) [LB] Angelica Treadwell, PTA      User Key  (r) = Recorded By, (t) = Taken By, (c) = Cosigned By    Initials Name Effective Dates    AW Adelia Paulen, MS [facility]-SLP 04/13/15 -     LB Angelica Treadwell PTA 02/18/16 -     CHAKA Salamanca, OTR 12/01/15 -     SHILPI Allan, OT Student 07/11/17 -               IP SLP Goals       08/07/17 1701 07/31/17 1102 07/27/17 1501    Safely Consume Diet    Safely Consume Diet- SLP, Time to Achieve   by discharge  -JT    Safely Consume Diet- SLP, Activity Level   Patient will improve oral skills for more efficient eating  -JT    Safely Consume Diet- SLP, Outcome   goal ongoing  -JT    Cognitive Linguistic- Optimal Participation in Care    Cognitive Linguistic Optimal Participation in Care- SLP, Date Established  07/31/17  -LT     Cognitive Linguistic Optimal Participation in Care- SLP, Time to Achieve by discharge  -AW by discharge  -LT     Cognitive Linguistic Optimal Participation in Care- SLP, Activity Level Patient will improve Executive functioning skills for increased safety in environment  -AW      Cognitive Linguistic Optimal Participation in Care- SLP, Outcome goal ongoing  -AW goal ongoing  -LT     Dysarthria- Optimal Particpation in Care    Dysarthria Optimal Participation in Care- SLP, Date Established  07/31/17  -LT     Dysarthria Optimal Participation in Care- SLP, Time to Achieve  by discharge  -LT     Dysarthria Optimal Participation in Care- SLP, Outcome goal ongoing  -AW goal ongoing  -LT       07/26/17 0930          Safely Consume Diet    Safely Consume Diet- SLP, Date Established 07/26/17  -OC      Safely  Consume Diet- SLP, Time to Achieve by discharge  -OC      Safely Consume Diet- SLP, Outcome goal ongoing  -OC        User Key  (r) = Recorded By, (t) = Taken By, (c) = Cosigned By    Initials Name Provider Type    OC Kaylah Vasquez MA,CCC-SLP Speech and Language Pathologist    LT Yvonneapolinar Miles MS CCC-SLP Speech and Language Pathologist    AW Adelia Martinez, MS CCC-SLP Speech and Language Pathologist    JASEN Ha Speech and Language Pathologist          EDUCATION  The patient has been educated in the following areas:   Cognitive Impairment.    SLP Recommendation and Plan  SLP Diagnosis: Mild cognitive impairment     Rehab Potential: good, to achieve stated therapy goals  Criteria for Skilled Therapeutic Interventions Met: skilled criteria for cognitive linguistic intervention met  Anticipated Discharge Disposition: home with assist     Therapy Frequency: 3-5 times/wk  Predicted Duration of Therapy Intervention (days/wks): until discharge  Expected Duration of Therapy Session (min): 30-45 minutes    Plan of Care Review  Plan Of Care Reviewed With: patient  Progress: improving          Time Calculation:         Time Calculation- SLP       08/08/17 1549          Time Calculation- SLP    SLP Start Time 1030  -AW      SLP Stop Time 1100  -AW      SLP Time Calculation (min) 30 min  -AW        User Key  (r) = Recorded By, (t) = Taken By, (c) = Cosigned By    Initials Name Provider Type    ISAAC Martinez MS CCC-SLP Speech and Language Pathologist          Therapy Charges for Today     Code Description Service Date Service Provider Modifiers Qty    [id number]  ST DEV OF COGN SKILLS EACH 15 MIN 8/7/2017 Adelia Martinez MS CCC-SLP GN 4    [id number]  ST DEV OF COGN SKILLS EACH 15 MIN 8/8/2017 Adelia Martinez MS CCC-SLP GN 2               Adelia Martinez MS CCC-YUNG  8/8/2017

## 2017-08-09 LAB
GLUCOSE BLDC GLUCOMTR-MCNC: 108 MG/DL (ref 70–130)
GLUCOSE BLDC GLUCOMTR-MCNC: 131 MG/DL (ref 70–130)

## 2017-08-09 PROCEDURE — 82962 GLUCOSE BLOOD TEST: CPT

## 2017-08-09 PROCEDURE — 97532: CPT

## 2017-08-09 PROCEDURE — 94799 UNLISTED PULMONARY SVC/PX: CPT

## 2017-08-09 PROCEDURE — 97535 SELF CARE MNGMENT TRAINING: CPT

## 2017-08-09 PROCEDURE — 97110 THERAPEUTIC EXERCISES: CPT

## 2017-08-09 RX ADMIN — BUDESONIDE AND FORMOTEROL FUMARATE DIHYDRATE 2 PUFF: 80; 4.5 AEROSOL RESPIRATORY (INHALATION) at 06:41

## 2017-08-09 RX ADMIN — BUDESONIDE AND FORMOTEROL FUMARATE DIHYDRATE 2 PUFF: 80; 4.5 AEROSOL RESPIRATORY (INHALATION) at 19:45

## 2017-08-09 RX ADMIN — APIXABAN 5 MG: 5 TABLET, FILM COATED ORAL at 19:42

## 2017-08-09 RX ADMIN — PREDNISOLONE ACETATE 1 DROP: 10 SUSPENSION/ DROPS OPHTHALMIC at 08:11

## 2017-08-09 RX ADMIN — METOPROLOL TARTRATE 25 MG: 25 TABLET ORAL at 19:41

## 2017-08-09 RX ADMIN — APIXABAN 5 MG: 5 TABLET, FILM COATED ORAL at 08:11

## 2017-08-09 RX ADMIN — ATORVASTATIN CALCIUM 80 MG: 80 TABLET, FILM COATED ORAL at 19:41

## 2017-08-09 RX ADMIN — PREDNISOLONE ACETATE 1 DROP: 10 SUSPENSION/ DROPS OPHTHALMIC at 19:42

## 2017-08-09 RX ADMIN — DOCUSATE SODIUM -SENNOSIDES 2 TABLET: 50; 8.6 TABLET, COATED ORAL at 19:41

## 2017-08-09 RX ADMIN — METOPROLOL TARTRATE 25 MG: 25 TABLET ORAL at 08:11

## 2017-08-09 NOTE — PROGRESS NOTES
Occupational Therapy: Individual: 75 minutes.    Physical Therapy: Branch    Speech Language Pathology:  Branch    Signed by: Adelia Salamanca OT

## 2017-08-09 NOTE — PROGRESS NOTES
Inpatient Rehabilitation Functional Measures Assessment    Functional Measures  BETH Eating:  [facility] Grooming: [facility] Bathing:  [facility] Upper Body Dressing:  [facility] Lower Body Dressing:  [facility] Toileting:  Toileting Score = 4.  Patient requires minimal assistance for  toileting, such as steadying for balance while cleansing or adjusting clothes.  Patient requires the following assistive device(s): Grab bar. Adaptive device to  maintain balance.    BETH Bladder Management  Level of Assistance:  Bladder Score = 3. Patient performs 50-74% of tasks and  requires moderate assistance for bladder management requiring assistive  device/method: toilet .  Frequency/Number of Accidents this Shift:  Bladder accidents this shift:  0 .  Patient has not had an accident this shift.    BETH Bowel Management  Level of Assistance: Bowel Score = 3. Patient performs 50-74% of tasks and  requires moderate assistance for bowel management requiring assistive  device/method: toilet .  Frequency/Number of Accidents this Shift: Bowel accidents this shift: 0 .  Patient has not had an accident this shift.    BETH Bed/Chair/Wheelchair Transfer:  Bed/chair/wheelchair Transfer Score = 3.  Patient performs 50-74% of effort and requires moderate assistance (some  lifting) for transferring to and from the bed/chair/wheelchair. Patient requires  the following assistive device(s): Bed rails. Elevated head of bed. Elevated  bed/surface.  BETH Toilet Transfer:  Toilet Transfer Score = 3.  Patient performs 50-74% of  effort and requires moderate assistance (some lifting) for transferring to and  from the toilet/commode. Patient requires the following assistive device(s):  Grab bars.  BETH Tub/Shower Transfer:  [place]    Previously Documented Mode of Locomotion at Discharge: Field  BETH Expected Mode of Locomotion at Discharge: [facility] Walk/Wheelchair:  [facility] Stairs:  [facility] Comprehension:  [facility] Expression:   Branch  [facility] Social Interaction:  Branch  BETH Problem Solving:  Branch  [facility] Memory:  Branch    Therapy Mode Minutes  Occupational Therapy: Branch  Physical Therapy: Branch  Speech Language Pathology:  Branch    Signed by: JULITA Mccabe

## 2017-08-09 NOTE — PLAN OF CARE
Problem: Inpatient Occupational Therapy  Goal: Transfer Training Goal 1 STG- OT  Outcome: Ongoing (interventions implemented as appropriate)    07/29/17 1054 08/02/17 1558 08/09/17 1546   Transfer Training OT STG   Transfer Training OT STG, Date Established --  --  08/09/17   Transfer Training OT STG, Time to Achieve --  1 wk --    Transfer Training OT STG, Activity Type toilet --  --    Transfer Training OT STG, [place] Level supervision required;verbal cues required --  --    Transfer Training OT STG, Assist Device --  walker, rolling  (grab bars) --    Transfer Training OT STG, Date Goal Reviewed --  --  08/09/17   Transfer Training OT STG, Outcome goal ongoing --  --        Goal: Transfer Training Goal 1 LTG- OT  Outcome: Ongoing (interventions implemented as appropriate)    07/29/17 1054 08/09/17 1546   Transfer Training OT LTG   Transfer Training OT LTG, Date Established --  08/09/17   Transfer Training OT LTG, Time to Achieve --  by discharge   Transfer Training OT LTG, Activity Type toilet --    Transfer Training OT LTG, [place] Level conditional independence --    Transfer Training OT LTG, Date Goal Reviewed --  08/09/17   Transfer Training OT LTG, Outcome goal ongoing --        Goal: Transfer Training Goal 2 STG- OT  Outcome: Ongoing (interventions implemented as appropriate)    08/02/17 1558 08/09/17 1546   Transfer Training 2 OT STG   Transfer Training 2 OT STG, Date Established --  08/09/17   Transfer Training 2 OT STG, Time to Achieve 1 wk --    Transfer Training 2 OT STG, Activity Type shower chair  (rwx) --    Transfer Training 2 OT STG, [place] Level supervision required --    Transfer Training 2 OT STG, Date Goal Reviewed --  08/09/17   Transfer Training 2 OT STG, Outcome --  goal ongoing       Goal: Transfer Training Goal 2 LTG- OT  Outcome: Ongoing (interventions implemented as appropriate)    07/29/17 1054 08/02/17 1558 08/09/17 1546   Transfer Training 2 OT LTG   Transfer  Training 2 OT LTG, Date Established --  --  08/09/17   Transfer Training 2 OT LTG, Time to Achieve --  by discharge --    Transfer Training 2 OT LTG, Activity Type shower chair;tub --  --    Transfer Training 2 OT LTG, [place] Level supervision required --  --    Transfer Training 2 OT LTG, Date Goal Reviewed --  --  08/09/17   Transfer Training 2 OT LTG, Outcome goal ongoing --  --        Goal: Bathing Goal STG- OT  Outcome: Ongoing (interventions implemented as appropriate)    07/29/17 1054 08/02/17 1558 08/09/17 1546   Bathing OT STG   Bathing Goal OT STG, Date Established --  --  08/09/17   Bathing Goal OT STG, Time to Achieve --  1 wk --    Bathing Goal OT STG, Activity Type upper body bathing;lower body bathing --  --    Bathing Goal OT STG, [place] Level --  contact guard assist --    Bathing Goal OT STG, Assist Device --  grab bars;shower head, detachable;shower chair with back --    Bathing Goal OT STG, Date Goal Reviewed --  --  08/09/17   Bathing Goal OT STG, Outcome --  --  goal ongoing       Goal: Bathing Goal LTG- OT  Outcome: Ongoing (interventions implemented as appropriate)    07/29/17 1054 08/02/17 1558 08/09/17 1546   Bathing OT LTG   Bathing Goal OT LTG, Date Established --  --  08/09/17   Bathing Goal OT LTG, Time to Achieve --  by discharge --    Bathing Goal OT LTG, Activity Type upper body bathing;lower body bathing --  --    Bathing Goal OT LTG, [place] Level --  supervision required --    Bathing Goal OT LTG, Date Goal Reviewed --  --  08/09/17   Bathing Goal OT LTG, Outcome --  --  goal ongoing       Goal: Grooming Goal STG- OT  Outcome: Ongoing (interventions implemented as appropriate)    07/29/17 1054 08/02/17 1558 08/09/17 1546   Grooming OT STG   Grooming Goal OT STG, Date Established --  --  08/09/17   Grooming Goal OT STG, Time to Achieve --  1 wk --    Grooming Goal OT STG, [place] Level conditional independence --  --    Grooming Goal OT STG, Date Goal Reviewed  --  --  08/08/17   Grooming Goal OT STG, Outcome goal ongoing --  --        Goal: Grooming Goal LTG- OT  Outcome: Ongoing (interventions implemented as appropriate)    07/29/17 1054 08/09/17 1546   Grooming OT LTG   Grooming Goal OT LTG, Date Established --  08/09/17   Grooming Goal OT LTG, Time to Achieve --  by discharge   Grooming Goal OT LTG, [place] Level conditional independence --    Grooming Goal OT LTG, Date Goal Reviewed --  08/09/17   Grooming Goal OT LTG, Outcome goal ongoing --        Goal: LB Dressing Goal STG- OT  Outcome: Outcome(s) achieved Date Met:  08/09/17 08/09/17 1546   LB Dressing OT STG   LB Dressing Goal OT STG, Date Goal Reviewed 08/09/17   LB Dressing Goal OT STG, Outcome goal met       Goal: LB Dressing Goal LTG- OT  Outcome: Ongoing (interventions implemented as appropriate)    07/29/17 1054 08/02/17 1558 08/09/17 1546   LB Dressing OT LTG   LB Dressing Goal OT LTG, Date Established --  --  08/09/17   LB Dressing Goal OT LTG, Time to Achieve --  by discharge --    LB Dressing Goal OT LTG, [place] Level --  supervision required --    LB Dressing Goal OT LTG, Date Goal Reviewed --  --  08/09/17   LB Dressing Goal OT LTG, Outcome goal ongoing --  --        Goal: UB Dressing Goal STG- OT  Outcome: Ongoing (interventions implemented as appropriate)    07/29/17 1054 08/02/17 1558 08/09/17 1546   UB Dressing OT STG   UB Dressing Goal OT STG, Date Established --  --  08/09/17   UB Dressing Goal OT STG, Time to Achieve --  1 wk --    UB Dressing Goal OT STG, [place] Level set up required --  --    UB Dressing Goal OT STG, Date Goal Reviewed --  --  08/09/17   UB Dressing Goal OT STG, Outcome --  --  goal ongoing       Goal: UB Dressing Goal LTG- OT  Outcome: Ongoing (interventions implemented as appropriate)    07/29/17 1054 08/09/17 1546   UB Dressing OT LTG   UB Dressing Goal OT LTG, Date Established --  08/09/17   UB Dressing Goal OT LTG, [place] Level conditional  independence --    UB Dressing Goal OT LTG, Date Goal Reviewed --  08/09/17   UB Dressing Goal OT LTG, Outcome goal ongoing --

## 2017-08-09 NOTE — PLAN OF CARE
Problem: Stroke (Ischemic) (Adult)  Goal: Signs and Symptoms of Listed Potential Problems Will be Absent or Manageable (Stroke)  Outcome: Ongoing (interventions implemented as appropriate)    08/09/17 1104   Stroke (Ischemic)   Problems Assessed (Stroke (Ischemic)/TIA) motor/sensory impairment   Problems Present (Stroke (Ischemic)/TIA) motor/sensory impairment         Problem: Fall Risk (Adult)  Goal: Absence of Falls  Outcome: Ongoing (interventions implemented as appropriate)    08/09/17 1104   Fall Risk (Adult)   Absence of Falls making progress toward outcome         Problem: Patient Care Overview (Adult)  Goal: Plan of Care Review  Outcome: Ongoing (interventions implemented as appropriate)    08/09/17 1104   Coping/Psychosocial Response Interventions   Plan Of Care Reviewed With patient   Patient Care Overview   Progress improving   Outcome Evaluation   Outcome Summary/Follow up Plan RI Friday 8/11

## 2017-08-09 NOTE — THERAPY TREATMENT NOTE
Inpatient Rehabilitation - Occupational Therapy Treatment Note  [facility]     Patient Name: Magnus Hartley  : 1928  MRN: [id number]  Today's Date: 2017  Onset of Illness/Injury or Date of Surgery Date: 17     Referring Physician: Jac      Admit Date: 2017    Visit Dx:     ICD-10-CM ICD-9-CM   1. Left hemiparesis G81.94 342.90   2. Dysarthria R47.1 784.51     Patient Active Problem List   Diagnosis   • Foot pain   • Asthma   • Benign essential hypertension   • Controlled type 2 diabetes mellitus without complication   • Dyslipidemia   • Macrocytosis without anemia   • Senile osteoporosis   • Post-menopausal osteoporosis   • Sinus bradycardia   • Stress incontinence in female   • Urge incontinence of urine   • Atrophic vaginitis   • Encounter for fitting and adjustment of other specified devices   • Incomplete emptying of bladder   • Urethral caruncle   • Incomplete uterovaginal prolapse   • Cerebrovascular accident (CVA) due to occlusion of right middle cerebral artery   • Atrial fibrillation   • Warfarin anticoagulation (goal INR 2-3)             Adult Rehabilitation Note       17 1434 17 1014 17 0931    Rehab Assessment/Intervention    Discipline (P)  occupational therapist  -SHILPI (P)  occupational therapist  -SHILPI physical therapist  -AR    Document Type (P)  therapy note (daily note)  -SHILPI (P)  therapy note (daily note)  -SHILPI therapy note (daily note)  -AR    Subjective Information (P)  no complaints;agree to therapy  -SHILPI (P)  agree to therapy;no complaints  -SHILPI agree to therapy  -AR    Patient Effort, Rehab Treatment (P)  good  -SHILPI (P)  good  -SHILPI good  -AR    Symptoms Noted During/After Treatment (P)  fatigue  -SHILPI (P)  shortness of breath   w/ tsfs, bending down for LBD when seated  -SHILPI fatigue  -AR    Symptoms Noted Comment (P)  seated breaks as necessary throughout task   -SHILPI (P)  allowed for rest breaks as necessary; vcs for deep breathing  -SHILPI      Precautions/Limitations (P)  fall precautions  -SHILPI (P)  fall precautions  -SHILPI fall precautions  -AR    Recorded by [SHILPI] Karen Allan OT Student [SHILPI] Karen Allan, OT Student [AR] Sis Ramirez PT    Pain Assessment    Pain Assessment (P)  No/denies pain  -SHILPI (P)  No/denies pain  -SHILPI No/denies pain  -AR    Recorded by [SHILPI] Karen Allan OT Student [SHILPI] Karen Allan, OT Student [AR] Sis Ramirez, PT    Cognitive Assessment/Intervention    Current Cognitive/Communication Assessment (P)  impaired  -SHILPI (P)  impaired  -SHILPI did not assess  -AR    Orientation Status (P)  oriented x 4  -SHILPI (P)  oriented x 4  -SHILPI oriented x 4  -AR    Follows Commands/Answers Questions (P)  75% of the time;able to follow single-step instructions;needs cueing;needs repetition  -SHILPI (P)  100% of the time;able to follow single-step instructions;needs increased time;needs cueing;needs repetition  -SHILPI     Personal Safety (P)  mild impairment  -SHILPI (P)  mild impairment  -SHILPI     Personal Safety Interventions (P)  fall prevention program maintained;gait belt;supervised activity;nonskid shoes/slippers when out of bed  -SHILPI  fall prevention program maintained;gait belt;muscle strengthening facilitated  -AR    Recorded by [SHILPI] Karen Allan, OT Student [SHILPI] Karen Allan OT Student [AR] Sis Ramirez PT    Bed Mobility, Assessment/Treatment    Bed Mob, Supine to Sit, [place]   supervision required  -AR    Bed Mob, Sit to Supine, [place]   supervision required  -AR    Bed Mobility, Comment   HOB flat, no bed rail  -AR    Recorded by   [AR] Sis Ramirez PT    Transfer Assessment/Treatment    Transfers, Sit-Stand [place]  (P)  contact guard assist;verbal cues required   vcs for safe stance when standing   -SHILPI stand by assist  -AR    Transfers, Stand-Sit [place]  (P)  contact guard assist  -SHILPI stand by assist  -AR    Transfers, Sit-Stand-Sit, Assist Device   rolling walker  -AR    Toilet Transfer, [place]  (P)   contact guard assist  -SHILPI     Toilet Transfer, Assistive Device  (P)  wheelchair   grab bars  -SHILPI     Transfer, Comment   car transfer w/ few VC and SBA  -AR    Recorded by  [SHILPI] Karen Allan, OT Student [AR] Sis Ramirez, PT    Gait Assessment/Treatment    Gait, [place] Level   contact guard assist;stand by assist  -AR    Gait, Assistive Device   rolling walker   CGA w/ cane  -AR    Gait, Distance (Feet)   350   , 80 w/ RW; cane 50', 80, 80   -AR    Gait, Gait Pattern Analysis   swing-through gait  -AR    Gait, Gait Deviations   rojelio decreased;decreased heel strike;forward flexed posture  -AR    Gait, Safety Issues   step length decreased;weight-shifting ability decreased  -AR    Gait, Impairments   strength decreased  -AR    Gait, Comment   outside on curb up/down ramp w/ RW  -AR    Recorded by   [AR] Sis Ramirez, PT    Stairs Assessment/Treatment    Number of Stairs   4  -AR    Stairs, Handrail Location   both sides  -AR    Stairs, [place] Level   contact guard assist  -AR    Stairs, Technique Used   step to step (descending);step to step (ascending)  -AR    Stairs, Safety Issues   weight-shifting ability decreased  -AR    Stairs, Impairments   impaired balance;strength decreased  -AR    Recorded by   [AR] Sis Ramirez, PT    ADL Assessment/Intervention    IADL Assess/Train, Comment (P)  pt completed cooking task to make stir portillo in unfamiliar kitchen w/ ingredients and materials set out; pt was able to use a knife safely when cutting veggies; pt required frequent vcs for body positioning to maintain safe standing posture when reaching across the counter; pt required frequent vcs for safe hand placement when transferring from sit to  w/c and hand placement when using the stove; pt required seated rest breaks, notable increase in standing endurance for self care activities   -SHILPI      Additional Documentation (P)  IADL Assess/Train, Comment (Row)  -SHILPI      Recorded by [SHILPI] Karen  Jerrell, OT Student      Upper Body Bathing Assessment/Training    UB Bathing Assess/Train, Position  (P)  sink side;sitting  -SHILPI     UB Bathing Assess/Train, [place] Level  (P)  stand by assist;verbal cues required   vcs for locating items   -SHILPI     Recorded by  [SHILPI] Karen Allan, OT Student     Lower Body Bathing Assessment/Training    LB Bathing Assess/Train, Position  (P)  sitting;sink side  -SHILPI     LB Bathing Assess/Train, [place] Level  (P)  stand by assist  -SHILPI     LB Bathing Assess/Train, Comment  (P)  elsi/buttocks bathing performed after toileting   -SHILPI     Recorded by  [SHILPI] Karen Allan, OT Student     Upper Body Dressing Assessment/Training    UB Dressing Assess/Train, Clothing Type  (P)  donning:;doffing:;bra;t-shirt   dof shirt; don bra, shirt  -SHILPI     UB Dressing Assess/Train, Position  (P)  sink side;sitting  -SHILPI     UB Dressing Assess/Train, [place]  (P)  minimum assist (75% patient effort);verbal cues required  -SHILPI     UB Dressing Assess/Train, Comment  (P)  pt required min a to adjust bra strap; pt required vcs and min a to thread L arm   -SHILPI     Recorded by  [SHILPI] Karen Allan, OT Student     Lower Body Dressing Assessment/Training    LB Dressing Assess/Train, Clothing Type  (P)  doffing:;donning:;pants;shoes;socks   dof underwear, pants; don underwear, pants, socks, shoes  -SHILPI     LB Dressing Assess/Train, Position  (P)  sitting;standing;edge of bed;sink side   socks and shoes at EOB  -SHILPI     LB Dressing Assess/Train, [place]  (P)  contact guard assist;verbal cues required  -SHILPI     LB Dressing Assess/Train, Comment  (P)  vcs for task completion; CGA to maintain balance when pulling up underwear/pants; pt able to IND don socks/shoes and tie shoe strings   -SHILPI     Recorded by  [SHILPI] Kaern Allan, OT Student     Toileting Assessment/Training    Toileting Assess/Train, Assistive Device  (P)  grab bars  -SHILPI     Toileting Assess/Train, Position  (P)  sitting;standing  -SHILPI      Toileting Assess/Train, [place] Level  (P)  contact guard assist;minimum assist (75% patient effort)  -SHILPI     Toileting Assess/Train, Comment  (P)  pt required min a to wipe bottom completely after BM; CGA for standing balance when managing clothing   -SHILPI     Recorded by  [SHILPI] Karen Allan, OT Student     Grooming Assessment/Training    Grooming Assess/Train, Position  (P)  sitting;sink side  -SHILPI     Grooming Assess/Train, [place] Level  (P)  set up required   gathering hairbrush from other room   -SHILPI     Recorded by  [SHILPI] Karen Allan, OT Student     Balance Skills Training    Static Standing Balance Support   eliu bar;Left upper extremity supported;Right upper extremity supported  -AR    Standing-Balance Activities   Compliant surfaces   side stepping  -AR    Gait Balance-Level of Assistance   --   TUG 22sec, 22 sec  -AR    Recorded by   [AR] Sis Ramirez, PT    Positioning and Restraints    Pre-Treatment Position (P)  sitting in chair/recliner  -SHILPI (P)  sitting in chair/recliner  -SHILPI sitting in chair/recliner  -AR    Post Treatment Position (P)  chair  -SHILPI (P)  chair  -SHILPI chair  -AR    In Chair (P)  call light within reach;sitting;encouraged to call for assist  -SHILPI (P)  sitting;call light within reach;encouraged to call for assist  -SHILPI reclined;sitting;call light within reach;encouraged to call for assist;exit alarm on  -AR    Recorded by [SHILPI] Karen Allan, OT Student [SHILPI] Karen Allan, OT Student [AR] Sis Ramirez, PT      08/08/17 1114 08/08/17 1100 08/08/17 0943    Rehab Assessment/Intervention    Discipline occupational therapist  -SHILPI NULL,CHAKA2 speech language pathologist  -AW physical therapy assistant  -LB    Document Type therapy note (daily note)  -SHILPI NULL AF2 therapy note (daily note)  -AW therapy note (daily note)  -LB    Subjective Information agree to therapy;complains of;weakness  -SHILPI NULL AF2 no complaints;agree to therapy  -AW agree to therapy  -LB    Patient Effort, Rehab Treatment good   -AF,SHILPI,AF2 good  -AW good  -LB    Symptoms Noted During/After Treatment shortness of breath   after tsfs, bending down for LBD;   -AF,SHILPI,AF2 none  -AW     Symptoms Noted Comment allowed for rest breaks as necessary; vcs for deep breathing   -AF,SHILPI,AF2      Precautions/Limitations fall precautions;swallowing precautions  -AF,SHILPI,AF2 fall precautions  -AW fall precautions;swallowing precautions  -LB    Recorded by [AF,SHILPI,AF2] Adelia Salamanca OTR (r) Karen Allan OT Student (t) NO Cooney (c) [AW] Adelia Martinez MS CCC-SLP [LB] Angelica Treadwell PTA    Pain Assessment    Pain Assessment No/denies pain  -AF,SHILPI,AF2  No/denies pain  -LB    Recorded by [AF,SHILPI,AF2] NO Cooney (r) Karen Allan, OT Student (t) NO Cooney (c)  [LB] Angelica Treadwell PTA    Cognitive Assessment/Intervention    Current Cognitive/Communication Assessment impaired  -AF,SHILPI,AF2      Orientation Status oriented x 4  -AF,SHILPI,AF2      Follows Commands/Answers Questions 100% of the time;able to follow single-step instructions;needs cueing;needs increased time;needs repetition  -AF,SHILPI,AF2      Personal Safety mild impairment  -AF,SHILPI,AF2      Personal Safety Interventions gait belt;nonskid shoes/slippers when out of bed;fall prevention program maintained  -AF,SHILPI,AF2  fall prevention program maintained;gait belt;muscle strengthening facilitated;nonskid shoes/slippers when out of bed  -LB    Recorded by [AF,SHILPI,AF2] Adelia Salamanca OTR (r) Karen Allan OT Student (t) NO Cooney (c)  [LB] Angelica Treadwell PTA    Improve attention    Attention Progress  50%;without cues;80%;with consistent cues  -AW     Comments: Improve attention  working memory task - listening to 4 words x2 and recalling one word by placement  -AW     Recorded by  [AW] Adelia Martinez MS CCC-SLP     Improve memory skills    Memory Skills Progress  100%;without cues  -AW     Comments: Improve memory skills  visual memory - recalling details  of picture after a 12 min delay  -AW     Recorded by  [AW] Adelia Martinez MS CCC-SLP     Improve functional problem solving    Functional Problem Solving Progress  80%;without cues;100%;with inconsistent cues  -AW     Comments: Improve functional problem solving  sequencing 5 step tasks  -AW     Recorded by  [AW] Adelia Martinez MS CCC-SLP     Bed Mobility, Assessment/Treatment    Bed Mobility, Assistive Device   bed rails;head of bed elevated  -LB    Bed Mob, Supine to Sit, [place] supervision required  -AF,SHILPI,AF2  supervision required  -LB    Bed Mob, Sit to Supine, [place]   supervision required  -LB    Recorded by [AF,SHILPI,AF2] Adelia Salamanca OTR (r) Karen Allan, OT Student (t) NO Cooney (c)  [LB] Angelica Treadwell PTA    Transfer Assessment/Treatment    Transfers, Bed-Chair [place] stand by assist;contact guard assist  -CHAKA,SHILPI,AF2  contact guard assist;stand by assist  -LB    Transfers, Chair-Bed [place]   contact guard assist;stand by assist  -LB    Transfers, Sit-Stand [place]   stand by assist  -LB    Transfers, Stand-Sit [place]   stand by assist  -LB    Transfers, Sit-Stand-Sit, Assist Device   rolling walker  -LB    Transfer, Comment tsf to and from EOM from w/c w/ CGA and vcs  -AF,SHILPI,AF2      Recorded by [AF,SHILPI,AF2] NO Cooney (r) Karen Allan, OT Student (t) NO Cooney (c)  [LB] Angelica Treadwell PTA    Gait Assessment/Treatment    Gait, [place] Level   stand by assist  -LB    Gait, Assistive Device   rolling walker  -LB    Gait, Distance (Feet)   320  -LB    Gait, Gait Deviations   forward flexed posture;step length decreased  -LB    Recorded by   [LB] Angelica Treadwell PTA    Stairs Assessment/Treatment    Number of Stairs   8  -LB    Stairs, Handrail Location   both sides  -LB    Stairs, [place] Level   contact guard assist;verbal cues required  -LB    Stairs, Technique Used   step to step (ascending);step to step (descending)   -LB    Recorded by   [LB] Angelica Treadwell, PTA    ADL Assessment/Intervention    IADL Assess/Train, Comment pt wrote a list of ingredients and amounts needed to make stir portillo dish, pt required some help w/ spelling; pt looked through old ads to find sales and compare the cheapest options, pt required min vcs, min gestural prompts to complete task   -AF,SHILPI,AF2      Recorded by [AF,SHILPI,AF2] Adelia Salamanca OTR (r) Karen Allan, OT Student (t) NO Cooney (c)      Upper Body Bathing Assessment/Training    UB Bathing Assess/Train, Position sitting;sink side  -AF,SHILPI,AF2      UB Bathing Assess/Train, [place] Level minimum assist (75% patient effort);verbal cues required   vcs for setup instructions; min a w/ back   -AF,SHILPI,AF2      Recorded by [AF,SHILPI,AF2] NO Cooney (r) Karen Allan, OT Student (t) NO Cooney (c)      Lower Body Bathing Assessment/Training    LB Bathing Assess/Train, Position sitting;sink side;standing  -AF,SHILPI,AF2      LB Bathing Assess/Train, [place] Level contact guard assist;verbal cues required  -AF,SHILPI,AF2      LB Bathing Assess/Train, Comment vcs for sequencing; cga to maintaing balance while pt washed elsi area  -AF,SHILPI,AF2      Recorded by [AF,SHILPI,AF2] NO Cooney (r) Karen Allan, OT Student (t) NO Cooney (c)      Upper Body Dressing Assessment/Training    UB Dressing Assess/Train, Clothing Type doffing:;donning:;bra;t-shirt   dof tshirt; don bra, tshirt  -AF,SHILPI,AF2      UB Dressing Assess/Train, Position sitting  -AF,SHILPI,AF2      UB Dressing Assess/Train, [place] minimum assist (75% patient effort);set up required  -AF,SHILPI,AF2      UB Dressing Assess/Train, Comment set up required to gather clothes; min a to adjust bra over back   -AF,SHILPI,AF2      Recorded by [AF,SHILPI,AF2] Adelia Salamanca, OTR (r) Karen Allan, OT Student (t) Adelia Salamanca, OTR (c)      Lower Body Dressing Assessment/Training    LB Dressing Assess/Train,  Clothing Type donning:;shoes;pants;socks;slipper socks;doffing:   dof slipper socks; don underwear, pants, socks, shoes  -AF,SHILPI,AF2      LB Dressing Assess/Train, Position sitting;sink side;standing;edge of bed  -AF,SHILPI,AF2      LB Dressing Assess/Train, [place] minimum assist (75% patient effort);contact guard assist  -AF,SHILPI,AF2      LB Dressing Assess/Train, Comment CGA to maintain balance while standing to pull up pants; min a to tighten shoe strings, pt was able to maintain grasp on shoe strings w/ LUE for increased ind w/ shoe tying  -AF,SHILPI,AF2      Recorded by [AF,SHILPI,AF2] Adelia Salamanca OTR (r) Karen Allan, OT Student (t) NO Cooney (c)      Grooming Assessment/Training    Grooming Assess/Train, Position sitting;sink side  -AF,SHILPI,AF2      Grooming Assess/Train, [place] Level set up required   gather materials from room   -AF,SHILPI,AF2      Recorded by [AF,SHILPI,AF2] NO Cooney (r) Karen Allan OT Student (t) NO Cooney (c)      Balance Skills Training    Gait Balance-Level of Assistance   Contact guard;Minimum assistance  -LB    Gait Balance Support   parallel bars;Right upper extremity supported;Left upper extremity supported  -LB    Gait Balance Activities   braiding / front;side-stepping  -LB    Recorded by   [LB] Angelica Treadwell PTA    Therapy Exercises    Bilateral Lower Extremities   AROM:;10 reps;heel raises;mini squats;supine;heel slides;hip abduction/adduction  -LB    Trunk Exercises   10 reps;supine;bridging  -LB    Recorded by   [LB] Angelica Treadwell PTA    Sensory Treatment    Sensory Reeducation Techniques sensory training, visual reinforcement;sensory train, w/o visual reinforcement  -AF,SHILPI,AF2      Sensory Reeduction Treatment pt was given an every day object to feel w/ BUE and observe; pt attempted to find object in rice bucket w/ LUE w/ vision occluded; pt was able to find spoon w/ eyes closed, required eyes open to find other objects; pt buried all  the items in the rice at clean up   -CHAKA,SHILPI,AF2      Recorded by [CHAKA,SHILPI,AF2] NO Cooney (r) Karen Allan, OT Student (t) NO Cooney (c)      Positioning and Restraints    Pre-Treatment Position in bed   in w/c second session   -CHAKASHILPI,AF2      Post Treatment Position chair   w/c second session   -SHILPI NULL,AF2  wheelchair  -LB    In Wheelchair sitting;with SLP;call light within reach;encouraged to call for assist  -SHILPI NULL,AF2  sitting;with OT  -LB    Recorded by [CHAKA,SHILPI,AF2] NO Cooney (r) Karen Allan, OT Student (t) NO Cooney (c)  [LB] Angelica Treadwell, PTA      08/07/17 1530 08/07/17 1143 08/07/17 0943    Rehab Assessment/Intervention    Discipline speech language pathologist  -AW occupational therapist  -SHILPI NULL AF2 physical therapy assistant  -LB    Document Type therapy note (daily note)  -AW therapy note (daily note)  -SHILPI NULL,AF2 therapy note (daily note)  -LB    Subjective Information no complaints;agree to therapy  -AW agree to therapy;no complaints  -CHAKA,SHILPI,AF2 agree to therapy  -LB    Patient Effort, Rehab Treatment good  -AW good  -CHAKA,SHILPI,AF2 good  -LB    Symptoms Noted During/After Treatment none  -AW shortness of breath   w/ tsfs, bending down from seated level   -CHAKA,SHILPI,AF2     Symptoms Noted Comment  rest breaks and vcs for deep breaths as necessary   -CHAKA,SHILPI,AF2     Precautions/Limitations fall precautions;swallowing precautions  -AW fall precautions  -CHAKASHILPI,AF2 fall precautions  -LB    Recorded by [AW] Adelia Martinez MS CCC-SLP [CHAKA,SHILPI,AF2] NO Cooney (r) Karen Allan, OT Student (t) NO Cooney (c) [LB] Angelica Treadwell, PTA    Pain Assessment    Pain Assessment  No/denies pain  -CHAKA,SHILPI,AF2 No/denies pain  -LB    Recorded by  [CHAKA,SHILPI,AF2] Adelia Salamanca, OTR (r) Karen Allan, OT Student (t) Adelia Salamanca, OTR (c) [LB] nAgelica Treadwell, YARA    Cognitive Assessment/Intervention    Current Cognitive/Communication Assessment  impaired   -AF,SHILPI,AF2     Orientation Status  oriented x 4  -AF,SHILPI,AF2     Follows Commands/Answers Questions  100% of the time;able to follow single-step instructions;needs cueing;needs increased time;needs repetition  -AF,SHILPI,AF2     Personal Safety  mild impairment;decreased awareness, need for safety;decreased insight to deficits  -AF,SHILPI,AF2     Personal Safety Interventions  fall prevention program maintained;gait belt;nonskid shoes/slippers when out of bed;supervised activity  -AF,SHILPI,AF2 fall prevention program maintained;gait belt;muscle strengthening facilitated;nonskid shoes/slippers when out of bed  -LB    Recorded by  [AF,SHILPI,AF2] Adelia Salamanca, OTR (r) Karen Allan OT Student (t) Adelia Salamanca, OTR (c) [LB] Angelica Treadwell, PTA    Improve attention    Attention Progress 60%;without cues;100%;with inconsistent cues  -AW      Comments: Improve attention working memory - repeating 3 words in reverse order  -AW      Recorded by [AW] Adelia Martinez MS CCC-SLP      Improve memory skills    Memory Skills Progress 0%;without cues;100%;with consistent cues  -AW      Comments: Improve memory skills after a 10 min delay, pt recalled 0/3 words; 3/3 w/ cues  -AW      Recorded by [AW] Adelia Martinez MS CCC-SLP      Improve functional problem solving    Functional Problem Solving Progress 80%;without cues;100%;with inconsistent cues  -AW      Comments: Improve functional problem solving word problems; repetitions beneficial  -AW      Recorded by [AW] Adelia Martinez MS CCC-SLP      Improve executive function skills    Executive Function Skills Progress 70%;without cues;100%;with inconsistent cues  -AW      Comments: Improve executive function skills deductive reasoning grid puzzle  -AW      Recorded by [AW] Adelia Martinez MS CCC-SLP      Bed Mobility, Assessment/Treatment    Bed Mob, Supine to Sit, [place]  supervision required  -AF,SHILPI,AF2     Bed Mobility, Comment   up in chair  -LB    Recorded by  [AF,SHILPI,AF2] Adelia Salamanca,  OTR (r) Karen Allan, OT Student (t) Adelia Salamanca, OTR (c) [LB] Angelica Treadwell, YARA    Transfer Assessment/Treatment    Transfers, Bed-Chair [place]  stand by assist  -AF,SHILPI,AF2     Transfers, Sit-Stand [place]  contact guard assist   while showeringto wash elsi area   -AF,SHILPI,AF2 stand by assist  -LB    Transfers, Stand-Sit [place]  contact guard assist   while showering to wash elsi area  -AF,SIHLPI,AF2 stand by assist  -LB    Transfers, Sit-Stand-Sit, Assist Device   rolling walker  -LB    Toilet Transfer, [place]  contact guard assist;verbal cues required   vcs for hand placement   -AF,SHILPI,AF2     Toilet Transfer, Assistive Device  elevated toilet seat;rolling walker   grab bars   -AF,SHILPI,AF2     Walk-In Shower Transfer, [place]  contact guard assist;verbal cues required   vcs for hand placement   -AF,SHILPI,AF2     Walk-In Shower Transfer, Assist Device  standard shower chair   grab bars; tsf from toilet   -AF,SHILPI,AF2     Bathtub Transfer, [place]  verbal cues required;contact guard assist   pt practiced tub tsf w/ demo to simulate home environment   -AF,SHILPI,AF2     Bathtub Transfer, Assistive Device  transfer tub bench;rolling platform walker   grab bars  -AF,SHILPI,AF2     Transfer, Comment  tsf to and from EOM from w/c w/ SBA, vcs for hand placement   -AF,SHILPI,AF2 car tsf CGA, rwx  -LB    Recorded by  [AF,SHILPI,AF2] Adelia Salamanca, OTR (r) Karen Allan, OT Student (t) Adelia Salamanca, OTR (c) [LB] Angelica Treadwell, YARA    Gait Assessment/Treatment    Gait, [place] Level   stand by assist  -LB    Gait, Assistive Device   rolling walker  -LB    Gait, Distance (Feet)   200  -LB    Gait, Gait Deviations   forward flexed posture;step length decreased  -LB    Recorded by   [LB] Angelica Treadwell PTA    Stairs Assessment/Treatment    Number of Stairs   8  -LB    Stairs, Handrail Location   both sides  -LB    Stairs, [place] Level   contact guard assist  -LB    Stairs, Technique  Used   step to step (ascending);step to step (descending)  -LB    Recorded by   [LB] Angelica Treadwell PTA    Functional Mobility    Functional Mobility- Ind. Level   contact guard assist;minimum assist (75% patient effort)  -LB    Functional Mobility- Device   straight cane   w tripod base  -LB    Functional Mobility-Distance (Feet)   180  -LB    Functional Mobility- Safety Issues   sequencing ability decreased   LOB x 1  -LB    Recorded by   [LB] Angelica Treadwell PTA    ADL Assessment/Intervention    Additional Documentation  --   pt practiced tying shoes from lap level w/ min a   -AF,SHILPI,AF2     Recorded by  [AF,SHILPI,AF2] Adelia Salamanca OTR (r) Karen Allan, OT Student (t) NO Cooney (c)     Upper Body Bathing Assessment/Training    UB Bathing Assess/Train Assistive Device  grab bars;shower chair with back;hand-held shower head  -AF,SHILPI,AF2     UB Bathing Assess/Train, Position  sitting  -AF,SHILPI,AF2     UB Bathing Assess/Train, [place] Level  minimum assist (75% patient effort);verbal cues required  -AF,SHILPI,AF2     UB Bathing Assess/Train, Comment  min a w/ wetting down hair; vcs to recall that pt had already washed hair   -AF,SHILPI,AF2     Recorded by  [AF,SHILPI,AF2] NO Cooney (r) Karen Allan, OT Student (t) NO [place]oney (c)     Lower Body Bathing Assessment/Training    LB Bathing Assess/Train Assistive Device  shower chair with back;hand-held shower head;grab bars  -AF,SHILPI,AF2     LB Bathing Assess/Train, Position  sitting;standing  -AF,SHILPI,AF2     LB Bathing Assess/Train, [place] Level  verbal cues required;contact guard assist  -AF,SHILPI,AF2     LB Bathing Assess/Train, Comment  vcs for sequencing; cga to maintain balance while pt washed elsi area   -AF,SHILPI,AF2     Recorded by  [AF,SHILPI,AF2] NO Cooney (r) Karen Allan, OT Student (t) NO [place]oney (c)     Upper Body Dressing Assessment/Training    UB Dressing Assess/Train, Clothing Type   donning:;doffing:;bra;t-shirt  -AF,SHILPI,AF2     UB Dressing Assess/Train, Position  sitting  -AF,SHILPI,AF2     UB Dressing Assess/Train, [place]  supervision required;set up required   set up to gather clothing   -AF,SHILPI,AF2     Recorded by  [AF,SHILPI,AF2] Adelia Salamanca OTR (r) Karen Allan, OT Student (t) NO [place]oney (c)     Lower Body Dressing Assessment/Training    LB Dressing Assess/Train, Clothing Type  donning:;doffing:;pants;shoes;socks   underwear   -AF,SHILPI,AF2     LB Dressing Assess/Train, Position  sitting;standing;edge of bed   socks, shoes completed at EOB  -AF,SHILPI,AF2     LB Dressing Assess/Train, [place]  set up required;verbal cues required;minimum assist (75% patient effort)  -AF,SHILPI,AF2     LB Dressing Assess/Train, Comment  cga while standing to pull up underwear/pants; vcs for sequencin; required assist w/ tying shoes   -AF,SHILPI,AF2     Recorded by  [AF,SHILPI,AF2] NO Cooney (r) Karen Allan, OT Student (t) NO [place]oney (c)     Toileting Assessment/Training    Toileting Assess/Train, Assistive Device  grab bars;raised toilet seat  -AF,SHILPI,AF2     Toileting Assess/Train, Position  sitting  -AF,SHILPI,AF2     Toileting Assess/Train, [place] Level  contact guard assist  -AF,SHILPI,AF2     Toileting Assess/Train, Comment  pt able to complete hygiene while seated; tsf to shower so no LBD required after toileting   -AF,SHILPI,AF2     Recorded by  [AF,SHILPI,AF2] NO Cooney (r) Karen Allan, OT Student (t) NO Cooney (c)     Grooming Assessment/Training    Grooming Assess/Train, Position  sitting;sink side  -AF,SHILPI,AF2     Grooming Assess/Train, [place] Level  set up required  -AF,SHILPI,AF2     Grooming Assess/Train, Comment  brushing hair   -AF,SHILPI,AF2     Recorded by  [CHAKA,SHILIP,AF2] Adelia Salamanca OTR (r) Karen Allan OT Student (t) NO Cooney (c)     Sensory Treatment    Sensory Reeducation Techniques  localization of touch  -SHILPI NULL,AF2     Sensory  Reeduction Treatment  pt pointed w/ RUE to location on LUE where tactile sensations of various textures was felt w/ difficulty; pt was able to localize w/ R hand when repeating touch w/ eyes open   -AF,SHILPI,AF2     Recorded by  [AF,SHILPI,AF2] Adelia Salamanca, OTR (r) Karen Allan, OT Student (t) Adelia Salamanca, OTR (c)     Positioning and Restraints    Pre-Treatment Position  in bed   in recliner second session   -CHAKA,SHILPI,AF2     Post Treatment Position  chair   both sessions   -CHAKA,SHILPI,AF2 wheelchair  -LB    In Chair  call light within reach;encouraged to call for assist;sitting  -AF,SHILPI,AF2     In Wheelchair   sitting;call light within reach  -LB    Recorded by  [AF,SHILPI,AF2] Adelia Salamanca, OTR (r) Karen Allan, OT Student (t) Adelia Salamanca, OTR (c) [LB] Angelica Treadwell, PTA      User Key  (r) = Recorded By, (t) = Taken By, (c) = Cosigned By    Initials Name Effective Dates    AW Adelia Martinez, MS CCC-SLP 04/13/15 -     LB Angelica Treadwell, PTA 02/18/16 -     AF Adelia Salamanca, OTR 12/01/15 -     SHERIF Ramirez, PT 06/27/16 -     SHILPI Allan, OT Student 07/11/17 -                 OT Goals       08/02/17 1558 07/29/17 1054       Transfer Training OT STG    Transfer Training OT STG, Date Established 08/02/17  -AF (r) SHILPI (t) AF (c) 07/29/17  -RE     Transfer Training OT STG, Time to Achieve 1 wk  -AF (r) SHIPLI (t) AF (c) 3 days  -RE     Transfer Training OT STG, Activity Type  toilet  -RE     Transfer Training OT STG, [place] Level  supervision required;verbal cues required  -RE     Transfer Training OT STG, Assist Device walker, rolling   grab bars  -AF (r) SHILPI (t) AF (c)      Transfer Training OT STG, Date Goal Reviewed 08/02/17  -AF (r) SHILPI (t) AF (c)      Transfer Training OT STG, Outcome  goal ongoing  -RE     Transfer Training OT LTG    Transfer Training OT LTG, Date Established  07/29/17  -RE     Transfer Training OT LTG, Time to Achieve  1 wk  -RE     Transfer Training OT LTG, Activity Type   toilet  -RE     Transfer Training OT LTG, [place] Level  conditional independence  -RE     Transfer Training OT LTG, Date Goal Reviewed 08/02/17  -AF (r) SHILPI (t) AF (c)      Transfer Training OT LTG, Outcome  goal ongoing  -RE     Transfer Training 2 OT STG    Transfer Training 2 OT STG, Date Established 08/02/17  -AF (r) SHILPI (t) AF (c)      Transfer Training 2 OT STG, Time to Achieve 1 wk  -AF (r) SHILPI (t) AF (c) 3 days  -RE     Transfer Training 2 OT STG, Activity Type shower chair   rwx  -AF (r) SHILPI (t) AF (c) shower chair  -RE     Transfer Training 2 OT STG, [place] Level supervision required  -AF (r) SHILPI (t) AF (c) contact guard assist  -RE     Transfer Training 2 OT STG, Outcome goal revised  -AF (r) SHILPI (t) AF (c) goal ongoing  -RE     Transfer Training 2 OT LTG    Transfer Training 2 OT LTG, Date Established 08/02/17  -AF (r) SHILPI (t) AF (c)      Transfer Training 2 OT LTG, Time to Achieve by discharge  -AF (r) SHILPI (t) AF (c) 2 wks  -RE     Transfer Training 2 OT LTG, Activity Type  shower chair;tub  -RE     Transfer Training 2 OT LTG, [place] Level  supervision required  -RE     Transfer Training 2 OT LTG, Date Goal Reviewed 08/02/17  -AF (r) SHILPI (t) AF (c)      Transfer Training 2 OT LTG, Outcome  goal ongoing  -RE     Bathing OT STG    Bathing Goal OT STG, Date Established 08/02/17  -AF (r) SHILPI (t) AF (c) 07/29/17  -RE     Bathing Goal OT STG, Time to Achieve 1 wk  -AF (r) SHILPI (t) AF (c) 3 days  -RE     Bathing Goal OT STG, Activity Type  upper body bathing;lower body bathing  -RE     Bathing Goal OT STG, [place] Level contact guard assist  -AF (r) SHILPI (t) AF (c) set up required  -RE     Bathing Goal OT STG, Assist Device grab bars;shower head, detachable;shower chair with back  -AF (r) SHILPI (t) AF (c)      Bathing Goal OT STG, Date Goal Reviewed 08/02/17  -AF (r) SHILPI (t) CHAKA (c)      Bathing Goal OT STG, Outcome  goal ongoing  -RE     Bathing OT LTG    Bathing Goal OT LTG, Date Established 08/02/17   -AF (r) SHILPI (t) AF (c)      Bathing Goal OT LTG, Time to Achieve by discharge  -AF (r) SHILPI (t) AF (c) 2 wks  -RE     Bathing Goal OT LTG, Activity Type  upper body bathing;lower body bathing  -RE     Bathing Goal OT LTG, [place] Level supervision required  -AF (r) SHILPI (t) AF (c) conditional independence  -RE     Bathing Goal OT LTG, Date Goal Reviewed 08/02/17  -AF (r) SHILPI (t) AF (c)      Bathing Goal OT LTG, Outcome  goal ongoing  -RE     Grooming OT STG    Grooming Goal OT STG, Date Established 08/02/17  -AF (r) SHILPI (t) AF (c)      Grooming Goal OT STG, Time to Achieve 1 wk  -AF (r) SHILPI (t) AF (c) 3 days  -RE     Grooming Goal OT STG, [place] Level  conditional independence  -RE     Grooming Goal OT STG, Date Goal Reviewed 08/02/17  -AF (r) SHILPI (t) AF (c)      Grooming Goal OT STG, Outcome  goal ongoing  -RE     Grooming OT LTG    Grooming Goal OT LTG, Date Established 08/02/17  -AF (r) SHILPI (t) AF (c)      Grooming Goal OT LTG, Time to Achieve by discharge  -AF (r) SHILPI (t) AF (c) 1 wk  -RE     Grooming Goal OT LTG, [place] Level  conditional independence  -RE     Grooming Goal OT LTG, Date Goal Reviewed 08/02/17  -AF (r) SHILPI (t) AF (c)      Grooming Goal OT LTG, Outcome  goal ongoing  -RE     LB Dressing OT STG    LB Dressing Goal OT STG, Date Established 08/02/17  -AF (r) SHILPI (t) AF (c) 07/29/17  -RE     LB Dressing Goal OT STG, Time to Achieve 1 wk  -AF (r) SHILPI (t) AF (c) 5 - 7 days  -RE     LB Dressing Goal OT STG, [place] Level contact guard assist   tying shoes   -AF (r) SHILPI (t) AF (c) supervision required;verbal cues required  -RE     LB Dressing Goal OT STG, Adaptive Equipment --   elastic laces  -AF (r) SHILPI (t) AF (c)      LB Dressing Goal OT STG, Date Goal Reviewed 08/02/17  -AF (r) SHILPI (t) AF (c)      LB Dressing Goal OT STG, Outcome  goal ongoing  -RE     LB Dressing OT LTG    LB Dressing Goal OT LTG, Date Established 08/02/17  -CHAKA (christine) SHILPI (indra) CHAKA (c) 07/29/17  -RE     LB Dressing Goal OT LTG, Time  to Achieve by discharge  -AF (r) SHILPI (t) AF (c) 2 wks  -RE     LB Dressing Goal OT LTG, [place] Level supervision required  -AF (r) SHILPI (t) AF (c) conditional independence  -RE     LB Dressing Goal OT LTG, Adaptive Equipment --   w/ shoe strings  -AF (r) SHILPI (t) AF (c)      LB Dressing Goal OT LTG, Date Goal Reviewed 08/02/17  -AF (r) SHILPI (t) AF (c)      LB Dressing Goal OT LTG, Outcome  goal ongoing  -RE     UB Dressing OT STG    UB Dressing Goal OT STG, Date Established 08/02/17  -AF (r) SHILPI (t) AF (c) 07/29/17  -RE     UB Dressing Goal OT STG, Time to Achieve 1 wk  -AF (r) SHILPI (t) AF (c) 3 days  -RE     UB Dressing Goal OT STG, [place] Level  set up required  -RE     UB Dressing Goal OT STG, Date Goal Reviewed 08/02/17  -AF (r) SHILPI (t) AF (c)      UB Dressing Goal OT STG, Outcome  goal ongoing  -RE     UB Dressing OT LTG    UB Dressing Goal OT LTG, Date Established 08/02/17  -AF (r) SHILPI (t) AF (c) 07/29/17  -RE     UB Dressing Goal OT LTG, Time to Achieve by discharge  -AF (r) SHILPI (t) AF (c) 2 wks  -RE     UB Dressing Goal OT LTG, [place] Level  conditional independence  -RE     UB Dressing Goal OT LTG, Date Goal Reviewed 08/02/17  -AF (r) SHILPI (t) AF (c)      UB Dressing Goal OT LTG, Outcome  goal ongoing  -RE       User Key  (r) = Recorded By, (t) = Taken By, (c) = Cosigned By    Initials Name Provider Type    RE Maya Apodaca, OTR Occupational Therapist    CHAKA Salamanca, OTR Occupational Therapist    SHILPI Allan, OT Student OT Student          Occupational Therapy Education     Title: PT OT SLP Therapies (Active)     Topic: Occupational Therapy (Active)     Point: Precautions (Done)    Description: Instruct learner(s) on prescribed precautions during self-care and functional transfers.    Learning Progress Summary    Learner Readiness Method Response Comment Documented by Status   Patient Emilie WHITE,NR pt was educated on the importance of awareness and safe hand and body positioning when  completing cooking tasks w/ decreased sensation in LUE, especially when cutting and using the stove/oven, in order to avoid falls, burns, cuts. SHILPI 08/09/17 1451 Done                      User Key     Initials Effective Dates Name Provider Type Discipline    SHILPI 07/11/17 -  Karen Allan OT Student OT Student OT                  OT Recommendation and Plan  Anticipated Equipment Needs At Discharge: tub bench  Planned Therapy Interventions: adaptive equipment training, ADL retraining, activity intolerance, energy conservation, fine motor coordination training, neuromuscular re-education  Therapy Frequency: 5 times/wk           Outcome Measures       08/09/17 1400          Timed Up and Go (TUG)    TUG Test 1 22 seconds  -AR      TUG Test 2 22 seconds  -AR      Timed Up and Go Comments RWX  -AR      Functional Assessment    Outcome Measure Options Timed Up and Go (TUG)  -AR        User Key  (r) = Recorded By, (t) = Taken By, (c) = Cosigned By    Initials Name Provider Type    AR Sis Ramirez, PT Physical Therapist           Time Calculation:         Time Calculation- OT       08/09/17 1449 08/09/17 1023       Time Calculation- OT    OT Start Time (P)  1100  -SHILPI (P)  0830  -SHILPI     OT Stop Time (P)  1145  -SHILPI (P)  0900  -SHILPI     OT Time Calculation (min) (P)  45 min  -SHILPI (P)  30 min  -SHILPI       User Key  (r) = Recorded By, (t) = Taken By, (c) = Cosigned By    Initials Name Provider Type    SHILPI Karen Allan OT Student OT Student           Therapy Charges for Today     Code Description Service Date Service Provider Modifiers Qty    53630541018 HC OT SELF CARE/MGMT/TRAIN EA 15 MIN 8/8/2017 Karen Allan OT Student GO 3    46547930985 HC OT NEUROMUSC RE EDUCATION EA 15 MIN 8/8/2017 Karen Allan OT Student GO 1    71457279688 HC OT SELF CARE/MGMT/TRAIN EA 15 MIN 8/9/2017 Karen Allan OT Student GO 2    75065994362 HC OT SELF CARE/MGMT/TRAIN EA 15 MIN 8/9/2017 Karen Allan OT Student GO 3               Karen  Jerrell, OT Student  8/9/2017

## 2017-08-09 NOTE — PROGRESS NOTES
Case Management  Inpatient Rehabilitation Plan of Care and Discharge Plan Note    Rehabilitation Diagnosis:  Branch  Date of Onset:  Branch    Medical Summary:  Branch  Past Medical History: [place]    Plan of Care  Updated Problems/Interventions  Field    Expected Intensity:  Branch  Interdisciplinary Team:  Branch  Estimated Length of Stay/Anticipated Discharge Date: [place]  Anticipated Discharge Destination:  Anticipated discharge destination from inpatient rehabilitation is community  discharge with assistance. Family conference Thursday, 8/10 @ 2:30.      Based on the patient's medical and functional status, their prognosis and  expected level of functional improvement is:  [place]anch    Signed by: JUAN DAVID Howell

## 2017-08-09 NOTE — PROGRESS NOTES
Inpatient Rehabilitation Functional Measures Assessment    Functional Measures  BETH Eating:  [facility] Grooming: [facility] Bathing:  [facility] Upper Body Dressing:  [facility] Lower Body Dressing:  [facility] Toileting:  [facility] Bladder Management  Level of Assistance:  [place]  Frequency/Number of Accidents this Shift:  [facility] Bowel Management  Level of Assistance: [place]  Frequency/Number of Accidents this Shift: [facility] Bed/Chair/Wheelchair Transfer:  [facility] Toilet Transfer:  [facility] Tub/Shower Transfer:  [place]    Previously Documented Mode of Locomotion at Discharge: Field  BETH Expected Mode of Locomotion at Discharge: [facility] Walk/Wheelchair:  [facility] Stairs:  [facility] Comprehension:  Auditory comprehension is the usual mode. Comprehension  Score = 6, Modified [place].  Patient comprehends complex/abstract  information in their primary language, requiring:  BETH Expression:  Vocal expression is the usual mode. Expression Score = 6,  Modified Independent.  Patient expresses complex/abstract information in their  primary language, requiring:  [facility] Social Interaction:  Social Interaction Score = 7, Independent. Patient is  completely independent for social interaction.  There are no activity  limitations.  BETH Problem Solving:  Activity was not observed.  BETH Memory:  Memory Score = 6, Modified [place].  Patient is modified  independent for memory, requiring:    Therapy Mode Minutes  Occupational Therapy: [place]  Physical Therapy: [place]  Speech Language Pathology:  [place]    Signed by: Mila Flores RN

## 2017-08-09 NOTE — PROGRESS NOTES
Inpatient Rehabilitation Functional Measures Assessment    Functional Measures  BETH Eating:  [facility] Grooming: [facility] Bathing:  [facility] Upper Body Dressing:  [facility] Lower Body Dressing:  [facility] Toileting:  [facility] Bladder Management  Level of Assistance:  [place]  Frequency/Number of Accidents this Shift:  [facility] Bowel Management  Level of Assistance: [place]  Frequency/Number of Accidents this Shift: [facility] Bed/Chair/Wheelchair Transfer:  [facility] Toilet Transfer:  [facility] Tub/Shower Transfer:  [place]    Previously Documented Mode of Locomotion at Discharge: Field  BETH Expected Mode of Locomotion at Discharge: [facility] Walk/Wheelchair:  [facility] Stairs:  [facility] Comprehension:  Auditory comprehension is the usual mode. Comprehension  Score = 6, Modified [place].  Patient comprehends complex/abstract  information in their primary language with only mild difficulty.  BETH Expression:  Vocal expression is the usual mode. Expression Score = 6,  Modified Independent.  Patient expresses complex/abstract information in their  primary language with only mild difficulty with tasks.  BETH Social Interaction:  Social Interaction Score = 6, Modified Independent.  Patient is modified independent for social interaction, requiring: Requires  additional time.  BETH Problem Solving:  Problem Solving Score = 6, Modified [place].  Patient  makes appropriate decisions in order to solve complex problems, but requires  extra time.  BETH Memory:  Memory Score = 6, Modified [place].  Patient is modified  independent for memory, having only mild difficulty and using self-initiated or  environmental cues to remember.    Therapy Mode Minutes  Occupational Therapy: Branch  Physical Therapy: [place]  Speech Language Pathology:  [place]    Signed by: Jethro Schmidt RN

## 2017-08-09 NOTE — PROGRESS NOTES
LOS: 12 days   Patient Care Team:  JOSHUA Chaudhari as PCP - General  Mario Mata MD as PCP - Claims Attributed  Hortencia Lisa MD as Consulting Physician (Cardiology)  Pierre Walker MD as Consulting Physician (Gastroenterology)    Chief Complaint: same    Subjective     History of Present Illness    Subjective Pt is awake and alert. No new issues. Pt ok with plan to dc 8/11.     History taken from: patient    Objective     Vital Signs  Temp:  [97.4 °F (36.3 °C)-99.1 °F (37.3 °C)] 99.1 °F (37.3 °C)  Heart Rate:  [81-89] 83  Resp:  [16-20] 18  BP: (128-155)/(63-78) 155/78    Objective exam unchanged    Results Review:     I reviewed the patient's new clinical results.    Medication Review:     Assessment/Plan     Active Problems:    Benign essential hypertension    Controlled type 2 diabetes mellitus without complication    Cerebrovascular accident (CVA) due to occlusion of right middle cerebral artery    Atrial fibrillation    Warfarin anticoagulation (goal INR 2-3)      Assessment & Plan Continue to prepare for dc.    Bayron Nathan MD  08/09/17  8:05 AM    Time:

## 2017-08-09 NOTE — THERAPY TREATMENT NOTE
Inpatient Rehabilitation - Speech Language Pathology Treatment Note  [facility]     Patient Name: Magnus Hartley  : 1928  MRN: [id number]  Today's Date: 2017         Admit Date: 2017    Visit Dx:      ICD-10-CM ICD-9-CM   1. Left hemiparesis G81.94 342.90   2. Dysarthria R47.1 784.51     Patient Active Problem List   Diagnosis   • Foot pain   • Asthma   • Benign essential hypertension   • Controlled type 2 diabetes mellitus without complication   • Dyslipidemia   • Macrocytosis without anemia   • Senile osteoporosis   • Post-menopausal osteoporosis   • Sinus bradycardia   • Stress incontinence in female   • Urge incontinence of urine   • Atrophic vaginitis   • Encounter for fitting and adjustment of other specified devices   • Incomplete emptying of bladder   • Urethral caruncle   • Incomplete uterovaginal prolapse   • Cerebrovascular accident (CVA) due to occlusion of right middle cerebral artery   • Atrial fibrillation   • Warfarin anticoagulation (goal INR 2-3)              Adult Rehabilitation Note       17 1500 17 1434 17 1014    Rehab Assessment/Intervention    Discipline speech language pathologist  -ISAAC occupational therapist  -SHILPI NULL AF2 occupational therapist  -SHILPI NULL AF2    Document Type therapy note (daily note)  -AW therapy note (daily note)  -SHILPI NULL AF2 therapy note (daily note)  -SHILPI NULL AF2    Subjective Information no complaints;agree to therapy  -AW no complaints;agree to therapy  -SHILPI NULL AF2 agree to therapy;no complaints  -SHILPI NULL AF2    Patient Effort, Rehab Treatment good  -AW good  -SHILPI NULL AF2 good  -SHILPI NULL AF2    Symptoms Noted During/After Treatment none  -AW fatigue  -SHILPI NULL AF2 shortness of breath   w/ tsfs, bending down for LBD when seated  -SHILPI NULL AF2    Symptoms Noted Comment  seated breaks as necessary throughout task   -SHILPI NULL AF2 allowed for rest breaks as necessary; vcs for deep breathing  -SHILPI NULL AF2    Precautions/Limitations fall precautions;swallowing  precautions  -AW fall precautions  -AF,SHILPI,AF2 fall precautions  -AF,SHILPI,AF2    Recorded by [AW] Adelia Martinez MS CCC-SLP [AF,SHILPI,AF2] NO Cooney (r) Karen Allan OT Student (t) NO Cooney (c) [AF,SHILPI,AF2] NO Cooney (r) Karen Allan OT Student (t) NO Cooney (c)    Pain Assessment    Pain Assessment  No/denies pain  -AF,SHILPI,AF2 No/denies pain  -AF,SHILPI,AF2    Recorded by  [AF,SHILPI,AF2] NO Cooney (r) Karen Allan OT Student (t) NO Cooney (c) [AF,SHILPI,AF2] NO Cooney (r) Karen Allan OT Student (t) NO Cooney (c)    Cognitive Assessment/Intervention    Current Cognitive/Communication Assessment  impaired  -AF,SHILPI,AF2 impaired  -AF,SHILPI,AF2    Orientation Status  oriented x 4  -AF,SHILPI,AF2 oriented x 4  -AF,SHILPI,AF2    Follows Commands/Answers Questions  75% of the time;able to follow single-step instructions;needs cueing;needs repetition  -AF,SHILPI,AF2 100% of the time;able to follow single-step instructions;needs increased time;needs cueing;needs repetition  -AF,SHILPI,AF2    Personal Safety  mild impairment  -AF,SHILPI,AF2 mild impairment  -AF,SHILPI,AF2    Personal Safety Interventions  fall prevention program maintained;gait belt;supervised activity;nonskid shoes/slippers when out of bed  -AF,SHILPI,AF2     Recorded by  [AF,SHILPI,AF2] NO Cooney (r) Karen Allan OT Student (t) NO Cooney (c) [AF,SHILPI,AF2] NO Cooney (r) Karen Allan OT Student (t) Adelia Mattie Salamanca, OTR (c)    Improve attention    Attention Progress 50%;without cues;90%;with consistent cues  -AW      Comments: Improve attention following written directions to carry out 2 tasks w/ a group of 6 words; consistent cues for attn to details  -AW      Recorded by [AW] Adelia Martinez MS CCC-SLP      Improve memory skills    Memory Skills Progress 60%;without cues;100%;with inconsistent cues  -AW      Comments: Improve memory skills category exclusion  -AW       Recorded by [AW] Adelia Martinez MS CCC-SLP      Improve functional problem solving    Functional Problem Solving Progress 50%;without cues;80%;with consistent cues  -AW      Comments: Improve functional problem solving [place] planning task; pt did well taking notes and trying to organize task, however, needed consistent cues to think through task  -AW      Recorded by [AW] Adelia Martinez MS CCC-SLP      Transfer Assessment/Treatment    Transfers, Sit-Stand [place]   contact guard assist;verbal cues required   vcs for safe stance when standing   -AF,SHILPI,AF2    Transfers, Stand-Sit [place]   contact guard assist  -AF,SHILPI,AF2    Toilet Transfer, [place]   contact guard assist  -AF,SHILPI,AF2    Toilet Transfer, Assistive Device   wheelchair   grab bars  -AF,SHILPI,AF2    Recorded by   [AF,SHILPI,AF2] Adelia Salamanca OTR (r) Karen Allan OT Student (t) NO Cooney (c)    ADL Assessment/Intervention    IADL Assess/Train, Comment  pt completed cooking task to make stir portillo in unfamiliar kitchen w/ ingredients and materials set out; pt was able to use a knife safely when cutting veggies; pt required frequent vcs for body positioning to maintain safe standing posture when reaching across the counter; pt required frequent vcs for safe hand placement when transferring from sit to  w/c and hand placement when using the stove; pt required seated rest breaks, notable increase in standing endurance for self care activities   -AF,SHILPI,AF2     Additional Documentation  IADL Assess/Train, Comment (Row)  -AF,SHILPI,AF2     Recorded by  [AF,SHILPI,AF2] NO Cooney (r) Karen Allan OT Student (t) NO Cooney (c)     Upper Body Bathing Assessment/Training    UB Bathing Assess/Train, Position   sink side;sitting  -AF,SHILPI,AF2    UB Bathing Assess/Train, [place] Level   stand by assist;verbal cues required   vcs for locating items   -AF,SHILPI,AF2    Recorded by   [AF,SHILPI,AF2] NO Cooney (r) Karen  Jerrell, OT Student (t) NO Cooney (c)    Lower Body Bathing Assessment/Training    LB Bathing Assess/Train, Position   sitting;sink side  -AF,SHILPI,AF2    LB Bathing Assess/Train, [place] Level   stand by assist  -AF,SHILPI,AF2    LB Bathing Assess/Train, Comment   elsi/buttocks bathing performed after toileting   -AF,SHILPI,AF2    Recorded by   [AF,SHILPI,AF2] NO Cooney (r) Karen Allan, OT Student (t) NO Cooney (c)    Upper Body Dressing Assessment/Training    UB Dressing Assess/Train, Clothing Type   donning:;doffing:;bra;t-shirt   dof shirt; don bra, shirt  -AF,SHILPI,AF2    UB Dressing Assess/Train, Position   sink side;sitting  -AF,SIHLPI,AF2    UB Dressing Assess/Train, [place]   minimum assist (75% patient effort);verbal cues required  -AF,SHILPI,AF2    UB Dressing Assess/Train, Comment   pt required min a to adjust bra strap; pt required vcs and min a to thread L arm   -AF,SHILPI,AF2    Recorded by   [AF,SHILPI,AF2] NO Cooney (r) Karen Allan, OT Student (t) NO Cooney (c)    Lower Body Dressing Assessment/Training    LB Dressing Assess/Train, Clothing Type   doffing:;donning:;pants;shoes;socks   dof underwear, pants; don underwear, pants, socks, shoes  -AF,SHILPI,AF2    LB Dressing Assess/Train, Position   sitting;standing;edge of bed;sink side   socks and shoes at EOB  -AF,SHILPI,AF2    LB Dressing Assess/Train, [place]   contact guard assist;verbal cues required  -AF,SHILPI,AF2    LB Dressing Assess/Train, Comment   vcs for task completion; CGA to maintain balance when pulling up underwear/pants; pt able to IND don socks/shoes and tie shoe strings   -AF,SHILPI,AF2    Recorded by   [AF,SHILPI,AF2] NO Cooney (r) Karen Jerrell, OT Student (t) Adelia Salamanca, OTR (c)    Toileting Assessment/Training    Toileting Assess/Train, Assistive Device   grab bars  -AF,SHILPI,AF2    Toileting Assess/Train, Position   sitting;standing  -AF,SHILPI,AF2    Toileting Assess/Train, [place] Level    contact guard assist;minimum assist (75% patient effort)  -AF,SHILPI,AF2    Toileting Assess/Train, Comment   pt required min a to wipe bottom completely after BM; CGA for standing balance when managing clothing   -AF,SHILPI,AF2    Recorded by   [AF,SHILPI,AF2] NO Cooney (r) Karen Allan OT Student (t) NO Cooney (c)    Grooming Assessment/Training    Grooming Assess/Train, Position   sitting;sink side  -AF,SHILPI,AF2    Grooming Assess/Train, [place] Level   set up required   gathering hairbrush from other room   -AF,SHILPI,AF2    Recorded by   [AF,SHILPI,AF2] NO Cooney (r) Karen Allan OT Student (t) NO Cooney (c)    Positioning and Restraints    Pre-Treatment Position  sitting in chair/recliner  -AF,SHILPI,AF2 sitting in chair/recliner  -AF,SHILPI,AF2    Post Treatment Position  chair  -AF,SHILPI,AF2 chair  -AF,SHILPI,AF2    In Chair  call light within reach;sitting;encouraged to call for assist  -AF,SHILPI,AF2 sitting;call light within reach;encouraged to call for assist  -AF,SHILPI,AF2    Recorded by  [AF,SHILPI,AF2] NO Cooney (r) Karen Allan OT Student (t) NO Cooney (c) [AF,SHILPI,AF2] NO Cooney (r) aKren Allan OT Student (t) NO Cooney (c)      08/09/17 0931 08/08/17 1114 08/08/17 1100    Rehab Assessment/Intervention    Discipline physical therapist  -AR occupational therapist  -SHILPI NULL,AF2 speech language pathologist  -AW    Document Type therapy note (daily note)  -AR therapy note (daily note)  -AF,SHILPI,AF2 therapy note (daily note)  -AW    Subjective Information agree to therapy  -AR agree to therapy;complains of;weakness  -AF,SHILPI,AF2 no complaints;agree to therapy  -AW    Patient Effort, Rehab Treatment good  -AR good  -CHAKA,SHILPI,AF2 good  -AW    Symptoms Noted During/After Treatment fatigue  -AR shortness of breath   after tsfs, bending down for LBD;   -AF,SHILPI,AF2 none  -AW    Symptoms Noted Comment  allowed for rest breaks as necessary; vcs for deep breathing    -AF,SHILPI,AF2     Precautions/Limitations fall precautions  -AR fall precautions;swallowing precautions  -AF,SHILPI,AF2 fall precautions  -AW    Recorded by [AR] Sis Ramirez, PT [AF,SHILIP,AF2] Adelia Salamanca OTR (r) Karen Allan OT Student (t) NO Cooney (c) [AW] Adelia Martinez MS CCC-SLP    Pain Assessment    Pain Assessment No/denies pain  -AR No/denies pain  -AF,SHILPI,AF2     Recorded by [AR] Sis Ramirez PT [AF,SHILPI,AF2] Adelia Salamanca OTR (r) Karen Allan OT Student (t) NO Cooney (c)     Cognitive Assessment/Intervention    Current Cognitive/Communication Assessment did not assess  -AR impaired  -AF,SHILPI,AF2     Orientation Status oriented x 4  -AR oriented x 4  -AF,SHILPI,AF2     Follows Commands/Answers Questions  100% of the time;able to follow single-step instructions;needs cueing;needs increased time;needs repetition  -AF,SHILPI,AF2     Personal Safety  mild impairment  -AF,SHILPI,AF2     Personal Safety Interventions fall prevention program maintained;gait belt;muscle strengthening facilitated  -AR gait belt;nonskid shoes/slippers when out of bed;fall prevention program maintained  -AF,SHILPI,AF2     Recorded by [AR] Sis Ramirez, PT [AF,SHILPI,AF2] NO Cooney (r) Karen Allan OT Student (t) NO Cooney (c)     Improve attention    Attention Progress   50%;without cues;80%;with consistent cues  -AW    Comments: Improve attention   working memory task - listening to 4 words x2 and recalling one word by placement  -AW    Recorded by   [AW] Adelia Martinez MS CCC-SLP    Improve memory skills    Memory Skills Progress   100%;without cues  -AW    Comments: Improve memory skills   visual memory - recalling details of picture after a 12 min delay  -AW    Recorded by   [AW] Adelia Martinez MS CCC-SLP    Improve functional problem solving    Functional Problem Solving Progress   80%;without cues;100%;with inconsistent cues  -AW    Comments: Improve functional problem solving   sequencing 5  step tasks  -AW    Recorded by   [AW] Adelia Martinez MS CCC-SLP    Bed Mobility, Assessment/Treatment    Bed Mob, Supine to Sit, [place] supervision required  -AR supervision required  -AF,SHILPI,AF2     Bed Mob, Sit to Supine, [place] supervision required  -AR      Bed Mobility, Comment HOB flat, no bed rail  -AR      Recorded by [AR] Sis Ramirez PT [AF,SHILPI,AF2] Adelia Salamanca OTR (r) Karen Allan, OT Student (t) NO Cooney (c)     Transfer Assessment/Treatment    Transfers, Bed-Chair [place]  stand by assist;contact guard assist  -AF,SHILPI,AF2     Transfers, Sit-Stand [place] stand by assist  -AR      Transfers, Stand-Sit [place] stand by assist  -AR      Transfers, Sit-Stand-Sit, Assist Device rolling walker  -AR      Transfer, Comment car transfer w/ few VC and SBA  -AR tsf to and from EOM from w/c w/ CGA and vcs  -CHAKA,SHILPI,AF2     Recorded by [AR] Sis Ramirez PT [AF,SHILPI,AF2] Adelia Salamanca OTR (r) Karen Allan, OT Student (t) NO Cooney (c)     Gait Assessment/Treatment    Gait, [place] Level contact guard assist;stand by assist  -AR      Gait, Assistive Device rolling walker   CGA w/ cane  -AR      Gait, Distance (Feet) 350   , 80 w/ RW; cane 50', 80, 80   -AR      Gait, Gait Pattern Analysis swing-through gait  -AR      Gait, Gait Deviations rojelio decreased;decreased heel strike;forward flexed posture  -AR      Gait, Safety Issues step length decreased;weight-shifting ability decreased  -AR      Gait, Impairments strength decreased  -AR      Gait, Comment outside on curb up/down ramp w/ RW  -AR      Recorded by [AR] Sis Ramirez PT      Stairs Assessment/Treatment    Number of Stairs 4  -AR      Stairs, Handrail Location both sides  -AR      Stairs, [place] Level contact guard assist  -AR      Stairs, Technique Used step to step (descending);step to step (ascending)  -AR      Stairs, Safety Issues weight-shifting ability decreased  -AR       Stairs, Impairments impaired balance;strength decreased  -AR      Recorded by [AR] Sis Ramirez, PT      ADL Assessment/Intervention    IADL Assess/Train, Comment  pt wrote a list of ingredients and amounts needed to make stir portillo dish, pt required some help w/ spelling; pt looked through old ads to find sales and compare the cheapest options, pt required min vcs, min gestural prompts to complete task   -AF,SHILPI,AF2     Recorded by  [AF,SHILPI,AF2] NO Cooney (r) Karen Allan, OT Student (t) NO Cooney (c)     Upper Body Bathing Assessment/Training    UB Bathing Assess/Train, Position  sitting;sink side  -AF,SHILPI,AF2     UB Bathing Assess/Train, [place] Level  minimum assist (75% patient effort);verbal cues required   vcs for setup instructions; min a w/ back   -AF,SHILPI,AF2     Recorded by  [AF,SHILPI,AF2] NO Cooney (r) Karen Allan, OT Student (t) NO Cooney (c)     Lower Body Bathing Assessment/Training    LB Bathing Assess/Train, Position  sitting;sink side;standing  -AF,SHILPI,AF2     LB Bathing Assess/Train, [place] Level  contact guard assist;verbal cues required  -AF,SHILPI,AF2     LB Bathing Assess/Train, Comment  vcs for sequencing; cga to maintaing balance while pt washed elsi area  -AF,SHILPI,AF2     Recorded by  [AF,SHILPI,AF2] NO Cooney (r) Karen Allan, OT Student (t) NO Cooney (c)     Upper Body Dressing Assessment/Training    UB Dressing Assess/Train, Clothing Type  doffing:;donning:;bra;t-shirt   dof tshirt; don bra, tshirt  -AF,SHILPI,AF2     UB Dressing Assess/Train, Position  sitting  -AF,SHILPI,AF2     UB Dressing Assess/Train, [place]  minimum assist (75% patient effort);set up required  -AF,SHILPI,AF2     UB Dressing Assess/Train, Comment  set up required to gather clothes; min a to adjust bra over back   -AF,SHILPI,AF2     Recorded by  [CHAKA,SHILPI,AF2] Adelia Salamanca OTR (r) Karen Allan, OT Student (t) Adelia Salamanca OTR (c)     Lower Body  Dressing Assessment/Training    LB Dressing Assess/Train, Clothing Type  donning:;shoes;pants;socks;slipper socks;doffing:   dof slipper socks; don underwear, pants, socks, shoes  -AF,SHILPI,AF2     LB Dressing Assess/Train, Position  sitting;sink side;standing;edge of bed  -AF,SHILPI,AF2     LB Dressing Assess/Train, [place]  minimum assist (75% patient effort);contact guard assist  -AF,SHILPI,AF2     LB Dressing Assess/Train, Comment  CGA to maintain balance while standing to pull up pants; min a to tighten shoe strings, pt was able to maintain grasp on shoe strings w/ LUE for increased ind w/ shoe tying  -AF,SHILPI,AF2     Recorded by  [AF,SHILPI,AF2] Adelia Salamanca OTR (r) Karen Allan OT Student (t) NO Cooney (c)     Grooming Assessment/Training    Grooming Assess/Train, Position  sitting;sink side  -AF,SHILPI,AF2     Grooming Assess/Train, [place] Level  set up required   gather materials from room   -AF,SHILPI,AF2     Recorded by  [AF,SHILPI,AF2] NO Cooney (r) Karen Allan OT Student (t) NO Cooney ([place])     Balance Skills Training    Static Standing Balance Support eliu bar;Left upper extremity supported;Right upper extremity supported  -AR      Standing-Balance Activities Compliant surfaces   side stepping  -AR      Gait Balance-Level of Assistance --   TUG 22sec, 22 sec  -AR      Recorded by [AR] Sis Ramirez PT      Sensory Treatment    Sensory Reeducation Techniques  sensory training, visual reinforcement;sensory train, w/o visual reinforcement  -AF,SHILPI,AF2     Sensory Reeduction Treatment  pt was given an every day object to feel w/ BUE and observe; pt attempted to find object in rice bucket w/ LUE w/ vision occluded; pt was able to find spoon w/ eyes closed, required eyes open to find other objects; pt buried all the items in the rice at clean up   -AF,SHILPI,AF2     Recorded by  [AF,SHILPI,AF2] NO Cooney (r) Karen Allan OT Student (t) Adelia Salamanca, OTR (c)     Positioning  and Restraints    Pre-Treatment Position sitting in chair/recliner  -AR in bed   in w/c second session   -AF,SHILPI,AF2     Post Treatment Position chair  -AR chair   w/c second session   -AF,SHILPI,AF2     In Chair reclined;sitting;call light within reach;encouraged to call for assist;exit alarm on  -AR      In Wheelchair  sitting;with SLP;call light within reach;encouraged to call for assist  -AF,SHILPI,AF2     Recorded by [AR] Sis Ramirez PT [AF,SHILPI,AF2] Adelia Salamanca OTR (r) Karen Allan, OT Student (t) NO Cooney (c)       08/08/17 0943 08/07/17 1530 08/07/17 1143    Rehab Assessment/Intervention    Discipline physical therapy assistant  -LB speech language pathologist  -AW occupational therapist  -AF,SHILPI,AF2    Document Type therapy note (daily note)  -LB therapy note (daily note)  -AW therapy note (daily note)  -AF,SHILPI,AF2    Subjective Information agree to therapy  -LB no complaints;agree to therapy  -AW agree to therapy;no complaints  -AF,SHILPI,AF2    Patient Effort, Rehab Treatment good  -LB good  -AW good  -AF,SHILPI,AF2    Symptoms Noted During/After Treatment  none  -AW shortness of breath   w/ tsfs, bending down from seated level   -AF,SHILPI,AF2    Symptoms Noted Comment   rest breaks and vcs for deep breaths as necessary   -AF,SHILPI,AF2    Precautions/Limitations fall precautions;swallowing precautions  -LB fall precautions;swallowing precautions  -AW fall precautions  -AF,SHILPI,AF2    Recorded by [LB] Angelica Treadwell PTA [AW] Adelia Martinez MS CCC-SLP [AF,SHILPI,AF2] NO Cooney (r) Karen Allan OT Student (t) NO Cooney (c)    Pain Assessment    Pain Assessment No/denies pain  -LB  No/denies pain  -AF,SHILPI,AF2    Recorded by [LB] Angelica Treadwell PTA  [AF,SHILPI,AF2] NO Cooney (r) Karen Allan OT Student (t) Adelia Mattie Salamanca, OTR (c)    Cognitive Assessment/Intervention    Current Cognitive/Communication Assessment   impaired  -AF,SHILPI,AF2    Orientation Status   oriented x 4   -AF,SHILPI,AF2    Follows Commands/Answers Questions   100% of the time;able to follow single-step instructions;needs cueing;needs increased time;needs repetition  -AF,SHILPI,AF2    Personal Safety   mild impairment;decreased awareness, need for safety;decreased insight to deficits  -AF,SHILPI,AF2    Personal Safety Interventions fall prevention program maintained;gait belt;muscle strengthening facilitated;nonskid shoes/slippers when out of bed  -LB  fall prevention program maintained;gait belt;nonskid shoes/slippers when out of bed;supervised activity  -AF,SHILPI,AF2    Recorded by [LB] Angelica Treadwell, PTA  [AF,SHILPI,AF2] Adelia Salamanca, OTR (r) Karen Allan, OT Student (t) Adelia Salamanca, OTR (c)    Improve attention    Attention Progress  60%;without cues;100%;with inconsistent cues  -AW     Comments: Improve attention  working memory - repeating 3 words in reverse order  -AW     Recorded by  [AW] Adelia Martinez MS CCC-SLP     Improve memory skills    Memory Skills Progress  0%;without cues;100%;with consistent cues  -AW     Comments: Improve memory skills  after a 10 min delay, pt recalled 0/3 words; 3/3 w/ cues  -AW     Recorded by  [AW] Adelia Martinez MS CCC-SLP     Improve functional problem solving    Functional Problem Solving Progress  80%;without cues;100%;with inconsistent cues  -AW     Comments: Improve functional problem solving  word problems; repetitions beneficial  -AW     Recorded by  [AW] Adelia Martinez MS CCC-SLP     Improve executive function skills    Executive Function Skills Progress  70%;without cues;100%;with inconsistent cues  -AW     Comments: Improve executive function skills  deductive reasoning grid puzzle  -AW     Recorded by  [AW] Adelia Martinez MS CCC-SLP     Bed Mobility, Assessment/Treatment    Bed Mobility, Assistive Device bed rails;head of bed elevated  -LB      Bed Mob, Supine to Sit, [place] supervision required  -LB  supervision required  -CHAKA,SHILPI,AF2    Bed Mob, Sit to Supine, [place]  supervision required  -LB      Recorded by [LB] Angelica Treadwell PTA  [AF,SHILPI,AF2] Adelia Salamanca, OTR (r) Karen Allan, OT Student (t) Adelia Salamanca, OTR (c)    Transfer Assessment/Treatment    Transfers, Bed-Chair [place] contact guard assist;stand by assist  -LB  stand by assist  -AF,SHILPI,AF2    Transfers, Chair-Bed [place] contact guard assist;stand by assist  -LB      Transfers, Sit-Stand [place] stand by assist  -LB  contact guard assist   while showeringto wash elsi area   -AF,SHILPI,AF2    Transfers, Stand-Sit [place] stand by assist  -LB  contact guard assist   while showering to wash elsi area  -AF,SHILPI,AF2    Transfers, Sit-Stand-Sit, Assist Device rolling walker  -LB      Toilet Transfer, [place]   contact guard assist;verbal cues required   vcs for hand placement   -AF,SHILPI,AF2    Toilet Transfer, Assistive Device   elevated toilet seat;rolling walker   grab bars   -AF,SHILPI,AF2    Walk-In Shower Transfer, [place]   contact guard assist;verbal cues required   vcs for hand placement   -AF,SHILPI,AF2    Walk-In Shower Transfer, Assist Device   standard shower chair   grab bars; tsf from toilet   -AF,SHILPI,AF2    Bathtub Transfer, [place]   verbal cues required;contact guard assist   pt practiced tub tsf w/ demo to simulate home environment   -AF,SHILPI,AF2    Bathtub Transfer, Assistive Device   transfer tub bench;rolling platform walker   grab bars  -AF,SHILPI,AF2    Transfer, Comment   tsf to and from EOM from w/c w/ SBA, vcs for hand placement   -AF,SHILPI,AF2    Recorded by [LB] Angelica Treadwell PTA  [AF,SHILPI,AF2] Adelia Salamanca, CHERELLER (r) Karen Allan, OT Student (t) Adelia Salamanca OTR (c)    Gait Assessment/Treatment    Gait, [place] Level stand by assist  -LB      Gait, Assistive Device rolling walker  -LB      Gait, Distance (Feet) 320  -LB      Gait, Gait Deviations forward flexed posture;step length decreased  -LB      Recorded by [LB] Angelica Treadwell PTA      Stairs  Assessment/Treatment    Number of Stairs 8  -LB      Stairs, Handrail Location both sides  -LB      Stairs, [place] Level contact guard assist;verbal cues required  -LB      Stairs, Technique Used step to step (ascending);step to step (descending)  -LB      Recorded by [LB] Angelica Treadwell PTA      ADL Assessment/Intervention    Additional Documentation   --   pt practiced tying shoes from lap level w/ min a   -AF,SHILPI,AF2    Recorded by   [AF,SHILPI,AF2] NO Cooney (r) Karen Allan, OT Student (t) NO Cooney (c)    Upper Body Bathing Assessment/Training    UB Bathing Assess/Train Assistive Device   grab bars;shower chair with back;hand-held shower head  -AF,SHILPI,AF2    UB Bathing Assess/Train, Position   sitting  -AF,SHILPI,AF2    UB Bathing Assess/Train, [place] Level   minimum assist (75% patient effort);verbal cues required  -AF,SHILPI,AF2    UB Bathing Assess/Train, Comment   min a w/ wetting down hair; vcs to recall that pt had already washed hair   -AF,SHILPI,AF2    Recorded by   [AF,SHILPI,AF2] NO Cooney (r) Karen Allan, CHERELLE Student (t) NO Cooney (c)    Lower Body Bathing Assessment/Training    LB Bathing Assess/Train Assistive Device   shower chair with back;hand-held shower head;grab bars  -AF,SHILPI,AF2    LB Bathing Assess/Train, Position   sitting;standing  -AF,SHILPI,AF2    LB Bathing Assess/Train, [place] Level   verbal cues required;contact guard assist  -AF,SHILPI,AF2    LB Bathing Assess/Train, Comment   vcs for sequencing; cga to maintain balance while pt washed elsi area   -AF,SHILPI,AF2    Recorded by   [AF,SHILPI,AF2] NO Cooney (r) Karen Allan, OT Student (t) NO Cooney (c)    Upper Body Dressing Assessment/Training    UB Dressing Assess/Train, Clothing Type   donning:;doffing:;bra;t-shirt  -AF,SHILPI,AF2    UB Dressing Assess/Train, Position   sitting  -SHILPI NULL,AF2    UB Dressing Assess/Train, [place]   supervision required;set up required   set  up to gather clothing   -AF,SHILPI,AF2    Recorded by   [AF,SHILPI,AF2] NO Cooney (r) Karen Allan OT Student (t) NO Cooney (c)    Lower Body Dressing Assessment/Training    LB Dressing Assess/Train, Clothing Type   donning:;doffing:;pants;shoes;socks   underwear   -AF,SHILPI,AF2    LB Dressing Assess/Train, Position   sitting;standing;edge of bed   socks, shoes completed at EOB  -AF,SHILPI,AF2    LB Dressing Assess/Train, [place]   set up required;verbal cues required;minimum assist (75% patient effort)  -AF,SHILPI,AF2    LB Dressing Assess/Train, Comment   cga while standing to pull up underwear/pants; vcs for sequencin; required assist w/ tying shoes   -AF,SHILPI,AF2    Recorded by   [AF,SHILPI,AF2] NO Cooney (r) Karen Allan, OT Student (t) NO Cooney (c)    Toileting Assessment/Training    Toileting Assess/Train, Assistive Device   grab bars;raised toilet seat  -AF,SHILPI,AF2    Toileting Assess/Train, Position   sitting  -AF,SHILPI,AF2    Toileting Assess/Train, Indepen Level   contact guard assist  -AF,SHILPI,AF2    Toileting Assess/Train, Comment   pt able to complete hygiene while seated; tsf to shower so no LBD required after toileting   -AF,SHILPI,AF2    Recorded by   [AF,SHILPI,AF2] NO Cooney (r) Karen Allan, OT Student (t) NO Cooney (c)    Grooming Assessment/Training    Grooming Assess/Train, Position   sitting;sink side  -AF,SHILPI,AF2    Grooming Assess/Train, Indepen Level   set up required  -AF,SHILPI,AF2    Grooming Assess/Train, Comment   brushing hair   -AF,SHILPI,AF2    Recorded by   [AF,SHILPI,AF2] NO Cooney (r) Karen Allan OT Student (t) NO Cooney (c)    Balance Skills Training    Gait Balance-Level of Assistance Contact guard;Minimum assistance  -LB      Gait Balance Support parallel bars;Right upper extremity supported;Left upper extremity supported  -LB      Gait Balance Activities braiding / front;side-stepping  -LB      Recorded by [LB]  Angelica Treadwell PTA      Therapy Exercises    Bilateral Lower Extremities AROM:;10 reps;heel raises;mini squats;supine;heel slides;hip abduction/adduction  -LB      Trunk Exercises 10 reps;supine;bridging  -LB      Recorded by [LB] Angelica Treawdell PTA      Sensory Treatment    Sensory Reeducation Techniques   localization of touch  -AF,SHILPI,AF2    Sensory Reeduction Treatment   pt pointed w/ RUE to location on LUE where tactile sensations of various textures was felt w/ difficulty; pt was able to localize w/ R hand when repeating touch w/ eyes open   -AF,SHILPI,AF2    Recorded by   [AF,SHILPI,AF2] Adelia Salamanca OTR (r) Karen Allan, OT Student (t) NO Cooney (c)    Positioning and Restraints    Pre-Treatment Position   in bed   in recliner second session   -AF,SHILPI,AF2    Post Treatment Position wheelchair  -LB  chair   both sessions   -AF,SHILPI,AF2    In Chair   call light within reach;encouraged to call for assist;sitting  -AF,SHILPI,AF2    In Wheelchair sitting;with OT  -LB      Recorded by [LB] Angelica Treadwell PTA  [AF,SHILPI,AF2] Adelia Salamanca OTR (r) Karen Allan, OT Student (t) NO Cooney (c)      08/07/17 0943          Rehab Assessment/Intervention    Discipline physical therapy assistant  -LB      Document Type therapy note (daily note)  -LB      Subjective Information agree to therapy  -LB      Patient Effort, Rehab Treatment good  -LB      Precautions/Limitations fall precautions  -LB      Recorded by [LB] Angelica Treadwell PTA      Pain Assessment    Pain Assessment No/denies pain  -LB      Recorded by [LB] Angelica Treadwell PTA      Cognitive Assessment/Intervention    Personal Safety Interventions fall prevention program maintained;gait belt;muscle strengthening facilitated;nonskid shoes/slippers when out of bed  -LB      Recorded by [LB] Angelica Treadwell PTA      Bed Mobility, Assessment/Treatment    Bed Mobility, Comment up in chair  -LB      Recorded by [LB] Angelica LOPES  YARA Treadwell      Transfer Assessment/Treatment    Transfers, Sit-Stand [place]iona stand by assist  -LB      Transfers, Stand-Sit [place]iona stand by assist  -LB      Transfers, Sit-Stand-Sit, Assist Device rolling walker  -LB      Transfer, Comment car tsf CGA, rwx  -LB      Recorded by [LB] Angelica Treadwell PTA      Gait Assessment/Treatment    Gait, T[place] Level stand by assist  -LB      Gait, Assistive Device rolling walker  -LB      Gait, Distance (Feet) 200  -LB      Gait, Gait Deviations forward flexed posture;step length decreased  -LB      Recorded by [LB] Angelica Treadwell PTA      Stairs Assessment/Treatment    Number of Stairs 8  -LB      Stairs, Handrail Location both sides  -LB      Stairs, [place] Level contact guard assist  -LB      Stairs, Technique Used step to step (ascending);step to step (descending)  -LB      Recorded by [LB] Angelica Treadwell PTA      Functional Mobility    Functional Mobility- Ind. Level contact guard assist;minimum assist (75% patient effort)  -LB      Functional Mobility- Device straight cane   w tripod base  -LB      Functional Mobility-Distance (Feet) 180  -LB      Functional Mobility- Safety Issues sequencing ability decreased   LOB x 1  -LB      Recorded by [LB] Angelica Treadwell PTA      Positioning and Restraints    Post Treatment Position wheelchair  -LB      In Wheelchair sitting;call light within reach  -LB      Recorded by [LB] Angelica Treadwell PTA        User Key  (r) = Recorded By, (t) = Taken By, (c) = Cosigned By    Initials Name Effective Dates    AW Adelia Martinez, MS [facility]-SLP 04/13/15 -     LB Angelica Treadwell PTA 02/18/16 -     AF Adelia Salamanca, OTR 12/01/15 -     AR Sis Ramirez, PT 06/27/16 -     SHILPI Allan, OT Student 07/11/17 -               IP SLP Goals       08/08/17 1615 08/07/17 1701 07/31/17 1102    Safely Consume Diet    Safely Consume Diet- SLP, Date Established 08/08/17  -AW      Safely Consume Diet- SLP, Time  to Achieve by discharge  -AW      Safely Consume Diet- SLP, Additional Goal Pt will tolerate diet upgrade to regular with no s/s.  -AW      Cognitive Linguistic- Optimal Participation in Care    Cognitive Linguistic Optimal Participation in Care- SLP, Date Established   07/31/17  -LT    Cognitive Linguistic Optimal Participation in Care- SLP, Time to Achieve  by discharge  -AW by discharge  -LT    Cognitive Linguistic Optimal Participation in Care- SLP, Activity Level  Patient will improve Executive functioning skills for increased safety in environment  -AW     Cognitive Linguistic Optimal Participation in Care- SLP, Outcome  goal ongoing  -AW goal ongoing  -LT    Dysarthria- Optimal Particpation in Care    Dysarthria Optimal Participation in Care- SLP, Date Established   07/31/17  -LT    Dysarthria Optimal Participation in Care- SLP, Time to Achieve   by discharge  -LT    Dysarthria Optimal Participation in Care- SLP, Outcome  goal ongoing  -AW goal ongoing  -LT      07/27/17 1501          Safely Consume Diet    Safely Consume Diet- SLP, Time to Achieve by discharge  -JT      Safely Consume Diet- SLP, Activity Level Patient will improve oral skills for more efficient eating  -JT      Safely Consume Diet- SLP, Outcome goal ongoing  -JT        User Key  (r) = Recorded By, (t) = Taken By, (c) = Cosigned By    Initials Name Provider Type    LT Yvonne Miles, MS CCC-SLP Speech and Language Pathologist    ISAAC Martinez, MS CCC-SLP Speech and Language Pathologist    JASEN Ha Speech and Language Pathologist          EDUCATION  The patient has been educated in the following areas:   Cognitive Impairment.    SLP Recommendation and Plan  SLP Diagnosis: Mild cognitive impairment     Rehab Potential: good, to achieve stated therapy goals  Criteria for Skilled Therapeutic Interventions Met: skilled criteria for cognitive linguistic intervention met  Anticipated Discharge Disposition: home with assist     Therapy  Frequency: 3-5 times/wk  Predicted Duration of Therapy Intervention (days/wks): until discharge  Expected Duration of Therapy Session (min): 30-45 minutes    Plan of Care Review  Plan Of Care Reviewed With: patient  Progress: improving  Outcome Summary/Follow up Plan: diet advanced to regular and thin         SLP Outcome Measures (last 72 hours)      SLP Outcome Measures       08/08/17 1600          SLP Outcome Measures    Outcome Measure Used? Adult NOMS  -AW      FCM Scores    FCM Chosen Swallowing  -AW      Swallowing FCM Score 6  -AW        User Key  (r) = Recorded By, (t) = Taken By, (c) = Cosigned By    Initials Name Effective Dates    AW Adelia Martinez MS CCC-SLP 04/13/15 -           Time Calculation:         Time Calculation- SLP       08/09/17 1620 08/09/17 0800       Time Calculation- SLP    SLP Start Time 1430  -AW 1030  -AW     SLP Stop Time 1500  -AW 1100  -AW     SLP Time Calculation (min) 30 min  -AW 30 min  -AW       User Key  (r) = Recorded By, (t) = Taken By, (c) = Cosigned By    Initials Name Provider Type    AW Adelia Martinez MS CCC-SLP Speech and Language Pathologist          Therapy Charges for Today     Code Description Service Date Service Provider Modifiers Qty    74635782258 HC ST DEV OF COGN SKILLS EACH 15 MIN 8/8/2017 Adelia Martinez MS CCC-SLP GN 2    67246634034 HC ST EVAL ORAL PHARYNG SWALLOW 2 8/8/2017 Adelia Martinez MS CCC-SLP GN 1    06743776294 HC ST DEV OF COGN SKILLS EACH 15 MIN 8/9/2017 Adelia Martinez MS CCC-SLP GN 4               Adelia Martinez MS CCC-YUNG  8/9/2017

## 2017-08-09 NOTE — PROGRESS NOTES
Inpatient Rehabilitation Functional Measures Assessment and Plan of Care    Plan of Care  Updated Problems/Interventions  Cognition    [ST] Memory(Active)  Current Status(08/09/2017): mod deficits w/ verbal memory, benefits from cues;  better with visual tasks  Weekly Goal(08/11/2017): use daily schedule I'ly  Discharge Goal: functional memory for home w/intermittent supervision    [ST] Problem Solving(Active)  Current Status(08/09/2017): mild-mod impairments in attention and executive  functioning  Weekly Goal(08/09/2017): complete ADL's with min cues  Discharge Goal: functional problem solving for home with intermittent  supervision    Functional Measures  [facility] Eating:  [facility] Grooming: [facility] Bathing:  [facility] Upper Body Dressing:  [facility] Lower Body Dressing:  [facility] Toileting:  [facility] Bladder Management  Level of Assistance:  [place]  Frequency/Number of Accidents this Shift:  [facility] Bowel Management  Level of Assistance: [place]  Frequency/Number of Accidents this Shift: [facility] Bed/Chair/Wheelchair Transfer:  [facility] Toilet Transfer:  [facility] Tub/Shower Transfer:  [place]    Previously Documented Mode of Locomotion at Discharge: Field  BETH Expected Mode of Locomotion at Discharge: [facility] Walk/Wheelchair:  [facility] Stairs:  [facility] Comprehension:  [facility] Expression:  [facility] Social Interaction:  [facility] Problem Solving:  [facility] Memory:  [place]    Therapy Mode Minutes  Occupational Therapy: Branch  Physical Therapy: Branch  Speech Language Pathology:  Individual: 60 minutes.    Signed by: Adelia Martinez, SLP

## 2017-08-09 NOTE — PROGRESS NOTES
Inpatient Rehabilitation Functional Measures Assessment and Plan of Care    Plan of Care  Updated Problems/Interventions  Mobility    [OT] Toilet Transfers(Active)  Current Status(08/09/2017): CGA/SBA  Weekly Goal(08/16/2017): MOD I  Discharge Goal: MOD I    [OT] Tub/Shower Transfers(Active)  Current Status(08/09/2017): CGA/SBA  Weekly Goal(08/16/2017): SUP  Discharge Goal: SUP        Self Care    [OT] Bathing(Active)  Current Status(08/09/2017): MIN  Weekly Goal(08/16/2017): CGA  Discharge Goal: CGA    [OT] Dressing (Lower)(Active)  Current Status(08/09/2017): CGA (w/ shoe strings)  Weekly Goal(08/16/2017): SUP  Discharge Goal: SUP    [OT] Dressing (Upper)(Active)  Current Status(08/09/2017): CGA  Weekly Goal(08/16/2017): MOD I  Discharge Goal: MOD I    [OT] Grooming(Active)  Current Status(08/09/2017): SBA  Weekly Goal(08/16/2017): MOD I  Discharge Goal: MOD I    [OT] Toileting(Active)  Current Status(08/09/2017): CGA/MIN  Weekly Goal(08/16/2017): CGA  Discharge Goal: CGA    Functional Measures  BETH Eating:  Branch  BETH Grooming: Branch  BETH Bathing:  Branch  BETH Upper Body Dressing:  Branch  BETH Lower Body Dressing:  Branch  BETH Toileting:  Branch    BETH Bladder Management  Level of Assistance:  Branch  Frequency/Number of Accidents this Shift:  Branch    BEHT Bowel Management  Level of Assistance: Branch  Frequency/Number of Accidents this Shift: Branch    BETH Bed/Chair/Wheelchair Transfer:  Branch  BETH Toilet Transfer:  Branch  BETH Tub/Shower Transfer:  Branch    Previously Documented Mode of Locomotion at Discharge: Field  BETH Expected Mode of Locomotion at Discharge: Branch  BETH Walk/Wheelchair:  Branch  BETH Stairs:  Branch    BETH Comprehension:  Branch  BETH Expression:  Branch  BETH Social Interaction:  Branch  BETH Problem Solving:  Branch  BETH Memory:  Branch    Therapy Mode Minutes  Occupational Therapy: Branch  Physical Therapy: Branch  Speech Language Pathology:  Branch    Signed by: Karen Allan OT  Student     - CoSigned By: Adelia Salamanca OT 8/9/2017 3:54:23 PM

## 2017-08-09 NOTE — THERAPY TREATMENT NOTE
Inpatient Rehabilitation - Occupational Therapy Treatment Note  [facility]     Patient Name: Magnus Hartley  : 1928  MRN: [id number]  Today's Date: 2017  Onset of Illness/Injury or Date of Surgery Date: 17     Referring Physician: Jac      Admit Date: 2017    Visit Dx:     ICD-10-CM ICD-9-CM   1. Left hemiparesis G81.94 342.90   2. Dysarthria R47.1 784.51     Patient Active Problem List   Diagnosis   • Foot pain   • Asthma   • Benign essential hypertension   • Controlled type 2 diabetes mellitus without complication   • Dyslipidemia   • Macrocytosis without anemia   • Senile osteoporosis   • Post-menopausal osteoporosis   • Sinus bradycardia   • Stress incontinence in female   • Urge incontinence of urine   • Atrophic vaginitis   • Encounter for fitting and adjustment of other specified devices   • Incomplete emptying of bladder   • Urethral caruncle   • Incomplete uterovaginal prolapse   • Cerebrovascular accident (CVA) due to occlusion of right middle cerebral artery   • Atrial fibrillation   • Warfarin anticoagulation (goal INR 2-3)             Adult Rehabilitation Note       17 1014 17 1114 17 1100    Rehab Assessment/Intervention    Discipline (P)  occupational therapist  -SHILPI occupational therapist  -SHILPI NULL AF2 speech language pathologist  -AW    Document Type (P)  therapy note (daily note)  -SHILPI therapy note (daily note)  -SHILPI NULL AF2 therapy note (daily note)  -AW    Subjective Information (P)  agree to therapy;no complaints  -SHILPI agree to therapy;complains of;weakness  -SHILPI NULL AF2 no complaints;agree to therapy  -AW    Patient Effort, Rehab Treatment (P)  good  -SHILPI good  -SHILPI NULL AF2 good  -AW    Symptoms Noted During/After Treatment (P)  shortness of breath   w/ tsfs, bending down for LBD when seated  -SHILPI shortness of breath   after tsfs, bending down for LBD;   -SHILPI NULL AF2 none  -AW    Symptoms Noted Comment (P)  allowed for rest breaks as necessary; vcs for deep  breathing  -SHILPI allowed for rest breaks as necessary; vcs for deep breathing   -AF,SHILPI,AF2     Precautions/Limitations (P)  fall precautions  -SHLIPI fall precautions;swallowing precautions  -AF,SHILPI,AF2 fall precautions  -AW    Recorded by [SHILPI] Karen Allan OT Student [AF,SHILPI,AF2] NO Cooney (r) Karen Allan OT Student (t) NO Cooney (c) [AW] Adelia Martinez MS CCC-SLP    Pain Assessment    Pain Assessment (P)  No/denies pain  -SHILPI No/denies pain  -AF,SHILPI,AF2     Recorded by [SHILPI] Karen Allan OT Student [AF,SHILPI,AF2] NO Cooney (r) Karen Allan OT Student (t) NO Cooney (c)     Cognitive Assessment/Intervention    Current Cognitive/Communication Assessment (P)  impaired  -SHILPI impaired  -AF,SHILPI,AF2     Orientation Status (P)  oriented x 4  -SHILPI oriented x 4  -AF,SHILPI,AF2     Follows Commands/Answers Questions (P)  100% of the time;able to follow single-step instructions;needs increased time;needs cueing;needs repetition  -SHILPI 100% of the time;able to follow single-step instructions;needs cueing;needs increased time;needs repetition  -AF,SHILPI,AF2     Personal Safety (P)  mild impairment  -SHILPI mild impairment  -AF,SHILPI,AF2     Personal Safety Interventions  gait belt;nonskid shoes/slippers when out of bed;fall prevention program maintained  -AF,SHILPI,AF2     Recorded by [SHILPI] Karen Allan OT Student [AF,SHILPI,AF2] NO Cooney (r) Karen Allan OT Student (t) NO Cooney (c)     Improve attention    Attention Progress   50%;without cues;80%;with consistent cues  -AW    Comments: Improve attention   working memory task - listening to 4 words x2 and recalling one word by placement  -AW    Recorded by   [AW] Adelia Martinez MS CCC-SLP    Improve memory skills    Memory Skills Progress   100%;without cues  -AW    Comments: Improve memory skills   visual memory - recalling details of picture after a 12 min delay  -AW    Recorded by   [AW] Adelia Martinez, MS CCC-SLP    Improve functional  problem solving    Functional Problem Solving Progress   80%;without cues;100%;with inconsistent cues  -AW    Comments: Improve functional problem solving   sequencing 5 step tasks  -AW    Recorded by   [AW] Adelia Martinez MS CCC-SLP    Bed Mobility, Assessment/Treatment    Bed Mob, Supine to Sit, [place]  supervision required  -AF,SHILPI,AF2     Recorded by  [AF,SHILPI,AF2] NO Cooney (r) Karen Allan, OT Student (t) NO Cooney (c)     Transfer Assessment/Treatment    Transfers, Bed-Chair [place]  stand by assist;contact guard assist  -AF,SHILPI,AF2     Transfers, Sit-Stand [place] (P)  contact guard assist;verbal cues required   vcs for safe stance when standing   -SHILPI      Transfers, Stand-Sit [place] (P)  contact guard assist  -SHILPI      Toilet Transfer, [place] (P)  contact guard assist  -SHILPI      Toilet Transfer, Assistive Device (P)  wheelchair   grab bars  -SHILPI      Transfer, Comment  tsf to and from EOM from w/c w/ CGA and vcs  -AF,SHILPI,AF2     Recorded by [SHILPI] Karen Allan, OT Student [AF,SHILPI,AF2] NO Cooney (r) Karen Allan OT Student (t) NO Cooney (c)     ADL Assessment/Intervention    IADL Assess/Train, Comment  pt wrote a list of ingredients and amounts needed to make stir portillo dish, pt required some help w/ spelling; pt looked through old ads to find sales and compare the cheapest options, pt required min vcs, min gestural prompts to complete task   -AF,SHILPI,AF2     Recorded by  [AF,SHILPI,AF2] NO Cooney (r) Karen Allan OT Student (t) NO Cooney (c)     Upper Body Bathing Assessment/Training    UB Bathing Assess/Train, Position (P)  sink side;sitting  -SHILPI sitting;sink side  -AF,SHILPI,AF2     UB Bathing Assess/Train, [place] Level (P)  stand by assist;verbal cues required   vcs for locating items   -SHILPI minimum assist (75% patient effort);verbal cues required   vcs for setup instructions; min a w/ back   -AF,SHILPI,AF2     Recorded  by [SHILPI] Karen Allan OT Student [AF,SHILPI,AF2] NO Cooney (r) Karen Allan OT Student (t) NO Cooney (c)     Lower Body Bathing Assessment/Training    LB Bathing Assess/Train, Position (P)  sitting;sink side  -SHILPI sitting;sink side;standing  -AF,SHILPI,AF2     LB Bathing Assess/Train, [place] Level (P)  stand by assist  -SHILPI contact guard assist;verbal cues required  -AF,SHILPI,AF2     LB Bathing Assess/Train, Comment (P)  elsi/buttocks bathing performed after toileting   -SHILPI vcs for sequencing; cga to maintaing balance while pt washed elsi area  -AF,SHILPI,AF2     Recorded by [SHILPI] Karen Allan OT Student [AF,SHILPI,AF2] NO Cooney (r) Karen Allan OT Student (t) NO Cooney (c)     Upper Body Dressing Assessment/Training    UB Dressing Assess/Train, Clothing Type (P)  donning:;doffing:;bra;t-shirt   dof shirt; don bra, shirt  -SHILPI doffing:;donning:;bra;t-shirt   dof tshirt; don bra, tshirt  -AF,SHILPI,AF2     UB Dressing Assess/Train, Position (P)  sink side;sitting  -SHILPI sitting  -AF,SHILPI,AF2     UB Dressing Assess/Train, [place] (P)  minimum assist (75% patient effort);verbal cues required  -SHILPI minimum assist (75% patient effort);set up required  -AF,SHILPI,AF2     UB Dressing Assess/Train, Comment (P)  pt required min a to adjust bra strap; pt required vcs and min a to thread L arm   -SHILPI set up required to gather clothes; min a to adjust bra over back   -AF,SHILPI,AF2     Recorded by [SHILPI] Karen Allan OT Student [AF,SHILPI,AF2] NO Cooney (r) Karen Allan OT Student (t) NO Cooney (c)     Lower Body Dressing Assessment/Training    LB Dressing Assess/Train, Clothing Type (P)  doffing:;donning:;pants;shoes;socks   dof underwear, pants; don underwear, pants, socks, shoes  -SHILPI donning:;shoes;pants;socks;slipper socks;doffing:   dof slipper socks; don underwear, pants, socks, shoes  -AF,SHILPI,AF2     LB Dressing Assess/Train, Position (P)  sitting;standing;edge of  bed;sink side   socks and shoes at EOB  -SHILPI sitting;sink side;standing;edge of bed  -AF,SHILPI,AF2     LB Dressing Assess/Train, [place] (P)  contact guard assist;verbal cues required  -SHILPI minimum assist (75% patient effort);contact guard assist  -AF,SHILPI,AF2     LB Dressing Assess/Train, Comment (P)  vcs for task completion; CGA to maintain balance when pulling up underwear/pants; pt able to IND don socks/shoes and tie shoe strings   -SHILPI CGA to maintain balance while standing to pull up pants; min a to tighten shoe strings, pt was able to maintain grasp on shoe strings w/ LUE for increased ind w/ shoe tying  -AF,SHILPI,AF2     Recorded by [SHILPI] Karen Allan, OT Student [AF,SHILPI,AF2] Adelia Salamanca, OTR (r) Karen Allan, OT Student (t) NO Cooney (c)     Toileting Assessment/Training    Toileting Assess/Train, Assistive Device (P)  grab bars  -SHILPI      Toileting Assess/Train, Position (P)  sitting;standing  -SHILPI      Toileting Assess/Train, [place] Level (P)  contact guard assist;minimum assist (75% patient effort)  -SHILPI      Toileting Assess/Train, Comment (P)  pt required min a to wipe bottom completely after BM; CGA for standing balance when managing clothing   -SHILPI      Recorded by [SHILPI] Karen Allan, OT Student      Grooming Assessment/Training    Grooming Assess/Train, Position (P)  sitting;sink side  -SHILPI sitting;sink side  -AF,SHILPI,AF2     Grooming Assess/Train, [place] Level (P)  set up required   gathering hairbrush from other room   -SHILPI set up required   gather materials from room   -AF,SHILPI,AF2     Recorded by [SHILPI] Karen Allan, OT Student [AF,SHILPI,AF2] Adelia Salamanca, OTBETO (r) Karen Allan, OT Student (t) NO Cooney (c)     Sensory Treatment    Sensory Reeducation Techniques  sensory training, visual reinforcement;sensory train, w/o visual reinforcement  -AF,SHILPI,AF2     Sensory Reeduction Treatment  pt was given an every day object to feel w/ BUE and observe; pt attempted to find object in  rice bucket w/ LUE w/ vision occluded; pt was able to find spoon w/ eyes closed, required eyes open to find other objects; pt buried all the items in the rice at clean up   -AF,SHILPI,AF2     Recorded by  [AF,SHILPI,AF2] Adelia Salamanca OTBETO (r) Karen Allan OT Student (t) NO Cooney (c)     Positioning and Restraints    Pre-Treatment Position (P)  sitting in chair/recliner  -SHILPI in bed   in w/c second session   -AF,SHILPI,AF2     Post Treatment Position (P)  chair  -SHILPI chair   w/c second session   -AF,SHILPI,AF2     In Chair (P)  sitting;call light within reach;encouraged to call for assist  -SHILPI      In Wheelchair  sitting;with SLP;call light within reach;encouraged to call for assist  -CRYSTAL NULLD,AF2     Recorded by [SHILPI] Karen Allan OT Student [CHAKA,SHILPI,AF2] NO Cooney (r) Karen Allan OT Student (t) NO Cooney (c)       08/08/17 0943 08/07/17 1530 08/07/17 1143    Rehab Assessment/Intervention    Discipline physical therapy assistant  -LB speech language pathologist  -AW occupational therapist  -SHILPI NULL,AF2    Document Type therapy note (daily note)  -LB therapy note (daily note)  -AW therapy note (daily note)  -CHAKASHILPI,AF2    Subjective Information agree to therapy  -LB no complaints;agree to therapy  -AW agree to therapy;no complaints  -CHAKASHILPI,AF2    Patient Effort, Rehab Treatment good  -LB good  -AW good  -CHAKA,SHILPI,AF2    Symptoms Noted During/After Treatment  none  -AW shortness of breath   w/ tsfs, bending down from seated level   -AF,SHILPI,AF2    Symptoms Noted Comment   rest breaks and vcs for deep breaths as necessary   -AF,SHILPI,AF2    Precautions/Limitations fall precautions;swallowing precautions  -LB fall precautions;swallowing precautions  -AW fall precautions  -AF,SHILPI,AF2    Recorded by [LB] Angelica Treadwell, PTA [AW] Adelia Martinez MS CCC-SLP [AF,SHILPI,AF2] Adelia Salamanca OTR (r) Karen Allan, OT Student (t) Adelia Salamanca, OTR (c)    Pain Assessment    Pain Assessment No/denies pain  -LB   No/denies pain  -AF,SHILPI,AF2    Recorded by [LB] Angelica Treadwell PTA  [AF,SHILPI,AF2] NO Cooney (r) Karen Allan OT Student (t) NO Cooney (c)    Cognitive Assessment/Intervention    Current Cognitive/Communication Assessment   impaired  -AF,SHILPI,AF2    Orientation Status   oriented x 4  -AF,SHILPI,AF2    Follows Commands/Answers Questions   100% of the time;able to follow single-step instructions;needs cueing;needs increased time;needs repetition  -AF,SHILPI,AF2    Personal Safety   mild impairment;decreased awareness, need for safety;decreased insight to deficits  -AF,SHILPI,AF2    Personal Safety Interventions fall prevention program maintained;gait belt;muscle strengthening facilitated;nonskid shoes/slippers when out of bed  -LB  fall prevention program maintained;gait belt;nonskid shoes/slippers when out of bed;supervised activity  -AF,SHILPI,AF2    Recorded by [LB] Angelica Treadwell PTA  [AF,SHILPI,AF2] Adelia Salamanca OTR (r) Karen Allan OT Student (t) NO Cooney (c)    Improve attention    Attention Progress  60%;without cues;100%;with inconsistent cues  -AW     Comments: Improve attention  working memory - repeating 3 words in reverse order  -AW     Recorded by  [AW] Adelia Martinez MS CCC-SLP     Improve memory skills    Memory Skills Progress  0%;without cues;100%;with consistent cues  -AW     Comments: Improve memory skills  after a 10 min delay, pt recalled 0/3 words; 3/3 w/ cues  -AW     Recorded by  [AW] Adelia Martinez MS CCC-SLP     Improve functional problem solving    Functional Problem Solving Progress  80%;without cues;100%;with inconsistent cues  -AW     Comments: Improve functional problem solving  word problems; repetitions beneficial  -AW     Recorded by  [AW] Adelia Martinez MS CCC-SLP     Improve executive function skills    Executive Function Skills Progress  70%;without cues;100%;with inconsistent cues  -AW     Comments: Improve executive function skills  deductive reasoning grid  puzzle  -AW     Recorded by  [AW] Adelia Martinez MS CCC-SLP     Bed Mobility, Assessment/Treatment    Bed Mobility, Assistive Device bed rails;head of bed elevated  -LB      Bed Mob, Supine to Sit, [place] supervision required  -LB  supervision required  -AF,SHILPI,AF2    Bed Mob, Sit to Supine, [place] supervision required  -LB      Recorded by [LB] Angelica Treadwell PTA  [AF,SHILPI,AF2] Adelia Salamanca, OTR (r) Karen Allan, OT Student (t) Adelia Salamanca, OTR (c)    Transfer Assessment/Treatment    Transfers, Bed-Chair [place] contact guard assist;stand by assist  -LB  stand by assist  -AF,SHILPI,AF2    Transfers, Chair-Bed [place] contact guard assist;stand by assist  -LB      Transfers, Sit-Stand [place] stand by assist  -LB  contact guard assist   while showeringto wash elsi area   -AF,SHILPI,AF2    Transfers, Stand-Sit [place] stand by assist  -LB  contact guard assist   while showering to wash elsi area  -AF,SHILPI,AF2    Transfers, Sit-Stand-Sit, Assist Device rolling walker  -LB      Toilet Transfer, [place]   contact guard assist;verbal cues required   vcs for hand placement   -AF,SHILPI,AF2    Toilet Transfer, Assistive Device   elevated toilet seat;rolling walker   grab bars   -AF,SHILPI,AF2    Walk-In Shower Transfer, [place]   contact guard assist;verbal cues required   vcs for hand placement   -AF,SHILPI,AF2    Walk-In Shower Transfer, Assist Device   standard shower chair   grab bars; tsf from toilet   -AF,SHILPI,AF2    Bathtub Transfer, [place]   verbal cues required;contact guard assist   pt practiced tub tsf w/ demo to simulate home environment   -AF,SHILPI,AF2    Bathtub Transfer, Assistive Device   transfer tub bench;rolling platform walker   grab bars  -AF,SHILPI,AF2    Transfer, Comment   tsf to and from EOM from w/c w/ SBA, vcs for hand placement   -AF,SHILPI,AF2    Recorded by [LB] Angelica Treadwell PTA  [AF,SHILPI,AF2] Adelia Salamanca, OTR (r) Karen Allan, OT Student (t) Adelia Salamanca,  CHERELLER (c)    Gait Assessment/Treatment    Gait, [place] Level stand by assist  -LB      Gait, Assistive Device rolling walker  -LB      Gait, Distance (Feet) 320  -LB      Gait, Gait Deviations forward flexed posture;step length decreased  -LB      Recorded by [LB] Angelica Treadwell PTA      Stairs Assessment/Treatment    Number of Stairs 8  -LB      Stairs, Handrail Location both sides  -LB      Stairs, [place] Level contact guard assist;verbal cues required  -LB      Stairs, Technique Used step to step (ascending);step to step (descending)  -LB      Recorded by [LB] Angelica Treadwell PTA      ADL Assessment/Intervention    Additional Documentation   --   pt practiced tying shoes from lap level w/ min a   -AF,SHILPI,AF2    Recorded by   [AF,SHILPI,AF2] NO Cooney (r) Karen Allan, OT Student (t) NO Cooney (c)    Upper Body Bathing Assessment/Training    UB Bathing Assess/Train Assistive Device   grab bars;shower chair with back;hand-held shower head  -AF,SHILPI,AF2    UB Bathing Assess/Train, Position   sitting  -AF,SHILPI,AF2    UB Bathing Assess/Train, [place] Level   minimum assist (75% patient effort);verbal cues required  -AF,SHILPI,AF2    UB Bathing Assess/Train, Comment   min a w/ wetting down hair; vcs to recall that pt had already washed hair   -AF,SHILPI,AF2    Recorded by   [AF,SHILPI,AF2] NO Cooney (r) Karen Allan, OT Student (t) NO Cooney (c)    Lower Body Bathing Assessment/Training    LB Bathing Assess/Train Assistive Device   shower chair with back;hand-held shower head;grab bars  -AF,SHILPI,AF2    LB Bathing Assess/Train, Position   sitting;standing  -AF,SHILPI,AF2    LB Bathing Assess/Train, [place] Level   verbal cues required;contact guard assist  -AF,SHILPI,AF2    LB Bathing Assess/Train, Comment   vcs for sequencing; cga to maintain balance while pt washed elsi area   -AF,SHILPI,AF2    Recorded by   [AF,SHILPI,AF2] NO Cooney (r) Karen Allan, OT Student  (t) NO Cooney (c)    Upper Body Dressing Assessment/Training    UB Dressing Assess/Train, Clothing Type   donning:;doffing:;bra;t-shirt  -AF,SHILPI,AF2    UB Dressing Assess/Train, Position   sitting  -AF,SHILPI,AF2    UB Dressing Assess/Train, [place]   supervision required;set up required   set up to gather clothing   -AF,SHILPI,AF2    Recorded by   [AF,SHILPI,AF2] NO Cooney (r) Karen Allan, OT Student (t) NO Cooney (c)    Lower Body Dressing Assessment/Training    LB Dressing Assess/Train, Clothing Type   donning:;doffing:;pants;shoes;socks   underwear   -AF,SHILPI,AF2    LB Dressing Assess/Train, Position   sitting;standing;edge of bed   socks, shoes completed at EOB  -AF,SHILPI,AF2    LB Dressing Assess/Train, [place]   set up required;verbal cues required;minimum assist (75% patient effort)  -AF,SHILPI,AF2    LB Dressing Assess/Train, Comment   cga while standing to pull up underwear/pants; vcs for sequencin; required assist w/ tying shoes   -AF,SHILPI,AF2    Recorded by   [AF,SHILPI,AF2] NO Cooney (r) Karen Allan, OT Student (t) NO Cooney (c)    Toileting Assessment/Training    Toileting Assess/Train, Assistive Device   grab bars;raised toilet seat  -AF,SHILPI,AF2    Toileting Assess/Train, Position   sitting  -AF,SHILPI,AF2    Toileting Assess/Train, [place]ep[place] Level   contact guard assist  -AF,SHILPI,AF2    Toileting Assess/Train, Comment   pt able to complete hygiene while seated; tsf to shower so no LBD required after toileting   -AF,SHILPI,AF2    Recorded by   [AF,SHILPI,AF2] NO Cooney (r) Karen Allan, OT Student (t) NO Cooney (c)    Grooming Assessment/Training    Grooming Assess/Train, Position   sitting;sink side  -AF,SHILPI,AF2    Grooming Assess/Train, [place] Level   set up required  -CHAKA,SHILPI,AF2    Grooming Assess/Train, Comment   brushing hair   -CHAKA,SHILPI,AF2    Recorded by   [CHAKA,SHILPI,AF2] Adelia Salamanca, OTR (r) Karen Allan OT Student (t) Adelia Salamanca,  OTR (c)    Balance Skills Training    Gait Balance-Level of Assistance Contact guard;Minimum assistance  -LB      Gait Balance Support parallel bars;Right upper extremity supported;Left upper extremity supported  -LB      Gait Balance Activities braiding / front;side-stepping  -LB      Recorded by [LB] Angelica Treadwell PTA      Therapy Exercises    Bilateral Lower Extremities AROM:;10 reps;heel raises;mini squats;supine;heel slides;hip abduction/adduction  -LB      Trunk Exercises 10 reps;supine;bridging  -LB      Recorded by [LB] Angelica Treadwell PTA      Sensory Treatment    Sensory Reeducation Techniques   localization of touch  -AF,SHILPI,AF2    Sensory Reeduction Treatment   pt pointed w/ RUE to location on LUE where tactile sensations of various textures was felt w/ difficulty; pt was able to localize w/ R hand when repeating touch w/ eyes open   -AF,SHILPI,AF2    Recorded by   [AF,SHILPI,AF2] Adelia Salamanca OTR (r) Karen Allan OT Student (t) NO Cooney (c)    Positioning and Restraints    Pre-Treatment Position   in bed   in recliner second session   -AF,SHILPI,AF2    Post Treatment Position wheelchair  -LB  chair   both sessions   -AF,SHILPI,AF2    In Chair   call light within reach;encouraged to call for assist;sitting  -AF,SHILPI,AF2    In Wheelchair sitting;with OT  -LB      Recorded by [LB] Angelica Treadwell PTA  [AF,SHILPI,AF2] NO Cooney (r) Karen Allan OT Student (t) NO Cooney (c)      08/07/17 0943          Rehab Assessment/Intervention    Discipline physical therapy assistant  -LB      Document Type therapy note (daily note)  -LB      Subjective Information agree to therapy  -LB      Patient Effort, Rehab Treatment good  -LB      Precautions/Limitations fall precautions  -LB      Recorded by [LB] Angelica Treadwell PTA      Pain Assessment    Pain Assessment No/denies pain  -LB      Recorded by [LB] Angelica Treadwell PTA      Cognitive Assessment/Intervention    Personal  Safety Interventions fall prevention program maintained;gait belt;muscle strengthening facilitated;nonskid shoes/slippers when out of bed  -LB      Recorded by [LB] Angelica Treadwell PTA      Bed Mobility, Assessment/Treatment    Bed Mobility, Comment up in chair  -LB      Recorded by [LB] Angelica Treadwell PTA      Transfer Assessment/Treatment    Transfers, Sit-Stand [place] stand by assist  -LB      Transfers, Stand-Sit [place] stand by assist  -LB      Transfers, Sit-Stand-Sit, Assist Device rolling walker  -LB      Transfer, Comment car tsf CGA, rwx  -LB      Recorded by [LB] Angelica Treadwell PTA      Gait Assessment/Treatment    Gait, [place] Level stand by assist  -LB      Gait, Assistive Device rolling walker  -LB      Gait, Distance (Feet) 200  -LB      Gait, Gait Deviations forward flexed posture;step length decreased  -LB      Recorded by [WINNIE] Angelica Treadwell PTA      Stairs Assessment/Treatment    Number of Stairs 8  -LB      Stairs, Handrail Location both sides  -LB      Stairs, [place] Level contact guard assist  -LB      Stairs, Technique Used step to step (ascending);step to step (descending)  -LB      Recorded by [LB] Angelica Treadwell PTA      Functional Mobility    Functional Mobility- Ind. Level contact guard assist;minimum assist (75% patient effort)  -LB      Functional Mobility- Device straight cane   w tripod base  -LB      Functional Mobility-Distance (Feet) 180  -LB      Functional Mobility- Safety Issues sequencing ability decreased   LOB x 1  -LB      Recorded by [LB] Angelica Treadwell PTA      Positioning and Restraints    Post Treatment Position wheelchair  -LB      In Wheelchair sitting;call light within reach  -LB      Recorded by [LB] Angelica Treadwell PTA        User Key  (r) = Recorded By, (t) = Taken By, (c) = Cosigned By    Initials Name Effective Dates    AW Adelia Martinez, MS CCC-SLP 04/13/15 -     LB Angelica Treadwell PTA 02/18/16 -     AF Adelia hPelps  [place], OTR 12/01/15 -     SHILPI Karen Allan, OT Student 07/11/17 -                 OT Goals       08/02/17 1558 07/29/17 1054       Transfer Training OT STG    Transfer Training OT STG, Date Established 08/02/17  -AF (r) SHILPI (t) AF (c) 07/29/17  -RE     Transfer Training OT STG, Time to Achieve 1 wk  -AF (r) SHILPI (t) AF (c) 3 days  -RE     Transfer Training OT STG, Activity Type  toilet  -RE     Transfer Training OT STG, [place] Level  supervision required;verbal cues required  -RE     Transfer Training OT STG, Assist Device walker, rolling   grab bars  -AF (r) SHILPI (t) AF (c)      Transfer Training OT STG, Date Goal Reviewed 08/02/17  -AF (r) SHILPI (t) AF (c)      Transfer Training OT STG, Outcome  goal ongoing  -RE     Transfer Training OT LTG    Transfer Training OT LTG, Date Established  07/29/17  -RE     Transfer Training OT LTG, Time to Achieve  1 wk  -RE     Transfer Training OT LTG, Activity Type  toilet  -RE     Transfer Training OT LTG, [place]  conditional independence  -RE     Transfer Training OT LTG, Date Goal Reviewed 08/02/17  -AF (r) SHILPI (t) AF (c)      Transfer Training OT LTG, Outcome  goal ongoing  -RE     Transfer Training 2 OT STG    Transfer Training 2 OT STG, Date Established 08/02/17  -AF (r) SHILPI (t) AF (c)      Transfer Training 2 OT STG, Time to Achieve 1 wk  -AF (r) SHILPI (t) AF (c) 3 days  -RE     Transfer Training 2 OT STG, Activity Type shower chair   rwx  -AF (r) SHILPI (t) AF (c) shower chair  -RE     Transfer Training 2 OT STG, [place] supervision required  -AF (r) SHILPI (t) AF (c) contact guard assist  -RE     Transfer Training 2 OT STG, Outcome goal revised  -AF (r) SHILPI (t) AF (c) goal ongoing  -RE     Transfer Training 2 OT LTG    Transfer Training 2 OT LTG, Date Established 08/02/17  -AF (r) SHILPI (t) AF (c)      Transfer Training 2 OT LTG, Time to Achieve by discharge  -AF (r) SHILPI (t) AF (c) 2 wks  -RE     Transfer Training 2 OT LTG, Activity Type  shower chair;tub  -RE      Transfer Training 2 OT LTG, [place] Level  supervision required  -RE     Transfer Training 2 OT LTG, Date Goal Reviewed 08/02/17  -AF (r) SHILPI (t) AF (c)      Transfer Training 2 OT LTG, Outcome  goal ongoing  -RE     Bathing OT STG    Bathing Goal OT STG, Date Established 08/02/17  -AF (r) SHILPI (t) AF (c) 07/29/17  -RE     Bathing Goal OT STG, Time to Achieve 1 wk  -AF (r) SHILPI (t) AF (c) 3 days  -RE     Bathing Goal OT STG, Activity Type  upper body bathing;lower body bathing  -RE     Bathing Goal OT STG, [place] Level contact guard assist  -AF (r) SHILPI (t) AF (c) set up required  -RE     Bathing Goal OT STG, Assist Device grab bars;shower head, detachable;shower chair with back  -AF (r) SHILPI (t) AF (c)      Bathing Goal OT STG, Date Goal Reviewed 08/02/17  -AF (r) SHILPI (t) AF (c)      Bathing Goal OT STG, Outcome  goal ongoing  -RE     Bathing OT LTG    Bathing Goal OT LTG, Date Established 08/02/17  -AF (r) SHILPI (t) AF (c)      Bathing Goal OT LTG, Time to Achieve by discharge  -AF (r) SHILPI (t) AF (c) 2 wks  -RE     Bathing Goal OT LTG, Activity Type  upper body bathing;lower body bathing  -RE     Bathing Goal OT LTG, [place] Level supervision required  -AF (r) SHILPI (t) AF (c) conditional independence  -RE     Bathing Goal OT LTG, Date Goal Reviewed 08/02/17  -AF (r) SHILPI (t) AF (c)      Bathing Goal OT LTG, Outcome  goal ongoing  -RE     Grooming OT STG    Grooming Goal OT STG, Date Established 08/02/17  -AF (r) SHILPI (t) AF (c)      Grooming Goal OT STG, Time to Achieve 1 wk  -AF (r) SHILPI (t) AF (c) 3 days  -RE     Grooming Goal OT STG, [place] Level  conditional independence  -RE     Grooming Goal OT STG, Date Goal Reviewed 08/02/17  -AF (r) SHILPI (t) AF (c)      Grooming Goal OT STG, Outcome  goal ongoing  -RE     Grooming OT LTG    Grooming Goal OT LTG, Date Established 08/02/17  -CHAKA (christine) SHILPI (indra) CHAKA (c)      Grooming Goal OT LTG, Time to Achieve by discharge  -CHAKA (christine) SHILPI (indra) CHAKA (c) 1 wk  -RE     Grooming Goal OT LTG,  [place] Level  conditional independence  -RE     Grooming Goal OT LTG, Date Goal Reviewed 08/02/17  -AF (r) SHILPI (t) AF (c)      Grooming Goal OT LTG, Outcome  goal ongoing  -RE     LB Dressing OT STG    LB Dressing Goal OT STG, Date Established 08/02/17  -AF (r) SHILPI (t) AF (c) 07/29/17  -RE     LB Dressing Goal OT STG, Time to Achieve 1 wk  -AF (r) SHILPI (t) AF (c) 5 - 7 days  -RE     LB Dressing Goal OT STG, [place] contact guard assist   tying shoes   -AF (r) SHILPI (t) AF (c) supervision required;verbal cues required  -RE     LB Dressing Goal OT STG, Adaptive Equipment --   elastic laces  -AF (r) SHILPI (t) AF (c)      LB Dressing Goal OT STG, Date Goal Reviewed 08/02/17  -AF (r) SHILPI (t) AF (c)      LB Dressing Goal OT STG, Outcome  goal ongoing  -RE     LB Dressing OT LTG    LB Dressing Goal OT LTG, Date Established 08/02/17  -AF (r) SHILPI (t) AF (c) 07/29/17  -RE     LB Dressing Goal OT LTG, Time to Achieve by discharge  -AF (r) SHILPI (t) AF (c) 2 wks  -RE     LB Dressing Goal OT LTG, [place] supervision required  -AF (r) SHILPI (t) AF (c) conditional independence  -RE     LB Dressing Goal OT LTG, Adaptive Equipment --   w/ shoe strings  -AF (r) SHILPI (t) AF (c)      LB Dressing Goal OT LTG, Date Goal Reviewed 08/02/17  -AF (r) SHILPI (t) AF (c)      LB Dressing Goal OT LTG, Outcome  goal ongoing  -RE     UB Dressing OT STG    UB Dressing Goal OT STG, Date Established 08/02/17  -AF (r) SHILPI (t) AF (c) 07/29/17  -RE     UB Dressing Goal OT STG, Time to Achieve 1 wk  -AF (r) SHILPI (t) AF (c) 3 days  -RE     UB Dressing Goal OT STG, [place]  set up required  -RE     UB Dressing Goal OT STG, Date Goal Reviewed 08/02/17  -AF (r) SHILPI (t) AF (c)      UB Dressing Goal OT STG, Outcome  goal ongoing  -RE     UB Dressing OT LTG    UB Dressing Goal OT LTG, Date Established 08/02/17  -CHAKA eller) SHILPI (indra) CHAKA (c) 07/29/17  -RE     UB Dressing Goal OT LTG, Time to Achieve by discharge  -CHAKA (christine) SHILPI (indra) CHAKA (c) 2 wks  -RE     UB  Dressing Goal OT LTG, [place] Level  conditional independence  -RE     UB Dressing Goal OT LTG, Date Goal Reviewed 08/02/17  -AF (r) SHILPI (t) AF (c)      UB Dressing Goal OT LTG, Outcome  goal ongoing  -RE       User Key  (r) = Recorded By, (t) = Taken By, (c) = Cosigned By    Initials Name Provider Type    RE Maya Apodaca, OTR Occupational Therapist    CHAKA Salamanca, OTR Occupational Therapist    SHILPI Allan, OT Student OT Student                OT Recommendation and Plan  Anticipated Equipment Needs At Discharge: tub bench  Planned Therapy Interventions: adaptive equipment training, ADL retraining, activity intolerance, energy conservation, fine motor coordination training, neuromuscular re-education  Therapy Frequency: 5 times/wk          Time Calculation:         Time Calculation- OT       08/09/17 1023          Time Calculation- OT    OT Start Time (P)  0830  -SHILPI      OT Stop Time (P)  0900  -SHILPI      OT Time Calculation (min) (P)  30 min  -SHILPI        User Key  (r) = Recorded By, (t) = Taken By, (c) = Cosigned By    Initials Name Provider Type    SHILPI Karen Allan, OT Student OT Student           Therapy Charges for Today     Code Description Service Date Service Provider Modifiers Qty    81167104790 HC OT SELF CARE/MGMT/TRAIN EA 15 MIN 8/8/2017 Karen Allan OT Student GO 3    [id number] HC OT NEUROMUSC RE EDUCATION EA 15 MIN 8/8/2017 Karen Allan OT Student GO 1    [id number] HC OT SELF CARE/MGMT/TRAIN EA 15 MIN 8/9/2017 Karen Allan OT Student GO 2               Karen Allan OT Student  8/9/2017

## 2017-08-09 NOTE — THERAPY TREATMENT NOTE
Inpatient Rehabilitation - Physical Therapy Treatment Note  [facility]     Patient Name: Magnus Hartley  : 1928  MRN: [id number]  Today's Date: 2017  Onset of Illness/Injury or Date of Surgery Date: 17  Date of Referral to PT: 17  Referring Physician: Jac    Admit Date: 2017    Visit Dx:    ICD-10-CM ICD-9-CM   1. Left hemiparesis G81.94 342.90   2. Dysarthria R47.1 784.51     Patient Active Problem List   Diagnosis   • Foot pain   • Asthma   • Benign essential hypertension   • Controlled type 2 diabetes mellitus without complication   • Dyslipidemia   • Macrocytosis without anemia   • Senile osteoporosis   • Post-menopausal osteoporosis   • Sinus bradycardia   • Stress incontinence in female   • Urge incontinence of urine   • Atrophic vaginitis   • Encounter for fitting and adjustment of other specified devices   • Incomplete emptying of bladder   • Urethral caruncle   • Incomplete uterovaginal prolapse   • Cerebrovascular accident (CVA) due to occlusion of right middle cerebral artery   • Atrial fibrillation   • Warfarin anticoagulation (goal INR 2-3)               Adult Rehabilitation Note       17 1014 17 0931 17 1114    Rehab Assessment/Intervention    Discipline (P)  occupational therapist  -SHILPI physical therapist  -AR occupational therapist  -SHILPI NULL AF2    Document Type (P)  therapy note (daily note)  -SHILPI therapy note (daily note)  -AR therapy note (daily note)  -SHILPI NULL,AF2    Subjective Information (P)  agree to therapy;no complaints  -SHILPI agree to therapy  -AR agree to therapy;complains of;weakness  -AF,SHILPI,AF2    Patient Effort, Rehab Treatment (P)  good  -SHILPI good  -AR good  -AF,SHILPI,AF2    Symptoms Noted During/After Treatment (P)  shortness of breath   w/ tsfs, bending down for LBD when seated  -SHILPI fatigue  -AR shortness of breath   after tsfs, bending down for LBD;   -AF,SHILPI,AF2    Symptoms Noted Comment (P)  allowed for rest breaks as necessary; vcs for  deep breathing  -SHILPI  allowed for rest breaks as necessary; vcs for deep breathing   -AF,SHILPI,AF2    Precautions/Limitations (P)  fall precautions  -SHILPI fall precautions  -AR fall precautions;swallowing precautions  -AF,SHILPI,AF2    Recorded by [SHILPI] Karen Allan OT Student [AR] Sis Ramirez, PT [AF,SHILPI,AF2] NO Cooney (r) Karen Allan OT Student (t) NO Cooney (c)    Pain Assessment    Pain Assessment (P)  No/denies pain  -SHILPI No/denies pain  -AR No/denies pain  -AF,SHILPI,AF2    Recorded by [SHILPI] CHERELLE Sepulveda [AR] Sis Ramirez PT [AF,SHILPI,AF2] NO Cooney (r) Karen Allan OT Student (t) NO Cooney (c)    Cognitive Assessment/Intervention    Current Cognitive/Communication Assessment (P)  impaired  -SHILPI did not assess  -AR impaired  -AF,SHILPI,AF2    Orientation Status (P)  oriented x 4  -SHILPI oriented x 4  -AR oriented x 4  -AF,SHILPI,AF2    Follows Commands/Answers Questions (P)  100% of the time;able to follow single-step instructions;needs increased time;needs cueing;needs repetition  -SHILPI  100% of the time;able to follow single-step instructions;needs cueing;needs increased time;needs repetition  -AF,SHILPI,AF2    Personal Safety (P)  mild impairment  -SHILPI  mild impairment  -AF,SHILPI,AF2    Personal Safety Interventions  fall prevention program maintained;gait belt;muscle strengthening facilitated  -AR gait belt;nonskid shoes/slippers when out of bed;fall prevention program maintained  -AF,SHILPI,AF2    Recorded by [SHILPI] Karen Allan OT Student [AR] Sis Ramirez, PT [AF,SHILPI,AF2] NO Cooney (r) Karen Allan OT Student (t) NO Cooney (c)    Bed Mobility, Assessment/Treatment    Bed Mob, Supine to Sit, [place]  supervision required  -AR supervision required  -AF,SHILPI,AF2    Bed Mob, Sit to Supine, [place]  supervision required  -AR     Bed Mobility, Comment  HOB flat, no bed rail  -AR     Recorded by  [AR] Sis Ramirez PT [AF,SHILPI,AF2] Adelia  Mattie Salamanca OTR (r) Karen Allan OT Student (t) NO Cooney (c)    Transfer Assessment/Treatment    Transfers, Bed-Chair [place]   stand by assist;contact guard assist  -AF,SHILPI,AF2    Transfers, Sit-Stand [place] (P)  contact guard assist;verbal cues required   vcs for safe stance when standing   -SHILPI stand by assist  -AR     Transfers, Stand-Sit [place] (P)  contact guard assist  -SHILPI stand by assist  -AR     Transfers, Sit-Stand-Sit, Assist Device  rolling walker  -AR     Toilet Transfer, [place] (P)  contact guard assist  -SHILPI      Toilet Transfer, Assistive Device (P)  wheelchair   grab bars  -SHILPI      Transfer, Comment  car transfer w/ few VC and SBA  -AR tsf to and from EOM from w/c w/ CGA and vcs  -AF,SHILPI,AF2    Recorded by [SHILPI] Karen Allan, OT Student [AR] Sis Ramirez, PT [AF,SHILPI,AF2] NO Cooney (r) Karen Allan, OT Student (t) NO Cooney (c)    Gait Assessment/Treatment    Gait, [place] Level  contact guard assist;stand by assist  -AR     Gait, Assistive Device  rolling walker   CGA w/ cane  -AR     Gait, Distance (Feet)  350   , 80 w/ RW; cane 50', 80, 80   -AR     Gait, Gait Pattern Analysis  swing-through gait  -AR     Gait, Gait Deviations  rojelio decreased;decreased heel strike;forward flexed posture  -AR     Gait, Safety Issues  step length decreased;weight-shifting ability decreased  -AR     Gait, Impairments  strength decreased  -AR     Gait, Comment  outside on curb up/down ramp w/ RW  -AR     Recorded by  [AR] Sis Ramirez, PT     Stairs Assessment/Treatment    Number of Stairs  4  -AR     Stairs, Handrail Location  both sides  -AR     Stairs, [place] Level  contact guard assist  -AR     Stairs, Technique Used  step to step (descending);step to step (ascending)  -AR     Stairs, Safety Issues  weight-shifting ability decreased  -AR     Stairs, Impairments  impaired balance;strength decreased  -AR     Recorded by  [AR] Sis  James, PT     ADL Assessment/Intervention    IADL Assess/Train, Comment   pt wrote a list of ingredients and amounts needed to make stir portillo dish, pt required some help w/ spelling; pt looked through old ads to find sales and compare the cheapest options, pt required min vcs, min gestural prompts to complete task   -AF,SHILPI,AF2    Recorded by   [AF,SHILPI,AF2] NO Cooney (r) Karen Allan, OT Student (t) NO Cooney (c)    Upper Body Bathing Assessment/Training    UB Bathing Assess/Train, Position (P)  sink side;sitting  -SHILPI  sitting;sink side  -AF,SHILPI,AF2    UB Bathing Assess/Train, [place] Level (P)  stand by assist;verbal cues required   vcs for locating items   -SHILPI  minimum assist (75% patient effort);verbal cues required   vcs for setup instructions; min a w/ back   -AF,SHILPI,AF2    Recorded by [SHILPI] Karen Allan, OT Student  [AF,SHILPI,AF2] NO Cooney (r) Karen Allan, OT Student (t) NO Cooney (c)    Lower Body Bathing Assessment/Training    LB Bathing Assess/Train, Position (P)  sitting;sink side  -SHILPI  sitting;sink side;standing  -AF,SHILPI,AF2    LB Bathing Assess/Train, [place] Level (P)  stand by assist  -SHILPI  contact guard assist;verbal cues required  -AF,SHILPI,AF2    LB Bathing Assess/Train, Comment (P)  elsi/buttocks bathing performed after toileting   -SHILPI  vcs for sequencing; cga to maintaing balance while pt washed elsi area  -AF,SHILPI,AF2    Recorded by [SHILPI] Karen Allan, OT Student  [AF,SHILPI,AF2] NO Cooney (r) Karen Allan OT Student (t) NO Cooney (c)    Upper Body Dressing Assessment/Training    UB Dressing Assess/Train, Clothing Type (P)  donning:;doffing:;bra;t-shirt   dof shirt; don bra, shirt  -SHILPI  doffing:;donning:;bra;t-shirt   dof tshirt; don bra, tshirt  -AF,SHILPI,AF2    UB Dressing Assess/Train, Position (P)  sink side;sitting  -SHILPI  sitting  -AF,SHILPI,AF2    UB Dressing Assess/Train, [place] (P)  minimum assist (75% patient  effort);verbal cues required  -SHILPI  minimum assist (75% patient effort);set up required  -AF,SHILPI,AF2    UB Dressing Assess/Train, Comment (P)  pt required min a to adjust bra strap; pt required vcs and min a to thread L arm   -SHILPI  set up required to gather clothes; min a to adjust bra over back   -AF,SHILPI,AF2    Recorded by [SHILPI] Karen Allan, OT Student  [AF,SHILPI,AF2] Adelia Salamanca, OTR (r) Karen Allan, OT Student (t) Adelia Salamanca OTR (c)    Lower Body Dressing Assessment/Training    LB Dressing Assess/Train, Clothing Type (P)  doffing:;donning:;pants;shoes;socks   dof underwear, pants; don underwear, pants, socks, shoes  -SHILPI  donning:;shoes;pants;socks;slipper socks;doffing:   dof slipper socks; don underwear, pants, socks, shoes  -AF,SHILPI,AF2    LB Dressing Assess/Train, Position (P)  sitting;standing;edge of bed;sink side   socks and shoes at EOB  -SHILPI  sitting;sink side;standing;edge of bed  -AF,SHILPI,AF2    LB Dressing Assess/Train, [place] (P)  contact guard assist;verbal cues required  -SHILPI  minimum assist (75% patient effort);contact guard assist  -AF,SHILPI,AF2    LB Dressing Assess/Train, Comment (P)  vcs for task completion; CGA to maintain balance when pulling up underwear/pants; pt able to IND don socks/shoes and tie shoe strings   -SHILPI  CGA to maintain balance while standing to pull up pants; min a to tighten shoe strings, pt was able to maintain grasp on shoe strings w/ LUE for increased ind w/ shoe tying  -AF,SHILPI,AF2    Recorded by [SHILPI] Karen Allan, OT Student  [AF,SHILPI,AF2] NO Cooney (r) Karen Allan OT Student (t) NO Cooney (c)    Toileting Assessment/Training    Toileting Assess/Train, Assistive Device (P)  grab bars  -SHILPI      Toileting Assess/Train, Position (P)  sitting;standing  -SHILPI      Toileting Assess/Train, [place] Level (P)  contact guard assist;minimum assist (75% patient effort)  -SHILPI      Toileting Assess/Train, Comment (P)  pt required min a to wipe bottom  completely after BM; CGA for standing balance when managing clothing   -SHILPI      Recorded by [SHILPI] Karen Allan, OT Student      Grooming Assessment/Training    Grooming Assess/Train, Position (P)  sitting;sink side  -SHILPI  sitting;sink side  -AF,SHILPI,AF2    Grooming Assess/Train, [place] Level (P)  set up required   gathering hairbrush from other room   -SHILPI  set up required   gather materials from room   -AF,SHILPI,AF2    Recorded by [SHILPI] Karen Allan OT Student  [AF,SHILPI,AF2] NO Cooney (r) Karen Allan OT Student (t) NO Cooney (c)    Balance Skills Training    Static Standing Balance Support  eliu bar;Left upper extremity supported;Right upper extremity supported  -AR     Standing-Balance Activities  Compliant surfaces   side stepping  -AR     Gait Balance-Level of Assistance  --   TUG 22sec, 22 sec  -AR     Recorded by  [AR] Sis Ramirez PT     Sensory Treatment    Sensory Reeducation Techniques   sensory training, visual reinforcement;sensory train, w/o visual reinforcement  -AF,SHILPI,AF2    Sensory Reeduction Treatment   pt was given an every day object to feel w/ BUE and observe; pt attempted to find object in rice bucket w/ LUE w/ vision occluded; pt was able to find spoon w/ eyes closed, required eyes open to find other objects; pt buried all the items in the rice at clean up   -AF,SHILPI,AF2    Recorded by   [AF,SHILPI,AF2] NO Cooney (r) Karen Allan OT Student (t) NO Cooney (c)    Positioning and Restraints    Pre-Treatment Position (P)  sitting in chair/recliner  -SHILPI sitting in chair/recliner  -AR in bed   in w/c second session   -AF,SHILPI,AF2    Post Treatment Position (P)  chair  -SHILPI chair  -AR chair   w/c second session   -AF,SHILPI,AF2    In Chair (P)  sitting;call light within reach;encouraged to call for assist  -SHILPI reclined;sitting;call light within reach;encouraged to call for assist;exit alarm on  -AR     In Wheelchair   sitting;with SLP;call light within  reach;encouraged to call for assist  -AF,SHILPI,AF2    Recorded by [SHILPI] Karen Allan, OT Student [AR] Sis Ramirez, PT [AF,SHILPI,AF2] Adelia Salamanca, OTR (r) Karen Allan, OT Student (t) Adelia Salamanca, OTR (c)      08/08/17 1100 08/08/17 0943 08/07/17 1530    Rehab Assessment/Intervention    Discipline speech language pathologist  -AW physical therapy assistant  -LB speech language pathologist  -AW    Document Type therapy note (daily note)  -AW therapy note (daily note)  -LB therapy note (daily note)  -AW    Subjective Information no complaints;agree to therapy  -AW agree to therapy  -LB no complaints;agree to therapy  -AW    Patient Effort, Rehab Treatment good  -AW good  -LB good  -AW    Symptoms Noted During/After Treatment none  -AW  none  -AW    Precautions/Limitations fall precautions  -AW fall precautions;swallowing precautions  -LB fall precautions;swallowing precautions  -AW    Recorded by [AW] Adelia Martinez MS CCC-SLP [LB] Angelica Treadwell PTA [AW] Adelia Martinez MS CCC-SLP    Pain Assessment    Pain Assessment  No/denies pain  -LB     Recorded by  [LB] Angelica Treadwell PTA     Cognitive Assessment/Intervention    Personal Safety Interventions  fall prevention program maintained;gait belt;muscle strengthening facilitated;nonskid shoes/slippers when out of bed  -LB     Recorded by  [LB] Angelica Treadwell PTA     Improve attention    Attention Progress 50%;without cues;80%;with consistent cues  -AW  60%;without cues;100%;with inconsistent cues  -AW    Comments: Improve attention working memory task - listening to 4 words x2 and recalling one word by placement  -AW  working memory - repeating 3 words in reverse order  -AW    Recorded by [AW] Adelia Martinez MS CCC-SLP  [AW] Adelia Martinez MS CCC-SLP    Improve memory skills    Memory Skills Progress 100%;without cues  -AW  0%;without cues;100%;with consistent cues  -AW    Comments: Improve memory skills visual memory - recalling details of picture after a 12  min delay  -AW  after a 10 min delay, pt recalled 0/3 words; 3/3 w/ cues  -AW    Recorded by [AW] Adelia Martinez MS CCC-SLP  [AW] Adelia Martinez MS CCC-SLP    Improve functional problem solving    Functional Problem Solving Progress 80%;without cues;100%;with inconsistent cues  -AW  80%;without cues;100%;with inconsistent cues  -AW    Comments: Improve functional problem solving sequencing 5 step tasks  -AW  word problems; repetitions beneficial  -AW    Recorded by [AW] Adelia Martinez MS CCC-SLP  [AW] Adelia Martinez MS CCC-SLP    Improve executive function skills    Executive Function Skills Progress   70%;without cues;100%;with inconsistent cues  -AW    Comments: Improve executive function skills   deductive reasoning grid puzzle  -AW    Recorded by   [AW] Adelia Martinez MS CCC-SLP    Bed Mobility, Assessment/Treatment    Bed Mobility, Assistive Device  bed rails;head of bed elevated  -LB     Bed Mob, Supine to Sit, [place]  supervision required  -LB     Bed Mob, Sit to Supine, [place]  supervision required  -LB     Recorded by  [LB] Angelica Treadwell PTA     Transfer Assessment/Treatment    Transfers, Bed-Chair [place]  contact guard assist;stand by assist  -LB     Transfers, Chair-Bed [place]  contact guard assist;stand by assist  -LB     Transfers, Sit-Stand [place]  stand by assist  -LB     Transfers, Stand-Sit [place]  stand by assist  -LB     Transfers, Sit-Stand-Sit, Assist Device  rolling walker  -LB     Recorded by  [LB] Angelica Treadwell PTA     Gait Assessment/Treatment    Gait, [place] Level  stand by assist  -LB     Gait, Assistive Device  rolling walker  -LB     Gait, Distance (Feet)  320  -LB     Gait, Gait Deviations  forward flexed posture;step length decreased  -LB     Recorded by  [LB] Angelica Treadwell PTA     Stairs Assessment/Treatment    Number of Stairs  8  -LB     Stairs, Handrail Location  both sides  -LB     Stairs, [place] Level  contact guard assist;verbal  cues required  -LB     Stairs, Technique Used  step to step (ascending);step to step (descending)  -LB     Recorded by  [LB] Angelica Treadwell PTA     Balance Skills Training    Gait Balance-Level of Assistance  Contact guard;Minimum assistance  -LB     Gait Balance Support  parallel bars;Right upper extremity supported;Left upper extremity supported  -LB     Gait Balance Activities  braiding / front;side-stepping  -LB     Recorded by  [LB] Angelica Treadwell PTA     Therapy Exercises    Bilateral Lower Extremities  AROM:;10 reps;heel raises;mini squats;supine;heel slides;hip abduction/adduction  -LB     Trunk Exercises  10 reps;supine;bridging  -LB     Recorded by  [LB] Angelica Treadwell PTA     Positioning and Restraints    Post Treatment Position  wheelchair  -LB     In Wheelchair  sitting;with OT  -LB     Recorded by  [LB] Angelica Treadwell PTA       08/07/17 1143 08/07/17 0943       Rehab Assessment/Intervention    Discipline occupational therapist  -SHILPI NULL,KIM physical therapy assistant  -WINNIE     Document Type therapy note (daily note)  -SHILPI NULL AF2 therapy note (daily note)  -LB     Subjective Information agree to therapy;no complaints  -SHILPI NULL,CHAKA2 agree to therapy  -LB     Patient Effort, Rehab Treatment good  -SHILPI NULL,AF2 good  -LB     Symptoms Noted During/After Treatment shortness of breath   w/ tsfs, bending down from seated level   -SHILPI NULL,CHAKA2      Symptoms Noted Comment rest breaks and vcs for deep breaths as necessary   -CRYSTAL NULLD,AF2      Precautions/Limitations fall precautions  -CRYSTAL NULLD,AF2 fall precautions  -LB     Recorded by [CHAKA,SHILPI,AF2] Adelia Salamanca OTR (r) Karen Allan, OT Student (t) Adelia Salamanca OTR (c) [LB] Angelica Treadwell PTA     Pain Assessment    Pain Assessment No/denies pain  -SHILPI NULL,AF2 No/denies pain  -LB     Recorded by [CHAKA,SHILPI,AF2] NO Cooney (r) Karen Allan, OT Student (t) NO Cooney (c) [LB] Angelica Treadwell PTA     Cognitive Assessment/Intervention     Current Cognitive/Communication Assessment impaired  -AF,SHILPI,AF2      Orientation Status oriented x 4  -AF,SHILPI,AF2      Follows Commands/Answers Questions 100% of the time;able to follow single-step instructions;needs cueing;needs increased time;needs repetition  -AF,SHILPI,AF2      Personal Safety mild impairment;decreased awareness, need for safety;decreased insight to deficits  -AF,SHILPI,AF2      Personal Safety Interventions fall prevention program maintained;gait belt;nonskid shoes/slippers when out of bed;supervised activity  -AF,SHILPI,AF2 fall prevention program maintained;gait belt;muscle strengthening facilitated;nonskid shoes/slippers when out of bed  -LB     Recorded by [AF,SHILPI,AF2] Adelia Salamanca OTR (r) Karen Allan, OT Student (t) Adelia Salamanca OTR (c) [LB] Angelica Treadwell PTA     Bed Mobility, Assessment/Treatment    Bed Mob, Supine to Sit, [place] supervision required  -AF,SHILPI,AF2      Bed Mobility, Comment  up in chair  -LB     Recorded by [AF,SHILPI,AF2] Adelia Salamanca OTR (r) Karen Allan, OT Student (t) Adelia Salamanca OTR (c) [LB] Angelica Treadwell PTA     Transfer Assessment/Treatment    Transfers, Bed-Chair [place] stand by assist  -AF,SHILPI,AF2      Transfers, Sit-Stand [place] contact guard assist   while showeringto wash elsi area   -AF,SHILPI,AF2 stand by assist  -LB     Transfers, Stand-Sit [place] contact guard assist   while showering to wash elsi area  -AF,SHILPI,AF2 stand by assist  -LB     Transfers, Sit-Stand-Sit, Assist Device  rolling walker  -LB     Toilet Transfer, [place] contact guard assist;verbal cues required   vcs for hand placement   -AF,SHILPI,AF2      Toilet Transfer, Assistive Device elevated toilet seat;rolling walker   grab bars   -AF,SHILPI,AF2      Walk-In Shower Transfer, [place] contact guard assist;verbal cues required   vcs for hand placement   -AF,SHILPI,AF2      Walk-In Shower Transfer, Assist Device standard shower chair   grab bars; tsf from toilet    -AF,SHILPI,AF2      Bathtub Transfer, [place] verbal cues required;contact guard assist   pt practiced tub tsf w/ demo to simulate home environment   -CHAKA,SHILPI,AF2      Bathtub Transfer, Assistive Device transfer tub bench;rolling platform walker   grab bars  -CHAKA,SHILPI,AF2      Transfer, Comment tsf to and from EOM from w/c w/ SBA, vcs for hand placement   -CHAKA,SHILPI,AF2 car tsf CGA, rwx  -LB     Recorded by [AF,SHILPI,AF2] Adelia Salamanca OTR (r) Karen Allan, OT Student (t) NO Cooney (c) [LB] Angelica Treadwell PTA     Gait Assessment/Treatment    Gait, [place] Level  stand by assist  -LB     Gait, Assistive Device  rolling walker  -LB     Gait, Distance (Feet)  200  -LB     Gait, Gait Deviations  forward flexed posture;step length decreased  -LB     Recorded by  [LB] Angelica Treadwell PTA     Stairs Assessment/Treatment    Number of Stairs  8  -LB     Stairs, Handrail Location  both sides  -LB     Stairs, [place] Level  contact guard assist  -LB     Stairs, Technique Used  step to step (ascending);step to step (descending)  -LB     Recorded by  [LB] Angelica Treadwell PTA     Functional Mobility    Functional Mobility- Ind. Level  contact guard assist;minimum assist (75% patient effort)  -LB     Functional Mobility- Device  straight cane   w tripod base  -LB     Functional Mobility-Distance (Feet)  180  -LB     Functional Mobility- Safety Issues  sequencing ability decreased   LOB x 1  -LB     Recorded by  [LB] Angelica Treadwell PTA     ADL Assessment/Intervention    Additional Documentation --   pt practiced tying shoes from lap level w/ min a   -AF,SHILPI,AF2      Recorded by [AF,SHILPI,AF2] NO Cooney (r) Karen Allan, OT Student (t) NO Cooney (c)      Upper Body Bathing Assessment/Training    UB Bathing Assess/Train Assistive Device grab bars;shower chair with back;hand-held shower head  -CHAKA,SHILPI,AF2      UB Bathing Assess/Train, Position sitting  -CRYSTAL NULLD,AF2      UB Bathing  Assess/Train, [place] Level minimum assist (75% patient effort);verbal cues required  -AF,SHILPI,AF2      UB Bathing Assess/Train, Comment min a w/ wetting down hair; vcs to recall that pt had already washed hair   -AF,SHILPI,AF2      Recorded by [AF,SHILPI,AF2] NO Cooney (r) Karen Allan, OT Student (t) NO Cooney (c)      Lower Body Bathing Assessment/Training    LB Bathing Assess/Train Assistive Device shower chair with back;hand-held shower head;grab bars  -AF,SHILPI,AF2      LB Bathing Assess/Train, Position sitting;standing  -AF,SHILPI,AF2      LB Bathing Assess/Train, [place] Level verbal cues required;contact guard assist  -AF,SHILPI,AF2      LB Bathing Assess/Train, Comment vcs for sequencing; cga to maintain balance while pt washed elsi area   -AF,SHILPI,AF2      Recorded by [AF,SHILPI,AF2] NO Cooney (r) Karen Allan, OT Student (t) NO Cooney (c)      Upper Body Dressing Assessment/Training    UB Dressing Assess/Train, Clothing Type donning:;doffing:;bra;t-shirt  -AF,SHILPI,AF2      UB Dressing Assess/Train, Position sitting  -AF,SHILPI,AF2      UB Dressing Assess/Train, [place] supervision required;set up required   set up to gather clothing   -AF,SHILPI,AF2      Recorded by [AF,SHILPI,AF2] NO Cooney (r) Karen Allan, OT Student (t) NO Cooney (c)      Lower Body Dressing Assessment/Training    LB Dressing Assess/Train, Clothing Type donning:;doffing:;pants;shoes;socks   underwear   -AF,SHILPI,AF2      LB Dressing Assess/Train, Position sitting;standing;edge of bed   socks, shoes completed at EOB  -AF,SHILPI,AF2      LB Dressing Assess/Train, [place] set up required;verbal cues required;minimum assist (75% patient effort)  -AF,SHILPI,AF2      LB Dressing Assess/Train, Comment cga while standing to pull up underwear/pants; vcs for sequencin; required assist w/ tying shoes   -CHAKA,SHILPI,AF2      Recorded by [CHAKA,SHILPI,AF2] Adelia Salamanca, OTR (r) Karen Allan OT Student (t)  NO Cooney (c)      Toileting Assessment/Training    Toileting Assess/Train, Assistive Device grab bars;raised toilet seat  -AF,SHILPI,AF2      Toileting Assess/Train, Position sitting  -AF,SHILPI,AF2      Toileting Assess/Train, [place] Level contact guard assist  -AF,SHILPI,AF2      Toileting Assess/Train, Comment pt able to complete hygiene while seated; tsf to shower so no LBD required after toileting   -AF,SHILPI,AF2      Recorded by [AF,SHILPI,AF2] NO Cooney (r) Karen Allan OT Student (t) NO Cooney (c)      Grooming Assessment/Training    Grooming Assess/Train, Position sitting;sink side  -AF,SHILPI,AF2      Grooming Assess/Train, Indepen Level set up required  -AF,SHILPI,AF2      Grooming Assess/Train, Comment brushing hair   -AF,SHILPI,AF2      Recorded by [AF,SHILPI,AF2] NO Cooney (r) Karen Allan OT Student (t) NO Cooney (c)      Sensory Treatment    Sensory Reeducation Techniques localization of touch  -AF,SHILPI,AF2      Sensory Reeduction Treatment pt pointed w/ RUE to location on LUE where tactile sensations of various textures was felt w/ difficulty; pt was able to localize w/ R hand when repeating touch w/ eyes open   -AF,SHILPI,AF2      Recorded by [AF,SHILPI,AF2] NO Cooney (r) Karen Allan OT Student (t) NO Cooney (c)      Positioning and Restraints    Pre-Treatment Position in bed   in recliner second session   -AF,SHILPI,AF2      Post Treatment Position chair   both sessions   -AF,SHILPI,AF2 wheelchair  -LB     In Chair call light within reach;encouraged to call for assist;sitting  -AF,SHILPI,AF2      In Wheelchair  sitting;call light within reach  -LB     Recorded by [AF,SHILPI,AF2] NO Cooney (r) Karen Allan OT Student (t) NO Cooney (c) [LB] Angelica Treadwell PTA       User Key  (r) = Recorded By, (t) = Taken By, (c) = Cosigned By    Initials Name Effective Dates    AW Adelia Martinez, MS [facility]-SLP 04/13/15 -     LB Angelica Treadwell PTA  02/18/16 -     AF Adelia Salamanca, OTR 12/01/15 -     AR Sis Ramirez, PT 06/27/16 -     SHILPI Allan, OT Student 07/11/17 -                 IP PT Goals       08/04/17 1057 07/29/17 1211       Bed Mobility PT LTG    Bed Mobility PT LTG, Date Established  07/29/17  -LB     Bed Mobility PT LTG, Time to Achieve  2 wks  -LB     Bed Mobility PT LTG, Activity Type  roll left/roll right;supine to sit/sit to supine  -LB     Bed Mobility PT LTG, [place] Level  independent  -LB     Bed Mobility PT LTG, Date Goal Reviewed 08/04/17  -LBA      Bed Mobility PT LTG, Outcome goal ongoing  -LBA      Transfer Training PT LTG    Transfer Training PT LTG, Date Established  07/29/17  -LB     Transfer Training PT LTG, Time to Achieve  2 wks  -LB     Transfer Training PT LTG, Activity Type  sit to stand/stand to sit  -LB     Transfer Training PT LTG, [place] Level  conditional independence  -LB     Transfer Training PT LTG, Assist Device  cane, straight  -LB     Transfer Training PT  LTG, Date Goal Reviewed 08/04/17  -LBA      Transfer Training PT LTG, Outcome goal ongoing  -LBA      Transfer Training 2 PT LTG    Transfer Training PT 2 LTG, Date Established  07/29/17  -LB     Transfer Training PT 2 LTG, Time to Achieve  2 wks  -LB     Transfer Training PT 2 LTG, Activity Type  --   car transfer  -LB     Transfer Training PT 2 LTG, [place] Level  contact guard assist  -LB     Transfer Training PT 2 LTG, Assist Device  cane, straight  -LB     Transfer Training PT 2 LTG, Date Goal Reviewed 08/04/17  -LBA      Transfer Training PT 2 LTG, Outcome goal ongoing  -LBA      Gait Training PT LTG    Gait Training Goal PT LTG, Date Established  07/29/17  -LB     Gait Training Goal PT LTG, Time to Achieve  2 wks  -LB     Gait Training Goal PT LTG, [place] Level  conditional independence  -LB     Gait Training Goal PT LTG, Assist Device  cane, straight  -LB     Gait Training Goal PT LTG, Distance to Achieve  150  -LB      Gait Training Goal PT LTG, Date Goal Reviewed 08/04/17  -LBA      Gait Training Goal PT LTG, Outcome goal not met  -LBA      Stair Training PT LTG    Stair Training Goal PT LTG, Date Established  07/29/17  -LB     Stair Training Goal PT LTG, Time to Achieve  2 wks  -LB     Stair Training Goal PT LTG, Number of Steps  4  -LB     Stair Training Goal PT LTG, [place] Level  contact guard assist  -LB     Stair Training Goal PT LTG, Assist Device  1 handrail  -LB     Stair Training Goal PT LTG, Date Goal Reviewed 08/04/17  -LBA      Stair Training Goal PT LTG, Outcome goal ongoing  -LBA        User Key  (r) = Recorded By, (t) = Taken By, (c) = Cosigned By    Initials Name Provider Type    WINNIE Hoffmann, PT Physical Therapist    MARY Treadwell, PTA Physical Therapy Assistant          Physical Therapy Education     Title: PT OT SLP Therapies (Active)     Topic: Physical Therapy (Active)     Point: Mobility training (Active)    Learning Progress Summary    Learner Readiness Method Response Comment Documented by Status   Patient Acceptance E NR  AR 08/09/17 1038 Active    Acceptance E VU,NR  LB 08/08/17 0955 Done    Acceptance E VU,NR  LB 08/07/17 1001 Done    Eager E,RHONA HARTMANN 08/05/17 1432 Done    Acceptance E VU,NR  LB 08/04/17 1358 Done    Acceptance E VU,NR  LB 08/03/17 1103 Done    Acceptance E VU,NR car, stairs, curb LB 08/02/17 1031 Done    Acceptance E VU,NR  LB 08/01/17 0939 Done    Acceptance E VU,NR  LB 07/31/17 0947 Done    Acceptance E VU,NR  LB1 07/29/17 1210 Done               Point: Home exercise program (Active)    Learning Progress Summary    Learner Readiness Method Response Comment Documented by Status   Patient Acceptance E NR  AR 08/09/17 1038 Active    Eager E,RHONA HARTMANN 08/05/17 1432 Done               Point: Precautions (Active)    Learning Progress Summary    Learner Readiness Method Response Comment Documented by Status   Patient Acceptance E NR  AR 08/09/17 1038 Active                       User Key     Initials Effective Dates Name Provider Type Discipline    LB1 10/06/15 -  Rosemary Hoffmann, PT Physical Therapist PT    LB 02/18/16 -  Angelica Treadwell, PTA Physical Therapy Assistant PT    JT 12/01/15 -  Danielle Arroyo, PT Physical Therapist PT    AR 06/27/16 -  Sis Ramirez PT Physical Therapist PT                    PT Recommendation and Plan  Anticipated Equipment Needs At Discharge: standard cane  Anticipated Discharge Disposition: home with outpatient services, home with assist  Planned Therapy Interventions: bed mobility training, balance training, neuromuscular re-education, transfer training, strengthening, stair training  PT Frequency: 2 times/day             Outcome Measures       08/09/17 1400          Timed Up and Go (TUG)    TUG Test 1 22 seconds  -AR      TUG Test 2 22 seconds  -AR      Timed Up and Go Comments RWX  -AR      Functional Assessment    Outcome Measure Options Timed Up and Go (TUG)  -AR        User Key  (r) = Recorded By, (t) = Taken By, (c) = Cosigned By    Initials Name Provider Type    AR Sis Ramirez PT Physical Therapist           Time Calculation:         PT Charges       08/09/17 1408 08/09/17 1038 08/09/17 0739    Time Calculation    Start Time 1300  -AR 0930  -AR     Stop Time 1330  -AR 1000  -AR     Time Calculation (min) 30 min  -AR 30 min  -AR     PT Received On 08/09/17  -AR 08/09/17  -AR 08/08/17  -AR    PT - Next Appointment 08/10/17  -AR 08/09/17  -AR     PT Goal Re-Cert Due Date   08/11/17  -AR      08/09/17 0737          Time Calculation    PT - Next Appointment 08/09/17  -AR        User Key  (r) = Recorded By, (t) = Taken By, (c) = Cosigned By    Initials Name Provider Type    AR Sis Ramirez PT Physical Therapist          Therapy Charges for Today     Code Description Service Date Service Provider Modifiers Qty    95800604198 HC PT THER PROC EA 15 MIN 8/9/2017 Sis Ramirez PT GP 4          PT G-Codes  Outcome Measure  Options: Timed Up and Go (TUG)    Sis Ramirez, PT  8/9/2017

## 2017-08-09 NOTE — PROGRESS NOTES
Inpatient Rehabilitation Functional Measures Assessment and Plan of Care    Plan of Care  Updated Problems/Interventions  Mobility    [PT] Bed/Chair/Wheelchair(Active)  Current Status(08/09/2017): CGA/SBA  Weekly Goal(08/11/2017): SBA  Discharge Goal: SBA    [PT] Bed Mobility(Active)  Current Status(08/09/2017): supervision  Weekly Goal(08/11/2017): Indep  Discharge Goal: Indep    [PT] Walk(Active)  Current Status(08/09/2017): 350 Rwx SBA  Weekly Goal(08/11/2017): BR Rwx SBA  Discharge Goal: 300 ft AAD SBA    [PT] Stairs(Active)  Current Status(08/09/2017): 4 HR CGA  Weekly Goal(08/11/2017): PT only  Discharge Goal: 4 HR CGA    [PT] Car Transfers(Active)  Current Status(08/09/2017): CGA, rwx  Weekly Goal(08/11/2017): PT only  Discharge Goal: Car tsf CGA, AAD    Functional Measures  BETH Eating:  Branch  BETH Grooming: Branch  BETH Bathing:  Branch  BETH Upper Body Dressing:  Branch  BETH Lower Body Dressing:  Branch  BETH Toileting:  Branch    BETH Bladder Management  Level of Assistance:  Branch  Frequency/Number of Accidents this Shift:  Branch    BETH Bowel Management  Level of Assistance: Branch  Frequency/Number of Accidents this Shift: Branch    BETH Bed/Chair/Wheelchair Transfer:  Branch  BETH Toilet Transfer:  Branch  BETH Tub/Shower Transfer:  Branch    Previously Documented Mode of Locomotion at Discharge: Field  [facility] Expected Mode of Locomotion at Discharge: Branch  [facility] Walk/Wheelchair:  Branch  [facility] Stairs:  Branch    BETH Comprehension:  Branch  BETH Expression:  Branch  [facility] Social Interaction:  Branch  [facility] Problem Solving:  Branch  BETH Memory:  Branch    Therapy Mode Minutes  Occupational Therapy: Branch  Physical Therapy: Branch  Speech Language Pathology:  Branch Inpatient Rehabilitation Functional Measures  Assessment    Functional Measures  BETH Eating:  Branch  BETH Grooming: Branch  BETH Bathing:  Branch  BETH Upper Body Dressing:  Branch  BETH Lower Body Dressing:  Branch  BETH Toileting:  Branch    BETH Bladder  Management  Level of Assistance:  Branch  Frequency/Number of Accidents this Shift:  [facility] Bowel Management  Level of Assistance: [place]  Frequency/Number of Accidents this Shift: [facility] Bed/Chair/Wheelchair Transfer:  Bed/chair/wheelchair Transfer Score = 4.  Patient performs 75% or more of effort and minimal assistance (little/incidental  help/lifting of one limb/steadying) for transferring to and from the  bed/chair/wheelchair, requiring: Contact guard. Patient requires the following  assistive device(s): Walker.  [facility] Toilet Transfer:  [facility] Tub/Shower Transfer:  [place]    Previously Documented Mode of Locomotion at Discharge: Field  BETH Expected Mode of Locomotion at Discharge: [facility] Walk/Wheelchair:  WHEELCHAIR OBSERVATION   Activity was not observed.    WALK OBSERVATION   Walk Distance Scale = 3.  Distance walked is greater than 150 feet. Walk Score  = 4.  Patient performs 75% or more of effort and requires minimal assistance.  Incidental help/contact guard/steadying was provided. Patient walked a distance  of  350 feet. Patient requires the following assistive device(s): Rolling  walker.  BETH Stairs:  Stairs Score = 2.  Incidental assistance with lifting or lowering,  contact guard or steadying was provided. Patient performs 75% or more of effort  and requires minimal contact assistance. Patient negotiated  4 stairs. Patient  requires the following assistive device(s): Handrail(s).    BETH Comprehension:  [facility] Expression:  [facility] Social Interaction:  [facility] Problem Solving:  [facility] Memory:  [place]    Therapy Mode Minutes  Occupational Therapy: [place]  Physical Therapy: Individual: 60 minutes.  Speech Language Pathology:  [place]    Signed by: Sis Ramirez PT

## 2017-08-09 NOTE — PROGRESS NOTES
Adult Nutrition  Assessment    Patient Name:  Magnus Hartley  YOB: 1928  MRN: [id number]  Admit Date:  7/28/2017    Assessment Date:  8/9/2017          Reason for Assessment       08/09/17 1104    Reason for Assessment    Reason For Assessment/Visit follow up protocol                  Labs/Tests/Procedures/Meds       08/09/17 1105    Labs/Tests/Procedures/Meds    Diagnostic Test/Procedure Review reviewed    Labs/Tests Review Reviewed    Procedure Review SLP    Swallow eval status Done    Type of SLP Evaluation Bedside   8/8 - trial of reg/thins at lunch --> mild dysphagia, ok'd for reg/thins    Medication Review Reviewed, pertinent    Significant Vitals reviewed                Nutrition Prescription Ordered       08/09/17 1105    Nutrition Prescription PO    Current PO Diet Regular    Fluid Consistency Thin    Common Modifiers Consistent Carbohydrate            Evaluation of Received Nutrient/Fluid Intake       08/09/17 1105    PO Evaluation    Number of Days PO Intake Evaluated 3 days    Number of Meals 9    % PO Intake %          Comments:  Pt tolerating diet, intake/appetite good. Education on coumadin/Vit K done last week.         Electronically signed by:  Tashia Farah RD  08/09/17 11:08 AM

## 2017-08-09 NOTE — PLAN OF CARE
Problem: Stroke (Ischemic) (Adult)  Goal: Signs and Symptoms of Listed Potential Problems Will be Absent or Manageable (Stroke)  Outcome: Ongoing (interventions implemented as appropriate)    08/09/17 0155   Stroke (Ischemic)   Problems Assessed (Stroke (Ischemic)/TIA) all   Problems Present (Stroke (Ischemic)/TIA) motor/sensory impairment         Problem: Fall Risk (Adult)  Goal: Absence of Falls  Outcome: Ongoing (interventions implemented as appropriate)    08/09/17 0155   Fall Risk (Adult)   Absence of Falls making progress toward outcome

## 2017-08-10 LAB
ANION GAP SERPL CALCULATED.3IONS-SCNC: 11.1 MMOL/L
BASOPHILS # BLD AUTO: 0.02 10*3/MM3 (ref 0–0.2)
BASOPHILS NFR BLD AUTO: 0.4 % (ref 0–1.5)
BUN BLD-MCNC: 17 MG/DL (ref 8–23)
BUN/CREAT SERPL: 31.5 (ref 7–25)
CALCIUM SPEC-SCNC: 8.6 MG/DL (ref 8.6–10.5)
CHLORIDE SERPL-SCNC: 105 MMOL/L (ref 98–107)
CO2 SERPL-SCNC: 23.9 MMOL/L (ref 22–29)
CREAT BLD-MCNC: 0.54 MG/DL (ref 0.57–1)
DEPRECATED RDW RBC AUTO: 48.1 FL (ref 37–54)
EOSINOPHIL # BLD AUTO: 0.36 10*3/MM3 (ref 0–0.7)
EOSINOPHIL NFR BLD AUTO: 6.5 % (ref 0.3–6.2)
ERYTHROCYTE [DISTWIDTH] IN BLOOD BY AUTOMATED COUNT: 12.8 % (ref 11.7–13)
GFR SERPL CREATININE-BSD FRML MDRD: 107 ML/MIN/1.73
GLUCOSE BLD-MCNC: 105 MG/DL (ref 65–99)
GLUCOSE BLDC GLUCOMTR-MCNC: 100 MG/DL (ref 70–130)
GLUCOSE BLDC GLUCOMTR-MCNC: 109 MG/DL (ref 70–130)
HCT VFR BLD AUTO: 38.3 % (ref 35.6–45.5)
HGB BLD-MCNC: 12.4 G/DL (ref 11.9–15.5)
IMM GRANULOCYTES # BLD: 0 10*3/MM3 (ref 0–0.03)
IMM GRANULOCYTES NFR BLD: 0 % (ref 0–0.5)
LYMPHOCYTES # BLD AUTO: 1.27 10*3/MM3 (ref 0.9–4.8)
LYMPHOCYTES NFR BLD AUTO: 22.8 % (ref 19.6–45.3)
MCH RBC QN AUTO: 33.3 PG (ref 26.9–32)
MCHC RBC AUTO-ENTMCNC: 32.4 G/DL (ref 32.4–36.3)
MCV RBC AUTO: 103 FL (ref 80.5–98.2)
MONOCYTES # BLD AUTO: 0.57 10*3/MM3 (ref 0.2–1.2)
MONOCYTES NFR BLD AUTO: 10.2 % (ref 5–12)
NEUTROPHILS # BLD AUTO: 3.35 10*3/MM3 (ref 1.9–8.1)
NEUTROPHILS NFR BLD AUTO: 60.1 % (ref 42.7–76)
PLATELET # BLD AUTO: 243 10*3/MM3 (ref 140–500)
PMV BLD AUTO: 9.8 FL (ref 6–12)
POTASSIUM BLD-SCNC: 3.5 MMOL/L (ref 3.5–5.2)
RBC # BLD AUTO: 3.72 10*6/MM3 (ref 3.9–5.2)
SODIUM BLD-SCNC: 140 MMOL/L (ref 136–145)
WBC NRBC COR # BLD: 5.57 10*3/MM3 (ref 4.5–10.7)

## 2017-08-10 PROCEDURE — 97112 NEUROMUSCULAR REEDUCATION: CPT

## 2017-08-10 PROCEDURE — 80048 BASIC METABOLIC PNL TOTAL CA: CPT | Performed by: INTERNAL MEDICINE

## 2017-08-10 PROCEDURE — 97535 SELF CARE MNGMENT TRAINING: CPT

## 2017-08-10 PROCEDURE — 97532: CPT

## 2017-08-10 PROCEDURE — 82962 GLUCOSE BLOOD TEST: CPT

## 2017-08-10 PROCEDURE — 94799 UNLISTED PULMONARY SVC/PX: CPT

## 2017-08-10 PROCEDURE — 97110 THERAPEUTIC EXERCISES: CPT

## 2017-08-10 PROCEDURE — 85025 COMPLETE CBC W/AUTO DIFF WBC: CPT | Performed by: INTERNAL MEDICINE

## 2017-08-10 RX ORDER — DOCUSATE SODIUM 100 MG/1
100 CAPSULE, LIQUID FILLED ORAL DAILY
Status: DISCONTINUED | OUTPATIENT
Start: 2017-08-10 | End: 2017-08-11 | Stop reason: HOSPADM

## 2017-08-10 RX ADMIN — BUDESONIDE AND FORMOTEROL FUMARATE DIHYDRATE 2 PUFF: 80; 4.5 AEROSOL RESPIRATORY (INHALATION) at 20:06

## 2017-08-10 RX ADMIN — METOPROLOL TARTRATE 25 MG: 25 TABLET ORAL at 08:12

## 2017-08-10 RX ADMIN — METOPROLOL TARTRATE 25 MG: 25 TABLET ORAL at 21:42

## 2017-08-10 RX ADMIN — DOCUSATE SODIUM 100 MG: 100 CAPSULE, LIQUID FILLED ORAL at 08:12

## 2017-08-10 RX ADMIN — BUDESONIDE AND FORMOTEROL FUMARATE DIHYDRATE 2 PUFF: 80; 4.5 AEROSOL RESPIRATORY (INHALATION) at 07:41

## 2017-08-10 RX ADMIN — DOCUSATE SODIUM -SENNOSIDES 2 TABLET: 50; 8.6 TABLET, COATED ORAL at 21:42

## 2017-08-10 RX ADMIN — APIXABAN 5 MG: 5 TABLET, FILM COATED ORAL at 08:12

## 2017-08-10 RX ADMIN — PREDNISOLONE ACETATE 1 DROP: 10 SUSPENSION/ DROPS OPHTHALMIC at 08:12

## 2017-08-10 RX ADMIN — ATORVASTATIN CALCIUM 80 MG: 80 TABLET, FILM COATED ORAL at 21:42

## 2017-08-10 RX ADMIN — PREDNISOLONE ACETATE 1 DROP: 10 SUSPENSION/ DROPS OPHTHALMIC at 21:41

## 2017-08-10 RX ADMIN — APIXABAN 5 MG: 5 TABLET, FILM COATED ORAL at 21:42

## 2017-08-10 NOTE — THERAPY DISCHARGE NOTE
Inpatient Rehabilitation - Occupational Therapy Treatment Note/Discharge  [facility]     Patient Name: Magnus Hartley  : 1928  MRN: [id number]  Today's Date: 8/10/2017  Onset of Illness/Injury or Date of Surgery Date: 17     Referring Physician: Jac      Admit Date: 2017    Visit Dx:     ICD-10-CM ICD-9-CM   1. Left hemiparesis G81.94 342.90   2. Dysarthria R47.1 784.51     Patient Active Problem List   Diagnosis   • Foot pain   • Asthma   • Benign essential hypertension   • Controlled type 2 diabetes mellitus without complication   • Dyslipidemia   • Macrocytosis without anemia   • Senile osteoporosis   • Post-menopausal osteoporosis   • Sinus bradycardia   • Stress incontinence in female   • Urge incontinence of urine   • Atrophic vaginitis   • Encounter for fitting and adjustment of other specified devices   • Incomplete emptying of bladder   • Urethral caruncle   • Incomplete uterovaginal prolapse   • Cerebrovascular accident (CVA) due to occlusion of right middle cerebral artery   • Atrial fibrillation   • Warfarin anticoagulation (goal INR 2-3)             Adult Rehabilitation Note       08/10/17 1500 08/10/17 1100 08/10/17 1019    Rehab Assessment/Intervention    Discipline speech language pathologist  -AW speech language pathologist  -AW (P)  occupational therapist  -SHILPI    Document Type therapy note (daily note)  -AW therapy note (daily note)  -AW (P)  discharge summary;therapy note (daily note)  -SHILPI    Subjective Information no complaints;agree to therapy  -AW no complaints;agree to therapy  -AW (P)  agree to therapy;no complaints  -SHILPI    Patient Effort, Rehab Treatment good  -AW good  -AW (P)  good  -SHILPI    Symptoms Noted During/After Treatment none  -AW none  -AW     Precautions/Limitations fall precautions  -AW fall precautions  -AW (P)  fall precautions  -SHILPI    Recorded by [AW] Adelia Martinez MS CCC-SLP [AW] Adelia Martinez, MS CCC-SLP [SHILPI] Karen Allan, OT Student    Pain  Assessment    Pain Assessment  (P)  No/denies pain  -SHILPI (P)  No/denies pain  -SHILPI    Recorded by  [SHILPI] Karen Allan OT Student [SHILPI] Karen Allan OT Student    Cognitive Assessment/Intervention    Current Cognitive/Communication Assessment  (P)  impaired  -SHILPI (P)  impaired  -SHILPI    Orientation Status  (P)  oriented x 4  -SHILPI (P)  oriented x 4  -SHILPI    Follows Commands/Answers Questions  (P)  able to follow single-step instructions;100% of the time;needs repetition;needs increased time;needs cueing  -SHILPI (P)  100% of the time;able to follow single-step instructions;needs increased time;needs cueing;needs repetition  -SHILPI    Personal Safety  (P)  mild impairment  -SHILPI (P)  mild impairment  -SHILPI    Personal Safety Interventions  (P)  gait belt;nonskid shoes/slippers when out of bed  -SHILPI (P)  gait belt;fall prevention program maintained;nonskid shoes/slippers when out of bed;supervised activity  -SHILPI    Recorded by  [SHILPI] Karen Allan OT Student [SHILPI] Karen Allan OT Student    Improve memory skills    Memory Skills Progress  90%;without cues;100%;with inconsistent cues  -AW     Comments: Improve memory skills  visual memory task of studying a list of 10 animals using grouping as a strategy and then choosing the 10 animals from a list of 20  -AW     Recorded by  [AW] Adelia Martinez MS CCC-SLP     Improve functional problem solving    Functional Problem Solving Progress 90%;without cues;100%;with inconsistent cues  -AW 50%;without cues  -AW     Comments: Improve functional problem solving obtaining information from a map  -AW after a 10 min delay, pt was asked to pick the 10 animals out of a list of 20, recalling 5/10; cues did not assist furthur recall  -AW     Recorded by [AW] Adelia Martinez MS CCC-SLP [AW] Adelia Martinez MS CCC-SLP     Improve executive function skills    Executive Function Skills Progress 70%;without cues;100%;with inconsistent cues  -AW 90%;without cues  -AW     Comments: Improve executive function skills $  management task  -AW obtaining information from a prescription label  -AW     Recorded by [AW] Adelia Martinez MS CCC-SLP [AW] Adelia Martinez MS CCC-SLP     General UE Assessment    ROM   (P)  LUE ROM was WNL;RUE ROM was WNL  -SHILPI    Recorded by   [SHILPI] Karen Allan OT Student    MMT (Manual Muscle Testing)    General MMT Assessment   (P)  no strength deficits identified   BUE WFL for age   -SHILPI    General MMT Assessment Detail   (P)   R 44, L 15; 3 pt pinch R 7, L 3; lateral pinch 7, 6  -SHILPI    Recorded by   [SHILPI] Karen Allan OT Student    Bed Mobility, Assessment/Treatment    Bed Mob, Supine to Sit, [place]   (P)  supervision required  -SHILPI    Bed Mobility, Comment   (P)  elevated HOB, bed rail  -SHILPI    Recorded by   [SHILPI] Karen Allan OT Student    Transfer Assessment/Treatment    Transfers, Sit-Stand [place]   (P)  stand by assist  -SHILPI    Transfers, Stand-Sit [place]   (P)  stand by assist  -SHILPI    Transfers, Sit-Stand-Sit, Assist Device   (P)  rolling walker  -SHILPI    Recorded by   [SHILPI] Karen Allan OT Student    Upper Body Bathing Assessment/Training    UB Bathing Assess/Train Assistive Device   (P)  hand-held shower head;grab bars;shower chair with back  -SHILPI    UB Bathing Assess/Train, Position   (P)  sitting  -SHILPI    UB Bathing Assess/Train, [place] Level   (P)  supervision required;set up required;verbal cues required   vcs for locating and managing items  -SHILPI    Recorded by   [SHILPI] Karen Allan OT Student    Lower Body Bathing Assessment/Training    LB Bathing Assess/Train Assistive Device   (P)  hand-held shower head;grab bars;shower chair with back  -SHILPI    LB Bathing Assess/Train, Position   (P)  sitting;standing  -SHILPI    LB Bathing Assess/Train, [place] Level   (P)  stand by assist;verbal cues required  -SHILPI    LB Bathing Assess/Train, Comment   (P)  vcs for sequencing buttocks/elsi bathing   -SHILPI    Recorded by   [SHILPI] Karen Allan OT Student    Upper Body Dressing  Assessment/Training    UB Dressing Assess/Train, Clothing Type   (P)  doffing:;donning:;t-shirt;bra;hospital gown   dof gown, don bra, tshirt   -SHILPI    UB Dressing Assess/Train, Position   (P)  sitting  -SHILPI    UB Dressing Assess/Train, [place]   (P)  minimum assist (75% patient effort);set up required   thread R arm; assist w/ gathering clothes  -SHILPI    Recorded by   [SHILPI] Karen Allan, OT Student    Lower Body Dressing Assessment/Training    LB Dressing Assess/Train, Clothing Type   (P)  doffing:;donning:;pants;socks;shoes   dof socks; don underwear, pants, socks, shoes   -SHILPI    LB Dressing Assess/Train, Position   (P)  sitting;standing   shower chair, recliner   -SHILPI    LB Dressing Assess/Train, [place]   (P)  minimum assist (75% patient effort);supervision required  -SHILPI    LB Dressing Assess/Train, Comment   (P)  sup for standing balance when pulling up pants; min to tie L shoe, pt displayed difficulty maintaining grasp due to decreased sensation in LUE  -SHILPI    Recorded by   [SHILPI] Karen Allan, CHERELLE Student    Toileting Assessment/Training    Toileting Assess/Train, Assistive Device   (P)  grab bars;raised toilet seat  -SHILPI    Toileting Assess/Train, Position   (P)  sitting;standing  -SHILPI    Toileting Assess/Train, Indepen Level   (P)  supervision required;verbal cues required  -SHILPI    Toileting Assess/Train, Comment   (P)  pt was able to manage clothing w/ vcs; sup for standing balance during clothing management; pt was able to complete hygiene while seated   -SHILPI    Recorded by   [SHILPI] Karen Allan, OT Student    Grooming Assessment/Training    Grooming Assess/Train, Position   (P)  sitting;sink side  -SHILPI    Grooming Assess/Train, Indepen Level   (P)  set up required  -SHILPI    Grooming Assess/Train, Comment   (P)  brushing hair w/c level   -SHILPI    Recorded by   [SHILPI] Karen Allan, OT Student    Fine Motor Coordination Training    9-Hole Peg Right   (P)  22  -SHILPI    9-Hole Peg Left   (P)  49  -SHILPI    Box and  Blocks Right   (P)  57  -SHILPI    Box and Blocks Left   (P)  42   required vcs to  only one block at a time   -SHILPI    Opposition   (P)  intact;Right:;Left:  -SHILPI    Recorded by   [SHILPI] CHERELLE Sepulveda    Sensory Assessment/Intervention    Sharp/Dull Discrimination   (P)  LUE   sharp 2/3 trials correct; dull 2/2 trials correct  -SHILPI    LUE Sharp/Dull Discrimination   (P)  mild impairment  -SHILPI    Stereognosis   (P)  LUE   unable to recognize 3/3 objects, able to describe features   -SHILPI    Recorded by   [SHILPI] CHERELLE Sepulveda    Positioning and Restraints    Pre-Treatment Position   (P)  in bed  -SHILPI    Post Treatment Position   (P)  chair  -SHILPI    In Chair   (P)  sitting;with PT  -SHILPI    Recorded by   [SHILPI] Karen Allan OT Student      08/10/17 0901 08/09/17 1500 08/09/17 1434    Rehab Assessment/Intervention    Discipline physical therapist  -AR speech language pathologist  -AW occupational therapist  -CHAKA,SHILPI,AF2    Document Type therapy note (daily note)  -AR therapy note (daily note)  -AW therapy note (daily note)  -AF,SHILPI,AF2    Subjective Information agree to therapy  -AR no complaints;agree to therapy  -AW no complaints;agree to therapy  -AF,SHILPI,AF2    Patient Effort, Rehab Treatment good  -AR good  -AW good  -AF,SHILPI,AF2    Symptoms Noted During/After Treatment  none  -AW fatigue  -AF,SHILPI,AF2    Symptoms Noted Comment   seated breaks as necessary throughout task   -AF,SHILPI,AF2    Precautions/Limitations fall precautions  -AR fall precautions;swallowing precautions  -AW fall precautions  -AF,SHILPI,AF2    Recorded by [AR] Sis Ramirez, PT [AW] Adelia Martinez MS CCC-SLP [AF,SHILPI,AF2] Adelia Salamanca, OTR (r) Karen Allan OT Student (t) NO Cooney (c)    Pain Assessment    Pain Assessment No/denies pain  -AR  No/denies pain  -AF,SHILPI,AF2    Recorded by [AR] Sis Ramirez PT  [AF,SHILPI,AF2] NO Cooney (r) Karen Allan OT Student (t) Adelia Salamanca, OTR (c)    Cognitive  Assessment/Intervention    Current Cognitive/Communication Assessment did not assess  -AR  impaired  -AF,SHILPI,AF2    Orientation Status oriented x 4  -AR  oriented x 4  -AF,SHILPI,AF2    Follows Commands/Answers Questions 100% of the time  -AR  75% of the time;able to follow single-step instructions;needs cueing;needs repetition  -AF,SHILPI,AF2    Personal Safety   mild impairment  -AF,SHILPI,AF2    Personal Safety Interventions fall prevention program maintained;gait belt;muscle strengthening facilitated;supervised activity  -AR  fall prevention program maintained;gait belt;supervised activity;nonskid shoes/slippers when out of bed  -AF,SHILPI,AF2    Recorded by [AR] Sis Ramirez, PT  [AF,SHILPI,AF2] Adelia Salamanca, OTR (r) Karen Allan OT Student (t) Adelia Salamanca, OTR (c)    Improve attention    Attention Progress  50%;without cues;90%;with consistent cues  -AW     Comments: Improve attention  following written directions to carry out 2 tasks w/ a group of 6 words; consistent cues for attn to details  -AW     Recorded by  [AW] Adelia Martinez MS CCC-SLP     Improve memory skills    Memory Skills Progress  60%;without cues;100%;with inconsistent cues  -AW     Comments: Improve memory skills  category exclusion  -AW     Recorded by  [AW] Adelia Martinez MS CCC-SLP     Improve functional problem solving    Functional Problem Solving Progress  50%;without cues;80%;with consistent cues  -AW     Comments: Improve functional problem solving  [place] planning task; pt did well taking notes and trying to organize task, however, needed consistent cues to think through task  -AW     Recorded by  [AW] Adelia Martinez MS CCC-SLP     Bed Mobility, Assessment/Treatment    Bed Mob, Supine to Sit, [place] supervision required  -AR      Bed Mob, Sit to Supine, [place] supervision required  -AR      Bed Mobility, Comment HOB flat, no bed rail  -AR      Recorded by [AR] Sis Ramirez, PT      Transfer Assessment/Treatment    Transfers, Sit-Stand  [place] stand by assist  -AR      Transfers, Stand-Sit [place] stand by assist;verbal cues required   for UE placement and keeping RW w/ pt  -AR      Transfers, Sit-Stand-Sit, Assist Device rolling walker  -AR      Recorded by [AR] Sis Ramirez PT      Gait Assessment/Treatment    Gait, [place] Level contact guard assist  -AR      Gait, Assistive Device rolling walker  -AR      Gait, Distance (Feet) 300   80, 80, w/ education to son/DIL  -AR      Gait, Gait Pattern Analysis swing-through gait  -AR      Gait, Gait Deviations rojelio decreased;decreased heel strike  -AR      Gait, Safety Issues step length decreased  -AR      Gait, Comment outside on concrete, up/down ramp  -AR      Recorded by [AR] Sis Ramirez PT      Stairs Assessment/Treatment    Number of Stairs 1   curb step outside  -AR      Stairs, Handrail Location none  -AR      Stairs, [place] Level contact guard assist;verbal cues required  -AR      Stairs, Assistive Device walker  -AR      Recorded by [AR] Sis Ramirez PT      ADL Assessment/Intervention    IADL Assess/Train, Comment   pt completed cooking task to make stir portillo in unfamiliar kitchen w/ ingredients and materials set out; pt was able to use a knife safely when cutting veggies; pt required frequent vcs for body positioning to maintain safe standing posture when reaching across the counter; pt required frequent vcs for safe hand placement when transferring from sit to  w/c and hand placement when using the stove; pt required seated rest breaks, notable increase in standing endurance for self care activities   -AF,SHILPI,AF2    Additional Documentation   IADL Assess/Train, Comment (Row)  -AF,SHILPI,AF2    Recorded by   [CHAKA,SHILPI,AF2] Adelia Salamanca OTR (r) Karen Allan OT Student (t) Adelia Salamanca OTR (c)    Balance Skills Training    Standing-Level of Assistance Minimum assistance  -AR      Static Standing Balance Support No upper extremity supported   -AR      Standing-Balance Activities Retrieve object from floor   bouncing ball and catching w/o UE support  -AR      Gait Balance-Level of Assistance Contact guard  -AR      Gait Balance Support parallel bars  -AR      Gait Balance Activities backwards;side-stepping;tandem;uneven surface   exagerated wide stance gait  -AR      Recorded by [AR] Sis Ramirez PT      Therapy Exercises    Bilateral Lower Extremities standing;heel raises;mini squats;hip flexion;hip abduction/adduction;15 reps   w/ education for HEP, B decreasing to single UE support  -AR      Recorded by [AR] Sis Ramirez PT      Positioning and Restraints    Pre-Treatment Position sitting in chair/recliner  -AR  sitting in chair/recliner  -CRYSTAL NULLD,AF2    Post Treatment Position chair  -AR  chair  -SHILPI NULL AF2    In Chair reclined;sitting;call light within reach;encouraged to call for assist;exit alarm on;with nsg  -AR  call light within reach;sitting;encouraged to call for assist  -SHILPI NULL,AF2    Recorded by [AR] Sis Ramirez PT  [CHAKA,SHILPI,AF2] Adelia Salamanca, OTR (r) Karen Allan OT Student (t) Adelia Salamanca OTR (c)      08/09/17 1014 08/09/17 0931 08/08/17 1114    Rehab Assessment/Intervention    Discipline occupational therapist  -SHILPI NULL AF2 physical therapist  -AR occupational therapist  -SHILPI NULL AF2    Document Type therapy note (daily note)  -CHAKASHILPI,AF2 therapy note (daily note)  -AR therapy note (daily note)  -CHAKA,SHILPI,AF2    Subjective Information agree to therapy;no complaints  -CHAKA,SHILPI,AF2 agree to therapy  -AR agree to therapy;complains of;weakness  -AF,SHILPI,AF2    Patient Effort, Rehab Treatment good  -CHAKA,SHILPI,AF2 good  -AR good  -CHAKA,SHILPI,AF2    Symptoms Noted During/After Treatment shortness of breath   w/ tsfs, bending down for LBD when seated  -CHAKASHILPI,AF2 fatigue  -AR shortness of breath   after tsfs, bending down for LBD;   -CHAKA,SHILPI,AF2    Symptoms Noted Comment allowed for rest breaks as necessary; vcs for deep breathing  -CHAKASHILPI,AF2  allowed  for rest breaks as necessary; vcs for deep breathing   -AF,SHILPI,AF2    Precautions/Limitations fall precautions  -AF,SHILPI,AF2 fall precautions  -AR fall precautions;swallowing precautions  -AF,SHILPI,AF2    Recorded by [AF,SHILPI,AF2] NO Cooney (r) Karen Allan OT Student (t) NO Cooney (c) [AR] Sis Ramirez, PT [AF,SHILPI,AF2] NO Cooney (r) Karen Allan OT Student (t) NO Cooney (c)    Pain Assessment    Pain Assessment No/denies pain  -AF,SHILPI,AF2 No/denies pain  -AR No/denies pain  -AF,SHILPI,AF2    Recorded by [AF,SHILPI,AF2] NO Cooney (r) Karen Allan OT Student (t) NO Cooney (c) [AR] Sis Ramirez, PT [AF,SHILPI,AF2] NO Cooney (r) Karen Allan OT Student (t) NO Cooney (c)    Cognitive Assessment/Intervention    Current Cognitive/Communication Assessment impaired  -AF,SHILPI,AF2 did not assess  -AR impaired  -AF,SHILPI,AF2    Orientation Status oriented x 4  -AF,SHILPI,AF2 oriented x 4  -AR oriented x 4  -AF,SHILPI,AF2    Follows Commands/Answers Questions 100% of the time;able to follow single-step instructions;needs increased time;needs cueing;needs repetition  -AF,SHILPI,AF2  100% of the time;able to follow single-step instructions;needs cueing;needs increased time;needs repetition  -AF,SHILPI,AF2    Personal Safety mild impairment  -AF,SHILPI,AF2  mild impairment  -AF,SHILPI,AF2    Personal Safety Interventions  fall prevention program maintained;gait belt;muscle strengthening facilitated  -AR gait belt;nonskid shoes/slippers when out of bed;fall prevention program maintained  -AF,SHILPI,AF2    Recorded by [AF,SHILPI,AF2] NO Cooney (r) Karen Allan OT Student (t) NO Cooney (c) [AR] Sis Ramirez, PT [AF,SHILPI,AF2] NO Cooney (r) Karen Allan, OT Student (t) NO Cooney (c)    Bed Mobility, Assessment/Treatment    Bed Mob, Supine to Sit, [place]  supervision required  -AR supervision required  -AF,SHILPI,AF2    Bed  Mob, Sit to Supine, [place]  supervision required  -AR     Bed Mobility, Comment  HOB flat, no bed rail  -AR     Recorded by  [AR] Sis Ramirez PT [AF,SHILPI,AF2] NO Cooney (r) Karen Allan, OT Student (t) NO Cooney (c)    Transfer Assessment/Treatment    Transfers, Bed-Chair [place]   stand by assist;contact guard assist  -AF,SHILPI,AF2    Transfers, Sit-Stand [place] contact guard assist;verbal cues required   vcs for safe stance when standing   -AF,SHILPI,AF2 stand by assist  -AR     Transfers, Stand-Sit [place] contact guard assist  -AF,SHILPI,AF2 stand by assist  -AR     Transfers, Sit-Stand-Sit, Assist Device  rolling walker  -AR     Toilet Transfer, [place] contact guard assist  -AF,SHILPI,AF2      Toilet Transfer, Assistive Device wheelchair   grab bars  -AF,SHILPI,AF2      Transfer, Comment  car transfer w/ few VC and SBA  -AR tsf to and from EOM from w/c w/ CGA and vcs  -AF,SHILPI,AF2    Recorded by [AF,SHILPI,AF2] NO Cooney (r) Karen Allan, OT Student (t) NO Cooney (c) [AR] Sis Ramirez PT [AF,SHILPI,AF2] NO Cooney (r) Karen Allan, OT Student (t) NO Cooney (c)    Gait Assessment/Treatment    Gait, [place] Level  contact guard assist;stand by assist  -AR     Gait, Assistive Device  rolling walker   CGA w/ cane  -AR     Gait, Distance (Feet)  350   , 80 w/ RW; cane 50', 80, 80   -AR     Gait, Gait Pattern Analysis  swing-through gait  -AR     Gait, Gait Deviations  rojelio decreased;decreased heel strike;forward flexed posture  -AR     Gait, Safety Issues  step length decreased;weight-shifting ability decreased  -AR     Gait, Impairments  strength decreased  -AR     Gait, Comment  outside on curb up/down ramp w/ RW  -AR     Recorded by  [AR] Sis Ramirez PT     Stairs Assessment/Treatment    Number of Stairs  4  -AR     Stairs, Handrail Location  both sides  -AR     Stairs, [place] Level  contact guard assist  -AR      Stairs, Technique Used  step to step (descending);step to step (ascending)  -AR     Stairs, Safety Issues  weight-shifting ability decreased  -AR     Stairs, Impairments  impaired balance;strength decreased  -AR     Recorded by  [AR] Sis Ramirez, PT     ADL Assessment/Intervention    IADL Assess/Train, Comment   pt wrote a list of ingredients and amounts needed to make stir portillo dish, pt required some help w/ spelling; pt looked through old ads to find sales and compare the cheapest options, pt required min vcs, min gestural prompts to complete task   -AF,SHILPI,AF2    Recorded by   [AF,SHILPI,AF2] Adelia Salamanca OTR (r) Karen Allan, OT Student (t) NO Cooney (c)    Upper Body Bathing Assessment/Training    UB Bathing Assess/Train, Position sink side;sitting  -AF,SHILPI,AF2  sitting;sink side  -AF,SHILPI,AF2    UB Bathing Assess/Train, [place] Level stand by assist;verbal cues required   vcs for locating items   -AF,SHILPI,AF2  minimum assist (75% patient effort);verbal cues required   vcs for setup instructions; min a w/ back   -AF,SHILPI,AF2    Recorded by [AF,SHILPI,AF2] NO Cooney (r) Karen Allan, OT Student (t) NO Cooney (c)  [AF,SHILPI,AF2] NO Cooney (r) Karen Allan, OT Student (t) NO Cooney (c)    Lower Body Bathing Assessment/Training    LB Bathing Assess/Train, Position sitting;sink side  -AF,SHILPI,AF2  sitting;sink side;standing  -AF,SHILPI,AF2    LB Bathing Assess/Train, [place] Level stand by assist  -AF,SHILPI,AF2  contact guard assist;verbal cues required  -AF,SHILPI,AF2    LB Bathing Assess/Train, Comment elsi/buttocks bathing performed after toileting   -AF,SHILPI,AF2  vcs for sequencing; cga to maintaing balance while pt washed elsi area  -AF,SHILPI,AF2    Recorded by [AF,SHILPI,AF2] NO Cooney (r) Karen Allan, OT Student (t) Adelia Salamanca, OTR (c)  [AF,SHILPI,AF2] Adelia Salamanca, OTR (r) Karen Allan, OT Student (t) Adelia Salamanca, OTR (c)    Upper Body  Dressing Assessment/Training    UB Dressing Assess/Train, Clothing Type donning:;doffing:;bra;t-shirt   dof shirt; don bra, shirt  -AF,SHILPI,AF2  doffing:;donning:;bra;t-shirt   dof tshirt; don bra, tshirt  -AF,SHILPI,AF2    UB Dressing Assess/Train, Position sink side;sitting  -AF,SHILPI,AF2  sitting  -AF,SHILPI,AF2    UB Dressing Assess/Train, [place] minimum assist (75% patient effort);verbal cues required  -AF,SHILPI,AF2  minimum assist (75% patient effort);set up required  -AF,SHILPI,AF2    UB Dressing Assess/Train, Comment pt required min a to adjust bra strap; pt required vcs and min a to thread L arm   -AF,SHILPI,AF2  set up required to gather clothes; min a to adjust bra over back   -AF,SHILPI,AF2    Recorded by [AF,SHILPI,AF2] Adelia Salamanca, OTR (r) Karen Allan OT Student (t) Adelia Salamanca OTR (c)  [AF,SHILPI,AF2] Adelia Salamanca OTR (r) Karen Allan, OT Student (t) Adelia Salamanca OTBETO (c)    Lower Body Dressing Assessment/Training    LB Dressing Assess/Train, Clothing Type doffing:;donning:;pants;shoes;socks   dof underwear, pants; don underwear, pants, socks, shoes  -AF,SHILPI,AF2  donning:;shoes;pants;socks;slipper socks;doffing:   dof slipper socks; don underwear, pants, socks, shoes  -AF,SHILPI,AF2    LB Dressing Assess/Train, Position sitting;standing;edge of bed;sink side   socks and shoes at EOB  -AF,SHILPI,AF2  sitting;sink side;standing;edge of bed  -AF,SHILPI,AF2    LB Dressing Assess/Train, [place] contact guard assist;verbal cues required  -AF,SHILPI,AF2  minimum assist (75% patient effort);contact guard assist  -AF,SHILPI,AF2    LB Dressing Assess/Train, Comment vcs for task completion; CGA to maintain balance when pulling up underwear/pants; pt able to IND don socks/shoes and tie shoe strings   -SHILPI NULL,CHAKA2  CGA to maintain balance while standing to pull up pants; min a to tighten shoe strings, pt was able to maintain grasp on shoe strings w/ LUE for increased ind w/ shoe tying  -SHILPI NULL,CHAKA2    Recorded by [SHILPI NULL,AF2] Adelia Phelps  NO Salamanca (r) Karen Allan OT Student (t) NO Cooney (c)  [AF,SHILPI,AF2] NO Cooney (r) Karen Allan OT Student (t) NO Cooney (c)    Toileting Assessment/Training    Toileting Assess/Train, Assistive Device grab bars  -AF,SHILPI,AF2      Toileting Assess/Train, Position sitting;standing  -AF,SHILPI,AF2      Toileting Assess/Train, [place] Level contact guard assist;minimum assist (75% patient effort)  -AF,SHILPI,AF2      Toileting Assess/Train, Comment pt required min a to wipe bottom completely after BM; CGA for standing balance when managing clothing   -AF,SHILPI,AF2      Recorded by [AF,SHILPI,AF2] NO Cooney (r) Karen Allan, OT Student (t) NO Cooney (c)      Grooming Assessment/Training    Grooming Assess/Train, Position sitting;sink side  -AF,SHILPI,AF2  sitting;sink side  -AF,SHILPI,AF2    Grooming Assess/Train, Indepen Level set up required   gathering hairbrush from other room   -AF,SHILPI,AF2  set up required   gather materials from room   -AF,SHILPI,AF2    Recorded by [AF,SHILPI,AF2] NO Cooney (r) Karen Allan OT Student (t) NO Cooney (c)  [AF,SHILPI,AF2] NO Cooney (r) Karen Allan OT Student (t) NO Cooney (c)    Balance Skills Training    Static Standing Balance Support  eliu bar;Left upper extremity supported;Right upper extremity supported  -AR     Standing-Balance Activities  Compliant surfaces   side stepping  -AR     Gait Balance-Level of Assistance  --   TUG 22sec, 22 sec  -AR     Recorded by  [AR] Sis Ramirez PT     Sensory Treatment    Sensory Reeducation Techniques   sensory training, visual reinforcement;sensory train, w/o visual reinforcement  -AF,SHILPI,AF2    Sensory Reeduction Treatment   pt was given an every day object to feel w/ BUE and observe; pt attempted to find object in rice bucket w/ LUE w/ vision occluded; pt was able to find spoon w/ eyes closed, required eyes open to find other objects; pt buried all  the items in the rice at clean up   -AF,SHILPI,AF2    Recorded by   [AF,SHILPI,AF2] NO Cooney (r) Karen Allan OT Student (t) NO Cooney (c)    Positioning and Restraints    Pre-Treatment Position sitting in chair/recliner  -AF,SHILPI,AF2 sitting in chair/recliner  -AR in bed   in w/c second session   -AF,SHILPI,AF2    Post Treatment Position chair  -AF,SHILPI,AF2 chair  -AR chair   w/c second session   -AF,SHILPI,AF2    In Chair sitting;call light within reach;encouraged to call for assist  -AF,SHILPI,AF2 reclined;sitting;call light within reach;encouraged to call for assist;exit alarm on  -AR     In Wheelchair   sitting;with SLP;call light within reach;encouraged to call for assist  -AF,SHILPI,AF2    Recorded by [AF,SHILPI,AF2] NO Cooney (r) Karen Allan OT Student (t) NO Cooney (c) [AR] Sis Ramirez, PT [AF,SHILPI,AF2] NO Cooney (r) Karen Allan OT Student (t) NO Cooney (c)      08/08/17 1100 08/08/17 0943       Rehab Assessment/Intervention    Discipline speech language pathologist  -AW physical therapy assistant  -LB     Document Type therapy note (daily note)  -AW therapy note (daily note)  -LB     Subjective Information no complaints;agree to therapy  -AW agree to therapy  -LB     Patient Effort, Rehab Treatment good  -AW good  -LB     Symptoms Noted During/After Treatment none  -AW      Precautions/Limitations fall precautions  -AW fall precautions;swallowing precautions  -LB     Recorded by [AW] Adelia Martniez MS CCC-SLP [LB] Angelica Treadwell PTA     Pain Assessment    Pain Assessment  No/denies pain  -LB     Recorded by  [LB] Angelica Treadwell PTA     Cognitive Assessment/Intervention    Personal Safety Interventions  fall prevention program maintained;gait belt;muscle strengthening facilitated;nonskid shoes/slippers when out of bed  -LB     Recorded by  [LB] Angelica Treadwell PTA     Improve attention    Attention Progress 50%;without cues;80%;with consistent  cues  -AW      Comments: Improve attention working memory task - listening to 4 words x2 and recalling one word by placement  -AW      Recorded by [AW] Adelia Martinez MS CCC-SLP      Improve memory skills    Memory Skills Progress 100%;without cues  -AW      Comments: Improve memory skills visual memory - recalling details of picture after a 12 min delay  -AW      Recorded by [AW] Adelia Martinez MS CCC-SLP      Improve functional problem solving    Functional Problem Solving Progress 80%;without cues;100%;with inconsistent cues  -AW      Comments: Improve functional problem solving sequencing 5 step tasks  -AW      Recorded by [AW] Adelia Martinez MS CCC-SLP      Bed Mobility, Assessment/Treatment    Bed Mobility, Assistive Device  bed rails;head of bed elevated  -LB     Bed Mob, Supine to Sit, [place]  supervision required  -LB     Bed Mob, Sit to Supine, [place]  supervision required  -LB     Recorded by  [LB] Angelica Treadwell PTA     Transfer Assessment/Treatment    Transfers, Bed-Chair [place]  contact guard assist;stand by assist  -LB     Transfers, Chair-Bed [place]  contact guard assist;stand by assist  -LB     Transfers, Sit-Stand [place]  stand by assist  -LB     Transfers, Stand-Sit [place]  stand by assist  -LB     Transfers, Sit-Stand-Sit, Assist Device  rolling walker  -LB     Recorded by  [LB] Angelica Treadwell PTA     Gait Assessment/Treatment    Gait, [place] Level  stand by assist  -LB     Gait, Assistive Device  rolling walker  -LB     Gait, Distance (Feet)  320  -LB     Gait, Gait Deviations  forward flexed posture;step length decreased  -LB     Recorded by  [LB] Angelica Treadwell PTA     Stairs Assessment/Treatment    Number of Stairs  8  -LB     Stairs, Handrail Location  both sides  -LB     Stairs, [place] Level  contact guard assist;verbal cues required  -LB     Stairs, Technique Used  step to step (ascending);step to step (descending)  -LB     Recorded by   [LB] Angelica Treadwell PTA     Balance Skills Training    Gait Balance-Level of Assistance  Contact guard;Minimum assistance  -LB     Gait Balance Support  parallel bars;Right upper extremity supported;Left upper extremity supported  -LB     Gait Balance Activities  braiding / front;side-stepping  -LB     Recorded by  [LB] Angelica Treadwell PTA     Therapy Exercises    Bilateral Lower Extremities  AROM:;10 reps;heel raises;mini squats;supine;heel slides;hip abduction/adduction  -LB     Trunk Exercises  10 reps;supine;bridging  -LB     Recorded by  [LB] Angelica Treadwell PTA     Positioning and Restraints    Post Treatment Position  wheelchair  -LB     In Wheelchair  sitting;with OT  -LB     Recorded by  [LB] Angelica Treadwell PTA       User Key  (r) = Recorded By, (t) = Taken By, (c) = Cosigned By    Initials Name Effective Dates    AW Adelia Martinez, MS CCC-SLP 04/13/15 -     LB Angelica Treadwell, PTA 02/18/16 -     AF Adelia Salamanca, OTR 12/01/15 -     AR Sis Ramirez, PT 06/27/16 -     SHILPIJUSTIN Allan, OT Student 07/11/17 -                 OT Goals       08/09/17 1546 08/02/17 1558 07/29/17 1054    Transfer Training OT STG    Transfer Training OT STG, Date Established 08/09/17  -AF (r) SHILPI (t) AF (c) 08/02/17  -AF (r) SHILPI (t) AF (c) 07/29/17  -RE    Transfer Training OT STG, Time to Achieve  1 wk  -AF (r) SHILPI (t) AF (c) 3 days  -RE    Transfer Training OT STG, Activity Type   toilet  -RE    Transfer Training OT STG, [place] Level   supervision required;verbal cues required  -RE    Transfer Training OT STG, Assist Device  walker, rolling   grab bars  -AF (r) SHILPI (t) AF (c)     Transfer Training OT STG, Date Goal Reviewed 08/09/17  -AF (r) SHILPI (t) AF (c) 08/02/17  -AF (r) SHILPI (t) AF (c)     Transfer Training OT STG, Outcome   goal ongoing  -RE    Transfer Training OT LTG    Transfer Training OT LTG, Date Established 08/09/17  -AF (r) SHILPI (t) AF (c)  07/29/17  -RE    Transfer Training OT LTG, Time to Achieve by  discharge  -AF (r) SHILPI (t) AF (c)  1 wk  -RE    Transfer Training OT LTG, Activity Type   toilet  -RE    Transfer Training OT LTG, [place] Level   conditional independence  -RE    Transfer Training OT LTG, Date Goal Reviewed 08/09/17  -AF (r) SHILPI (t) AF (c) 08/02/17  -AF (r) SHILPI (t) AF (c)     Transfer Training OT LTG, Outcome   goal ongoing  -RE    Transfer Training 2 OT STG    Transfer Training 2 OT STG, Date Established 08/09/17  -AF (r) SHILPI (t) AF (c) 08/02/17  -AF (r) SHILPI (t) AF (c)     Transfer Training 2 OT STG, Time to Achieve  1 wk  -AF (r) SHILPI (t) AF (c) 3 days  -RE    Transfer Training 2 OT STG, Activity Type  shower chair   rwx  -AF (r) SHILPI (t) AF (c) shower chair  -RE    Transfer Training 2 OT STG, [place] Level  supervision required  -AF (r) SHILPI (t) AF (c) contact guard assist  -RE    Transfer Training 2 OT STG, Date Goal Reviewed 08/09/17  -AF (r) SHILPI (t) AF (c)      Transfer Training 2 OT STG, Outcome goal ongoing  -AF (r) SHILPI (t) AF (c) goal revised  -AF (r) SHILPI (t) AF (c) goal ongoing  -RE    Transfer Training 2 OT LTG    Transfer Training 2 OT LTG, Date Established 08/09/17  -AF (r) SHILPI (t) AF (c) 08/02/17  -AF (r) SHILPI (t) AF (c)     Transfer Training 2 OT LTG, Time to Achieve  by discharge  -AF (r) SHILPI (t) AF (c) 2 wks  -RE    Transfer Training 2 OT LTG, Activity Type   shower chair;tub  -RE    Transfer Training 2 OT LTG, [place] Level   supervision required  -RE    Transfer Training 2 OT LTG, Date Goal Reviewed 08/09/17  -AF (r) SHILPI (t) AF (c) 08/02/17  -AF (r) SHILPI (t) AF (c)     Transfer Training 2 OT LTG, Outcome   goal ongoing  -RE    Bathing OT STG    Bathing Goal OT STG, Date Established 08/09/17  -AF (r) SHILPI (t) AF (c) 08/02/17  -AF (r) SHILPI (t) AF (c) 07/29/17  -RE    Bathing Goal OT STG, Time to Achieve  1 wk  -AF (r) SHILPI (t) AF (c) 3 days  -RE    Bathing Goal OT STG, Activity Type   upper body bathing;lower body bathing  -RE    Bathing Goal OT STG, [place] Level  contact guard assist   -AF (r) SHILPI (t) AF (c) set up required  -RE    Bathing Goal OT STG, Assist Device  grab bars;shower head, detachable;shower chair with back  -AF (r) SHILPI (t) AF (c)     Bathing Goal OT STG, Date Goal Reviewed 08/09/17  -AF (r) SHILPI (t) AF (c) 08/02/17  -AF (r) SHILPI (t) AF (c)     Bathing Goal OT STG, Outcome goal ongoing  -AF (r) SHILPI (t) AF (c)  goal ongoing  -RE    Bathing OT LTG    Bathing Goal OT LTG, Date Established 08/09/17  -AF (r) SHILPI (t) AF (c) 08/02/17  -AF (r) SHILPI (t) AF (c)     Bathing Goal OT LTG, Time to Achieve  by discharge  -AF (r) SHILPI (t) AF (c) 2 wks  -RE    Bathing Goal OT LTG, Activity Type   upper body bathing;lower body bathing  -RE    Bathing Goal OT LTG, [place] Level  supervision required  -AF (r) SHILPI (t) AF (c) conditional independence  -RE    Bathing Goal OT LTG, Date Goal Reviewed 08/09/17  -AF (r) SHILPI (t) AF (c) 08/02/17  -AF (r) SHILPI (t) AF (c)     Bathing Goal OT LTG, Outcome goal ongoing  -AF (r) SHILPI (t) AF (c)  goal ongoing  -RE    Grooming OT STG    Grooming Goal OT STG, Date Established 08/09/17  -AF (r) SHILPI (t) AF (c) 08/02/17  -AF (r) SHILPI (t) AF (c)     Grooming Goal OT STG, Time to Achieve  1 wk  -AF (r) SHILPI (t) AF (c) 3 days  -RE    Grooming Goal OT STG, [place] Level   conditional independence  -RE    Grooming Goal OT STG, Date Goal Reviewed 08/08/17  -AF (r) SHILPI (t) AF (c) 08/02/17  -AF (r) SHILPI (t) AF (c)     Grooming Goal OT STG, Outcome   goal ongoing  -RE    Grooming OT LTG    Grooming Goal OT LTG, Date Established 08/09/17  -AF (r) SHILPI (t) AF (c) 08/02/17  -AF (r) SHILPI (t) AF (c)     Grooming Goal OT LTG, Time to Achieve by discharge  -CHKAA (christine) SHILPI (t) AF (c) by discharge  -CHAKA (r) SHILPI (t) CHAKA (c) 1 wk  -RE    Grooming Goal OT LTG, [place] Level   conditional independence  -RE    Grooming Goal OT LTG, Date Goal Reviewed 08/09/17  -CHAKA (christine) SHILPI (indra) CHAKA (c) 08/02/17  -CHAKA (r) SHILPI (t) CHAKA (c)     Grooming Goal OT LTG, Outcome   goal ongoing  -RE    LB Dressing OT STG    LB Dressing Goal OT STG,  Date Established  08/02/17  -AF (r) SHILPI (t) AF (c) 07/29/17  -RE    LB Dressing Goal OT STG, Time to Achieve  1 wk  -AF (r) SHILPI (t) AF (c) 5 - 7 days  -RE    LB Dressing Goal OT STG, [place] Level  contact guard assist   tying shoes   -AF (r) SHILPI (t) AF (c) supervision required;verbal cues required  -RE    LB Dressing Goal OT STG, Adaptive Equipment  --   elastic laces  -AF (r) SHILPI (t) AF (c)     LB Dressing Goal OT STG, Date Goal Reviewed 08/09/17  -AF (r) SHILPI (t) AF (c) 08/02/17  -AF (r) SHILPI (t) AF (c)     LB Dressing Goal OT STG, Outcome goal met  -AF (r) SHILPI (t) AF (c)  goal ongoing  -RE    LB Dressing OT LTG    LB Dressing Goal OT LTG, Date Established 08/09/17  -AF (r) SHILPI (t) AF (c) 08/02/17  -AF (r) SHILPI (t) AF (c) 07/29/17  -RE    LB Dressing Goal OT LTG, Time to Achieve  by discharge  -AF (r) SHILPI (t) AF (c) 2 wks  -RE    LB Dressing Goal OT LTG, [place] Level  supervision required  -AF (r) SHILPI (t) AF (c) conditional independence  -RE    LB Dressing Goal OT LTG, Adaptive Equipment  --   w/ shoe strings  -AF (r) SHILPI (t) AF (c)     LB Dressing Goal OT LTG, Date Goal Reviewed 08/09/17  -AF (r) SHILPI (t) AF (c) 08/02/17  -AF (r) SHILPI (t) AF (c)     LB Dressing Goal OT LTG, Outcome   goal ongoing  -RE    UB Dressing OT STG    UB Dressing Goal OT STG, Date Established 08/09/17  -AF (r) SHILPI (t) AF (c) 08/02/17  -AF (r) SHILPI (t) AF (c) 07/29/17  -RE    UB Dressing Goal OT STG, Time to Achieve  1 wk  -AF (r) SHILPI (t) AF (c) 3 days  -RE    UB Dressing Goal OT STG, [place] Level   set up required  -RE    UB Dressing Goal OT STG, Date Goal Reviewed 08/09/17  -AF (r) SHILPI (t) AF (c) 08/02/17  -AF (r) SHILPI (t) AF (c)     UB Dressing Goal OT STG, Outcome goal ongoing  -AF (r) SHILPI (t) AF (c)  goal ongoing  -RE    UB Dressing OT LTG    UB Dressing Goal OT LTG, Date Established 08/09/17  -AF (r) SHILPI (t) AF (c) 08/02/17  -AF (r) SHILPI (t) AF (c) 07/29/17  -RE    UB Dressing Goal OT LTG, Time to Achieve  by discharge  -AF (r) SHILPI (t) AF (c) 2  wks  -RE    UB Dressing Goal OT LTG, [place] Level   conditional independence  -RE    UB Dressing Goal OT LTG, Date Goal Reviewed 08/09/17  -AF (r) SHILPI (t) AF (c) 08/02/17  -AF (r) SHILPI (t) AF (c)     UB Dressing Goal OT LTG, Outcome   goal ongoing  -RE      User Key  (r) = Recorded By, (t) = Taken By, (c) = Cosigned By    Initials Name Provider Type    RE Maya Apodaca, OTR Occupational Therapist    CHAKA Salamanca, OTR Occupational Therapist    SHILPI Allan, OT Student OT Student          Occupational Therapy Education     Title: PT OT SLP Therapies (Active)     Topic: Occupational Therapy (Done)     Point: ADL training (Done)    Description: Instruct learner(s) on proper safety adaptation and remediation techniques during self care or transfers.   Instruct in proper use of assistive devices.    Learning Progress Summary    Learner Readiness Method Response Comment Documented by Status   Patient Acceptance E VU pt, son, and daughter in law attended family conference to review current assist levels for ADLs and safety considerations decreased LUE sensation. Recommend OP OT, tub bench, rwx, bedside commode for home. Family observed tub tsf w/ tub bench SHILPI 08/10/17 1607 Done   Family Acceptance E VU pt, son, and daughter in law attended family conference to review current assist levels for ADLs and safety considerations decreased LUE sensation. Recommend OP OT, tub bench, rwx, bedside commode for home. Family observed tub tsf w/ tub bench SHILPI 08/10/17 1607 Done               Point: Precautions (Done)    Description: Instruct learner(s) on prescribed precautions during self-care and functional transfers.    Learning Progress Summary    Learner Readiness Method Response Comment Documented by Status   Patient Eager E CINDY,NR pt was educated on the importance of awareness and safe hand and body positioning when completing cooking tasks w/ decreased sensation in LUE, especially when cutting and using the  stove/oven, in order to avoid falls, burns, cuts. SHILPI 08/09/17 1451 Done                      User Key     Initials Effective Dates Name Provider Type Discipline     07/11/17 -  Karen Allan, CHERELLE Student OT Student OT                OT Recommendation and Plan  Anticipated Equipment Needs At Discharge: tub bench  Planned Therapy Interventions: adaptive equipment training, ADL retraining, activity intolerance, energy conservation, fine motor coordination training, neuromuscular re-education  Therapy Frequency: 5 times/wk             Outcome Measures       08/09/17 1400          Timed Up and Go (TUG)    TUG Test 1 22 seconds  -AR      TUG Test 2 22 seconds  -AR      Timed Up and Go Comments RWX  -AR      Functional Assessment    Outcome Measure Options Timed Up and Go (TUG)  -AR        User Key  (r) = Recorded By, (t) = Taken By, (c) = Cosigned By    Initials Name Provider Type    AR Sis Ramirez, PT Physical Therapist           Time Calculation:          Time Calculation- OT       08/10/17 1612 08/10/17 1611 08/10/17 1014    Time Calculation- OT    OT Start Time (P)  0830  -SHILPI (P)  1100  -SHILPI     OT Stop Time (P)  0900  -SHILPI (P)  1130  -SHILPI     OT Time Calculation (min) (P)  30 min  -SHILPI (P)  30 min  -SHILPI     OT Non-Billable Time (min)  (P)  30 min   family conference  -SHILPI 15 min   team rounds  -AF      User Key  (r) = Recorded By, (t) = Taken By, (c) = Cosigned By    Initials Name Provider Type    AF Adelia Salamanca, OTR Occupational Therapist    SHILPIJUSTIN Allan, OT Student OT Student          Therapy Charges for Today     Code Description Service Date Service Provider Modifiers Qty    28360475965 HC OT SELF CARE/MGMT/TRAIN EA 15 MIN 8/9/2017 Karen Allan OT Student GO 2    77501583273 HC OT SELF CARE/MGMT/TRAIN EA 15 MIN 8/9/2017 Karen Allan OT Student GO 3    64166911139 HC OT CARE PLAN EA 15 MIN 8/10/2017 Karen Allan OT Student GO 2    75782166877 HC OT NEUROMUSC RE EDUCATION EA 15 MIN 8/10/2017  Karen Allan, OT Student GO 2    28314523548 HC OT SELF CARE/MGMT/TRAIN EA 15 MIN 8/10/2017 Karen Allan, OT Student GO 2                    Karen Allan, OT Student  8/10/2017

## 2017-08-10 NOTE — PROGRESS NOTES
Inpatient Rehabilitation Functional Measures Assessment    Functional Measures  BETH Eating:  [facility] Grooming: [facility] Bathing:  [facility] Upper Body Dressing:  [facility] Lower Body Dressing:  [facility] Toileting:  [facility] Bladder Management  Level of Assistance:  [place]  Frequency/Number of Accidents this Shift:  [facility] Bowel Management  Level of Assistance: [place]  Frequency/Number of Accidents this Shift: [facility] Bed/Chair/Wheelchair Transfer:  [facility] Toilet Transfer:  [facility] Tub/Shower Transfer:  [place]    Previously Documented Mode of Locomotion at Discharge: Field  BETH Expected Mode of Locomotion at Discharge: [facility] Walk/Wheelchair:  [facility] Stairs:  [facility] Comprehension:  Auditory comprehension is the usual mode. Comprehension  Score = 6, Modified [place].  Patient comprehends complex/abstract  information in their primary language, requiring:  BETH Expression:  Vocal expression is the usual mode. Expression Score = 6,  Modified Independent.  Patient expresses complex/abstract information in their  primary language, requiring:  [facility] Social Interaction:  Social Interaction Score = 7, Independent. Patient is  completely independent for social interaction.  There are no activity  limitations.  BETH Problem Solving:  Activity was not observed.  BETH Memory:  Memory Score = 6, Modified [place].  Patient is modified  independent for memory, requiring:    Therapy Mode Minutes  Occupational Therapy: Branch  Physical Therapy: Branch  Speech Language Pathology:  Branch    Signed by: Mila Flores RN

## 2017-08-10 NOTE — PROGRESS NOTES
Inpatient Rehabilitation Functional Measures Assessment    Functional Measures  BETH Eating:  Branch  [facility] Grooming: Branch  [facility] Bathing:  Branch  [facility] Upper Body Dressing:  Branch  [facility] Lower Body Dressing:  Branch  [facility] Toileting:  Toileting Score = 5.  Patient is supervision/set-up for  toileting, requiring: Setting out toileting equipment. Patient requires the  following assistive device(s): Grab bar.    BETH Bladder Management  Level of Assistance:  Bladder Score = 5.  Patient is supervision/set-up for  bladder management, requiring: Stand by assistance. Setting out equipment.  Patient requires the following assistive device(s): Pad . Other assistive  device/method: Pad .  Frequency/Number of Accidents this Shift:  Bladder accidents this shift:  0 .  Patient has not had an accident this shift.    BETH Bowel Management  Level of Assistance: Activity was not observed.  Frequency/Number of Accidents this Shift: [place]    [facility] Bed/Chair/Wheelchair Transfer:  Bed/chair/wheelchair Transfer Score = 5.  Patient is supervision/set-up for transferring to and from the  bed/chair/wheelchair, requiring: Stand by assistance. Verbal cuing, prompting,  or instructing. Set up (positioning equipment, lock brakes and/or adjust foot  rest). Patient requires the following assistive device(s): Bed rails. Grab bars.    BETH Toilet Transfer:  Branch  [facility] Tub/Shower Transfer:  [place]    Previously Documented Mode of Locomotion at Discharge: Field  BETH Expected Mode of Locomotion at Discharge: Branch  [facility] Walk/Wheelchair:  Branch  [facility] Stairs:  Branch    [facility] Comprehension:  Branch  [facility] Expression:  Branch  [facility] Social Interaction:  Branch  [facility] Problem Solving:  Branch  [facility] Memory:  Branch    Therapy Mode Minutes  Occupational Therapy: Branch  Physical Therapy: Branch  Speech Language Pathology:  Branch    Signed by: JULITA Lemos

## 2017-08-10 NOTE — PROGRESS NOTES
Case Management  Inpatient Rehabilitation Team Conference    Conference Date/Time: 8/10/2017 7:32:00 AM    Team Conference Attendees:  Dr. Bayron Reynolds, MSSW  Adelia Salamanca, OT  Adelia Martinez, SLP  Alex Amezquita, CTRS  Angela Cope, RN   Densie Ramirez, PT  Karen Allan OT Student    Demographics            Age: 88Y            Gender: Female    Admission Date: 7/28/2017 4:32:00 PM  Rehabilitation Diagnosis:  CVA left eliu  Past Medical History: HLD, HTN; asthma; arthritis, osteoporosis, ORIF elbow,  TKR; stress incont, urethral vag prolapse, oophrectomy; constipation;  dermatitis; DM      Plan of Care  Anticipated Discharge Date/Estimated Length of Stay: ELOS: DC 8/11  Anticipated Discharge Destination: Community discharge with assistance  Discharge Plan : Family conference Thursday, 8/10 @ 2:30.  Medical Necessity Expected Level Rationale: CGA with HH  Intensity and Duration: an average of 3 hours/5 days per week  Medical Supervision and 24 Hour Rehab Nursing: x  Physical Therapy: x  PT Intensity/Duration: 60 minutes/day, 5 days/week for 17 days  Occupational Therapy: x  OT Intensity/Duration: 60 minutes/day, 5 days/week for 17 days  Speech and Language Therapy: x  SLP Intensity/Duration: 60 minutes/day, 5 days/week for 17 days  Social Work: x  Therapeutic Recreation: x  Psychology: x  Updated (if changes indicated)    Anticipated Discharge Date/Estimated Length of Stay:   ELOS: DC 8/11    Based on the patient's medical and functional status, their prognosis and  expected level of functional improvement is: SBA with HH      Interdisciplinary Problem/Goals/Status    All Rehab Problems:  Cognition    [ST] Memory(Active)  Current Status(08/09/2017): mod deficits w/ verbal memory, benefits from cues;  better with visual tasks  Weekly Goal(08/11/2017): use daily schedule I'ly  Discharge Goal: functional memory for home w/intermittent supervision    [ST] Problem Solving(Active)  Current  Status(08/09/2017): mild-mod impairments in attention and executive  functioning  Weekly Goal(08/09/2017): complete ADL's with min cues  Discharge Goal: functional problem solving for home with intermittent  supervision        Mobility    [PT] Bed/Chair/Wheelchair(Active)  Current Status(08/09/2017): CGA/SBA  Weekly Goal(08/11/2017): SBA  Discharge Goal: SBA    [PT] Bed Mobility(Active)  Current Status(08/09/2017): supervision  Weekly Goal(08/11/2017): Indep  Discharge Goal: Indep    [PT] Walk(Active)  Current Status(08/09/2017): 350 Rwx SBA  Weekly Goal(08/11/2017): BR Rwx SBA  Discharge Goal: 300 ft AAD SBA    [PT] Stairs(Active)  Current Status(08/09/2017): 4 HR CGA  Weekly Goal(08/11/2017): PT only  Discharge Goal: 4 HR CGA    [PT] Car Transfers(Active)  Current Status(08/09/2017): CGA, rwx  Weekly Goal(08/11/2017): PT only  Discharge Goal: Car tsf CGA, AAD    [OT] Toilet Transfers(Active)  Current Status(08/09/2017): CGA/SBA  Weekly Goal(08/16/2017): MOD I  Discharge Goal: MOD I    [OT] Tub/Shower Transfers(Active)  Current Status(08/09/2017): CGA/SBA  Weekly Goal(08/16/2017): SUP  Discharge Goal: SUP        Psychosocial    [RN] Coping/Adjustment(Active)  Current Status(08/07/2017): Patient is at risk for pyschosocial issues related  to recent stroke.  Weekly Goal(08/11/2017): Patient will demonstrate approrpiate coping techniques.    Discharge Goal: Patient will be free from any psychosocial issues.        Safety    [RN] Potential for Injury(Active)  Current Status(08/07/2017): Patient is at risk for falls related to recent  stroke.  Weekly Goal(08/11/2017): Patient will be free from falls and demonstrate  appropriate safety techniques.  Discharge Goal: Same as weekly.        Self Care    [OT] Bathing(Active)  Current Status(08/09/2017): MIN  Weekly Goal(08/16/2017): CGA  Discharge Goal: CGA    [OT] Dressing (Lower)(Active)  Current Status(08/09/2017): CGA (w/ shoe strings)  Weekly Goal(08/16/2017):  SUP  Discharge Goal: SUP    [OT] Dressing (Upper)(Active)  Current Status(08/09/2017): CGA  Weekly Goal(08/16/2017): MOD I  Discharge Goal: MOD I    [OT] Grooming(Active)  Current Status(08/09/2017): SBA  Weekly Goal(08/16/2017): MOD I  Discharge Goal: MOD I    [OT] Toileting(Active)  Current Status(08/09/2017): CGA/MIN  Weekly Goal(08/16/2017): CGA  Discharge Goal: CGA        Sphincter Control    [RN] Bowel and Bladder control(Active)  Current Status(08/07/2017): Patient is continent of bowel and bladder 100% of  the time.  Weekly Goal(08/11/2017): Patient will continue to be continent of bowel and  bladder and be free from any elimintaion issues.  Discharge Goal: Same as weekly.        Comments: 8/3: impaired sensation left hand - needs assist with shoes; good  carryover; impulsive;    8/10: family conference today; left hand sensation much improved;    Signed by: Davian Yang RN    Physician CoSigned By: Bayron Nathan 08/10/2017 07:53:10

## 2017-08-10 NOTE — PROGRESS NOTES
Occupational Therapy: Individual: 60 minutes.    Physical Therapy: Branch    Speech Language Pathology:  Branch    Signed by: Karen Allan OT Student     - CoSigned By: Adelia Salamanca OT 8/10/2017 3:49:37 PM

## 2017-08-10 NOTE — PROGRESS NOTES
LOS: 13 days   Patient Care Team:  JOSHUA Chaudhari as PCP - General  Mario Mata MD as PCP - Claims Attributed  Hortencia Lisa MD as Consulting Physician (Cardiology)  Pierre Walker MD as Consulting Physician (Gastroenterology)    Chief Complaint: same    Subjective     History of Present Illness    Subjective Pt is awake and alert. No new issues.     History taken from: patient    Objective     Vital Signs  Temp:  [97.4 °F (36.3 °C)-97.9 °F (36.6 °C)] 97.4 °F (36.3 °C)  Heart Rate:  [84-99] 99  Resp:  [18-20] 20  BP: (110-152)/(77-96) 152/84    Objectiveexam unchanged    Results Review:     I reviewed the patient's new clinical results.    Medication Review:     Assessment/Plan     Active Problems:    Benign essential hypertension    Controlled type 2 diabetes mellitus without complication    Cerebrovascular accident (CVA) due to occlusion of right middle cerebral artery    Atrial fibrillation    Warfarin anticoagulation (goal INR 2-3)      Assessment & Plan Continue to prepare for dc. I returned to pt's room to discuss dc plan for tomorrow as finalized in team conference this am.     Bayron Nathan MD  08/10/17  7:14 AM    Time:

## 2017-08-10 NOTE — PLAN OF CARE
Problem: Stroke (Ischemic) (Adult)  Goal: Signs and Symptoms of Listed Potential Problems Will be Absent or Manageable (Stroke)  Outcome: Ongoing (interventions implemented as appropriate)    Problem: Fall Risk (Adult)  Goal: Absence of Falls  Outcome: Ongoing (interventions implemented as appropriate)    08/10/17 0142   Fall Risk (Adult)   Absence of Falls making progress toward outcome

## 2017-08-10 NOTE — PROGRESS NOTES
Inpatient Rehabilitation Functional Measures Assessment and Plan of Care    Plan of Care  Updated Problems/Interventions  Field    Functional Measures  BETH Eating:  Branch  [facility] Grooming: Grooming Score = 5. Patient is supervision/set-up for grooming,  requiring: Stand by assistance. No assistive devices were required.  BETH Bathing:  Patient bathed in shower. Bathing Score = 5.  Patient is  supervision/set-up for bathing, requiring: Standing by. Verbal cuing, prompting,  or instructing. Setting out bathing equipment. Patient requires the following  assistive device(s): Grab bar/arm rest to maintain balance. Hand held shower.  shower chair w/ back  BETH Upper Body Dressing:  Upper Body Dressing Score = 5. Patient is supervision  for upper body dressing, requiring: Stand by assistance. Gathering/setting out  clothes. Verbal cuing, prompting, or instructing. No assistive devices were  required.  BETH Lower Body Dressing:  Lower Body Dressing Score = 5. Patient is  supervision/set-up for lower body dressing, requiring: Standing by. Verbal  cuing, prompting, or instructing. Gathering/setting out clothes. No assistive  devices were required.  BETH Toileting:  Toileting Score = 5.  Patient is supervision/set-up for  toileting, requiring: Stand by assistance. Verbal cuing, prompting, or  instructing. No assistive devices were required.    [facility] Bladder Management  Level of Assistance:  [place]  Frequency/Number of Accidents this Shift:  [facility] Bowel Management  Level of Assistance: [place]  Frequency/Number of Accidents this Shift: [facility] Bed/Chair/Wheelchair Transfer:  [facility] Toilet Transfer:  Toilet Transfer Score = 5.  Patient is supervision/set-up  for transferring to and from the toilet/commode, requiring: Stand by assistance.  Verbal cuing, prompting, or instructing. Set up (positioning equipment, lock  brakes and/or adjust foot rests). Patient requires the following assistive  device(s): Grab bars.  Walker.  BETH Tub/Shower Transfer:  Shower Transfer Score = 5.  Patient is  supervision/set-up for transferring to and from the shower, requiring: Stand by  assistance. Verbal cuing, prompting, or instructing. Set up (positioning  equipment, lock brakes and/or adjust foot rests). Patient requires the following  assistive device(s): Grab bars. Shower chair.    Previously Documented Mode of Locomotion at Discharge: Field  BETH Expected Mode of Locomotion at Discharge: Branch  [facility] Walk/Wheelchair:  Branch  [facility] Stairs:  Branch    BETH Comprehension:  Branch  BETH Expression:  Branch  BETH Social Interaction:  Branch  BETH Problem Solving:  Branch  BETH Memory:  Branch    Therapy Mode Minutes  Occupational Therapy: Branch  Physical Therapy: Branch  Speech Language Pathology:  Branch    Signed by: Karen Allan OT Student     - CoSigned By: Adelia Salamanca OT 8/10/2017 3:51:08 PM

## 2017-08-10 NOTE — THERAPY TREATMENT NOTE
Inpatient Rehabilitation - Physical Therapy Treatment Note  Psychiatric     Patient Name: Magnus Hartley  : 1928  MRN: [id number]  Today's Date: 8/10/2017  Onset of Illness/Injury or Date of Surgery Date: 17  Date of Referral to PT: 17  Referring Physician: Jac    Admit Date: 2017    Visit Dx:    ICD-10-CM ICD-9-CM   1. Left hemiparesis G81.94 342.90   2. Dysarthria R47.1 784.51     Patient Active Problem List   Diagnosis   • Foot pain   • Asthma   • Benign essential hypertension   • Controlled type 2 diabetes mellitus without complication   • Dyslipidemia   • Macrocytosis without anemia   • Senile osteoporosis   • Post-menopausal osteoporosis   • Sinus bradycardia   • Stress incontinence in female   • Urge incontinence of urine   • Atrophic vaginitis   • Encounter for fitting and adjustment of other specified devices   • Incomplete emptying of bladder   • Urethral caruncle   • Incomplete uterovaginal prolapse   • Cerebrovascular accident (CVA) due to occlusion of right middle cerebral artery   • Atrial fibrillation   • Warfarin anticoagulation (goal INR 2-3)               Adult Rehabilitation Note       08/10/17 1100 08/10/17 1019 08/10/17 0901    Rehab Assessment/Intervention    Discipline speech language pathologist  -AW (P)  occupational therapist  -HSILPI physical therapist  -AR    Document Type therapy note (daily note)  -AW (P)  discharge summary;therapy note (daily note)  -SHILPI therapy note (daily note)  -AR    Subjective Information no complaints;agree to therapy  -AW (P)  agree to therapy;no complaints  -SHILPI agree to therapy  -AR    Patient Effort, Rehab Treatment good  -AW (P)  good  -SHILPI good  -AR    Symptoms Noted During/After Treatment none  -AW      Precautions/Limitations fall precautions  -AW (P)  fall precautions  -SHILPI fall precautions  -AR    Recorded by [AW] Adelia Martinez, MS CCC-SLP [SHILPI] Karen Allan, OT Student [AR] Sis Ramirez, PT    Pain Assessment    Pain  Assessment  (P)  No/denies pain  -SHILPI No/denies pain  -AR    Recorded by  [SHILPI] Karen Allan, OT Student [AR] Sis Ramirez, PT    Cognitive Assessment/Intervention    Current Cognitive/Communication Assessment  (P)  impaired  -SHILPI did not assess  -AR    Orientation Status  (P)  oriented x 4  -SHILPI oriented x 4  -AR    Follows Commands/Answers Questions  (P)  100% of the time;able to follow single-step instructions;needs increased time;needs cueing;needs repetition  -SHILPI 100% of the time  -AR    Personal Safety  (P)  mild impairment  -SHILPI     Personal Safety Interventions  (P)  gait belt;fall prevention program maintained;nonskid shoes/slippers when out of bed;supervised activity  -SHILPI fall prevention program maintained;gait belt;muscle strengthening facilitated;supervised activity  -AR    Recorded by  [SHILPI] Karen Allan, OT Student [AR] Sis Ramirez, PT    Improve memory skills    Memory Skills Progress 90%;without cues;100%;with inconsistent cues  -AW      Comments: Improve memory skills visual memory task of studying a list of 10 animals using grouping as a strategy and then choosing the 10 animals from a list of 20  -AW      Recorded by [AW] Adelia Martinez MS CCC-SLP      Improve functional problem solving    Functional Problem Solving Progress 50%;without cues  -AW      Comments: Improve functional problem solving after a 10 min delay, pt was asked to pick the 10 animals out of a list of 20, recalling 5/10; cues did not assist furthur recall  -AW      Recorded by [AW] Adelia Martinez MS CCC-SLP      Improve executive function skills    Executive Function Skills Progress 90%;without cues  -AW      Comments: Improve executive function skills obtaining information from a prescription label  -AW      Recorded by [AW] Adelia Martinez MS CCC-SLP      Bed Mobility, Assessment/Treatment    Bed Mob, Supine to Sit, [place]  (P)  supervision required  -SHILPI supervision required  -AR    Bed Mob, Sit to Supine, [place]    supervision required  -AR    Bed Mobility, Comment  (P)  elevated HOB, bed rail  -SHILPI HOB flat, no bed rail  -AR    Recorded by  [SHILPI] Karen Allan, OT Student [AR] Sis Ramirez PT    Transfer Assessment/Treatment    Transfers, Sit-Stand [place]  (P)  stand by assist  -SHILPI stand by assist  -AR    Transfers, Stand-Sit [place]  (P)  stand by assist  -SHILPI stand by assist;verbal cues required   for UE placement and keeping RW w/ pt  -AR    Transfers, Sit-Stand-Sit, Assist Device  (P)  rolling walker  -SHILPI rolling walker  -AR    Recorded by  [SHILPI] Karen Allan, OT Student [AR] Sis Ramirez PT    Gait Assessment/Treatment    Gait, [place] Level   contact guard assist  -AR    Gait, Assistive Device   rolling walker  -AR    Gait, Distance (Feet)   300   80, 80, w/ education to son/DIL  -AR    Gait, Gait Pattern Analysis   swing-through gait  -AR    Gait, Gait Deviations   rojelio decreased;decreased heel strike  -AR    Gait, Safety Issues   step length decreased  -AR    Gait, Comment   outside on concrete, up/down ramp  -AR    Recorded by   [AR] Sis Ramirez PT    Stairs Assessment/Treatment    Number of Stairs   1   curb step outside  -AR    Stairs, Handrail Location   none  -AR    Stairs, [place] Level   contact guard assist;verbal cues required  -AR    Stairs, Assistive Device   walker  -AR    Recorded by   [AR] Sis Ramirez PT    Upper Body Bathing Assessment/Training    UB Bathing Assess/Train Assistive Device  (P)  hand-held shower head;grab bars;shower chair with back  -SHILPI     UB Bathing Assess/Train, Position  (P)  sitting  -SHILPI     UB Bathing Assess/Train, [place] Level  (P)  supervision required;set up required;verbal cues required   vcs for locating and managing items  -SHILPI     Recorded by  [SHILPI] Karen Allan, OT Student     Lower Body Bathing Assessment/Training    LB Bathing Assess/Train Assistive Device  (P)  hand-held shower head;grab bars;shower chair with back  -SHILPI      LB Bathing Assess/Train, Position  (P)  sitting;standing  -SHILPI     LB Bathing Assess/Train, Irene Level  (P)  stand by assist;verbal cues required  -SHILPI     LB Bathing Assess/Train, Comment  (P)  vcs for sequencing buttocks/elsi bathing   -SHILPI     Recorded by  [SHILPI] Karen Allan, OT Student     Upper Body Dressing Assessment/Training    UB Dressing Assess/Train, Clothing Type  (P)  doffing:;donning:;t-shirt;bra;hospital gown   dof gown, don bra, tshirt   -SHILPI     UB Dressing Assess/Train, Position  (P)  sitting  -SHILPI     UB Dressing Assess/Train, Irene  (P)  minimum assist (75% patient effort);set up required   thread R arm; assist w/ gathering clothes  -SHILPI     Recorded by  [SHILPI] Karen Allan, OT Student     Lower Body Dressing Assessment/Training    LB Dressing Assess/Train, Clothing Type  (P)  doffing:;donning:;pants;socks;shoes   dof socks; don underwear, pants, socks, shoes   -SHILPI     LB Dressing Assess/Train, Position  (P)  sitting;standing   shower chair, recliner   -SHILPI     LB Dressing Assess/Train, Irene  (P)  minimum assist (75% patient effort);supervision required  -SHILPI     LB Dressing Assess/Train, Comment  (P)  sup for standing balance when pulling up pants; min to tie L shoe, pt displayed difficulty maintaining grasp due to decreased sensation in LUE  -SHILPI     Recorded by  [SHILPI] Karen Allan OT Student     Toileting Assessment/Training    Toileting Assess/Train, Assistive Device  (P)  grab bars;raised toilet seat  -SHILPI     Toileting Assess/Train, Position  (P)  sitting;standing  -SHILPI     Toileting Assess/Train, Indepen Level  (P)  supervision required;verbal cues required  -SIHLPI     Toileting Assess/Train, Comment  (P)  pt was able to manage clothing w/ vcs; sup for standing balance during clothing management; pt was able to complete hygiene while seated   -SHILPI     Recorded by  [SHILPI] Karen Allan, OT Student     Grooming Assessment/Training    Grooming Assess/Train, Position  (P)   sitting;sink side  -SHILPI     Grooming Assess/Train, Indepen Level  (P)  set up required  -SHILPI     Grooming Assess/Train, Comment  (P)  brushing hair w/c level   -SHILPI     Recorded by  [SHILPI] Karen Allan, OT Student     Balance Skills Training    Standing-Level of Assistance   Minimum assistance  -AR    Static Standing Balance Support   No upper extremity supported  -AR    Standing-Balance Activities   Retrieve object from floor   bouncing ball and catching w/o UE support  -AR    Gait Balance-Level of Assistance   Contact guard  -AR    Gait Balance Support   parallel bars  -AR    Gait Balance Activities   backwards;side-stepping;tandem;uneven surface   exagerated wide stance gait  -AR    Recorded by   [AR] Sis Ramirez PT    Therapy Exercises    Bilateral Lower Extremities   standing;heel raises;mini squats;hip flexion;hip abduction/adduction;15 reps   w/ education for HEP, B decreasing to single UE support  -AR    Recorded by   [AR] Sis Ramirez PT    Positioning and Restraints    Pre-Treatment Position  (P)  in bed  -SHILPI sitting in chair/recliner  -AR    Post Treatment Position  (P)  chair  -SHILPI chair  -AR    In Chair  (P)  sitting;with PT  -SHILPI reclined;sitting;call light within reach;encouraged to call for assist;exit alarm on;with nsg  -AR    Recorded by  [SHILPI] Karen Allan OT Student [AR] Sis Ramirez PT      08/09/17 1500 08/09/17 1434 08/09/17 1014    Rehab Assessment/Intervention    Discipline speech language pathologist  -AW occupational therapist  -SHILPI NULL AF2 occupational therapist  -SHILPI NULL AF2    Document Type therapy note (daily note)  -AW therapy note (daily note)  -SHILPI NULL AF2 therapy note (daily note)  -SHILPI NULL AF2    Subjective Information no complaints;agree to therapy  -AW no complaints;agree to therapy  -SHILPI NULL AF2 agree to therapy;no complaints  -SHILPI NULL AF2    Patient Effort, Rehab Treatment good  -AW good  -SHILPI NULL AF2 good  -SHILPI NULL AF2    Symptoms Noted During/After Treatment none  -AW fatigue   -AF,HSILPI,AF2 shortness of breath   w/ tsfs, bending down for LBD when seated  -AF,SHILPI,AF2    Symptoms Noted Comment  seated breaks as necessary throughout task   -AF,SHILPI,AF2 allowed for rest breaks as necessary; vcs for deep breathing  -AF,SHILPI,AF2    Precautions/Limitations fall precautions;swallowing precautions  -AW fall precautions  -AF,SHILPI,AF2 fall precautions  -AF,SHILPI,AF2    Recorded by [AW] Adelia Martinez MS CCC-SLP [AF,SHILPI,AF2] NO Cooney (r) Karen Allan OT Student (t) NO Cooney (c) [AF,SHILPI,AF2] NO Cooney (r) Karen Allan OT Student (t) NO Cooney (c)    Pain Assessment    Pain Assessment  No/denies pain  -AF,SHILPI,AF2 No/denies pain  -AF,SHILPI,AF2    Recorded by  [AF,SHILPI,AF2] NO Cooney (r) Karen Allan OT Student (t) NO Cooney (c) [AF,SHILPI,AF2] NO Cooney (r) Karen Allan OT Student (t) NO Cooney (c)    Cognitive Assessment/Intervention    Current Cognitive/Communication Assessment  impaired  -AF,SHILPI,AF2 impaired  -AF,SHILPI,AF2    Orientation Status  oriented x 4  -AF,SHILPI,AF2 oriented x 4  -AF,SHILPI,AF2    Follows Commands/Answers Questions  75% of the time;able to follow single-step instructions;needs cueing;needs repetition  -AF,SHILPI,AF2 100% of the time;able to follow single-step instructions;needs increased time;needs cueing;needs repetition  -AF,SHILPI,AF2    Personal Safety  mild impairment  -AF,SHILPI,AF2 mild impairment  -AF,SHILPI,AF2    Personal Safety Interventions  fall prevention program maintained;gait belt;supervised activity;nonskid shoes/slippers when out of bed  -AF,SHILPI,AF2     Recorded by  [AF,SHILPI,AF2] Adelia Salamanca OTR (r) Karen Allan, OT Student (t) Adelia Salamanca OTBETO (c) [AF,SHILPI,AF2] NO Cooney (r) Karen Allan, OT Student (t) Adelia Salamanca OTR (c)    Improve attention    Attention Progress 50%;without cues;90%;with consistent cues  -AW      Comments: Improve attention following written directions to  carry out 2 tasks w/ a group of 6 words; consistent cues for attn to details  -AW      Recorded by [AW] Adelia Martinez MS CCC-SLP      Improve memory skills    Memory Skills Progress 60%;without cues;100%;with inconsistent cues  -AW      Comments: Improve memory skills category exclusion  -AW      Recorded by [AW] Adelia Martinez MS CCC-SLP      Improve functional problem solving    Functional Problem Solving Progress 50%;without cues;80%;with consistent cues  -AW      Comments: Improve functional problem solving [place] planning task; pt did well taking notes and trying to organize task, however, needed consistent cues to think through task  -AW      Recorded by [AW] Adelia Martinez MS CCC-SLP      Transfer Assessment/Treatment    Transfers, Sit-Stand [place]   contact guard assist;verbal cues required   vcs for safe stance when standing   -AF,SHILPI,AF2    Transfers, Stand-Sit [place]   contact guard assist  -AF,SHILPI,AF2    Toilet Transfer, [place]   contact guard assist  -AF,SHILPI,AF2    Toilet Transfer, Assistive Device   wheelchair   grab bars  -AF,SHILPI,AF2    Recorded by   [AF,SHILPI,AF2] NO Cooney (r) Karen Allan, OT Student (t) NO Cooney (c)    ADL Assessment/Intervention    IADL Assess/Train, Comment  pt completed cooking task to make stir oprtillo in unfamiliar kitchen w/ ingredients and materials set out; pt was able to use a knife safely when cutting veggies; pt required frequent vcs for body positioning to maintain safe standing posture when reaching across the counter; pt required frequent vcs for safe hand placement when transferring from sit to  w/c and hand placement when using the stove; pt required seated rest breaks, notable increase in standing endurance for self care activities   -AF,SHILPI,AF2     Additional Documentation  IADL Assess/Train, Comment (Row)  -AF,SHILPI,AF2     Recorded by  [AF,SHILPI,AF2] NO Cooney (r) Karen Allan, OT Student (t) NO Cooney (c)      Upper Body Bathing Assessment/Training    UB Bathing Assess/Train, Position   sink side;sitting  -AF,SHILPI,AF2    UB Bathing Assess/Train, [place] Level   stand by assist;verbal cues required   vcs for locating items   -AF,SHILPI,AF2    Recorded by   [AF,SHILPI,AF2] NO Cooney (r) Karen Allan, OT Student (t) NO Cooney (c)    Lower Body Bathing Assessment/Training    LB Bathing Assess/Train, Position   sitting;sink side  -AF,SHILPI,AF2    LB Bathing Assess/Train, [place] Level   stand by assist  -AF,SHILPI,AF2    LB Bathing Assess/Train, Comment   elsi/buttocks bathing performed after toileting   -AF,SHILPI,AF2    Recorded by   [AF,SHILPI,AF2] NO Cooney (r) Karen Allan, OT Student (t) NO Cooney (c)    Upper Body Dressing Assessment/Training    UB Dressing Assess/Train, Clothing Type   donning:;doffing:;bra;t-shirt   dof shirt; don bra, shirt  -AF,SHILPI,AF2    UB Dressing Assess/Train, Position   sink side;sitting  -AF,SHILPI,AF2    UB Dressing Assess/Train, [place]   minimum assist (75% patient effort);verbal cues required  -AF,SHILPI,AF2    UB Dressing Assess/Train, Comment   pt required min a to adjust bra strap; pt required vcs and min a to thread L arm   -AF,SHILPI,AF2    Recorded by   [AF,SHILPI,AF2] NO Cooney (r) Karen Allan, OT Student (t) NO Cooney (c)    Lower Body Dressing Assessment/Training    LB Dressing Assess/Train, Clothing Type   doffing:;donning:;pants;shoes;socks   dof underwear, pants; don underwear, pants, socks, shoes  -AF,SHILPI,AF2    LB Dressing Assess/Train, Position   sitting;standing;edge of bed;sink side   socks and shoes at EOB  -AF,SHILPI,AF2    LB Dressing Assess/Train, [place]   contact guard assist;verbal cues required  -AF,SHILPI,AF2    LB Dressing Assess/Train, Comment   vcs for task completion; CGA to maintain balance when pulling up underwear/pants; pt able to IND don socks/shoes and tie shoe strings   -SHILPI NULL AF2    Recorded by    [AF,SHILPI,AF2] NO Cooney (r) Karen Allan OT Student (t) NO Cooney (c)    Toileting Assessment/Training    Toileting Assess/Train, Assistive Device   grab bars  -AF,SHILPI,AF2    Toileting Assess/Train, Position   sitting;standing  -AF,SHILPI,AF2    Toileting Assess/Train, Indepen Level   contact guard assist;minimum assist (75% patient effort)  -AF,SHILPI,AF2    Toileting Assess/Train, Comment   pt required min a to wipe bottom completely after BM; CGA for standing balance when managing clothing   -AF,SHILPI,AF2    Recorded by   [AF,SHILPI,AF2] NO Cooney (r) Karen Allan OT Student (t) NO Cooney (c)    Grooming Assessment/Training    Grooming Assess/Train, Position   sitting;sink side  -AF,SHILPI,AF2    Grooming Assess/Train, Indepen Level   set up required   gathering hairbrush from other room   -AF,SHILPI,AF2    Recorded by   [AF,SHILPI,AF2] NO Cooney (r) Karen Allan OT Student (t) NO Cooney (c)    Positioning and Restraints    Pre-Treatment Position  sitting in chair/recliner  -AF,SHILPI,AF2 sitting in chair/recliner  -AF,SHILPI,AF2    Post Treatment Position  chair  -AF,SHILPI,AF2 chair  -AF,SHILPI,AF2    In Chair  call light within reach;sitting;encouraged to call for assist  -AF,SHILPI,AF2 sitting;call light within reach;encouraged to call for assist  -AF,SHILPI,AF2    Recorded by  [AF,SHILPI,AF2] NO Cooney (r) Karen Allan OT Student (t) NO Cooney (c) [AF,SHILPI,AF2] NO Cooney (r) Karen Allan OT Student (t) NO Cooney (c)      08/09/17 0931 08/08/17 1114 08/08/17 1100    Rehab Assessment/Intervention    Discipline physical therapist  -AR occupational therapist  -SHILPI NULL AF2 speech language pathologist  -ISAAC    Document Type therapy note (daily note)  -AR therapy note (daily note)  -SHILPI NULL AF2 therapy note (daily note)  -ISAAC    Subjective Information agree to therapy  -AR agree to therapy;complains of;weakness  -SHILPI NULL AF2 no complaints;agree to  therapy  -AW    Patient Effort, Rehab Treatment good  -AR good  -AF,SHILPI,AF2 good  -AW    Symptoms Noted During/After Treatment fatigue  -AR shortness of breath   after tsfs, bending down for LBD;   -AF,SHILPI,AF2 none  -AW    Symptoms Noted Comment  allowed for rest breaks as necessary; vcs for deep breathing   -AF,SHILPI,AF2     Precautions/Limitations fall precautions  -AR fall precautions;swallowing precautions  -AF,SHILPI,AF2 fall precautions  -AW    Recorded by [AR] Sis Ramirez, PT [AF,SHILPI,AF2] Adelia Salamanca OTR (r) Karen Allan OT Student (t) NO Cooney (c) [AW] Adelia Martinez MS CCC-SLP    Pain Assessment    Pain Assessment No/denies pain  -AR No/denies pain  -AF,SHILPI,AF2     Recorded by [AR] Sis Ramirez PT [AF,SHILPI,AF2] NO Cooney (r) Karen Allan OT Student (t) NO Cooney (c)     Cognitive Assessment/Intervention    Current Cognitive/Communication Assessment did not assess  -AR impaired  -AF,SHILPI,AF2     Orientation Status oriented x 4  -AR oriented x 4  -AF,SHILPI,AF2     Follows Commands/Answers Questions  100% of the time;able to follow single-step instructions;needs cueing;needs increased time;needs repetition  -AF,SHILPI,AF2     Personal Safety  mild impairment  -AF,SHILPI,AF2     Personal Safety Interventions fall prevention program maintained;gait belt;muscle strengthening facilitated  -AR gait belt;nonskid shoes/slippers when out of bed;fall prevention program maintained  -AF,SHILPI,AF2     Recorded by [AR] Sis Ramirez, PT [AF,SHILPI,AF2] Adelia Salamanca OTR (r) Karen Allan OT Student (t) NO Cooney (c)     Improve attention    Attention Progress   50%;without cues;80%;with consistent cues  -AW    Comments: Improve attention   working memory task - listening to 4 words x2 and recalling one word by placement  -AW    Recorded by   [AW] Adelia Martinez MS CCC-SLP    Improve memory skills    Memory Skills Progress   100%;without cues  -AW    Comments: Improve memory skills    visual memory - recalling details of picture after a 12 min delay  -AW    Recorded by   [AW] Adelia Martinez MS CCC-SLP    Improve functional problem solving    Functional Problem Solving Progress   80%;without cues;100%;with inconsistent cues  -AW    Comments: Improve functional problem solving   sequencing 5 step tasks  -AW    Recorded by   [AW] Adelia Martinez MS CCC-SLP    Bed Mobility, Assessment/Treatment    Bed Mob, Supine to Sit, [place] supervision required  -AR supervision required  -AF,SHILPI,AF2     Bed Mob, Sit to Supine, [place] supervision required  -AR      Bed Mobility, Comment HOB flat, no bed rail  -AR      Recorded by [AR] Sis Ramirez PT [AF,SHILPI,AF2] Adelia Salamanca OTR (r) Karen Allan, OT Student (t) NO Cooney (c)     Transfer Assessment/Treatment    Transfers, Bed-Chair [place]  stand by assist;contact guard assist  -AF,SHILPI,AF2     Transfers, Sit-Stand [place] stand by assist  -AR      Transfers, Stand-Sit [place] stand by assist  -AR      Transfers, Sit-Stand-Sit, Assist Device rolling walker  -AR      Transfer, Comment car transfer w/ few VC and SBA  -AR tsf to and from EOM from w/c w/ CGA and vcs  -AF,SHILPI,AF2     Recorded by [AR] Sis Ramirez PT [AF,SHILPI,AF2] Adelia Salamanca OTR (r) Karen Allan, OT Student (t) NO Cooney (c)     Gait Assessment/Treatment    Gait, [place] Level contact guard assist;stand by assist  -AR      Gait, Assistive Device rolling walker   CGA w/ cane  -AR      Gait, Distance (Feet) 350   , 80 w/ RW; cane 50', 80, 80   -AR      Gait, Gait Pattern Analysis swing-through gait  -AR      Gait, Gait Deviations rojelio decreased;decreased heel strike;forward flexed posture  -AR      Gait, Safety Issues step length decreased;weight-shifting ability decreased  -AR      Gait, Impairments strength decreased  -AR      Gait, Comment outside on curb up/down ramp w/ RW  -AR      Recorded by [AR] Sis Ramirez, PT      Stairs  Assessment/Treatment    Number of Stairs 4  -AR      Stairs, Handrail Location both sides  -AR      Stairs, [place] Level contact guard assist  -AR      Stairs, Technique Used step to step (descending);step to step (ascending)  -AR      Stairs, Safety Issues weight-shifting ability decreased  -AR      Stairs, Impairments impaired balance;strength decreased  -AR      Recorded by [AR] Sis Ramirez, PT      ADL Assessment/Intervention    IADL Assess/Train, Comment  pt wrote a list of ingredients and amounts needed to make stir portillo dish, pt required some help w/ spelling; pt looked through old ads to find sales and compare the cheapest options, pt required min vcs, min gestural prompts to complete task   -AF,SHILPI,AF2     Recorded by  [AF,SHILPI,AF2] NO Cooney (r) Karen Allan, OT Student (t) NO Cooney (c)     Upper Body Bathing Assessment/Training    UB Bathing Assess/Train, Position  sitting;sink side  -AF,SHILPI,AF2     UB Bathing Assess/Train, [place] Level  minimum assist (75% patient effort);verbal cues required   vcs for setup instructions; min a w/ back   -AF,SHILPI,AF2     Recorded by  [AF,SHILPI,AF2] NO Cooney (r) Karen Allan, OT Student (t) NO Cooney (c)     Lower Body Bathing Assessment/Training    LB Bathing Assess/Train, Position  sitting;sink side;standing  -AF,SHILPI,AF2     LB Bathing Assess/Train, [place] Level  contact guard assist;verbal cues required  -AF,SHILPI,AF2     LB Bathing Assess/Train, Comment  vcs for sequencing; cga to maintaing balance while pt washed elsi area  -AF,SHILPI,AF2     Recorded by  [AF,SHILPI,AF2] NO Cooney (r) Karen Allan, OT Student (t) NO Cooney (c)     Upper Body Dressing Assessment/Training    UB Dressing Assess/Train, Clothing Type  doffing:;donning:;bra;t-shirt   dof tshirt; don bra, tshirt  -AF,SHILPI,AF2     UB Dressing Assess/Train, Position  sitting  -AF,SHILPI,AF2     UB Dressing Assess/Train, [place]   minimum assist (75% patient effort);set up required  -AF,SHILPI,AF2     UB Dressing Assess/Train, Comment  set up required to gather clothes; min a to adjust bra over back   -AF,SHILPI,AF2     Recorded by  [AF,SHILPI,AF2] NO Cooney (r) Karen Allan, OT Student (t) NO Cooney (c)     Lower Body Dressing Assessment/Training    LB Dressing Assess/Train, Clothing Type  donning:;shoes;pants;socks;slipper socks;doffing:   dof slipper socks; don underwear, pants, socks, shoes  -AF,SHILPI,AF2     LB Dressing Assess/Train, Position  sitting;sink side;standing;edge of bed  -AF,SHILPI,AF2     LB Dressing Assess/Train, [place]  minimum assist (75% patient effort);contact guard assist  -AF,SHILPI,AF2     LB Dressing Assess/Train, Comment  CGA to maintain balance while standing to pull up pants; min a to tighten shoe strings, pt was able to maintain grasp on shoe strings w/ LUE for increased ind w/ shoe tying  -AF,SHILPI,AF2     Recorded by  [AF,SHILPI,AF2] NO Cooney (r) Karen Allan OT Student (t) NO Cooney (c)     Grooming Assessment/Training    Grooming Assess/Train, Position  sitting;sink side  -AF,SHILPI,AF2     Grooming Assess/Train, Indepen Level  set up required   gather materials from room   -AF,SHILPI,AF2     Recorded by  [AF,SHILPI,AF2] NO Cooney (r) Karen Allan OT Student (t) NO Cooney (c)     Balance Skills Training    Static Standing Balance Support eliu bar;Left upper extremity supported;Right upper extremity supported  -AR      Standing-Balance Activities Compliant surfaces   side stepping  -AR      Gait Balance-Level of Assistance --   TUG 22sec, 22 sec  -AR      Recorded by [AR] Sis Ramirez PT      Sensory Treatment    Sensory Reeducation Techniques  sensory training, visual reinforcement;sensory train, w/o visual reinforcement  -AF,SHILPI,AF2     Sensory Reeduction Treatment  pt was given an every day object to feel w/ BUE and observe; pt attempted to find object in  rice bucket w/ LUE w/ vision occluded; pt was able to find spoon w/ eyes closed, required eyes open to find other objects; pt buried all the items in the rice at clean up   -AF,SHILPI,AF2     Recorded by  [AF,SHILPI,AF2] Adelia Salamanca OTR (r) Karen Allan OT Student (t) NO Cooney (c)     Positioning and Restraints    Pre-Treatment Position sitting in chair/recliner  -AR in bed   in w/c second session   -AF,SHILPI,AF2     Post Treatment Position chair  -AR chair   w/c second session   -AF,SHILPI,AF2     In Chair reclined;sitting;call light within reach;encouraged to call for assist;exit alarm on  -AR      In Wheelchair  sitting;with SLP;call light within reach;encouraged to call for assist  -AF,SHILPI,AF2     Recorded by [AR] Sis Ramirez PT [AF,SHILPI,AF2] Adelia Salamanca OTR (r) Karne Allan, OT Student (t) NO Cooney (c)       08/08/17 0943 08/07/17 1530       Rehab Assessment/Intervention    Discipline physical therapy assistant  -LB speech language pathologist  -AW     Document Type therapy note (daily note)  -LB therapy note (daily note)  -AW     Subjective Information agree to therapy  -LB no complaints;agree to therapy  -AW     Patient Effort, Rehab Treatment good  -LB good  -AW     Symptoms Noted During/After Treatment  none  -AW     Precautions/Limitations fall precautions;swallowing precautions  -LB fall precautions;swallowing precautions  -AW     Recorded by [LB] Angelica Treadwell PTA [AW] Adelia Martinez MS CCC-SLP     Pain Assessment    Pain Assessment No/denies pain  -LB      Recorded by [LB] Angelica Treadwell PTA      Cognitive Assessment/Intervention    Personal Safety Interventions fall prevention program maintained;gait belt;muscle strengthening facilitated;nonskid shoes/slippers when out of bed  -LB      Recorded by [LB] Angelica Treadwell PTA      Improve attention    Attention Progress  60%;without cues;100%;with inconsistent cues  -AW     Comments: Improve attention  working memory  - repeating 3 words in reverse order  -AW     Recorded by  [AW] Adelia Martinez MS CCC-SLP     Improve memory skills    Memory Skills Progress  0%;without cues;100%;with consistent cues  -AW     Comments: Improve memory skills  after a 10 min delay, pt recalled 0/3 words; 3/3 w/ cues  -AW     Recorded by  [AW] Adelia Martinez MS CCC-SLP     Improve functional problem solving    Functional Problem Solving Progress  80%;without cues;100%;with inconsistent cues  -AW     Comments: Improve functional problem solving  word problems; repetitions beneficial  -AW     Recorded by  [AW] Adelia Martinez MS CCC-SLP     Improve executive function skills    Executive Function Skills Progress  70%;without cues;100%;with inconsistent cues  -AW     Comments: Improve executive function skills  deductive reasoning grid puzzle  -AW     Recorded by  [AW] Adelia Martinez MS CCC-SLP     Bed Mobility, Assessment/Treatment    Bed Mobility, Assistive Device bed rails;head of bed elevated  -LB      Bed Mob, Supine to Sit, [place] supervision required  -LB      Bed Mob, Sit to Supine, [place] supervision required  -LB      Recorded by [LB] Angelica Treadwell PTA      Transfer Assessment/Treatment    Transfers, Bed-Chair [place] contact guard assist;stand by assist  -LB      Transfers, Chair-Bed [place] contact guard assist;stand by assist  -LB      Transfers, Sit-Stand [place] stand by assist  -LB      Transfers, Stand-Sit [place] stand by assist  -LB      Transfers, Sit-Stand-Sit, Assist Device rolling walker  -LB      Recorded by [LB] Angelica Treadwell PTA      Gait Assessment/Treatment    Gait, [place] Level stand by assist  -LB      Gait, Assistive Device rolling walker  -LB      Gait, Distance (Feet) 320  -LB      Gait, Gait Deviations forward flexed posture;step length decreased  -LB      Recorded by [LB] Angelica Treadwell PTA      Stairs Assessment/Treatment    Number of Stairs 8  -LB      Stairs, Handrail Location  both sides  -LB      Stairs, [place] Level contact guard assist;verbal cues required  -LB      Stairs, Technique Used step to step (ascending);step to step (descending)  -LB      Recorded by [LB] Angelica Treadwell PTA      Balance Skills Training    Gait Balance-Level of Assistance Contact guard;Minimum assistance  -LB      Gait Balance Support parallel bars;Right upper extremity supported;Left upper extremity supported  -LB      Gait Balance Activities braiding / front;side-stepping  -LB      Recorded by [LB] Angelica Treadwell PTA      Therapy Exercises    Bilateral Lower Extremities AROM:;10 reps;heel raises;mini squats;supine;heel slides;hip abduction/adduction  -LB      Trunk Exercises 10 reps;supine;bridging  -LB      Recorded by [LB] Angelica Treadwell PTA      Positioning and Restraints    Post Treatment Position wheelchair  -LB      In Wheelchair sitting;with OT  -LB      Recorded by [WINNIE] Angelica Treadwell PTA        User Key  (r) = Recorded By, (t) = Taken By, (c) = Cosigned By    Initials Name Effective Dates    AW Adelia Martinez MS CCC-SLP 04/13/15 -     LB Angelica Treadwell, PTA 02/18/16 -     AF Adelia Salamanca, OTR 12/01/15 -     AR Sis Ramirez, PT 06/27/16 -     SHILPI Allan, OT Student 07/11/17 -                 IP PT Goals       08/04/17 1057 07/29/17 1211       Bed Mobility PT LTG    Bed Mobility PT LTG, Date Established  07/29/17  -LB     Bed Mobility PT LTG, Time to Achieve  2 wks  -LB     Bed Mobility PT LTG, Activity Type  roll left/roll right;supine to sit/sit to supine  -LB     Bed Mobility PT LTG, [place] Level  independent  -LB     Bed Mobility PT LTG, Date Goal Reviewed 08/04/17  -LBA      Bed Mobility PT LTG, Outcome goal ongoing  -LBA      Transfer Training PT LTG    Transfer Training PT LTG, Date Established  07/29/17  -LB     Transfer Training PT LTG, Time to Achieve  2 wks  -LB     Transfer Training PT LTG, Activity Type  sit to stand/stand to sit  -LB     Transfer  Training PT LTG, [place] Level  conditional independence  -LB     Transfer Training PT LTG, Assist Device  cane, straight  -LB     Transfer Training PT  LTG, Date Goal Reviewed 08/04/17  -LBA      Transfer Training PT LTG, Outcome goal ongoing  -LBA      Transfer Training 2 PT LTG    Transfer Training PT 2 LTG, Date Established  07/29/17  -LB     Transfer Training PT 2 LTG, Time to Achieve  2 wks  -LB     Transfer Training PT 2 LTG, Activity Type  --   car transfer  -LB     Transfer Training PT 2 LTG, [place] Level  contact guard assist  -LB     Transfer Training PT 2 LTG, Assist Device  cane, straight  -LB     Transfer Training PT 2 LTG, Date Goal Reviewed 08/04/17  -LBA      Transfer Training PT 2 LTG, Outcome goal ongoing  -LBA      Gait Training PT LTG    Gait Training Goal PT LTG, Date Established  07/29/17  -LB     Gait Training Goal PT LTG, Time to Achieve  2 wks  -LB     Gait Training Goal PT LTG, [place] Level  conditional independence  -LB     Gait Training Goal PT LTG, Assist Device  cane, straight  -LB     Gait Training Goal PT LTG, Distance to Achieve  150  -LB     Gait Training Goal PT LTG, Date Goal Reviewed 08/04/17  -LBA      Gait Training Goal PT LTG, Outcome goal not met  -LBA      Stair Training PT LTG    Stair Training Goal PT LTG, Date Established  07/29/17  -LB     Stair Training Goal PT LTG, Time to Achieve  2 wks  -LB     Stair Training Goal PT LTG, Number of Steps  4  -LB     Stair Training Goal PT LTG, [place] Level  contact guard assist  -LB     Stair Training Goal PT LTG, Assist Device  1 handrail  -LB     Stair Training Goal PT LTG, Date Goal Reviewed 08/04/17  -LBA      Stair Training Goal PT LTG, Outcome goal ongoing  -LBA        User Key  (r) = Recorded By, (t) = Taken By, (c) = Cosigned By    Initials Name Provider Type    WINNIE Hoffmann, PT Physical Therapist    MARY Treadwell, PTA Physical Therapy Assistant          Physical Therapy Education     Title:  PT OT SLP Therapies (Active)     Topic: Physical Therapy (Active)     Point: Mobility training (Active)    Learning Progress Summary    Learner Readiness Method Response Comment Documented by Status   Patient Acceptance E NR  AR 08/10/17 0949 Active    Acceptance E NR  AR 08/09/17 1038 Active    Acceptance E VU,NR  LB 08/08/17 0955 Done    Acceptance E VU,NR  LB 08/07/17 1001 Done    Eager E,TB VU,DU  JT 08/05/17 1432 Done    Acceptance E VU,NR  LB 08/04/17 1358 Done    Acceptance E VU,NR  LB 08/03/17 1103 Done    Acceptance E VU,NR car, stairs, curb LB 08/02/17 1031 Done    Acceptance E VU,NR  LB 08/01/17 0939 Done    Acceptance E VU,NR  LB 07/31/17 0947 Done    Acceptance E VU,NR  LB1 07/29/17 1210 Done               Point: Home exercise program (Done)    Learning Progress Summary    Learner Readiness Method Response Comment Documented by Status   Patient Acceptance E,TB,D,H DU,VU  AR 08/10/17 1527 Done    Acceptance E NR  AR 08/10/17 0949 Active    Acceptance E NR  AR 08/09/17 1038 Active    Eager E,TB VU,DU  JT 08/05/17 1432 Done   Family Acceptance E,TB,D,H DU,VU  AR 08/10/17 1527 Done               Point: Precautions (Active)    Learning Progress Summary    Learner Readiness Method Response Comment Documented by Status   Patient Acceptance E NR  AR 08/10/17 0949 Active    Acceptance E NR  AR 08/09/17 1038 Active                      User Key     Initials Effective Dates Name Provider Type Discipline    LB 10/06/15 -  Rosemary Hoffmann, PT Physical Therapist PT    LB 02/18/16 -  Angelica Treadwell, PTA Physical Therapy Assistant PT    JT 12/01/15 -  Danielle Arroyo, PT Physical Therapist PT    AR 06/27/16 -  Sis Ramirez, PT Physical Therapist PT                    PT Recommendation and Plan  Anticipated Equipment Needs At Discharge: standard cane  Anticipated Discharge Disposition: home with outpatient services, home with assist  Planned Therapy Interventions: bed mobility training, balance training,  neuromuscular re-education, transfer training, strengthening, stair training  PT Frequency: 2 times/day             Outcome Measures       08/09/17 1400          Timed Up and Go (TUG)    TUG Test 1 22 seconds  -AR      TUG Test 2 22 seconds  -AR      Timed Up and Go Comments RWX  -AR      Functional Assessment    Outcome Measure Options Timed Up and Go (TUG)  -AR        User Key  (r) = Recorded By, (t) = Taken By, (c) = Cosigned By    Initials Name Provider Type    AR Sis Ramirez PT Physical Therapist           Time Calculation:         PT Charges       08/10/17 1527 08/10/17 0949       Time Calculation    Start Time 1400  -AR 0900  -AR     Stop Time 1430  -AR 0930  -AR     Time Calculation (min) 30 min  -AR 30 min  -AR     PT Received On 08/10/17  -AR 08/10/17  -AR     PT - Next Appointment 08/11/17  -AR 08/10/17  -AR       User Key  (r) = Recorded By, (t) = Taken By, (c) = Cosigned By    Initials Name Provider Type    AR Sis Ramirez PT Physical Therapist          Therapy Charges for Today     Code Description Service Date Service Provider Modifiers Qty    40148492375 HC PT THER PROC EA 15 MIN 8/9/2017 Sis Ramirez, PT GP 4    92457661630 HC PT CARE PLAN EACH 15 MIN 8/10/2017 Sis aRmirez, PT GP 1    52396107647 HC PT THER PROC EA 15 MIN 8/10/2017 Sis Ramirez, PT GP 4          PT G-Codes  Outcome Measure Options: Timed Up and Go (TUG)    Sis Ramirez PT  8/10/2017

## 2017-08-10 NOTE — PLAN OF CARE
Problem: Stroke (Ischemic) (Adult)  Goal: Signs and Symptoms of Listed Potential Problems Will be Absent or Manageable (Stroke)  Outcome: Ongoing (interventions implemented as appropriate)    Problem: Fall Risk (Adult)  Goal: Absence of Falls  Outcome: Ongoing (interventions implemented as appropriate)    Problem: Patient Care Overview (Adult)  Goal: Plan of Care Review  Outcome: Ongoing (interventions implemented as appropriate)    08/10/17 6021   Coping/Psychosocial Response Interventions   Plan Of Care Reviewed With patient   Patient Care Overview   Progress improving   Outcome Evaluation   Outcome Summary/Follow up Plan No complaints of pain this shift. No other concerns at this time.Preparing for discharge tomorrow

## 2017-08-10 NOTE — THERAPY DISCHARGE NOTE
Inpatient Rehabilitation - Occupational Therapy Treatment Note/Discharge  [facility]     Patient Name: Magnus Hartley  : 1928  MRN: [id number]  Today's Date: 8/10/2017  Onset of Illness/Injury or Date of Surgery Date: 17     Referring Physician: Jac      Admit Date: 2017    Visit Dx:     ICD-10-CM ICD-9-CM   1. Left hemiparesis G81.94 342.90   2. Dysarthria R47.1 784.51     Patient Active Problem List   Diagnosis   • Foot pain   • Asthma   • Benign essential hypertension   • Controlled type 2 diabetes mellitus without complication   • Dyslipidemia   • Macrocytosis without anemia   • Senile osteoporosis   • Post-menopausal osteoporosis   • Sinus bradycardia   • Stress incontinence in female   • Urge incontinence of urine   • Atrophic vaginitis   • Encounter for fitting and adjustment of other specified devices   • Incomplete emptying of bladder   • Urethral caruncle   • Incomplete uterovaginal prolapse   • Cerebrovascular accident (CVA) due to occlusion of right middle cerebral artery   • Atrial fibrillation   • Warfarin anticoagulation (goal INR 2-3)             Adult Rehabilitation Note       08/10/17 1500 08/10/17 1100 08/10/17 1019    Rehab Assessment/Intervention    Discipline speech language pathologist  -AW speech language pathologist  -AW (P)  occupational therapist  -SHILPI    Document Type therapy note (daily note)  -AW therapy note (daily note)  -AW (P)  discharge summary;therapy note (daily note)  -SHILPI    Subjective Information no complaints;agree to therapy  -AW no complaints;agree to therapy  -AW (P)  agree to therapy;no complaints  -SHILPI    Patient Effort, Rehab Treatment good  -AW good  -AW (P)  good  -SHILPI    Symptoms Noted During/After Treatment none  -AW none  -AW     Precautions/Limitations fall precautions  -AW fall precautions  -AW (P)  fall precautions  -SHILPI    Recorded by [AW] Adelia Martinez MS CCC-SLP [AW] Adelia Martinez, MS CCC-SLP [SHILPI] Kaern Allan, OT Student    Pain  Assessment    Pain Assessment  (P)  No/denies pain  -SHILPI (P)  No/denies pain  -SHILPI    Recorded by  [SHILPI] Karen Allan OT Student [SHILPI] Karen Allan OT Student    Cognitive Assessment/Intervention    Current Cognitive/Communication Assessment  (P)  impaired  -SHILPI (P)  impaired  -SHILPI    Orientation Status  (P)  oriented x 4  -SHILPI (P)  oriented x 4  -SHILPI    Follows Commands/Answers Questions  (P)  able to follow single-step instructions;100% of the time;needs repetition;needs increased time;needs cueing  -SHILPI (P)  100% of the time;able to follow single-step instructions;needs increased time;needs cueing;needs repetition  -SHILPI    Personal Safety  (P)  mild impairment  -SHILPI (P)  mild impairment  -SHILPI    Personal Safety Interventions  (P)  gait belt;nonskid shoes/slippers when out of bed  -SHILPI (P)  gait belt;fall prevention program maintained;nonskid shoes/slippers when out of bed;supervised activity  -SHILPI    Recorded by  [SHILPI] Karen Allan OT Student [SHILPI] Karen Allan OT Student    Improve memory skills    Memory Skills Progress  90%;without cues;100%;with inconsistent cues  -AW     Comments: Improve memory skills  visual memory task of studying a list of 10 animals using grouping as a strategy and then choosing the 10 animals from a list of 20  -AW     Recorded by  [AW] Adelia Martinez MS CCC-SLP     Improve functional problem solving    Functional Problem Solving Progress 90%;without cues;100%;with inconsistent cues  -AW 50%;without cues  -AW     Comments: Improve functional problem solving obtaining information from a map  -AW after a 10 min delay, pt was asked to pick the 10 animals out of a list of 20, recalling 5/10; cues did not assist furthur recall  -AW     Recorded by [AW] Adelia Martinez MS CCC-SLP [AW] Adelia Martinez MS CCC-SLP     Improve executive function skills    Executive Function Skills Progress 70%;without cues;100%;with inconsistent cues  -AW 90%;without cues  -AW     Comments: Improve executive function skills $  management task  -AW obtaining information from a prescription label  -AW     Recorded by [AW] Adelia Martinez MS CCC-SLP [AW] Adelia Martinez MS CCC-SLP     General UE Assessment    ROM   (P)  LUE ROM was WNL;RUE ROM was WNL  -SHILPI    Recorded by   [SHILPI] Karen Allan OT Student    MMT (Manual Muscle Testing)    General MMT Assessment   (P)  no strength deficits identified   BUE WFL for age   -SHILPI    General MMT Assessment Detail   (P)   R 44, L 15; 3 pt pinch R 7, L 3; lateral pinch 7, 6  -SHILPI    Recorded by   [SHILPI] Karen Allan OT Student    Bed Mobility, Assessment/Treatment    Bed Mob, Supine to Sit, [place]   (P)  supervision required  -SHILPI    Bed Mobility, Comment   (P)  elevated HOB, bed rail  -SHILPI    Recorded by   [SHILPI] Karen Allan OT Student    Transfer Assessment/Treatment    Transfers, Sit-Stand [place]   (P)  stand by assist  -SHILPI    Transfers, Stand-Sit [place]   (P)  stand by assist  -SHILPI    Transfers, Sit-Stand-Sit, Assist Device   (P)  rolling walker  -SHILPI    Recorded by   [SHILPI] Karen Allan OT Student    Upper Body Bathing Assessment/Training    UB Bathing Assess/Train Assistive Device   (P)  hand-held shower head;grab bars;shower chair with back  -SHILPI    UB Bathing Assess/Train, Position   (P)  sitting  -SHILPI    UB Bathing Assess/Train, [place] Level   (P)  supervision required;set up required;verbal cues required   vcs for locating and managing items  -SHILPI    Recorded by   [SHILPI] Karen Allan OT Student    Lower Body Bathing Assessment/Training    LB Bathing Assess/Train Assistive Device   (P)  hand-held shower head;grab bars;shower chair with back  -SHILPI    LB Bathing Assess/Train, Position   (P)  sitting;standing  -SHILPI    LB Bathing Assess/Train, [place] Level   (P)  stand by assist;verbal cues required  -SHILPI    LB Bathing Assess/Train, Comment   (P)  vcs for sequencing buttocks/elsi bathing   -SHILPI    Recorded by   [SHILPI] Karen Allan OT Student    Upper Body Dressing  Assessment/Training    UB Dressing Assess/Train, Clothing Type   (P)  doffing:;donning:;t-shirt;bra;hospital gown   dof gown, don bra, tshirt   -SHILPI    UB Dressing Assess/Train, Position   (P)  sitting  -SHILPI    UB Dressing Assess/Train, [place]   (P)  minimum assist (75% patient effort);set up required   thread R arm; assist w/ gathering clothes  -SHILPI    Recorded by   [SHILPI] Karen Allan, OT Student    Lower Body Dressing Assessment/Training    LB Dressing Assess/Train, Clothing Type   (P)  doffing:;donning:;pants;socks;shoes   dof socks; don underwear, pants, socks, shoes   -SHILPI    LB Dressing Assess/Train, Position   (P)  sitting;standing   shower chair, recliner   -SHILPI    LB Dressing Assess/Train, [place]   (P)  minimum assist (75% patient effort);supervision required  -SHILPI    LB Dressing Assess/Train, Comment   (P)  sup for standing balance when pulling up pants; min to tie L shoe, pt displayed difficulty maintaining grasp due to decreased sensation in LUE  -SHILPI    Recorded by   [SHILPI] Karen Allan, CHERELLE Student    Toileting Assessment/Training    Toileting Assess/Train, Assistive Device   (P)  grab bars;raised toilet seat  -SHILPI    Toileting Assess/Train, Position   (P)  sitting;standing  -SHILPI    Toileting Assess/Train, [place]ep[place] Level   (P)  supervision required;verbal cues required  -SHILPI    Toileting Assess/Train, Comment   (P)  pt was able to manage clothing w/ vcs; sup for standing balance during clothing management; pt was able to complete hygiene while seated   -SHILPI    Recorded by   [SHILPI] Karen Allan, OT Student    Grooming Assessment/Training    Grooming Assess/Train, Position   (P)  sitting;sink side  -SHILPI    Grooming Assess/Train, [place] Level   (P)  set up required  -SHILPI    Grooming Assess/Train, Comment   (P)  brushing hair w/c level   -SHILPI    Recorded by   [SHILPI] Karen Allan, OT Student    Fine Motor Coordination Training    9-Hole Peg Right   (P)  22  -SHILPI    9-Hole Peg Left   (P)  49  -SHILPI    Box and  Blocks Right   (P)  57  -SHILPI    Box and Blocks Left   (P)  42   required vcs to  only one block at a time   -SHILPI    Opposition   (P)  intact;Right:;Left:  -SHILPI    Recorded by   [SHILPI] CHERELLE Sepulveda    Sensory Assessment/Intervention    Sharp/Dull Discrimination   (P)  LUE   sharp 2/3 trials correct; dull 2/2 trials correct  -SHILPI    LUE Sharp/Dull Discrimination   (P)  mild impairment  -SHILPI    Stereognosis   (P)  LUE   unable to recognize 3/3 objects, able to describe features   -SHILPI    Recorded by   [SHILPI] CHERELLE Sepulveda    Positioning and Restraints    Pre-Treatment Position   (P)  in bed  -SHILPI    Post Treatment Position   (P)  chair  -SHILPI    In Chair   (P)  sitting;with PT  -SHILPI    Recorded by   [SHILPI] Karen Allan OT Student      08/10/17 0901 08/09/17 1500 08/09/17 1434    Rehab Assessment/Intervention    Discipline physical therapist  -AR speech language pathologist  -AW occupational therapist  -CHAKA,SHILPI,AF2    Document Type therapy note (daily note)  -AR therapy note (daily note)  -AW therapy note (daily note)  -AF,SHILPI,AF2    Subjective Information agree to therapy  -AR no complaints;agree to therapy  -AW no complaints;agree to therapy  -AF,SHILPI,AF2    Patient Effort, Rehab Treatment good  -AR good  -AW good  -AF,SHILPI,AF2    Symptoms Noted During/After Treatment  none  -AW fatigue  -AF,SHILPI,AF2    Symptoms Noted Comment   seated breaks as necessary throughout task   -AF,SHILPI,AF2    Precautions/Limitations fall precautions  -AR fall precautions;swallowing precautions  -AW fall precautions  -AF,SHILPI,AF2    Recorded by [AR] Sis Ramirez, PT [AW] Adelia Martinez MS CCC-SLP [AF,SHILPI,AF2] Adelia Salamanca, OTR (r) Karen Allan OT Student (t) NO Cooney (c)    Pain Assessment    Pain Assessment No/denies pain  -AR  No/denies pain  -AF,SHILPI,AF2    Recorded by [AR] Sis Ramirez PT  [AF,SHILPI,AF2] NO [place] (r) Karen Allan OT Student (t) Adelia Salamanca, OTR (c)    Cognitive  Assessment/Intervention    Current Cognitive/Communication Assessment did not assess  -AR  impaired  -AF,SHILPI,AF2    Orientation Status oriented x 4  -AR  oriented x 4  -AF,SHILPI,AF2    Follows Commands/Answers Questions 100% of the time  -AR  75% of the time;able to follow single-step instructions;needs cueing;needs repetition  -AF,SHILPI,AF2    Personal Safety   mild impairment  -AF,SHILPI,AF2    Personal Safety Interventions fall prevention program maintained;gait belt;muscle strengthening facilitated;supervised activity  -AR  fall prevention program maintained;gait belt;supervised activity;nonskid shoes/slippers when out of bed  -AF,SHILPI,AF2    Recorded by [AR] Sis Ramirez, PT  [AF,SHILPI,AF2] Adelia Salamanca, OTR (r) Karen Allan OT Student (t) Adelia Salamanca, OTR (c)    Improve attention    Attention Progress  50%;without cues;90%;with consistent cues  -AW     Comments: Improve attention  following written directions to carry out 2 tasks w/ a group of 6 words; consistent cues for attn to details  -AW     Recorded by  [AW] Adelia Martinez MS CCC-SLP     Improve memory skills    Memory Skills Progress  60%;without cues;100%;with inconsistent cues  -AW     Comments: Improve memory skills  category exclusion  -AW     Recorded by  [AW] Adelia Martinez MS CCC-SLP     Improve functional problem solving    Functional Problem Solving Progress  50%;without cues;80%;with consistent cues  -AW     Comments: Improve functional problem solving  [place] planning task; pt did well taking notes and trying to organize task, however, needed consistent cues to think through task  -AW     Recorded by  [AW] Adelia Martinez MS CCC-SLP     Bed Mobility, Assessment/Treatment    Bed Mob, Supine to Sit, [place] supervision required  -AR      Bed Mob, Sit to Supine, [place] supervision required  -AR      Bed Mobility, Comment HOB flat, no bed rail  -AR      Recorded by [AR] Sis Ramirez, PT      Transfer Assessment/Treatment    Transfers, Sit-Stand  [place] stand by assist  -AR      Transfers, Stand-Sit [place] stand by assist;verbal cues required   for UE placement and keeping RW w/ pt  -AR      Transfers, Sit-Stand-Sit, Assist Device rolling walker  -AR      Recorded by [AR] Sis Ramirez PT      Gait Assessment/Treatment    Gait, [place] Level contact guard assist  -AR      Gait, Assistive Device rolling walker  -AR      Gait, Distance (Feet) 300   80, 80, w/ education to son/DIL  -AR      Gait, Gait Pattern Analysis swing-through gait  -AR      Gait, Gait Deviations rojelio decreased;decreased heel strike  -AR      Gait, Safety Issues step length decreased  -AR      Gait, Comment outside on concrete, up/down ramp  -AR      Recorded by [AR] Sis Ramirez PT      Stairs Assessment/Treatment    Number of Stairs 1   curb step outside  -AR      Stairs, Handrail Location none  -AR      Stairs, [place] Level contact guard assist;verbal cues required  -AR      Stairs, Assistive Device walker  -AR      Recorded by [AR] Sis Ramirez PT      ADL Assessment/Intervention    IADL Assess/Train, Comment   pt completed cooking task to make stir portillo in unfamiliar kitchen w/ ingredients and materials set out; pt was able to use a knife safely when cutting veggies; pt required frequent vcs for body positioning to maintain safe standing posture when reaching across the counter; pt required frequent vcs for safe hand placement when transferring from sit to  w/c and hand placement when using the stove; pt required seated rest breaks, notable increase in standing endurance for self care activities   -AF,SHILPI,AF2    Additional Documentation   IADL Assess/Train, Comment (Row)  -AF,SHILPI,AF2    Recorded by   [CHAKA,SHILPI,AF2] Adelia Salamanca OTR (r) Karen Allan OT Student (t) Adelia Salamanca OTR (c)    Balance Skills Training    Standing-Level of Assistance Minimum assistance  -AR      Static Standing Balance Support No upper extremity supported   -AR      Standing-Balance Activities Retrieve object from floor   bouncing ball and catching w/o UE support  -AR      Gait Balance-Level of Assistance Contact guard  -AR      Gait Balance Support parallel bars  -AR      Gait Balance Activities backwards;side-stepping;tandem;uneven surface   exagerated wide stance gait  -AR      Recorded by [AR] Sis Ramirez PT      Therapy Exercises    Bilateral Lower Extremities standing;heel raises;mini squats;hip flexion;hip abduction/adduction;15 reps   w/ education for HEP, B decreasing to single UE support  -AR      Recorded by [AR] Sis Ramirez PT      Positioning and Restraints    Pre-Treatment Position sitting in chair/recliner  -AR  sitting in chair/recliner  -CRYSTAL NULLD,AF2    Post Treatment Position chair  -AR  chair  -SHILPI NULL AF2    In Chair reclined;sitting;call light within reach;encouraged to call for assist;exit alarm on;with nsg  -AR  call light within reach;sitting;encouraged to call for assist  -SHILPI NULL,AF2    Recorded by [AR] Sis Ramirez PT  [CHAKA,SHILPI,AF2] Adelia Salamanca, OTR (r) Karen Allan OT Student (t) Adelia Salamanca OTR (c)      08/09/17 1014 08/09/17 0931 08/08/17 1114    Rehab Assessment/Intervention    Discipline occupational therapist  -SHILPI NULL AF2 physical therapist  -AR occupational therapist  -SHILPI NULL AF2    Document Type therapy note (daily note)  -CHAKASHILPI,AF2 therapy note (daily note)  -AR therapy note (daily note)  -CHAKA,SHILPI,AF2    Subjective Information agree to therapy;no complaints  -CHAKA,SHILPI,AF2 agree to therapy  -AR agree to therapy;complains of;weakness  -AF,SHILPI,AF2    Patient Effort, Rehab Treatment good  -CHAKA,SHILPI,AF2 good  -AR good  -CHAKA,SHILPI,AF2    Symptoms Noted During/After Treatment shortness of breath   w/ tsfs, bending down for LBD when seated  -CHAKASHILPI,AF2 fatigue  -AR shortness of breath   after tsfs, bending down for LBD;   -CHAKA,SHILPI,AF2    Symptoms Noted Comment allowed for rest breaks as necessary; vcs for deep breathing  -CHAKASHILPI,AF2  allowed  for rest breaks as necessary; vcs for deep breathing   -AF,SHILPI,AF2    Precautions/Limitations fall precautions  -AF,SHILPI,AF2 fall precautions  -AR fall precautions;swallowing precautions  -AF,SHILPI,AF2    Recorded by [AF,SHILPI,AF2] NO Cooney (r) Karen Allan OT Student (t) NO Cooney (c) [AR] Sis Ramirez, PT [AF,SHILPI,AF2] NO Cooney (r) Karen Allan OT Student (t) NO Cooney (c)    Pain Assessment    Pain Assessment No/denies pain  -AF,SHILPI,AF2 No/denies pain  -AR No/denies pain  -AF,SHILPI,AF2    Recorded by [AF,SHILPI,AF2] NO Cooney (r) Karen Allan OT Student (t) NO Cooney (c) [AR] Sis Ramirez, PT [AF,SHILPI,AF2] NO Cooney (r) Karen Allan OT Student (t) NO Cooney (c)    Cognitive Assessment/Intervention    Current Cognitive/Communication Assessment impaired  -AF,SHILPI,AF2 did not assess  -AR impaired  -AF,SHILPI,AF2    Orientation Status oriented x 4  -AF,SHILPI,AF2 oriented x 4  -AR oriented x 4  -AF,SHILPI,AF2    Follows Commands/Answers Questions 100% of the time;able to follow single-step instructions;needs increased time;needs cueing;needs repetition  -AF,SHILPI,AF2  100% of the time;able to follow single-step instructions;needs cueing;needs increased time;needs repetition  -AF,SHILPI,AF2    Personal Safety mild impairment  -AF,SHILPI,AF2  mild impairment  -AF,SHILPI,AF2    Personal Safety Interventions  fall prevention program maintained;gait belt;muscle strengthening facilitated  -AR gait belt;nonskid shoes/slippers when out of bed;fall prevention program maintained  -AF,SHILPI,AF2    Recorded by [AF,SHILPI,AF2] NO Cooney (r) Karen Allan OT Student (t) NO Cooney (c) [AR] Sis Ramirez, PT [AF,SHILPI,AF2] NO Cooney (r) Karen Allan, OT Student (t) NO Cooney (c)    Bed Mobility, Assessment/Treatment    Bed Mob, Supine to Sit, [place]  supervision required  -AR supervision required  -AF,SHILPI,AF2    Bed  Mob, Sit to Supine, [place]  supervision required  -AR     Bed Mobility, Comment  HOB flat, no bed rail  -AR     Recorded by  [AR] Sis Ramirez PT [AF,SHILPI,AF2] NO Cooney (r) Karen Allan, OT Student (t) NO Cooney (c)    Transfer Assessment/Treatment    Transfers, Bed-Chair [place]   stand by assist;contact guard assist  -AF,SHILPI,AF2    Transfers, Sit-Stand [place] contact guard assist;verbal cues required   vcs for safe stance when standing   -AF,SHILPI,AF2 stand by assist  -AR     Transfers, Stand-Sit [place] contact guard assist  -AF,SHILPI,AF2 stand by assist  -AR     Transfers, Sit-Stand-Sit, Assist Device  rolling walker  -AR     Toilet Transfer, [place] contact guard assist  -AF,SHILPI,AF2      Toilet Transfer, Assistive Device wheelchair   grab bars  -AF,SHILPI,AF2      Transfer, Comment  car transfer w/ few VC and SBA  -AR tsf to and from EOM from w/c w/ CGA and vcs  -AF,SHILPI,AF2    Recorded by [AF,SHILPI,AF2] NO Cooney (r) Karen Allan, OT Student (t) NO Cooney (c) [AR] Sis Ramirez PT [AF,SHILPI,AF2] NO Cooney (r) Karen Allan, OT Student (t) NO Cooney (c)    Gait Assessment/Treatment    Gait, [place] Level  contact guard assist;stand by assist  -AR     Gait, Assistive Device  rolling walker   CGA w/ cane  -AR     Gait, Distance (Feet)  350   , 80 w/ RW; cane 50', 80, 80   -AR     Gait, Gait Pattern Analysis  swing-through gait  -AR     Gait, Gait Deviations  rojelio decreased;decreased heel strike;forward flexed posture  -AR     Gait, Safety Issues  step length decreased;weight-shifting ability decreased  -AR     Gait, Impairments  strength decreased  -AR     Gait, Comment  outside on curb up/down ramp w/ RW  -AR     Recorded by  [AR] Sis Ramirez PT     Stairs Assessment/Treatment    Number of Stairs  4  -AR     Stairs, Handrail Location  both sides  -AR     Stairs, [place] Level  contact guard assist  -AR      Stairs, Technique Used  step to step (descending);step to step (ascending)  -AR     Stairs, Safety Issues  weight-shifting ability decreased  -AR     Stairs, Impairments  impaired balance;strength decreased  -AR     Recorded by  [AR] Sis Ramirez, PT     ADL Assessment/Intervention    IADL Assess/Train, Comment   pt wrote a list of ingredients and amounts needed to make stir portillo dish, pt required some help w/ spelling; pt looked through old ads to find sales and compare the cheapest options, pt required min vcs, min gestural prompts to complete task   -AF,SHILPI,AF2    Recorded by   [AF,SHILPI,AF2] Adelia Salamanca OTR (r) Karen Allan, OT Student (t) NO Cooney (c)    Upper Body Bathing Assessment/Training    UB Bathing Assess/Train, Position sink side;sitting  -AF,SHILPI,AF2  sitting;sink side  -AF,SHILPI,AF2    UB Bathing Assess/Train, [place] Level stand by assist;verbal cues required   vcs for locating items   -AF,SHILPI,AF2  minimum assist (75% patient effort);verbal cues required   vcs for setup instructions; min a w/ back   -AF,SHILPI,AF2    Recorded by [AF,SHILPI,AF2] ON Cooney (r) Karen Allan, OT Student (t) NO Cooney (c)  [AF,SHILPI,AF2] NO Cooney (r) Karen Allan, OT Student (t) NO Cooney (c)    Lower Body Bathing Assessment/Training    LB Bathing Assess/Train, Position sitting;sink side  -AF,SHILPI,AF2  sitting;sink side;standing  -AF,SHILPI,AF2    LB Bathing Assess/Train, [place] Level stand by assist  -AF,SHILPI,AF2  contact guard assist;verbal cues required  -AF,SHILPI,AF2    LB Bathing Assess/Train, Comment elsi/buttocks bathing performed after toileting   -AF,SHILPI,AF2  vcs for sequencing; cga to maintaing balance while pt washed elsi area  -AF,SHILPI,AF2    Recorded by [AF,SHILPI,AF2] NO Cooney (r) Karen Allan, OT Student (t) Adelia Salamanca, OTR (c)  [AF,SHILPI,AF2] Adelia Salamanca, OTR (r) Karen Allan, OT Student (t) Adelia Salamanca, OTR (c)    Upper Body  Dressing Assessment/Training    UB Dressing Assess/Train, Clothing Type donning:;doffing:;bra;t-shirt   dof shirt; don bra, shirt  -AF,SHILPI,AF2  doffing:;donning:;bra;t-shirt   dof tshirt; don bra, tshirt  -AF,SHILPI,AF2    UB Dressing Assess/Train, Position sink side;sitting  -AF,SHILPI,AF2  sitting  -AF,SHILPI,AF2    UB Dressing Assess/Train, [place] minimum assist (75% patient effort);verbal cues required  -AF,SHILPI,AF2  minimum assist (75% patient effort);set up required  -AF,SHILPI,AF2    UB Dressing Assess/Train, Comment pt required min a to adjust bra strap; pt required vcs and min a to thread L arm   -AF,SHILPI,AF2  set up required to gather clothes; min a to adjust bra over back   -AF,SHILPI,AF2    Recorded by [AF,SHILPI,AF2] Adelia Salamanca, OTR (r) Karen Allan OT Student (t) Adelia Salamanca OTR (c)  [AF,SHILPI,AF2] Adelia Salamanca OTR (r) Karen Allan, OT Student (t) Adelia Salamanca OTBETO (c)    Lower Body Dressing Assessment/Training    LB Dressing Assess/Train, Clothing Type doffing:;donning:;pants;shoes;socks   dof underwear, pants; don underwear, pants, socks, shoes  -AF,SHILPI,AF2  donning:;shoes;pants;socks;slipper socks;doffing:   dof slipper socks; don underwear, pants, socks, shoes  -AF,SHILPI,AF2    LB Dressing Assess/Train, Position sitting;standing;edge of bed;sink side   socks and shoes at EOB  -AF,SHILPI,AF2  sitting;sink side;standing;edge of bed  -AF,SHILPI,AF2    LB Dressing Assess/Train, [place] contact guard assist;verbal cues required  -AF,SHILPI,AF2  minimum assist (75% patient effort);contact guard assist  -AF,SHILPI,AF2    LB Dressing Assess/Train, Comment vcs for task completion; CGA to maintain balance when pulling up underwear/pants; pt able to IND don socks/shoes and tie shoe strings   -SHILPI NULL,CHAKA2  CGA to maintain balance while standing to pull up pants; min a to tighten shoe strings, pt was able to maintain grasp on shoe strings w/ LUE for increased ind w/ shoe tying  -SHILPI NULL,CHAKA2    Recorded by [SHILPI NULL,AF2] Adelia Phelps  NO [place] (r) Karen Allan OT Student (t) NO Cooney (c)  [AF,SHILPI,AF2] NO Cooney (r) Karen Allan OT Student (t) NO Cooney (c)    Toileting Assessment/Training    Toileting Assess/Train, Assistive Device grab bars  -AF,SHILPI,AF2      Toileting Assess/Train, Position sitting;standing  -AF,SHILPI,AF2      Toileting Assess/Train, [place] Level contact guard assist;minimum assist (75% patient effort)  -AF,SHILPI,AF2      Toileting Assess/Train, Comment pt required min a to wipe bottom completely after BM; CGA for standing balance when managing clothing   -AF,SHILPI,AF2      Recorded by [AF,SHILPI,AF2] NO Cooney (r) Karen Allan, OT Student (t) NO Cooney (c)      Grooming Assessment/Training    Grooming Assess/Train, Position sitting;sink side  -AF,SHILPI,AF2  sitting;sink side  -AF,SHILPI,AF2    Grooming Assess/Train, Indepen Level set up required   gathering hairbrush from other room   -AF,SHILPI,AF2  set up required   gather materials from room   -AF,SHILPI,AF2    Recorded by [AF,SHILPI,AF2] NO Cooney (r) Karen Allan OT Student (t) NO Cooney (c)  [AF,SHILPI,AF2] NO Cooney (r) Karen Allan OT Student (t) NO Cooney (c)    Balance Skills Training    Static Standing Balance Support  eliu bar;Left upper extremity supported;Right upper extremity supported  -AR     Standing-Balance Activities  Compliant surfaces   side stepping  -AR     Gait Balance-Level of Assistance  --   TUG 22sec, 22 sec  -AR     Recorded by  [AR] Sis Ramirez PT     Sensory Treatment    Sensory Reeducation Techniques   sensory training, visual reinforcement;sensory train, w/o visual reinforcement  -AF,SHILPI,AF2    Sensory Reeduction Treatment   pt was given an every day object to feel w/ BUE and observe; pt attempted to find object in rice bucket w/ LUE w/ vision occluded; pt was able to find spoon w/ eyes closed, required eyes open to find other objects; pt buried all  the items in the rice at clean up   -AF,SHILPI,AF2    Recorded by   [AF,SHILPI,AF2] NO Cooney (r) Karen Allan OT Student (t) NO Cooney (c)    Positioning and Restraints    Pre-Treatment Position sitting in chair/recliner  -AF,SHILPI,AF2 sitting in chair/recliner  -AR in bed   in w/c second session   -AF,SHILPI,AF2    Post Treatment Position chair  -AF,SHILPI,AF2 chair  -AR chair   w/c second session   -AF,SHILPI,AF2    In Chair sitting;call light within reach;encouraged to call for assist  -AF,SHILPI,AF2 reclined;sitting;call light within reach;encouraged to call for assist;exit alarm on  -AR     In Wheelchair   sitting;with SLP;call light within reach;encouraged to call for assist  -AF,SHILPI,AF2    Recorded by [AF,SHILPI,AF2] NO Cooney (r) Karen Allan OT Student (t) NO Cooney (c) [AR] Sis Ramirez, PT [AF,SHILPI,AF2] ON Cooney (r) Karen Allan OT Student (t) NO Cooney (c)      08/08/17 1100 08/08/17 0943       Rehab Assessment/Intervention    Discipline speech language pathologist  -AW physical therapy assistant  -LB     Document Type therapy note (daily note)  -AW therapy note (daily note)  -LB     Subjective Information no complaints;agree to therapy  -AW agree to therapy  -LB     Patient Effort, Rehab Treatment good  -AW good  -LB     Symptoms Noted During/After Treatment none  -AW      Precautions/Limitations fall precautions  -AW fall precautions;swallowing precautions  -LB     Recorded by [AW] Adelia Martinez MS CCC-SLP [LB] Angelica Treadwell PTA     Pain Assessment    Pain Assessment  No/denies pain  -LB     Recorded by  [LB] Angelica Treadwell PTA     Cognitive Assessment/Intervention    Personal Safety Interventions  fall prevention program maintained;gait belt;muscle strengthening facilitated;nonskid shoes/slippers when out of bed  -LB     Recorded by  [LB] Angelica Treadwell PTA     Improve attention    Attention Progress 50%;without cues;80%;with consistent  cues  -AW      Comments: Improve attention working memory task - listening to 4 words x2 and recalling one word by placement  -AW      Recorded by [AW] Adelia Martinez MS CCC-SLP      Improve memory skills    Memory Skills Progress 100%;without cues  -AW      Comments: Improve memory skills visual memory - recalling details of picture after a 12 min delay  -AW      Recorded by [AW] Adelia Martinez MS CCC-SLP      Improve functional problem solving    Functional Problem Solving Progress 80%;without cues;100%;with inconsistent cues  -AW      Comments: Improve functional problem solving sequencing 5 step tasks  -AW      Recorded by [AW] Adelia Martinez MS CCC-SLP      Bed Mobility, Assessment/Treatment    Bed Mobility, Assistive Device  bed rails;head of bed elevated  -LB     Bed Mob, Supine to Sit, Levy  supervision required  -LB     Bed Mob, Sit to Supine, [place]  supervision required  -LB     Recorded by  [LB] Angelica Treadwell PTA     Transfer Assessment/Treatment    Transfers, Bed-Chair [place]  contact guard assist;stand by assist  -LB     Transfers, Chair-Bed [place]  contact guard assist;stand by assist  -LB     Transfers, Sit-Stand [place]  stand by assist  -LB     Transfers, Stand-Sit [place]  stand by assist  -LB     Transfers, Sit-Stand-Sit, Assist Device  rolling walker  -LB     Recorded by  [LB] Angelica Treadwell PTA     Gait Assessment/Treatment    Gait, [place] Level  stand by assist  -LB     Gait, Assistive Device  rolling walker  -LB     Gait, Distance (Feet)  320  -LB     Gait, Gait Deviations  forward flexed posture;step length decreased  -LB     Recorded by  [LB] Angelica Treadwell PTA     Stairs Assessment/Treatment    Number of Stairs  8  -LB     Stairs, Handrail Location  both sides  -LB     Stairs, [place] Level  contact guard assist;verbal cues required  -LB     Stairs, Technique Used  step to step (ascending);step to step (descending)  -LB     Recorded by   [LB] Angelica Treadwell PTA     Balance Skills Training    Gait Balance-Level of Assistance  Contact guard;Minimum assistance  -LB     Gait Balance Support  parallel bars;Right upper extremity supported;Left upper extremity supported  -LB     Gait Balance Activities  braiding / front;side-stepping  -LB     Recorded by  [LB] Angelica Treadwell PTA     Therapy Exercises    Bilateral Lower Extremities  AROM:;10 reps;heel raises;mini squats;supine;heel slides;hip abduction/adduction  -LB     Trunk Exercises  10 reps;supine;bridging  -LB     Recorded by  [LB] Angelica Treadwell PTA     Positioning and Restraints    Post Treatment Position  wheelchair  -LB     In Wheelchair  sitting;with OT  -LB     Recorded by  [LB] Angelica Treadwell PTA       User Key  (r) = Recorded By, (t) = Taken By, (c) = Cosigned By    Initials Name Effective Dates    AW Adelia Martinez, MS CCC-SLP 04/13/15 -     LB Angelica Treadwell, PTA 02/18/16 -     AF Adelia Salamanca, OTR 12/01/15 -     AR Sis Ramirez, PT 06/27/16 -     SHILPI Karen Allan, OT Student 07/11/17 -                 OT Goals       08/10/17 1613 08/09/17 1546 08/02/17 1558    Transfer Training OT STG    Transfer Training OT STG, Date Established  08/09/17  -AF (r) SHILPI (t) AF (c) 08/02/17  -AF (r) SHILPI (t) AF (c)    Transfer Training OT STG, Time to Achieve   1 wk  -AF (r) SHILPI (t) AF (c)    Transfer Training OT STG, Assist Device   walker, rolling   grab bars  -AF (r) SHILPI (t) AF (c)    Transfer Training OT STG, Date Goal Reviewed (P)  08/10/17  -SHILPI 08/09/17  -AF (r) SHILPI (t) AF (c) 08/02/17  -AF (r) SHILPI (t) AF (c)    Transfer Training OT STG, Outcome (P)  goal met  -SHILPI      Transfer Training OT LTG    Transfer Training OT LTG, Date Established  08/09/17  -AF (r) SHILPI (t) AF (c)     Transfer Training OT LTG, Time to Achieve  by discharge  -AF (r) SHILPI (t) AF (c)     Transfer Training OT LTG, Date Goal Reviewed  08/09/17  -CHAKA (r) SHILPI (indra) CHAKA (c) 08/02/17  -CHAKA (r) SHILPI (indra) CHAKA (c)    Transfer Training OT  LTG, Outcome (P)  goal not met  -SHILPI      Transfer Training OT LTG, Reason Goal Not Met (P)  progress slower than expected  -SHILPI      Transfer Training 2 OT STG    Transfer Training 2 OT STG, Date Established  08/09/17  -AF (r) SHILPI (t) AF (c) 08/02/17  -AF (r) SHILPI (t) AF (c)    Transfer Training 2 OT STG, Time to Achieve   1 wk  -AF (r) SHILPI (t) AF (c)    Transfer Training 2 OT STG, Activity Type   shower chair   rwx  -AF (r) SHILPI (t) AF (c)    Transfer Training 2 OT STG, [place] Level   supervision required  -AF (r) SHILPI (t) AF (c)    Transfer Training 2 OT STG, Date Goal Reviewed  08/09/17  -AF (r) SHILPI (t) AF (c)     Transfer Training 2 OT STG, Outcome (P)  goal met  -SHILPI goal ongoing  -AF (r) SHILPI (t) AF (c) goal revised  -AF (r) SHILPI (t) AF (c)    Transfer Training 2 OT LTG    Transfer Training 2 OT LTG, Date Established  08/09/17  -AF (r) SHILPI (t) AF (c) 08/02/17  -AF (r) SHILPI (t) AF (c)    Transfer Training 2 OT LTG, Time to Achieve   by discharge  -AF (r) SHILPI (t) AF (c)    Transfer Training 2 OT LTG, Date Goal Reviewed  08/09/17  -AF (r) SHILPI (t) AF (c) 08/02/17  -AF (r) SHILPI (t) AF (c)    Transfer Training 2 OT LTG, Outcome (P)  goal met  -SHILPI      Bathing OT STG    Bathing Goal OT STG, Date Established  08/09/17  -AF (r) SHILPI (t) AF (c) 08/02/17  -AF (r) SHILPI (t) AF (c)    Bathing Goal OT STG, Time to Achieve   1 wk  -AF (r) SHILPI (t) AF (c)    Bathing Goal OT STG, [place] Level   contact guard assist  -AF (r) SHILPI (t) AF (c)    Bathing Goal OT STG, Assist Device   grab bars;shower head, detachable;shower chair with back  -AF (r) SHILPI (t) AF (c)    Bathing Goal OT STG, Date Goal Reviewed  08/09/17  -AF (r) SHILPI (t) AF (c) 08/02/17  -AF (r) SHILPI (t) AF (c)    Bathing Goal OT STG, Outcome (P)  goal met  -SHILPI goal ongoing  -AF (r) SHILPI (t) AF (c)     Bathing OT LTG    Bathing Goal OT LTG, Date Established  08/09/17  -AF (r) SHILPI (t) AF (c) 08/02/17  -AF (r) SHILPI (t) AF (c)    Bathing Goal OT LTG, Time to Achieve   by discharge  -AF (r) SHILPI (t) AF  (c)    Bathing Goal OT LTG, [place] Level   supervision required  -AF (r) SHILPI (t) AF (c)    Bathing Goal OT LTG, Date Goal Reviewed  08/09/17  -AF (r) SHILPI (t) AF (c) 08/02/17  -AF (r) SHILPI (t) AF (c)    Bathing Goal OT LTG, Outcome (P)  goal met  -SHILPI goal ongoing  -AF (r) SHILPI (t) AF (c)     Grooming OT STG    Grooming Goal OT STG, Date Established  08/09/17  -AF (r) SHILPI (t) AF (c) 08/02/17  -AF (r) SHILPI (t) AF (c)    Grooming Goal OT STG, Time to Achieve   1 wk  -AF (r) SHILPI (t) AF (c)    Grooming Goal OT STG, Date Goal Reviewed  08/08/17  -AF (r) SHILPI (t) AF (c) 08/02/17  -AF (r) SHILPI (t) AF (c)    Grooming Goal OT STG, Outcome (P)  goal met  -SHILPI      Grooming OT LTG    Grooming Goal OT LTG, Date Established  08/09/17  -AF (r) SHILPI (t) AF (c) 08/02/17  -AF (r) SHILPI (t) AF (c)    Grooming Goal OT LTG, Time to Achieve  by discharge  -AF (r) SHILPI (t) AF (c) by discharge  -AF (r) SHILPI (t) AF (c)    Grooming Goal OT LTG, Date Goal Reviewed  08/09/17  -AF (r) SHILPI (t) AF (c) 08/02/17  -AF (r) SHILPI (t) AF (c)    Grooming Goal OT LTG, Outcome (P)  goal met  -SHILPI      LB Dressing OT STG    LB Dressing Goal OT STG, Date Established   08/02/17  -AF (r) SHILPI (t) AF (c)    LB Dressing Goal OT STG, Time to Achieve   1 wk  -AF (r) SHILPI (t) AF (c)    LB Dressing Goal OT STG, [place] Level   contact guard assist   tying shoes   -AF (r) SHILPI (t) AF (c)    LB Dressing Goal OT STG, Adaptive Equipment   --   elastic laces  -AF (r) SHILPI (t) AF (c)    LB Dressing Goal OT STG, Date Goal Reviewed  08/09/17  -AF (r) SHILPI (t) AF (c) 08/02/17  -AF (r) SHILPI (t) AF (c)    LB Dressing Goal OT STG, Outcome  goal met  -AF (r) SHILPI (t) AF (c)     LB Dressing OT LTG    LB Dressing Goal OT LTG, Date Established  08/09/17  -AF (r) SHILPI (t) AF (c) 08/02/17  -AF (r) SHILPI (t) AF (c)    LB Dressing Goal OT LTG, Time to Achieve   by discharge  -AF (r) SHILPI (t) AF (c)    LB Dressing Goal OT LTG, [place] Level   supervision required  -AF (r) SHILPI (t) AF (c)    LB Dressing Goal OT LTG,  Adaptive Equipment   --   w/ shoe strings  -AF (r) SHILPI (t) AF (c)    LB Dressing Goal OT LTG, Date Goal Reviewed  08/09/17  -AF (r) SHILPI (t) AF (c) 08/02/17  -AF (r) SHILPI (t) AF (c)    LB Dressing Goal OT LTG, Outcome (P)  goal met  -SHILPI      UB Dressing OT STG    UB Dressing Goal OT STG, Date Established  08/09/17  -AF (r) SHILPI (t) AF (c) 08/02/17  -AF (r) SHILPI (t) AF (c)    UB Dressing Goal OT STG, Time to Achieve   1 wk  -AF (r) SHILPI (t) AF (c)    UB Dressing Goal OT STG, Date Goal Reviewed  08/09/17  -AF (r) SHILPI (t) AF (c) 08/02/17  -AF (r) SHILPI (t) AF (c)    UB Dressing Goal OT STG, Outcome (P)  goal not met  -SHILPI goal ongoing  -AF (r) SHILPI (t) AF (c)     UB Dressing Goal OT STG, Reason Goal Not Met (P)  progress slower than expected  -SHILPI      UB Dressing OT LTG    UB Dressing Goal OT LTG, Date Established  08/09/17  -AF (r) SHILPI (t) AF (c) 08/02/17  -AF (r) SHILPI (t) AF (c)    UB Dressing Goal OT LTG, Time to Achieve   by discharge  -AF (r) SHILPI (t) AF (c)    UB Dressing Goal OT LTG, Date Goal Reviewed  08/09/17  -AF (r) SHILPI (t) AF (c) 08/02/17  -AF (r) SHILPI (t) AF (c)    UB Dressing Goal OT LTG, Outcome (P)  goal not met  -SHILPI      UB Dressing Goal OT LTG, Reason Goal Not Met (P)  progress slower than expected  -SHILPI        07/29/17 1054          Transfer Training OT STG    Transfer Training OT STG, Date Established 07/29/17  -RE      Transfer Training OT STG, Time to Achieve 3 days  -RE      Transfer Training OT STG, Activity Type toilet  -RE      Transfer Training OT STG, [place] Level supervision required;verbal cues required  -RE      Transfer Training OT STG, Outcome goal ongoing  -RE      Transfer Training OT LTG    Transfer Training OT LTG, Date Established 07/29/17  -RE      Transfer Training OT LTG, Time to Achieve 1 wk  -RE      Transfer Training OT LTG, Activity Type toilet  -RE      Transfer Training OT LTG, [place] Level conditional independence  -RE      Transfer Training OT LTG, Outcome goal ongoing  -RE       Transfer Training 2 OT STG    Transfer Training 2 OT STG, Time to Achieve 3 days  -RE      Transfer Training 2 OT STG, Activity Type shower chair  -RE      Transfer Training 2 OT STG, [place] Level contact guard assist  -RE      Transfer Training 2 OT STG, Outcome goal ongoing  -RE      Transfer Training 2 OT LTG    Transfer Training 2 OT LTG, Time to Achieve 2 wks  -RE      Transfer Training 2 OT LTG, Activity Type shower chair;tub  -RE      Transfer Training 2 OT LTG, [place] Level supervision required  -RE      Transfer Training 2 OT LTG, Outcome goal ongoing  -RE      Bathing OT STG    Bathing Goal OT STG, Date Established 07/29/17  -RE      Bathing Goal OT STG, Time to Achieve 3 days  -RE      Bathing Goal OT STG, Activity Type upper body bathing;lower body bathing  -RE      Bathing Goal OT STG, [place] Level set up required  -RE      Bathing Goal OT STG, Outcome goal ongoing  -RE      Bathing OT LTG    Bathing Goal OT LTG, Time to Achieve 2 wks  -RE      Bathing Goal OT LTG, Activity Type upper body bathing;lower body bathing  -RE      Bathing Goal OT LTG, [place] Level conditional independence  -RE      Bathing Goal OT LTG, Outcome goal ongoing  -RE      Grooming OT STG    Grooming Goal OT STG, Time to Achieve 3 days  -RE      Grooming Goal OT STG, [place] Level conditional independence  -RE      Grooming Goal OT STG, Outcome goal ongoing  -RE      Grooming OT LTG    Grooming Goal OT LTG, Time to Achieve 1 wk  -RE      Grooming Goal OT LTG, [place] Level conditional independence  -RE      Grooming Goal OT LTG, Outcome goal ongoing  -RE      LB Dressing OT STG    LB Dressing Goal OT STG, Date Established 07/29/17  -RE      LB Dressing Goal OT STG, Time to Achieve 5 - 7 days  -RE      LB Dressing Goal OT STG, [place] Level supervision required;verbal cues required  -RE      LB Dressing Goal OT STG, Outcome goal ongoing  -RE      LB Dressing OT LTG    LB Dressing Goal OT LTG,  Date Established 07/29/17  -RE      LB Dressing Goal OT LTG, Time to Achieve 2 wks  -RE      LB Dressing Goal OT LTG, [place] Level conditional independence  -RE      LB Dressing Goal OT LTG, Outcome goal ongoing  -RE      UB Dressing OT STG    UB Dressing Goal OT STG, Date Established 07/29/17  -RE      UB Dressing Goal OT STG, Time to Achieve 3 days  -RE      UB Dressing Goal OT STG, [place] Level set up required  -RE      UB Dressing Goal OT STG, Outcome goal ongoing  -RE      UB Dressing OT LTG    UB Dressing Goal OT LTG, Date Established 07/29/17  -RE      UB Dressing Goal OT LTG, Time to Achieve 2 wks  -RE      UB Dressing Goal OT LTG, [place] Level conditional independence  -RE      UB Dressing Goal OT LTG, Outcome goal ongoing  -RE        User Key  (r) = Recorded By, (t) = Taken By, (c) = Cosigned By    Initials Name Provider Type    RE Maya Apodaca, OTR Occupational Therapist    CHAKA Salamanca, OTR Occupational Therapist    SHILPI Allan, OT Student OT Student          Occupational Therapy Education     Title: PT OT SLP Therapies (Active)     Topic: Occupational Therapy (Done)     Point: ADL training (Done)    Description: Instruct learner(s) on proper safety adaptation and remediation techniques during self care or transfers.   Instruct in proper use of assistive devices.    Learning Progress Summary    Learner Readiness Method Response Comment Documented by Status   Patient Acceptance E VU pt, son, and daughter in law attended family conference to review current assist levels for ADLs and safety considerations decreased LUE sensation. Recommend OP OT, tub bench, rwx, bedside commode for home. Family observed tub tsf w/ tub bench SHILPI 08/10/17 3759 Done   Family Acceptance E VU pt, son, and daughter in law attended family conference to review current assist levels for ADLs and safety considerations decreased LUE sensation. Recommend OP OT, tub bench, rwx, bedside commode for home.  Family observed tub tsf w/ tub bench SHILPI 08/10/17 1607 Done               Point: Precautions (Done)    Description: Instruct learner(s) on prescribed precautions during self-care and functional transfers.    Learning Progress Summary    Learner Readiness Method Response Comment Documented by Status   Patient Emilie WHITE,NR pt was educated on the importance of awareness and safe hand and body positioning when completing cooking tasks w/ decreased sensation in LUE, especially when cutting and using the stove/oven, in order to avoid falls, burns, cuts. SHILPI 08/09/17 1451 Done                      User Key     Initials Effective Dates Name Provider Type Discipline    SHILPI 07/11/17 -  Karen Allan OT Student OT Student OT                OT Recommendation and Plan  Anticipated Equipment Needs At Discharge: tub bench  Anticipated Discharge Disposition: (P) home with assist, home with outpatient services  Planned Therapy Interventions: adaptive equipment training, ADL retraining, activity intolerance, energy conservation, fine motor coordination training, neuromuscular re-education  Therapy Frequency: 5 times/wk             Outcome Measures       08/09/17 1400          Timed Up and Go (TUG)    TUG Test 1 22 seconds  -AR      TUG Test 2 22 seconds  -AR      Timed Up and Go Comments RWX  -AR      Functional Assessment    Outcome Measure Options Timed Up and Go (TUG)  -AR        User Key  (r) = Recorded By, (t) = Taken By, (c) = Cosigned By    Initials Name Provider Type    AR Sis Ramirez PT Physical Therapist           Time Calculation:          Time Calculation- OT       08/10/17 1612 08/10/17 1611 08/10/17 1014    Time Calculation- OT    OT Start Time (P)  0830  -SHILPI (P)  1100  -SHILPI     OT Stop Time (P)  0900  -SHILPI (P)  1130  -SHILPI     OT Time Calculation (min) (P)  30 min  -SHILPI (P)  30 min  -SHILPI     OT Non-Billable Time (min)  (P)  30 min   family conference  -SHILPI 15 min   team rounds  -AF      User Key  (r) = Recorded By, (t) =  Taken By, (c) = Cosigned By    Initials Name Provider Type    AF Adelia Salamanca, OTR Occupational Therapist    SHILPI Allan, OT Student OT Student          Therapy Charges for Today     Code Description Service Date Service Provider Modifiers Qty    93889153001 HC OT SELF CARE/MGMT/TRAIN EA 15 MIN 8/9/2017 Karen Allan OT Student GO 2    11678171383 HC OT SELF CARE/MGMT/TRAIN EA 15 MIN 8/9/2017 Karen Allan OT Student GO 3    [id number] HC OT CARE PLAN EA 15 MIN 8/10/2017 Karen Allan OT Student GO 2    99970161186 HC OT NEUROMUSC RE EDUCATION EA 15 MIN 8/10/2017 Karen Allan OT Student GO 2    05005177722 HC OT SELF CARE/MGMT/TRAIN EA 15 MIN 8/10/2017 Karen Allan, OT Student GO 2               OT Discharge Summary  Anticipated Discharge Disposition: (P) home with assist, home with outpatient services  Reason for Discharge: (P) Discharge from facility  Outcomes Achieved: (P) Patient able to partially acheive established goals  Discharge Destination: (P) Home with assist, Home with outpatient services    Karen Allan OT Student  8/10/2017

## 2017-08-10 NOTE — THERAPY TREATMENT NOTE
Inpatient Rehabilitation - Speech Language Pathology Treatment Note  [facility]     Patient Name: Magnus Hartley  : 1928  MRN: [id number]  Today's Date: 8/10/2017         Admit Date: 2017    Visit Dx:      ICD-10-CM ICD-9-CM   1. Left hemiparesis G81.94 342.90   2. Dysarthria R47.1 784.51     Patient Active Problem List   Diagnosis   • Foot pain   • Asthma   • Benign essential hypertension   • Controlled type 2 diabetes mellitus without complication   • Dyslipidemia   • Macrocytosis without anemia   • Senile osteoporosis   • Post-menopausal osteoporosis   • Sinus bradycardia   • Stress incontinence in female   • Urge incontinence of urine   • Atrophic vaginitis   • Encounter for fitting and adjustment of other specified devices   • Incomplete emptying of bladder   • Urethral caruncle   • Incomplete uterovaginal prolapse   • Cerebrovascular accident (CVA) due to occlusion of right middle cerebral artery   • Atrial fibrillation   • Warfarin anticoagulation (goal INR 2-3)              Adult Rehabilitation Note       08/10/17 1500 08/10/17 1100 08/10/17 1019    Rehab Assessment/Intervention    Discipline speech language pathologist  -AW speech language pathologist  -AW (P)  occupational therapist  -SHILPI    Document Type therapy note (daily note)  -AW therapy note (daily note)  -AW (P)  discharge summary;therapy note (daily note)  -SHILPI    Subjective Information no complaints;agree to therapy  -AW no complaints;agree to therapy  -AW (P)  agree to therapy;no complaints  -SHILPI    Patient Effort, Rehab Treatment good  -AW good  -AW (P)  good  -SHILPI    Symptoms Noted During/After Treatment none  -AW none  -AW     Precautions/Limitations fall precautions  -AW fall precautions  -AW (P)  fall precautions  -SHILPI    Recorded by [AW] Adelia Martinez MS CCC-SLP [AW] Adelia Martinez MS CCC-SLP [SHILPI] Karen Allan, OT Student    Pain Assessment    Pain Assessment  (P)  No/denies pain  -SHILPI (P)  No/denies pain  -SHILPI    Recorded by  [SHILPI]  Karen Allan, OT Student [SHILPI] Karen Allan OT Student    Cognitive Assessment/Intervention    Current Cognitive/Communication Assessment  (P)  impaired  -SHILPI (P)  impaired  -SHILPI    Orientation Status  (P)  oriented x 4  -SHILPI (P)  oriented x 4  -SHILPI    Follows Commands/Answers Questions  (P)  able to follow single-step instructions;100% of the time;needs repetition;needs increased time;needs cueing  -SHILPI (P)  100% of the time;able to follow single-step instructions;needs increased time;needs cueing;needs repetition  -SHILPI    Personal Safety  (P)  mild impairment  -SHILPI (P)  mild impairment  -SHILPI    Personal Safety Interventions  (P)  gait belt;nonskid shoes/slippers when out of bed  -SHILPI (P)  gait belt;fall prevention program maintained;nonskid shoes/slippers when out of bed;supervised activity  -SHILPI    Recorded by  [SHILPI] Karen Allan OT Student [SHILPI] Karen Allan OT Student    Improve memory skills    Memory Skills Progress  90%;without cues;100%;with inconsistent cues  -AW     Comments: Improve memory skills  visual memory task of studying a list of 10 animals using grouping as a strategy and then choosing the 10 animals from a list of 20  -AW     Recorded by  [AW] Adelia Martinez MS CCC-SLP     Improve functional problem solving    Functional Problem Solving Progress 90%;without cues;100%;with inconsistent cues  -AW 50%;without cues  -AW     Comments: Improve functional problem solving obtaining information from a map  -AW after a 10 min delay, pt was asked to pick the 10 animals out of a list of 20, recalling 5/10; cues did not assist furthur recall  -AW     Recorded by [AW] Adelia Martinez MS CCC-SLP [AW] Adelia Martinez MS CCC-SLP     Improve executive function skills    Executive Function Skills Progress 70%;without cues;100%;with inconsistent cues  -AW 90%;without cues  -AW     Comments: Improve executive function skills $ management task  -AW obtaining information from a prescription label  -AW     Recorded by [AW] Adelia  MS Juan CCC-SLP [AW] Adelia Martinez MS CCC-SLP     General UE Assessment    ROM   (P)  LUE ROM was WNL;RUE ROM was WNL  -SHILPI    Recorded by   [SHILPI] Karen Allan, CHERELLE Student    Bed Mobility, Assessment/Treatment    Bed Mob, Supine to Sit, [place]   (P)  supervision required  -SHILPI    Bed Mobility, Comment   (P)  elevated HOB, bed rail  -SHILPI    Recorded by   [SHILPI] Karen Allan OT Student    Transfer Assessment/Treatment    Transfers, Sit-Stand [place]   (P)  stand by assist  -SIHLPI    Transfers, Stand-Sit [place]   (P)  stand by assist  -SHILPI    Transfers, Sit-Stand-Sit, Assist Device   (P)  rolling walker  -SHILPI    Recorded by   [SHILPI] Karen Allan OT Student    Upper Body Bathing Assessment/Training    UB Bathing Assess/Train Assistive Device   (P)  hand-held shower head;grab bars;shower chair with back  -SHILPI    UB Bathing Assess/Train, Position   (P)  sitting  -SHILPI    UB Bathing Assess/Train, [place] Level   (P)  supervision required;set up required;verbal cues required   vcs for locating and managing items  -SHILPI    Recorded by   [SHILPI] Karen Allan OT Student    Lower Body Bathing Assessment/Training    LB Bathing Assess/Train Assistive Device   (P)  hand-held shower head;grab bars;shower chair with back  -SHILPI    LB Bathing Assess/Train, Position   (P)  sitting;standing  -SHILPI    LB Bathing Assess/Train, [place] Level   (P)  stand by assist;verbal cues required  -SHILPI    LB Bathing Assess/Train, Comment   (P)  vcs for sequencing buttocks/elsi bathing   -SHILPI    Recorded by   [SHILPI] Karen Allan, CHERELLE Student    Upper Body Dressing Assessment/Training    UB Dressing Assess/Train, Clothing Type   (P)  doffing:;donning:;t-shirt;bra;hospital gown   dof gown, don bra, tshirt   -SHILPI    UB Dressing Assess/Train, Position   (P)  sitting  -SHILPI    UB Dressing Assess/Train, [place]   (P)  minimum assist (75% patient effort);set up required   thread R arm; assist w/ gathering clothes  -SHILPI    Recorded by   [SHILPI]  Karen Allan, OT Student    Lower Body Dressing Assessment/Training    LB Dressing Assess/Train, Clothing Type   (P)  doffing:;donning:;pants;socks;shoes   dof socks; don underwear, pants, socks, shoes   -SHILPI    LB Dressing Assess/Train, Position   (P)  sitting;standing   shower chair, recliner   -SHILPI    LB Dressing Assess/Train, [place]   (P)  minimum assist (75% patient effort);supervision required  -SHILPI    LB Dressing Assess/Train, Comment   (P)  sup for standing balance when pulling up pants; min to tie L shoe, pt displayed difficulty maintaining grasp due to decreased sensation in LUE  -SHILPI    Recorded by   [SHILPI] Karen Allan OT Student    Toileting Assessment/Training    Toileting Assess/Train, Assistive Device   (P)  grab bars;raised toilet seat  -SHILPI    Toileting Assess/Train, Position   (P)  sitting;standing  -SHILPI    Toileting Assess/Train, [place] Level   (P)  supervision required;verbal cues required  -SHILPI    Toileting Assess/Train, Comment   (P)  pt was able to manage clothing w/ vcs; sup for standing balance during clothing management; pt was able to complete hygiene while seated   -SHILPI    Recorded by   [SHILPI] Karen Allan OT Student    Grooming Assessment/Training    Grooming Assess/Train, Position   (P)  sitting;sink side  -SHILPI    Grooming Assess/Train, [place]ep[place] Level   (P)  set up required  -SHILPI    Grooming Assess/Train, Comment   (P)  brushing hair w/c level   -SHILPI    Recorded by   [SHILPI] Karen Allan OT Student    Positioning and Restraints    Pre-Treatment Position   (P)  in bed  -SHILPI    Post Treatment Position   (P)  chair  -SHILPI    In Chair   (P)  sitting;with PT  -SHILPI    Recorded by   [SHILPI] Karen Allan OT Student      08/10/17 0901 08/09/17 1500 08/09/17 4244    Rehab Assessment/Intervention    Discipline physical therapist  -AR speech language pathologist  -AW occupational therapist  -SHILPI NULL,AF2    Document Type therapy note (daily note)  -AR therapy note (daily note)  -AW therapy note (daily  note)  -AF,SHILPI,AF2    Subjective Information agree to therapy  -AR no complaints;agree to therapy  -AW no complaints;agree to therapy  -AF,SHILPI,AF2    Patient Effort, Rehab Treatment good  -AR good  -AW good  -AF,SHILPI,AF2    Symptoms Noted During/After Treatment  none  -AW fatigue  -AF,SHILPI,AF2    Symptoms Noted Comment   seated breaks as necessary throughout task   -AF,SHILPI,AF2    Precautions/Limitations fall precautions  -AR fall precautions;swallowing precautions  -AW fall precautions  -AF,SHILPI,AF2    Recorded by [AR] Sis Ramirez, PT [AW] Adelia Martinez MS CCC-SLP [AF,SHILPI,AF2] NO Cooney (r) Karen Allan OT Student (t) NO Cooney (c)    Pain Assessment    Pain Assessment No/denies pain  -AR  No/denies pain  -AF,SHILPI,AF2    Recorded by [AR] Sis Ramirez PT  [AF,SHILPI,AF2] NO Cooney (r) Karen Allan, OT Student (t) NO Cooney (c)    Cognitive Assessment/Intervention    Current Cognitive/Communication Assessment did not assess  -AR  impaired  -AF,SHILPI,AF2    Orientation Status oriented x 4  -AR  oriented x 4  -AF,SHILPI,AF2    Follows Commands/Answers Questions 100% of the time  -AR  75% of the time;able to follow single-step instructions;needs cueing;needs repetition  -AF,SHILPI,AF2    Personal Safety   mild impairment  -AF,SHILPI,AF2    Personal Safety Interventions fall prevention program maintained;gait belt;muscle strengthening facilitated;supervised activity  -AR  fall prevention program maintained;gait belt;supervised activity;nonskid shoes/slippers when out of bed  -AF,SHILPI,AF2    Recorded by [AR] Sis Ramirez PT  [AF,SHILPI,AF2] NO Cooney (r) Karen Allan OT Student (t) NO Cooney (c)    Improve attention    Attention Progress  50%;without cues;90%;with consistent cues  -AW     Comments: Improve attention  following written directions to carry out 2 tasks w/ a group of 6 words; consistent cues for attn to details  -AW     Recorded by  [AW] Adelia Martinez, MS  CCC-SLP     Improve memory skills    Memory Skills Progress  60%;without cues;100%;with inconsistent cues  -AW     Comments: Improve memory skills  category exclusion  -AW     Recorded by  [AW] Adelia Martinez MS CCC-SLP     Improve functional problem solving    Functional Problem Solving Progress  50%;without cues;80%;with consistent cues  -AW     Comments: Improve functional problem solving  [place] planning task; pt did well taking notes and trying to organize task, however, needed consistent cues to think through task  -AW     Recorded by  [AW] Adelia Martinez MS CCC-SLP     Bed Mobility, Assessment/Treatment    Bed Mob, Supine to Sit, [place] supervision required  -AR      Bed Mob, Sit to Supine, [place] supervision required  -AR      Bed Mobility, Comment HOB flat, no bed rail  -AR      Recorded by [AR] Sis Ramirez PT      Transfer Assessment/Treatment    Transfers, Sit-Stand [place] stand by assist  -AR      Transfers, Stand-Sit [place] stand by assist;verbal cues required   for UE placement and keeping RW w/ pt  -AR      Transfers, Sit-Stand-Sit, Assist Device rolling walker  -AR      Recorded by [AR] Sis Ramirez PT      Gait Assessment/Treatment    Gait, [place] Level contact guard assist  -AR      Gait, Assistive Device rolling walker  -AR      Gait, Distance (Feet) 300   80, 80, w/ education to son/DIL  -AR      Gait, Gait Pattern Analysis swing-through gait  -AR      Gait, Gait Deviations rojelio decreased;decreased heel strike  -AR      Gait, Safety Issues step length decreased  -AR      Gait, Comment outside on concrete, up/down ramp  -AR      Recorded by [AR] Sis Ramirez PT      Stairs Assessment/Treatment    Number of Stairs 1   curb step outside  -AR      Stairs, Handrail Location none  -AR      Stairs, [place] Level contact guard assist;verbal cues required  -AR      Stairs, Assistive Device walker  -AR      Recorded by [AR] Sis Ramirez, PT      ADL  Assessment/Intervention    IADL Assess/Train, Comment   pt completed cooking task to make stir portillo in unfamiliar kitchen w/ ingredients and materials set out; pt was able to use a knife safely when cutting veggies; pt required frequent vcs for body positioning to maintain safe standing posture when reaching across the counter; pt required frequent vcs for safe hand placement when transferring from sit to  w/c and hand placement when using the stove; pt required seated rest breaks, notable increase in standing endurance for self care activities   -CHAKA,SHILPI,AF2    Additional Documentation   IADL Assess/Train, Comment (Row)  -CHAKA,SHILPI,AF2    Recorded by   [CHAKA,SHILPI,AF2] Adelia Salamanca OTR (r) Karen Allan OT Student (t) Adelia Salamanca OTR (c)    Balance Skills Training    Standing-Level of Assistance Minimum assistance  -AR      Static Standing Balance Support No upper extremity supported  -AR      Standing-Balance Activities Retrieve object from floor   bouncing ball and catching w/o UE support  -AR      Gait Balance-Level of Assistance Contact guard  -AR      Gait Balance Support parallel bars  -AR      Gait Balance Activities backwards;side-stepping;tandem;uneven surface   exagerated wide stance gait  -AR      Recorded by [AR] Sis Ramirez PT      Therapy Exercises    Bilateral Lower Extremities standing;heel raises;mini squats;hip flexion;hip abduction/adduction;15 reps   w/ education for HEP, B decreasing to single UE support  -AR      Recorded by [AR] Sis Ramirez PT      Positioning and Restraints    Pre-Treatment Position sitting in chair/recliner  -AR  sitting in chair/recliner  -SHILPI NULL,AF2    Post Treatment Position chair  -AR  chair  -SHILPI NULL,AF2    In Chair reclined;sitting;call light within reach;encouraged to call for assist;exit alarm on;with nsg  -AR  call light within reach;sitting;encouraged to call for assist  -SHILPI NULL,AF2    Recorded by [AR] Sis Ramirez PT  [CHAKA,SHILPI,AF2] Adelia Phelps  NO [place]amanca (r) Karen Allan, OT Student (t) NO Cooney (c)      08/09/17 1014 08/09/17 0931 08/08/17 1114    Rehab Assessment/Intervention    Discipline occupational therapist  -AF,SHILPI,AF2 physical therapist  -AR occupational therapist  -AF,SHILPI,AF2    Document Type therapy note (daily note)  -AF,SHILPI,AF2 therapy note (daily note)  -AR therapy note (daily note)  -AF,SHILPI,AF2    Subjective Information agree to therapy;no complaints  -AF,SHILPI,AF2 agree to therapy  -AR agree to therapy;complains of;weakness  -AF,SHILPI,AF2    Patient Effort, Rehab Treatment good  -AF,SHILPI,AF2 good  -AR good  -AF,SHILPI,AF2    Symptoms Noted During/After Treatment shortness of breath   w/ tsfs, bending down for LBD when seated  -AF,SHILPI,AF2 fatigue  -AR shortness of breath   after tsfs, bending down for LBD;   -AF,SHILPI,AF2    Symptoms Noted Comment allowed for rest breaks as necessary; vcs for deep breathing  -AF,SHILPI,AF2  allowed for rest breaks as necessary; vcs for deep breathing   -AF,SHILPI,AF2    Precautions/Limitations fall precautions  -AF,SHILPI,AF2 fall precautions  -AR fall precautions;swallowing precautions  -AF,SHILPI,AF2    Recorded by [AF,SHILPI,AF2] NO Cooney (r) Karen Allan, OT Student (t) NO Cooney (c) [AR] Sis Ramirez, PT [AF,SHILPI,AF2] NO Cooney (r) Karen Allan, OT Student (t) NO Cooney (c)    Pain Assessment    Pain Assessment No/denies pain  -AF,SHILPI,AF2 No/denies pain  -AR No/denies pain  -AF,SHILPI,AF2    Recorded by [AF,SHILPI,AF2] NO Cooney (r) Karen Allan, OT Student (t) NO Cooney (c) [AR] Sis Ramirez, PT [AF,SHILPI,AF2] Adelia Slaamanca, OTR (r) Karen Allan, OT Student (t) Adelia Salamanca, OTR (c)    Cognitive Assessment/Intervention    Current Cognitive/Communication Assessment impaired  -AF,SHILPI,AF2 did not assess  -AR impaired  -AF,SHILPI,AF2    Orientation Status oriented x 4  -AF,SHILPI,AF2 oriented x 4  -AR oriented x 4  -AF,SHILPI,AF2    Follows Commands/Answers  Questions 100% of the time;able to follow single-step instructions;needs increased time;needs cueing;needs repetition  -AF,SHILPI,AF2  100% of the time;able to follow single-step instructions;needs cueing;needs increased time;needs repetition  -AF,SHILPI,AF2    Personal Safety mild impairment  -AF,SHILPI,AF2  mild impairment  -AF,SHILPI,AF2    Personal Safety Interventions  fall prevention program maintained;gait belt;muscle strengthening facilitated  -AR gait belt;nonskid shoes/slippers when out of bed;fall prevention program maintained  -AF,SHILPI,AF2    Recorded by [AF,SHILPI,AF2] Adelia Salamanca OTR (r) Karen Allan, OT Student (t) NO Cooney (c) [AR] Sis Ramirez, PT [AF,SHILPI,AF2] NO Cooney (r) Karen Allan, OT Student (t) NO Cooney (c)    Bed Mobility, Assessment/Treatment    Bed Mob, Supine to Sit, [place]  supervision required  -AR supervision required  -AF,SHILPI,AF2    Bed Mob, Sit to Supine, [place]  supervision required  -AR     Bed Mobility, Comment  HOB flat, no bed rail  -AR     Recorded by  [AR] Sis Ramirez, PT [AF,SHILPI,AF2] NO Cooney (r) Karen Allan, OT Student (t) NO Cooney (c)    Transfer Assessment/Treatment    Transfers, Bed-Chair [place]   stand by assist;contact guard assist  -AF,SHILPI,AF2    Transfers, Sit-Stand [place] contact guard assist;verbal cues required   vcs for safe stance when standing   -AF,SHILPI,AF2 stand by assist  -AR     Transfers, Stand-Sit [place] contact guard assist  -AF,SHILPI,AF2 stand by assist  -AR     Transfers, Sit-Stand-Sit, Assist Device  rolling walker  -AR     Toilet Transfer, [place] contact guard assist  -AF,SHILPI,AF2      Toilet Transfer, Assistive Device wheelchair   grab bars  -AF,SHILPI,AF2      Transfer, Comment  car transfer w/ few VC and SBA  -AR tsf to and from EOM from w/c w/ CGA and vcs  -AF,SHILPI,AF2    Recorded by [CHAKA,SHILPI,AF2] Adelia Salamanca, OTR (r) Karen Allan, OT Student (t) Adelia Phleps  NO [place] (c) [AR] Sis Ramirez, PT [AF,SHILPI,AF2] Adelia Salamanca OTR (r) Karen Allan, OT Student (t) NO Cooney (c)    Gait Assessment/Treatment    Gait, [place] Level  contact guard assist;stand by assist  -AR     Gait, Assistive Device  rolling walker   CGA w/ cane  -AR     Gait, Distance (Feet)  350   , 80 w/ RW; cane 50', 80, 80   -AR     Gait, Gait Pattern Analysis  swing-through gait  -AR     Gait, Gait Deviations  rojelio decreased;decreased heel strike;forward flexed posture  -AR     Gait, Safety Issues  step length decreased;weight-shifting ability decreased  -AR     Gait, Impairments  strength decreased  -AR     Gait, Comment  outside on curb up/down ramp w/ RW  -AR     Recorded by  [AR] Sis Ramirez PT     Stairs Assessment/Treatment    Number of Stairs  4  -AR     Stairs, Handrail Location  both sides  -AR     Stairs, [place] Level  contact guard assist  -AR     Stairs, Technique Used  step to step (descending);step to step (ascending)  -AR     Stairs, Safety Issues  weight-shifting ability decreased  -AR     Stairs, Impairments  impaired balance;strength decreased  -AR     Recorded by  [AR] Sis Ramirez, PT     ADL Assessment/Intervention    IADL Assess/Train, Comment   pt wrote a list of ingredients and amounts needed to make stir portillo dish, pt required some help w/ spelling; pt looked through old ads to find sales and compare the cheapest options, pt required min vcs, min gestural prompts to complete task   -AF,SHILPI,AF2    Recorded by   [AF,SHILPI,AF2] NO Cooney (r) Karen Allan, OT Student (t) NO Cooney (c)    Upper Body Bathing Assessment/Training    UB Bathing Assess/Train, Position sink side;sitting  -AF,SHILPI,AF2  sitting;sink side  -AF,SHILPI,AF2    UB Bathing Assess/Train, [place] Level stand by assist;verbal cues required   vcs for locating items   -AF,SHILPI,AF2  minimum assist (75% patient effort);verbal cues required   vcs for setup  instructions; min a w/ back   -AF,SHILPI,AF2    Recorded by [AF,SHILPI,AF2] NO Cooney (r) Karen Allan OT Student (t) NO Cooney (c)  [AF,SHILPI,AF2] NO Cooney (r) Karen Allan OT Student (t) NO Cooney (c)    Lower Body Bathing Assessment/Training    LB Bathing Assess/Train, Position sitting;sink side  -AF,SHILPI,AF2  sitting;sink side;standing  -AF,SHILPI,AF2    LB Bathing Assess/Train, [place] Level stand by assist  -AF,SHILPI,AF2  contact guard assist;verbal cues required  -AF,SHILPI,AF2    LB Bathing Assess/Train, Comment elsi/buttocks bathing performed after toileting   -AF,SHILPI,AF2  vcs for sequencing; cga to maintaing balance while pt washed elsi area  -AF,SHILPI,AF2    Recorded by [AF,SHILPI,AF2] NO Cooney (r) Karen Allan OT Student (t) NO Cooney (c)  [AF,SHILPI,AF2] NO Cooney (r) Karen Allan OT Student (t) NO Cooney (c)    Upper Body Dressing Assessment/Training    UB Dressing Assess/Train, Clothing Type donning:;doffing:;bra;t-shirt   dof shirt; don bra, shirt  -AF,SHILPI,AF2  doffing:;donning:;bra;t-shirt   dof tshirt; don bra, tshirt  -AF,SHILPI,AF2    UB Dressing Assess/Train, Position sink side;sitting  -AF,SHILPI,AF2  sitting  -AF,SHILPI,AF2    UB Dressing Assess/Train, [place] minimum assist (75% patient effort);verbal cues required  -AF,SHILPI,AF2  minimum assist (75% patient effort);set up required  -AF,SHILPI,AF2    UB Dressing Assess/Train, Comment pt required min a to adjust bra strap; pt required vcs and min a to thread L arm   -AF,SHILPI,AF2  set up required to gather clothes; min a to adjust bra over back   -AF,SHILPI,AF2    Recorded by [AF,SHILPI,AF2] NO Cooney (r) Karen Allan, OT Student (t) NO Cooney (c)  [AF,SHILPI,AF2] NO Cooney (r) Karen Allan, OT Student (t) NO oConey (c)    Lower Body Dressing Assessment/Training    LB Dressing Assess/Train, Clothing Type doffing:;donning:;pants;shoes;socks    dof underwear, pants; don underwear, pants, socks, shoes  -AF,SHILPI,AF2  donning:;shoes;pants;socks;slipper socks;doffing:   dof slipper socks; don underwear, pants, socks, shoes  -AF,SHILPI,AF2    LB Dressing Assess/Train, Position sitting;standing;edge of bed;sink side   socks and shoes at EOB  -AF,SHILPI,AF2  sitting;sink side;standing;edge of bed  -AF,SHILPI,AF2    LB Dressing Assess/Train, [place] contact guard assist;verbal cues required  -AF,SHILPI,AF2  minimum assist (75% patient effort);contact guard assist  -AF,SHILPI,AF2    LB Dressing Assess/Train, Comment vcs for task completion; CGA to maintain balance when pulling up underwear/pants; pt able to IND don socks/shoes and tie shoe strings   -AF,SHILPI,AF2  CGA to maintain balance while standing to pull up pants; min a to tighten shoe strings, pt was able to maintain grasp on shoe strings w/ LUE for increased ind w/ shoe tying  -AF,SHILPI,AF2    Recorded by [AF,SHILPI,AF2] Adelia Salamanca, OTR (r) Karen Allan, OT Student (t) Adelia Salamanca OTR (c)  [AF,SHILPI,AF2] NO Cooney (r) Karen Allan, OT Student (t) Adelia Salamanca OTR (c)    Toileting Assessment/Training    Toileting Assess/Train, Assistive Device grab bars  -AF,SHILPI,AF2      Toileting Assess/Train, Position sitting;standing  -AF,SHILPI,AF2      Toileting Assess/Train, [place]ep[place] Level contact guard assist;minimum assist (75% patient effort)  -AF,SHILPI,AF2      Toileting Assess/Train, Comment pt required min a to wipe bottom completely after BM; CGA for standing balance when managing clothing   -AF,SHILPI,AF2      Recorded by [AF,SHILPI,AF2] Adelia Salamanca, NO (r) Karen Allan, OT Student (t) Adelia Salamanca, OTR (c)      Grooming Assessment/Training    Grooming Assess/Train, Position sitting;sink side  -AF,SHILPI,AF2  sitting;sink side  -AF,SHILPI,AF2    Grooming Assess/Train, [place]ep[place] Level set up required   gathering hairbrush from other room   -AF,SHILPI,AF2  set up required   gather materials from room   -AF,SHILPI,AF2    Recorded  by [AF,SHILPI,AF2] NO Cooney (r) Karen Allan OT Student (t) NO Cooney (c)  [AF,SHILPI,AF2] NO Cooney (r) Karen Allan OT Student (t) NO Cooney (c)    Balance Skills Training    Static Standing Balance Support  eliu bar;Left upper extremity supported;Right upper extremity supported  -AR     Standing-Balance Activities  Compliant surfaces   side stepping  -AR     Gait Balance-Level of Assistance  --   TUG 22sec, 22 sec  -AR     Recorded by  [AR] Sis Ramirez PT     Sensory Treatment    Sensory Reeducation Techniques   sensory training, visual reinforcement;sensory train, w/o visual reinforcement  -AF,SHILPI,AF2    Sensory Reeduction Treatment   pt was given an every day object to feel w/ BUE and observe; pt attempted to find object in rice bucket w/ LUE w/ vision occluded; pt was able to find spoon w/ eyes closed, required eyes open to find other objects; pt buried all the items in the rice at clean up   -AF,SHILPI,AF2    Recorded by   [AF,SHILPI,AF2] NO Cooney (r) Karen Allan OT Student (t) NO Cooney (c)    Positioning and Restraints    Pre-Treatment Position sitting in chair/recliner  -AF,SHILPI,AF2 sitting in chair/recliner  -AR in bed   in w/c second session   -AF,SHILPI,AF2    Post Treatment Position chair  -AF,SHILPI,AF2 chair  -AR chair   w/c second session   -AF,SHILPI,AF2    In Chair sitting;call light within reach;encouraged to call for assist  -AF,SHILPI,AF2 reclined;sitting;call light within reach;encouraged to call for assist;exit alarm on  -AR     In Wheelchair   sitting;with SLP;call light within reach;encouraged to call for assist  -AF,SHILPI,AF2    Recorded by [AF,SHILPI,AF2] NO Cooney (r) Karen Allan OT Student (t) NO Cooney (c) [AR] Ssi Ramirez PT [AF,SHILPI,AF2] Adelia Salamanca, OTR (r) Karen Allan OT Student (t) Adelia Salamanca OTR (c)      08/08/17 1100 08/08/17 0910       Rehab Assessment/Intervention    Discipline speech  language pathologist  -AW physical therapy assistant  -LB     Document Type therapy note (daily note)  -AW therapy note (daily note)  -LB     Subjective Information no complaints;agree to therapy  -AW agree to therapy  -LB     Patient Effort, Rehab Treatment good  -AW good  -LB     Symptoms Noted During/After Treatment none  -AW      Precautions/Limitations fall precautions  -AW fall precautions;swallowing precautions  -LB     Recorded by [AW] Adelia Martinez MS CCC-SLP [LB] Angelica Treadwell PTA     Pain Assessment    Pain Assessment  No/denies pain  -LB     Recorded by  [LB] Angelica Treadwell PTA     Cognitive Assessment/Intervention    Personal Safety Interventions  fall prevention program maintained;gait belt;muscle strengthening facilitated;nonskid shoes/slippers when out of bed  -LB     Recorded by  [LB] Angelica Treadwell PTA     Improve attention    Attention Progress 50%;without cues;80%;with consistent cues  -AW      Comments: Improve attention working memory task - listening to 4 words x2 and recalling one word by placement  -AW      Recorded by [AW] Adelia Martinez MS CCC-SLP      Improve memory skills    Memory Skills Progress 100%;without cues  -AW      Comments: Improve memory skills visual memory - recalling details of picture after a 12 min delay  -AW      Recorded by [AW] Adelia Martinez MS CCC-SLP      Improve functional problem solving    Functional Problem Solving Progress 80%;without cues;100%;with inconsistent cues  -AW      Comments: Improve functional problem solving sequencing 5 step tasks  -AW      Recorded by [AW] Adelia Martinez MS CCC-SLP      Bed Mobility, Assessment/Treatment    Bed Mobility, Assistive Device  bed rails;head of bed elevated  -LB     Bed Mob, Supine to Sit, [place]  supervision required  -LB     Bed Mob, Sit to Supine, [place]  supervision required  -LB     Recorded by  [LB] Angelica Treadwell PTA     Transfer Assessment/Treatment    Transfers, Bed-Chair [place]   contact guard assist;stand by assist  -LB     Transfers, Chair-Bed [place]  contact guard assist;stand by assist  -LB     Transfers, Sit-Stand [place]  stand by assist  -LB     Transfers, Stand-Sit [place]  stand by assist  -LB     Transfers, Sit-Stand-Sit, Assist Device  rolling walker  -LB     Recorded by  [LB] Angelica Treadwell PTA     Gait Assessment/Treatment    Gait, [place] Level  stand by assist  -LB     Gait, Assistive Device  rolling walker  -LB     Gait, Distance (Feet)  320  -LB     Gait, Gait Deviations  forward flexed posture;step length decreased  -LB     Recorded by  [WINNIE] Angelica Treadwell PTA     Stairs Assessment/Treatment    Number of Stairs  8  -LB     Stairs, Handrail Location  both sides  -LB     Stairs, [place] Level  contact guard assist;verbal cues required  -LB     Stairs, Technique Used  step to step (ascending);step to step (descending)  -LB     Recorded by  [LB] Angelica Treadwell PTA     Balance Skills Training    Gait Balance-Level of Assistance  Contact guard;Minimum assistance  -LB     Gait Balance Support  parallel bars;Right upper extremity supported;Left upper extremity supported  -LB     Gait Balance Activities  braiding / front;side-stepping  -LB     Recorded by  [LB] Angelica Treadwell PTA     Therapy Exercises    Bilateral Lower Extremities  AROM:;10 reps;heel raises;mini squats;supine;heel slides;hip abduction/adduction  -LB     Trunk Exercises  10 reps;supine;bridging  -LB     Recorded by  [WINNIE] Angelica Treadwell PTA     Positioning and Restraints    Post Treatment Position  wheelchair  -LB     In Wheelchair  sitting;with OT  -LB     Recorded by  [WINNIE] Angelica Treadwell PTA       User Key  (r) = Recorded By, (t) = Taken By, (c) = Cosigned By    Initials Name Effective Dates    AW Adelia Martinez MS CCC-SLP 04/13/15 -     LB Angelica Treadwell PTA 02/18/16 -     AF Adelia Salamanca, OTR 12/01/15 -     SHERIF Ramirez, PT 06/27/16 -     SHILPI Jones  Jerrell OT Student 07/11/17 -               IP SLP Goals       08/08/17 1615 08/07/17 1701 07/31/17 1102    Safely Consume Diet    Safely Consume Diet- SLP, Date Established 08/08/17  -AW      Safely Consume Diet- SLP, Time to Achieve by discharge  -AW      Safely Consume Diet- SLP, Additional Goal Pt will tolerate diet upgrade to regular with no s/s.  -AW      Cognitive Linguistic- Optimal Participation in Care    Cognitive Linguistic Optimal Participation in Care- SLP, Date Established   07/31/17  -LT    Cognitive Linguistic Optimal Participation in Care- SLP, Time to Achieve  by discharge  -AW by discharge  -LT    Cognitive Linguistic Optimal Participation in Care- SLP, Activity Level  Patient will improve Executive functioning skills for increased safety in environment  -AW     Cognitive Linguistic Optimal Participation in Care- SLP, Outcome  goal ongoing  -AW goal ongoing  -LT    Dysarthria- Optimal Particpation in Care    Dysarthria Optimal Participation in Care- SLP, Date Established   07/31/17  -LT    [place] Optimal Participation in Care- SLP, Time to Achieve   by discharge  -LT    [place] Optimal Participation in Care- SLP, Outcome  goal ongoing  -AW goal ongoing  -LT      User Key  (r) = Recorded By, (t) = Taken By, (c) = Cosigned By    Initials Name Provider Type    LT Yvonne Miles MS CCC-SLP Speech and Language Pathologist    ISAAC Martinez MS CCC-SLP Speech and Language Pathologist          EDUCATION  The patient has been educated in the following areas:   Cognitive Impairment.    SLP Recommendation and Plan  SLP Diagnosis: Mild cognitive impairment     Rehab Potential: good, to achieve stated therapy goals  Criteria for Skilled Therapeutic Interventions Met: skilled criteria for cognitive linguistic intervention met  Anticipated Discharge Disposition: home with assist     Therapy Frequency: 3-5 times/wk  Predicted Duration of Therapy Intervention (days/wks): until discharge  Expected Duration  of Therapy Session (min): 30-45 minutes    Plan of Care Review  Plan Of Care Reviewed With: patient  Progress: improving  Outcome Summary/Follow up Plan: diet advanced to regular and thin         SLP Outcome Measures (last 72 hours)      SLP Outcome Measures       08/08/17 1600          SLP Outcome Measures    Outcome Measure Used? Adult NOMS  -AW      FCM Scores    FCM Chosen Swallowing  -AW      Swallowing FCM Score 6  -AW        User Key  (r) = Recorded By, (t) = Taken By, (c) = Cosigned By    Initials Name Effective Dates    ISAAC Martinez MS CCC-SLP 04/13/15 -           Time Calculation:         Time Calculation- SLP       08/10/17 1603 08/10/17 1220       Time Calculation- SLP    SLP Start Time 1330  -AW 1030  -AW     SLP Stop Time 1400  -AW 1100  -AW     SLP Time Calculation (min) 30 min  -AW 30 min  -AW     SLP Non-Billable Time (min) 1430 min   family conference [id number]  -AW      TCU Minutes- SLP 1500 min  -AW        User Key  (r) = Recorded By, (t) = Taken By, (c) = Cosigned By    Initials Name Provider Type    ISAAC Martinez MS CCC-SLP Speech and Language Pathologist          Therapy Charges for Today     Code Description Service Date Service Provider Modifiers Qty    42607227615  ST DEV OF COGN SKILLS EACH 15 MIN 8/9/2017 Adelia Martinez MS CCC-SLP GN 4    [id number]  ST DEV OF COGN SKILLS EACH 15 MIN 8/10/2017 Adelia Martinez MS CCC-SLP GN 4               Adelia Martinez MS CCC-SLP  8/10/2017

## 2017-08-10 NOTE — PROGRESS NOTES
Inpatient Rehabilitation Functional Measures Assessment    Functional Measures  BETH Eating:  Branch  BETH Grooming: Branch  [facility] Bathing:  Branch  [facility] Upper Body Dressing:  Branch  [facility] Lower Body Dressing:  Branch  [facility] Toileting:  [facility] Bladder Management  Level of Assistance:  [place]  Frequency/Number of Accidents this Shift:  [facility] Bowel Management  Level of Assistance: [place]  Frequency/Number of Accidents this Shift: Branch    [facility] Bed/Chair/Wheelchair Transfer:  Bed/chair/wheelchair Transfer Score = 5.  Patient is supervision/set-up for transferring to and from the  bed/chair/wheelchair, requiring: Stand by assistance. Patient requires the  following assistive device(s): Walker.  BETH Toilet Transfer:  [facility] Tub/Shower Transfer:  [place]    Previously Documented Mode of Locomotion at Discharge: Field  BETH Expected Mode of Locomotion at Discharge: [facility] Walk/Wheelchair:  WHEELCHAIR OBSERVATION   Activity was not observed.    WALK OBSERVATION   Walk Distance Scale = 3.  Distance walked is greater than 150 feet. Walk Score  = 5.  Patient requires supervision or set up for walking. Patient walked a  distance of  300 feet. Patient requires the following assistive device(s):  Rolling walker.  BETH Stairs:  Stairs Score = 1.  Incidental assistance with lifting or lowering,  contact guard or steadying was provided. Patient requires/performs 75% or more  of effort and minimal contact assistance with negotiating stairs. Patient  negotiated 1 stairs.  Patient requires the following assistive device(s):  Rolling walker.    BETH Comprehension:  [facility] Expression:  [facility] Social Interaction:  [facility] Problem Solving:  [facility] Memory:  [place]    Therapy Mode Minutes  Occupational Therapy: [place]  Physical Therapy: Individual: 60 minutes.  Speech Language Pathology:  [place]    Signed by: Sis Ramirez PT

## 2017-08-10 NOTE — PLAN OF CARE
Problem: Inpatient Occupational Therapy  Goal: Transfer Training Goal 1 STG- OT  Outcome: Outcome(s) achieved Date Met:  08/10/17    08/10/17 1613   Transfer Training OT STG   Transfer Training OT STG, Date Goal Reviewed 08/10/17   Transfer Training OT STG, Outcome goal met       Goal: Transfer Training Goal 1 LTG- OT  Outcome: Unable to achieve outcome(s) by discharge Date Met:  08/10/17    08/10/17 1613   Transfer Training OT LTG   Transfer Training OT LTG, Outcome goal not met   Transfer Training OT LTG, Reason Goal Not Met progress slower than expected       Goal: Transfer Training Goal 2 STG- OT  Outcome: Outcome(s) achieved Date Met:  08/10/17    08/10/17 1613   Transfer Training 2 OT STG   Transfer Training 2 OT STG, Outcome goal met       Goal: Transfer Training Goal 2 LTG- OT  Outcome: Outcome(s) achieved Date Met:  08/10/17    08/10/17 1613   Transfer Training 2 OT LTG   Transfer Training 2 OT LTG, Outcome goal met       Goal: Bathing Goal STG- OT  Outcome: Outcome(s) achieved Date Met:  08/10/17    08/10/17 1613   Bathing OT STG   Bathing Goal OT STG, Outcome goal met       Goal: Bathing Goal LTG- OT  Outcome: Outcome(s) achieved Date Met:  08/10/17    08/10/17 1613   Bathing OT LTG   Bathing Goal OT LTG, Outcome goal met       Goal: Grooming Goal STG- OT  Outcome: Outcome(s) achieved Date Met:  08/10/17    08/10/17 1613   Grooming OT STG   Grooming Goal OT STG, Outcome goal met       Goal: Grooming Goal LTG- OT  Outcome: Outcome(s) achieved Date Met:  08/10/17    08/10/17 1613   Grooming OT LTG   Grooming Goal OT LTG, Outcome goal met       Goal: LB Dressing Goal LTG- OT  Outcome: Outcome(s) achieved Date Met:  08/10/17    08/10/17 1613   LB Dressing OT LTG   LB Dressing Goal OT LTG, Outcome goal met       Goal: UB Dressing Goal STG- OT  Outcome: Unable to achieve outcome(s) by discharge Date Met:  08/10/17    08/10/17 1613   UB Dressing OT STG   UB Dressing Goal OT STG, Outcome goal not met   UB  Dressing Goal OT STG, Reason Goal Not Met progress slower than expected       Goal: UB Dressing Goal LTG- OT  Outcome: Unable to achieve outcome(s) by discharge Date Met:  08/10/17    08/10/17 1613   UB Dressing OT LTG   UB Dressing Goal OT LTG, Outcome goal not met   UB Dressing Goal OT LTG, Reason Goal Not Met progress slower than expected

## 2017-08-10 NOTE — PROGRESS NOTES
Family conference held today with pt, pt's daughter-in-law, PT,OT,ST,RN and SW. Discussed pt's progress, current status and discharge recommendations.    PT reports pt is independent with bed mobility. She transfers with rolling walker and SBA. She ambulates 350' with rolling walker and SBA. CG assist needed with ambulation outside or on very uneven surfaces. Pt can ascend/descend 4 steps with CG.    In OT, pt completes commode and tub transfers with SBA.  She bathes, dresses and completes toileting with SBA.  Pt's left hand co-ordination has improved, but sensation is MOD-severely impaired. Pt completed a cooking task with supervision;  risk of injury due to impaired sensation discussed.    Family concerned about pt's frequent urination throughout the night. OT suggests pt use a commode at the bedside. Pt prefers to use a bedside commode at night rather than wake a family member to walk her to the bathroom.    ST reports pt's memory, visual spatial skills and attention are impaired.  Memory is helped by repitition and cues. Pt is aware of her memory difficulties and is learning to use strategies to compensate. ST recommends supervision with finances and medication management.    Nursing reviewed medications and follow up with cardiology, neurology and primary care provider after discharge.     DME discussed. Pt has a rolling walker. Family to purchase a tub bench and a BSC .  Outpatient PT,OT and ST is recommended. Referral made to [facility] for treatment.    Pt to discharge tomorrow to her son's home where she will have daily supervision.  Family teaching completed. Family is supportive and able to provide 24 hour supervision.

## 2017-08-10 NOTE — PROGRESS NOTES
Inpatient Rehabilitation Functional Measures Assessment    Functional Measures  BETH Eating:  Eating Score = 5. Patient is supervision/set-up for eating,  requiring: No assistive devices were required.  BETH Grooming: Branch  [facility] Bathing:  Branch  [facility] Upper Body Dressing:  Branch  [facility] Lower Body Dressing:  Branch  [facility] Toileting:  Toileting Score = 5.  Patient is supervision/set-up for  toileting, requiring: No assistive devices were required.    BETH Bladder Management  Level of Assistance:  Bladder Score = 5.  Patient is supervision/set-up for  bladder management, requiring: No assistive devices were required.  Frequency/Number of Accidents this Shift:  Bladder accidents this shift:  0 .  Patient has not had an accident this shift.    BETH Bowel Management  Level of Assistance: Bowel Score = 5.  Patient is supervision/set-up for bowel  management, requiring: No assistive devices were required.  Frequency/Number of Accidents this Shift: Bowel accidents this shift: 0 .  Patient has not had an accident this shift.    BETH Bed/Chair/Wheelchair Transfer:  Bed/chair/wheelchair Transfer Score = 4.  Patient performs 75% or more of effort and minimal assistance (little/incidental  help/lifting of one limb/steadying) for transferring to and from the  bed/chair/wheelchair, requiring: No assistive devices were required.  BETH Toilet Transfer:  Toilet Transfer Score = 4.  Patient performs 75% or more  of effort and minimal assistance (little/incidental help/steadying) for  transferring to and from the toilet/commode, requiring: No assistive devices  were required.  BETH Tub/Shower Transfer:  Branch    Previously Documented Mode of Locomotion at Discharge: Field  BETH Expected Mode of Locomotion at Discharge: [facility] Walk/Wheelchair:  [facility] Stairs:  [facility] Comprehension:  Branch  [facility] Expression:  Branch  [facility] Social Interaction:  Branch  [facility] Problem Solving:  [facility] Memory:  Branch    Therapy Mode  Minutes  Occupational Therapy: Branch  Physical Therapy: Branch  Speech Language Pathology:  [place]anch    Signed by: JULITA Varghese

## 2017-08-10 NOTE — PROGRESS NOTES
SECTION GG      Self Care Performance Discharge:   Oral Hygiene: [place] sets up or cleans up; patient completes activity. [place]  assists only prior to or following the activity.   Toileting Hygiene: : [place] provides verbal cues or touching/steadying  assistance as patient completes activity.   Shower/Bathe Self: [place] provides verbal cues or touching/steadying assistance  as patient completes activity.   Upper Body Dressing: [place] provides verbal cues or touching/steadying  assistance as patient completes activity.   Lower Body Dressing: [place] provides verbal cues or touching/steadying  assistance as patient completes activity.   Putting On/Taking Off Footwear: [place] provides verbal cues or  touching/steadying assistance as patient completes activity.    Mobility Toilet Transfer Discharge: [place] provides verbal cues or  touching/steadying assistance as patient completes activity.    Signed by: Karen Allan OT Student     - CoSigned By: Adelia Salamanca OT 8/10/2017 3:51:22 PM

## 2017-08-11 VITALS
RESPIRATION RATE: 18 BRPM | DIASTOLIC BLOOD PRESSURE: 96 MMHG | OXYGEN SATURATION: 96 % | HEIGHT: 64 IN | BODY MASS INDEX: 24.19 KG/M2 | HEART RATE: 87 BPM | SYSTOLIC BLOOD PRESSURE: 154 MMHG | TEMPERATURE: 98.2 F | WEIGHT: 141.7 LBS

## 2017-08-11 LAB — GLUCOSE BLDC GLUCOMTR-MCNC: 98 MG/DL (ref 70–130)

## 2017-08-11 PROCEDURE — 82962 GLUCOSE BLOOD TEST: CPT

## 2017-08-11 PROCEDURE — 94799 UNLISTED PULMONARY SVC/PX: CPT

## 2017-08-11 RX ADMIN — PREDNISOLONE ACETATE 1 DROP: 10 SUSPENSION/ DROPS OPHTHALMIC at 08:57

## 2017-08-11 RX ADMIN — DOCUSATE SODIUM -SENNOSIDES 2 TABLET: 50; 8.6 TABLET, COATED ORAL at 08:54

## 2017-08-11 RX ADMIN — BUDESONIDE AND FORMOTEROL FUMARATE DIHYDRATE 2 PUFF: 80; 4.5 AEROSOL RESPIRATORY (INHALATION) at 06:31

## 2017-08-11 RX ADMIN — DOCUSATE SODIUM 100 MG: 100 CAPSULE, LIQUID FILLED ORAL at 08:55

## 2017-08-11 RX ADMIN — APIXABAN 5 MG: 5 TABLET, FILM COATED ORAL at 08:55

## 2017-08-11 RX ADMIN — METOPROLOL TARTRATE 25 MG: 25 TABLET ORAL at 08:55

## 2017-08-11 NOTE — PLAN OF CARE
Problem: Stroke (Ischemic) (Adult)  Goal: Signs and Symptoms of Listed Potential Problems Will be Absent or Manageable (Stroke)  Outcome: Ongoing (interventions implemented as appropriate)    Problem: Fall Risk (Adult)  Goal: Absence of Falls  Outcome: Ongoing (interventions implemented as appropriate)    Problem: Patient Care Overview (Adult)  Goal: Plan of Care Review  Outcome: Ongoing (interventions implemented as appropriate)    08/10/17 1455 08/10/17 2146 08/11/17 0256   Coping/Psychosocial Response Interventions   Plan Of Care Reviewed With --  patient --    Patient Care Overview   Progress improving --  --    Outcome Evaluation   Outcome Summary/Follow up Plan --  --  No c/o pain. No unsafe behavior. BG stable today. Meds 1 at a time with water.       Goal: Adult Individualization and Mutuality  Outcome: Ongoing (interventions implemented as appropriate)  Goal: Discharge Needs Assessment  Outcome: Ongoing (interventions implemented as appropriate)

## 2017-08-11 NOTE — PROGRESS NOTES
Inpatient Rehabilitation Functional Measures Assessment    Functional Measures  BETH Eating:  Branch  [facility] Grooming: Branch  [facility] Bathing:  Branch  [facility] Upper Body Dressing:  Branch  [facility] Lower Body Dressing:  Branch  [facility] Toileting:  Toileting Score = 5.  Patient is supervision/set-up for  toileting, requiring: Stand by assistance. Verbal cuing, prompting, or  instructing. Setting out toileting equipment. Patient requires the following  assistive device(s): Grab bar.    BETH Bladder Management  Level of Assistance:  Bladder Score = 5.  Patient is supervision/set-up for  bladder management, requiring: Stand by assistance. Setting out equipment. No  assistive devices were required.  Frequency/Number of Accidents this Shift:  Bladder accidents this shift:  0 .  Patient has not had an accident, but used a device/medication this shift  requiring: Pad .    [facility] Bowel Management  Level of Assistance: Activity was not observed.  Frequency/Number of Accidents this Shift: Branch    [facility] Bed/Chair/Wheelchair Transfer:  Bed/chair/wheelchair Transfer Score = 5.  Patient is supervision/set-up for transferring to and from the  bed/chair/wheelchair, requiring: Stand by assistance. Verbal cuing, prompting,  or instructing. Set up (positioning equipment, lock brakes and/or adjust foot  rest). Patient requires the following assistive device(s): Grab bars.  BETH Toilet Transfer:  [facility] Tub/Shower Transfer:  [place]    Previously Documented Mode of Locomotion at Discharge: Field  BETH Expected Mode of Locomotion at Discharge: [facility] Walk/Wheelchair:  Branch  [facility] Stairs:  Branch    [facility] Comprehension:  Branch  [facility] Expression:  Branch  [facility] Social Interaction:  [facility] Problem Solving:  [facility] Memory:  [place]    Therapy Mode Minutes  Occupational Therapy: Branch  Physical Therapy: Branch  Speech Language Pathology:  [place]    Signed by: JULITA Lemos

## 2017-08-11 NOTE — PROGRESS NOTES
Inpatient Rehabilitation Functional Measures Assessment    Functional Measures  BETH Eating:  [facility] Grooming: [facility] Bathing:  [facility] Upper Body Dressing:  [facility] Lower Body Dressing:  [facility] Toileting:  [facility] Bladder Management  Level of Assistance:  [place]  Frequency/Number of Accidents this Shift:  [facility] Bowel Management  Level of Assistance: [place]  Frequency/Number of Accidents this Shift: [facility] Bed/Chair/Wheelchair Transfer:  [facility] Toilet Transfer:  [facility] Tub/Shower Transfer:  [place]    Previously Documented Mode of Locomotion at Discharge: Field  BETH Expected Mode of Locomotion at Discharge: [facility] Walk/Wheelchair:  [facility] Stairs:  [facility] Comprehension:  Auditory comprehension is the usual mode. Comprehension  Score = 7, Independent.  Patient comprehends complex/abstract information in  their primary language.  Patient is completely independent for auditory  comprehension.  There are no activity limitations.  BETH Expression:  Vocal expression is the usual mode. Expression Score = 7,  Independent.  Patient expresses complex/abstract information in their primary  language.  Patient is completely independent for vocal expression.  There are no  activity limitations.  BETH Social Interaction:  Social Interaction Score = 7, Independent. Patient is  completely independent for social interaction.  There are no activity  limitations.  BETH Problem Solving:  Patient does not make appropriate decisions in order to  solve complex problems without assistance from a helper. Problem Solving Score =  5, Supervision.  Patient makes appropriate decisions in order to solve routine  problems with directing only under stressful or unfamiliar conditions, but no  more than 10% of the time, for the following behavior(s):  BETH Memory:  Memory Score = 4, Minimal Prompting. Patient recognizes and  remembers 75-90% of the time. Patient requires  minimal/occasional prompting for  memory for the following:    Therapy Mode Minutes  Occupational Therapy: Branch  Physical Therapy: Branch  Speech Language Pathology:  Branch    Signed by: YUNG Vann

## 2017-08-11 NOTE — PROGRESS NOTES
Inpatient Rehabilitation Plan of Care Note    Plan of Care  Care Plan Reviewed - No updates at this time.    Safety    Performed Intervention(s)  falls precautions  hourly rounding, items within reach  bed alarm      Sphincter Control    Performed Intervention(s)  monitor intake and output  stool softners as ordered      Psychosocial    Performed Intervention(s)  encourage expression of feelings and concerns    Signed by: Stephany Loaiza RN

## 2017-08-11 NOTE — PLAN OF CARE
Problem: Patient Care Overview (Adult)  Goal: Plan of Care Review  Outcome: Ongoing (interventions implemented as appropriate)    08/11/17 1047   Coping/Psychosocial Response Interventions   Plan Of Care Reviewed With patient   Patient Care Overview   Progress improving   Outcome Evaluation   Outcome Summary/Follow up Plan DC today

## 2017-08-11 NOTE — PROGRESS NOTES
SECTION GG      Mobility Performance Discharge:   Roll Left and Right: Patient completed the activities by him/herself with no  assistance from a helper.   Sit to Lying: [place] provides verbal cues or touching/steadying assistance as  patient completes activity.   Lying to Sitting on Side of Bed: [place] provides verbal cues or  touching/steadying assistance as patient completes activity.   Sit to Stand: [place] provides verbal cues or touching/steadying assistance as  patient completes activity.   Chair/Bed to Chair Transfer: [place] provides verbal cues or touching/steadying  assistance as patient completes activity.   Car Transfer: [place] provides verbal cues or touching/steadying assistance as  patient completes activity.    Patient Walks:  Yes   Walk 10 Feet: Patient completed the activities by him/herself with no  assistance from a helper.   Walk 50 Feet With 2 Turns: [place] provides verbal cues or touching/steadying  assistance as patient completes activity.   Walk 150 Feet: [place] provides verbal cues or touching/steadying assistance as  patient completes activity.   Walking 10 Feet on Uneven Surfaces: [place] provides verbal cues or  touching/steadying assistance as patient completes activity.   1 Step Over Curb or Up/Down Stair: [place] provides verbal cues or  touching/steadying assistance as patient completes activity.   4 Steps Up and Down, With/Without Rail: [place] provides verbal cues or  touching/steadying assistance as patient completes activity.   12 Steps Up and Down, With/Without Rail: Not applicable.   Picking up an Object: [place] provides verbal cues or touching/steadying  assistance as patient completes activity.    Signed by: Rosemary Hoffmann, PT

## 2017-08-11 NOTE — THERAPY DISCHARGE NOTE
Inpatient Rehabilitation - Speech Language Pathology Discharge Summary  [facility]       Patient Name: Magnus Hartley  : 1928  MRN: [id number]    Today's Date: 2017  Onset of Illness/Injury or Date of Surgery Date: 17                Admit Date: 2017      SLP Recommendation and Plan    Visit Dx:    ICD-10-CM ICD-9-CM   1. Left hemiparesis G81.94 342.90   2. Dysarthria R47.1 784.51                     IP SLP Goals       17 1615 17 1701 17 1102    Safely Consume Diet    Safely Consume Diet- SLP, Date Established 17  -AW      Safely Consume Diet- SLP, Time to Achieve by discharge  -AW      Safely Consume Diet- SLP, Additional Goal Pt will tolerate diet upgrade to regular with no s/s.  -AW      Cognitive Linguistic- Optimal Participation in Care    Cognitive Linguistic Optimal Participation in Care- SLP, Date Established   17  -LT    Cognitive Linguistic Optimal Participation in Care- SLP, Time to Achieve  by discharge  -AW by discharge  -LT    Cognitive Linguistic Optimal Participation in Care- SLP, Activity Level  Patient will improve Executive functioning skills for increased safety in environment  -AW     Cognitive Linguistic Optimal Participation in Care- SLP, Outcome  goal ongoing  -AW goal ongoing  -LT    Dysarthria- Optimal Particpation in Care    Dysarthria Optimal Participation in Care- SLP, Date Established   17  -LT    Dysarthria Optimal Participation in Care- SLP, Time to Achieve   by discharge  -LT    Dysarthria Optimal Participation in Care- SLP, Outcome  goal ongoing  -AW goal ongoing  -LT      User Key  (r) = Recorded By, (t) = Taken By, (c) = Cosigned By    Initials Name Provider Type    LT Yvonne Miles MS CCC-SLP Speech and Language Pathologist    AW Adelia Martinez MS CCC-SLP Speech and Language Pathologist            Therapy Charges for Today     Code Description Service Date Service Provider Modifiers Qty    [id number] [facility] DEV OF COGN  SKILLS EACH 15 MIN 8/10/2017 Adelia Martinez MS CCC-SLP GN 4            SLP Discharge Summary  Anticipated Discharge Disposition: home with 24/7 care  Reason for Discharge: Discharge from facility  Outcomes Achieved: Able to achieve all goals within established timeline  Discharge Destination: Home with outpatient services      Adelia Martinez MS CCC-SLP  8/11/2017

## 2017-08-11 NOTE — PROGRESS NOTES
Inpatient Rehabilitation Functional Measures Assessment    Functional Measures  BETH Eating:  [facility] Grooming: [facility] Bathing:  [facility] Upper Body Dressing:  [facility] Lower Body Dressing:  [facility] Toileting:  [facility] Bladder Management  Level of Assistance:  [place]  Frequency/Number of Accidents this Shift:  [facility] Bowel Management  Level of Assistance: [place]  Frequency/Number of Accidents this Shift: [facility] Bed/Chair/Wheelchair Transfer:  [facility] Toilet Transfer:  [facility] Tub/Shower Transfer:  [place]    Previously Documented Mode of Locomotion at Discharge: [place]  BETH Expected Mode of Locomotion at Discharge: [facility] Walk/Wheelchair:  [facility] Stairs:  [facility] Comprehension:  Auditory comprehension is the usual mode. Comprehension  Score = 7, Independent.  Patient comprehends complex/abstract information in  their primary language.  Patient is completely independent for auditory  comprehension.  There are no activity limitations.  BETH Expression:  Vocal expression is the usual mode. Expression Score = 6,  Modified Independent.  Patient expresses complex/abstract information in their  primary language with only mild difficulty with tasks.  BETH Social Interaction:  Social Interaction Score = 7, Independent. Patient is  completely independent for social interaction.  There are no activity  limitations.  BETH Problem Solving:  Patient does not make appropriate decisions in order to  solve complex problems without assistance from a helper. Problem Solving Score =  4, Minimal Direction. Patient makes appropriate decisions in order to solve  routine problems 75-90% of the time. Patient requires minimal/occasional  direction for the following behavior(s): Difficulty completing tasks. Difficulty  with self-monitoring.  BETH Memory:  Activity was not observed.    Therapy Mode Minutes  Occupational Therapy: [place]  Physical Therapy: [place]  Speech Language Pathology:   [place]    Signed by: Stephany Loaiza RN

## 2017-08-11 NOTE — PROGRESS NOTES
Inpatient Rehabilitation Functional Measures Assessment    Functional Measures  BETH Eating:  [facility] Grooming: [facility] Bathing:  [facility] Upper Body Dressing:  [facility] Lower Body Dressing:  [facility] Toileting:  [facility] Bladder Management  Level of Assistance:  [place]  Frequency/Number of Accidents this Shift:  [facility] Bowel Management  Level of Assistance: [place]  Frequency/Number of Accidents this Shift: [facility] Bed/Chair/Wheelchair Transfer:  [facility] Toilet Transfer:  [facility] Tub/Shower Transfer:  [place]    Previously Documented Mode of Locomotion at Discharge: [place]  BETH Expected Mode of Locomotion at Discharge: [facility] Walk/Wheelchair:  [facility] Stairs:  [facility] Comprehension:  Auditory comprehension is the usual mode. Comprehension  Score = 6, Modified [place].  Patient comprehends complex/abstract  information in their primary language with only mild difficulty.  BETH Expression:  Vocal expression is the usual mode. Expression Score = 6,  Modified Independent.  Patient expresses complex/abstract information in their  primary language with only mild difficulty with tasks.  BETH Social Interaction:  Social Interaction Score = 6, Modified Independent.  Patient is modified independent for social interaction, requiring: Requires  additional time.  BETH Problem Solving:  Problem Solving Score = 6, Modified [place].  Patient  makes appropriate decisions in order to solve complex problems with mild  difficulty but self-corrects.  BETH Memory:  Memory Score = 6, Modified [place].  Patient is modified  independent for memory, having only mild difficulty and using self-initiated or  environmental cues to remember.    Therapy Mode Minutes  Occupational Therapy: [place]  Physical Therapy: [place]  Speech Language Pathology:  [place]    Signed by: Jethro Schmidt RN

## 2017-08-11 NOTE — PLAN OF CARE
Problem: Stroke (Ischemic) (Adult)  Goal: Signs and Symptoms of Listed Potential Problems Will be Absent or Manageable (Stroke)  Outcome: Ongoing (interventions implemented as appropriate)    08/11/17 1046   Stroke (Ischemic)   Problems Assessed (Stroke (Ischemic)/TIA) motor/sensory impairment   Problems Present (Stroke (Ischemic)/TIA) motor/sensory impairment         Problem: Fall Risk (Adult)  Goal: Absence of Falls  Outcome: Ongoing (interventions implemented as appropriate)    08/11/17 1046   Fall Risk (Adult)   Absence of Falls making progress toward outcome

## 2017-08-11 NOTE — PROGRESS NOTES
SECTION GG    Eating Performance Discharge: [place] sets up or cleans up; patient completes  activity. [place] assists only prior to or following the activity.    Signed by: Jethro Schmidt RN

## 2017-08-11 NOTE — PROGRESS NOTES
LOS: 14 days   Patient Care Team:  JOSHUA Chaudhari as PCP - General  Mario Mata MD as PCP - Claims Attributed  Hortencia Lisa MD as Consulting Physician (Cardiology)  Pierre Walker MD as Consulting Physician (Gastroenterology)    Chief Complaint: same    Subjective     History of Present Illness    Subjective Pt is awake and alert. No new issues. Pt ready for dc.     History taken from: patient    Objective     Vital Signs  Temp:  [98 °F (36.7 °C)-98.5 °F (36.9 °C)] 98.2 °F (36.8 °C)  Heart Rate:  [] 87  Resp:  [18-20] 18  BP: (154-165)/(81-96) 154/96    Objectiveexam unchanged    Results Review:     I reviewed the patient's new clinical results.    Medication Review:     Assessment/Plan     Active Problems:    Benign essential hypertension    Controlled type 2 diabetes mellitus without complication    Cerebrovascular accident (CVA) due to occlusion of right middle cerebral artery    Atrial fibrillation    Warfarin anticoagulation (goal INR 2-3)      Assessment & Plan Continue to prepare for dc. Medication reconciliation completed.     Bayron Nathan MD  08/11/17  7:31 AM    Time:

## 2017-08-11 NOTE — SIGNIFICANT NOTE
08/11/17 1123   PT Discharge Summary   Reason for Discharge Discharge from facility   Outcomes Achieved Able to achieve all goals within established timeline   Discharge Destination Home with assist;Home with outpatient services

## 2017-08-25 ENCOUNTER — OFFICE VISIT (OUTPATIENT)
Dept: FAMILY MEDICINE CLINIC | Facility: CLINIC | Age: 82
End: 2017-08-25

## 2017-08-25 VITALS
TEMPERATURE: 97.6 F | WEIGHT: 131 LBS | BODY MASS INDEX: 22.36 KG/M2 | OXYGEN SATURATION: 98 % | HEIGHT: 64 IN | DIASTOLIC BLOOD PRESSURE: 86 MMHG | HEART RATE: 67 BPM | SYSTOLIC BLOOD PRESSURE: 142 MMHG

## 2017-08-25 DIAGNOSIS — I48.20 CHRONIC ATRIAL FIBRILLATION (HCC): Primary | ICD-10-CM

## 2017-08-25 DIAGNOSIS — Z79.01 ANTICOAGULATED: ICD-10-CM

## 2017-08-25 DIAGNOSIS — I63.511 CEREBROVASCULAR ACCIDENT (CVA) DUE TO OCCLUSION OF RIGHT MIDDLE CEREBRAL ARTERY (HCC): ICD-10-CM

## 2017-08-25 PROCEDURE — 99214 OFFICE O/P EST MOD 30 MIN: CPT | Performed by: NURSE PRACTITIONER

## 2017-08-25 NOTE — PROGRESS NOTES
"Subjective   Magnus Hartley is a 88 y.o. female who presents for a follow up after being admitted to [facility] 7/24-7/28 for CVA. Spent 3 weeks in rehab.     History of Present Illness   Had L sided weakness, no trouble walking, still with general weakness since home from rehab. Is going to OT, PT, and speech 2 x weekly outpt. Son concerned about the price of eliquis. Pt now living with son.   The following portions of the patient's history were reviewed and updated as appropriate: allergies, current medications, past family history, past medical history, past social history, past surgical history and problem list.    Review of Systems   Constitutional: Positive for activity change. Negative for chills and fever.   HENT: Negative.  Negative for trouble swallowing.    Eyes: Negative for visual disturbance.   Cardiovascular: Negative.    Gastrointestinal: Negative.    Musculoskeletal: Negative.    Skin: Negative.    Allergic/Immunologic: Negative.    Neurological: Positive for weakness (generalized). Negative for dizziness, light-headedness and headaches.   Psychiatric/Behavioral: Negative.      /86  Pulse 67  Temp 97.6 °F (36.4 °C) (Oral)   Ht 64\" (162.6 cm)  Wt 131 lb (59.4 kg)  SpO2 98%  BMI 22.49 kg/m2  130/70  Objective   Physical Exam   Constitutional: She is oriented to person, place, and time. She appears well-developed and well-nourished. No distress.   Neck: Normal range of motion. Neck supple. No thyromegaly present.   Cardiovascular: Normal rate and normal heart sounds.  An irregularly irregular rhythm present.   Pulses:       Radial pulses are 2+ on the right side, and 2+ on the left side.   Pulmonary/Chest: Effort normal and breath sounds normal.   Lymphadenopathy:     She has no cervical adenopathy.   Neurological: She is alert and oriented to person, place, and time. She has normal strength. Gait (stooped) abnormal.   Mild facial droop, full lid closure   Psychiatric: She has a normal mood and " affect. Her behavior is normal. Judgment and thought content normal.   Nursing note and vitals reviewed.  Assessment/Plan   Problems Addressed this Visit        Cardiovascular and Mediastinum    Cerebrovascular accident (CVA) due to occlusion of right middle cerebral artery    Atrial fibrillation - Primary       Other    Anticoagulated        To apply for pt assistance on eliquis. Consider warfarin if not getting price to affordable. Continue Oupt therapy, PT, OT, speech. FU as scheduled, sooner if needed. Safe and supportive environment in son's home.    Carotids normal on MRA neck. Continue anticoagulation indefinitely. Keep scheduled follow up with neurology.     BP adequate control, based on age and normal on recheck.     Current outpatient and discharge medications have been reconciled for the patient.  JOSHUA Chaudhari

## 2017-08-29 NOTE — PROGRESS NOTES
PPS CMG Coordinator  Inpatient Rehabilitation Discharge    Mode of Locomotion: Walking.    Discharge Against Medical Advice:  No.  Discharge Information  Patient Discharged Alive:  Yes  Discharge Destination/Living Setting: Home.  At discharge, the patient was discharged to live (with) (02)  Family / Relatives    Diagnosis for Interruption/Death: ICD    Impairment Group: Stroke: 01.1 Left Body Involvement (Right Brain)    Comorbidities: ICD    Complications: ICD      BETH Bladder Accidents: 0  - Accidents.  Patient used medications/device this  shift.  8/11/2017 5:12:00 AM  Bladder Score = 6. Patient has not had an accident, but uses a  device/medication.  BETH Bowel Accident: 0  - Accidents.  Patient used medications/device this shift.   8/1/2017 4:14:00 AM  Bowel Score = 6. Patient has no accidents, but uses a device/medications.      QUALITY INDICATORS  Health Conditions: Fall(s) Since Admission:  No  Section M. Skin Conditions Discharge:  Unhealed Pressure Ulcer(s) at Stage 1 or  Higher:  No    . Current Number of Unhealed Pressure Ulcers  [place]    . Worsening in Pressure Ulcer Status Since Admission:  [place]    . Healed Pressure Ulcer(s):    Number of Healed Stage 1: 0  Number of Healed Stage 2: 0  Number of Healed Stage 3: 0  Number of Healed Stage 4: 0    . Influenza Vaccine - Discharge  Received in this facility for this year's influenza vaccination season:  No.  Influenza Vaccine Not Received Due To: Patient not in this facility during this  year's influenza vaccination season.    Date Influenza Vaccine Received (if applicable)  Text Entry    Signed by: Davian Yang RN

## 2017-08-31 ENCOUNTER — TELEPHONE (OUTPATIENT)
Dept: NEUROLOGY | Facility: CLINIC | Age: 82
End: 2017-08-31

## 2017-09-07 ENCOUNTER — TELEPHONE (OUTPATIENT)
Dept: FAMILY MEDICINE CLINIC | Facility: CLINIC | Age: 82
End: 2017-09-07

## 2017-09-07 RX ORDER — OMEGA-3/DHA/EPA/FISH OIL 500-1000MG
1000 CAPSULE ORAL DAILY
Qty: 60 CAPSULE | Refills: 5 | Status: SHIPPED | OUTPATIENT
Start: 2017-09-07

## 2017-09-07 NOTE — TELEPHONE ENCOUNTER
If patient is to continue taking eliquis and metoprolol, she will need refills. [facility] sent the request to Dr Nathan but heard nothing back. Also needs refill on fish oil 1000mg qd. Okay to send all?

## 2017-09-08 ENCOUNTER — OFFICE VISIT (OUTPATIENT)
Dept: CARDIOLOGY | Facility: CLINIC | Age: 82
End: 2017-09-08

## 2017-09-08 ENCOUNTER — HOSPITAL ENCOUNTER (OUTPATIENT)
Dept: CARDIOLOGY | Facility: HOSPITAL | Age: 82
Setting detail: RECURRING SERIES
Discharge: HOME | End: 2017-09-08

## 2017-09-08 VITALS
HEIGHT: 65 IN | WEIGHT: 129.6 LBS | BODY MASS INDEX: 21.59 KG/M2 | HEART RATE: 85 BPM | SYSTOLIC BLOOD PRESSURE: 138 MMHG | DIASTOLIC BLOOD PRESSURE: 80 MMHG

## 2017-09-08 DIAGNOSIS — I48.20 CHRONIC ATRIAL FIBRILLATION (HCC): Primary | ICD-10-CM

## 2017-09-08 DIAGNOSIS — I10 ESSENTIAL HYPERTENSION: ICD-10-CM

## 2017-09-08 DIAGNOSIS — Z79.01 ANTICOAGULATED BY ANTICOAGULATION TREATMENT: ICD-10-CM

## 2017-09-08 DIAGNOSIS — I63.511 CEREBROVASCULAR ACCIDENT (CVA) DUE TO OCCLUSION OF RIGHT MIDDLE CEREBRAL ARTERY (HCC): ICD-10-CM

## 2017-09-08 DIAGNOSIS — I34.0 NON-RHEUMATIC MITRAL REGURGITATION: ICD-10-CM

## 2017-09-08 PROCEDURE — 99214 OFFICE O/P EST MOD 30 MIN: CPT | Performed by: NURSE PRACTITIONER

## 2017-09-08 PROCEDURE — 93000 ELECTROCARDIOGRAM COMPLETE: CPT | Performed by: NURSE PRACTITIONER

## 2017-09-08 RX ORDER — ASCORBIC ACID 125 MG
TABLET,CHEWABLE ORAL
Refills: 0 | COMMUNITY
Start: 2017-09-05 | End: 2017-10-23 | Stop reason: SDUPTHER

## 2017-09-08 NOTE — PROGRESS NOTES
Date of Office Visit: 2017  Encounter Provider: JOSHUA Black  Place of Service: [facility] CARDIOLOGY  Patient Name: Magnus Hartley  :1928    Chief Complaint   Patient presents with   • Atrial Fibrillation   :     HPI: Magnus Hartley is a 88 y.o. female who presents today for hospital follow-up.  The patient is new to me and her previous records have been reviewed.  She has a past history of essential hypertension, type 2 diabetes mellitus, dyslipidemia, and reactive airway disease.      She presented to [facility] on 2017 complaining of acute onset left-sided numbness, tingling, and weakness.  She was admitted for an acute stroke due to occlusion of the right middle cerebral artery.  Dr. Lashawn Torres consulted on the patient for new onset atrial fibrillation with a heart rate of 140 bpm.  She was recommended to start Toprol XL and be on long-term anticoagulation.  She had a transesophageal echocardiogram completed on 17 with the following results: EF 56-60%, left atrium severely dilated, mild tricuspid valve regurgitation, moderate to severe mitral valve regurgitation, mobile, fibrinous material in the left atrial side of the mitral valve, mild aortic valve regurgitation, and fibrinous material noted on the LV side of the aortic leaflets.  Mrs. Hartley was discharged on 17 and transition to acute rehabilitation for further management.    Mrs. Hartley presents today with her son, Mark, accompanying her.  He explains that she continues to experience some residual left arm and leg weakness and slower speech.  She currently is enrolled in speech therapy, occupational therapy, and physical therapy.  She has been compliant with her apixaban twice daily and denies any adverse effects or bleeding.  She remains in atrial fibrillation and her heart rate is well-controlled on the metoprolol tartrate.  Her blood pressure is also normal.   She does experience some occasional dizziness.  She denies chest pain, shortness of air, paroxysmal nocturnal dyspnea, orthopnea, cough, wheezing, edema, palpitations, or syncope.      Past Medical History:   Diagnosis Date   • Acute pain of left foot    • Asthma    • Atopic dermatitis    • Atrial fibrillation 07/24/2017    on apixiban    • Back pain    • Cerebrovascular accident (CVA) due to occlusion of right middle cerebral artery 7/24/2017   • Controlled diabetes mellitus type II without complication    • Hyperlipidemia    • Hypertension    • Insomnia    • Macrocytosis    • Menopause     AT AGE 50   • Non-rheumatic mitral regurgitation 09/08/2017    Moderate to severe per CAROL 7/25/17   • Osteoarthritis    • Osteoporosis    • Prolapsed uterus    • Reactive airway disease    • Stress incontinence    • Urinary frequency    • UTI (urinary tract infection)    • Vitamin D deficiency        Past Surgical History:   Procedure Laterality Date   • COLONOSCOPY  08/09/2010    WNL      • ELBOW PROCEDURE Left     OPEN REDUCTION   • OOPHORECTOMY     • OVARIAN CYST SURGERY     • TOTAL KNEE ARTHROPLASTY  03/11/2013           Social History     Social History   • Marital status:      Spouse name: N/A   • Number of children: N/A   • Years of education: N/A     Occupational History   • Not on file.     Social History Main Topics   • Smoking status: Former Smoker     Years: 15.00   • Smokeless tobacco: Never Used      Comment: QUIT OVER 30 YRS AGO   • Alcohol use No   • Drug use: No   • Sexual activity: Not on file     Other Topics Concern   • Not on file     Social History Narrative       Family History   Problem Relation Age of Onset   • Hypertension Father    • Heart disease Father    • Stroke Father    • Diabetes Daughter        Review of Systems   Constitution: Positive for malaise/fatigue. Negative for chills, diaphoresis, fever, night sweats, weight gain and weight loss.   HENT: Negative for hearing  loss, nosebleeds, sore throat and tinnitus.    Eyes: Negative for blurred vision, double vision, pain and visual disturbance.   Cardiovascular: Negative for chest pain, claudication, cyanosis, dyspnea on exertion, irregular heartbeat, leg swelling, near-syncope, orthopnea, palpitations, paroxysmal nocturnal dyspnea and syncope.   Respiratory: Negative for cough, hemoptysis, shortness of breath, snoring and wheezing.    Endocrine: Negative for cold intolerance, heat intolerance and polyuria.   Hematologic/Lymphatic: Negative for bleeding problem. Does not bruise/bleed easily.   Skin: Negative for color change, dry skin, flushing and itching.   Musculoskeletal: Negative for falls, joint pain, joint swelling, muscle cramps, muscle weakness and myalgias.   Gastrointestinal: Negative for abdominal pain, constipation, heartburn, melena, nausea and vomiting.   Genitourinary: Positive for frequency. Negative for dysuria and hematuria.   Neurological: Positive for dizziness. Negative for excessive daytime sleepiness, light-headedness, loss of balance, numbness, paresthesias, seizures and vertigo.   Psychiatric/Behavioral: Negative for altered mental status, depression, memory loss and substance abuse. The patient does not have insomnia and is not nervous/anxious.    Allergic/Immunologic: Negative for environmental allergies.       Allergies   Allergen Reactions   • Aspirin Hives   • Penicillins          Current Outpatient Prescriptions:   •  apixaban (ELIQUIS) 5 MG tablet tablet, Take 1 tablet by mouth Every 12 (Twelve) Hours., Disp: 60 tablet, Rfl: 3  •  budesonide-formoterol (SYMBICORT) 80-4.5 MCG/ACT inhaler, Inhale 2 puffs 2 (Two) Times a Day., Disp: 3 inhaler, Rfl: 2  •  lovastatin (MEVACOR) 40 MG tablet, Take 1 tablet by mouth Every Night., Disp: 90 tablet, Rfl: 3  •  metoprolol tartrate (LOPRESSOR) 25 MG tablet, Take 1 tablet by mouth Every 12 (Twelve) Hours., Disp: 60 tablet, Rfl: 3  •  Omega-3 Fatty Acids (FISH OIL  "PEARLS) 183.33 MG capsule, , Disp: , Rfl: 0  •  Omega-3 Fatty Acids (OMEGA 3 500) 500 MG capsule, Take 1,000 mg by mouth Daily., Disp: 60 capsule, Rfl: 5  •  polyethylene glycol (MIRALAX) packet, Take 17 g by mouth Every Night., Disp: 30 packet, Rfl: 12  •  PREMARIN 0.625 MG/GM vaginal cream, Insert 0.5 g into the vagina daily., Disp: , Rfl: 0  •  SYSTANE BALANCE 0.6 % solution, instill 1 drop into both eyes four times a day, Disp: , Rfl: 0     Objective:     Vitals:    09/08/17 1044   BP: 138/80   Pulse: 85   Weight: 129 lb 9.6 oz (58.8 kg)   Height: 64.5\" (163.8 cm)     Body mass index is 21.9 kg/(m^2).    PHYSICAL EXAM:    Vitals Reviewed.   General Appearance: No acute distress, well developed and well nourished. Thin.  Eyes: Conjunctiva and lids: No erythema, swelling, or discharge. Sclera non-icteric.   HENT: Atraumatic, normocephalic. External eyes, ears, and nose normal. No hearing loss noted. Mucous membranes normal. Lips not cyanotic. Neck supple with no tenderness.  Respiratory: No signs of respiratory distress. Respiration rhythm and depth normal.   Clear to auscultation. No rales, crackles, rhonchi, or wheezing auscultated.   Cardiovascular:  Jugular Venous Pressure: Normal  Heart Rate and Rhythm: Irregularly, irregular.  Heart Sounds: Normal S1 and S2. No S3 or S4 noted.  Murmurs: No murmurs noted. No rubs, thrills, or gallops.   Arterial Pulses: Carotid pulses normal. No carotid bruit noted. Posterior tibialis and dorsalis pedis pulses normal.   Lower Extremities: No edema noted.  Gastrointestinal:  Abdomen soft, non-distended, non-tender. Normal bowel sounds. No hepatomegaly.   Musculoskeletal: Normal movement of extremities  Skin and Nails: General appearance normal. No pallor, cyanosis, diaphoresis. Skin temperature normal. No clubbing of fingernails.   Psychiatric: Patient alert and oriented to person, place, and time. Speech and behavior appropriate. Normal mood and affect.       ECG 12 " Lead  Date/Time: 9/8/2017 10:40 AM  Performed by: RODY CANTU  Authorized by: RODY CANTU   Comparison: compared with previous ECG from 7/24/2017  Comparison to previous ECG: Afib with   Rhythm: atrial fibrillation  Rate: normal  BPM: 85  Conduction: conduction normal  ST Segments: ST segments normal  T Waves: T waves normal  QRS axis: normal  Clinical impression: abnormal ECG              Assessment:       Diagnosis Plan   1. Chronic atrial fibrillation     2. Cerebrovascular accident (CVA) due to occlusion of right middle cerebral artery     3. Anticoagulated by anticoagulation treatment     4. Non-rheumatic mitral regurgitation     5. Essential hypertension            Plan:       1. Atrial Fibrillation and Atrial Flutter  Assessment  • The patient has permanent atrial fibrillation  • This is non-valvular in etiology  • The patient's CHADS2-VASc score is 6  • A [id number] score of 2 or more is considered a high risk for a thromboembolic event  • Apixaban prescribed    Plan  • Continue in atrial fibrillation with rate control  • Continue apixaban for antithrombotic therapy, bleeding issues discussed  • Continue beta blocker for rate control    Subjective - Objective  •   Overall, she is doing well post hospitalization.  She remains in chronic atrial fibrillation and is rate controlled on metoprolol tartrate.  She has an elevated chads score of 6 and will remain on apixaban.  She denies any adverse effects.  I discussed the plan of care with Dr. Lashawn Torres and she has recommended follow-up with her in 6 months.      2.  Mitral Regurgitation: This was noted to be moderate to severe per transesophageal echocardiogram in July.  Her valvular status will be monitored.    3.  Essential Hypertension: I recommended good blood pressure control.  Her blood pressure is stable today in the office.  As always, it has been a pleasure to participate in your patient's care.      Sincerely,         Rody  JOSHUA Nieves

## 2017-09-19 ENCOUNTER — TELEPHONE (OUTPATIENT)
Dept: FAMILY MEDICINE CLINIC | Facility: CLINIC | Age: 82
End: 2017-09-19

## 2017-09-19 DIAGNOSIS — I63.9 CEREBROVASCULAR ACCIDENT (CVA), UNSPECIFIED MECHANISM (HCC): Primary | ICD-10-CM

## 2017-09-20 NOTE — TELEPHONE ENCOUNTER
Order has been entered, looks like sending to [facility], pt requesting jules on [facility].

## 2017-09-22 ENCOUNTER — TELEPHONE (OUTPATIENT)
Dept: FAMILY MEDICINE CLINIC | Facility: CLINIC | Age: 82
End: 2017-09-22

## 2017-09-22 DIAGNOSIS — I63.9 CEREBROVASCULAR ACCIDENT (CVA), UNSPECIFIED MECHANISM (HCC): Primary | ICD-10-CM

## 2017-09-22 NOTE — TELEPHONE ENCOUNTER
----- Message from Christy Kate sent at 9/21/2017  3:20 PM EDT -----  [facility]IDA [facility] IS ASKING FOR ANOTHER ORDER TO BE SENT OVER FOR SPEECH, OCCUPATIONAL, AND PHYSICAL THERAPY. PLEASE ENTER IN THESE ORDERS SO I CAN FAX THEM TO HAMIDA  [phone]  PHONE [phone]    Butch Stock orders    Dx stroke    Dr. ROJAS

## 2017-09-25 ENCOUNTER — OFFICE VISIT (OUTPATIENT)
Dept: FAMILY MEDICINE CLINIC | Facility: CLINIC | Age: 82
End: 2017-09-25

## 2017-09-25 VITALS
BODY MASS INDEX: 20.99 KG/M2 | SYSTOLIC BLOOD PRESSURE: 138 MMHG | OXYGEN SATURATION: 98 % | WEIGHT: 126 LBS | HEART RATE: 72 BPM | TEMPERATURE: 98.5 F | DIASTOLIC BLOOD PRESSURE: 82 MMHG | HEIGHT: 65 IN

## 2017-09-25 DIAGNOSIS — N30.00 ACUTE CYSTITIS WITHOUT HEMATURIA: Primary | ICD-10-CM

## 2017-09-25 LAB
BILIRUB BLD-MCNC: NEGATIVE MG/DL
CLARITY, POC: ABNORMAL
COLOR UR: YELLOW
GLUCOSE UR STRIP-MCNC: NEGATIVE MG/DL
KETONES UR QL: NEGATIVE
LEUKOCYTE EST, POC: ABNORMAL
NITRITE UR-MCNC: POSITIVE MG/ML
PH UR: 5.5 [PH] (ref 5–8)
PROT UR STRIP-MCNC: ABNORMAL MG/DL
RBC # UR STRIP: ABNORMAL /UL
SP GR UR: 1.02 (ref 1–1.03)
UROBILINOGEN UR QL: NORMAL

## 2017-09-25 PROCEDURE — 99213 OFFICE O/P EST LOW 20 MIN: CPT | Performed by: NURSE PRACTITIONER

## 2017-09-25 PROCEDURE — 81003 URINALYSIS AUTO W/O SCOPE: CPT | Performed by: NURSE PRACTITIONER

## 2017-09-25 RX ORDER — MECLIZINE HYDROCHLORIDE 25 MG/1
TABLET ORAL
Refills: 0 | COMMUNITY
Start: 2017-09-16 | End: 2018-05-19

## 2017-09-25 RX ORDER — OMEGA-3/DHA/EPA/FISH OIL 200-300 MG
CAPSULE ORAL
Refills: 0 | COMMUNITY
Start: 2017-09-07 | End: 2017-10-23 | Stop reason: SDUPTHER

## 2017-09-25 RX ORDER — AMLODIPINE BESYLATE 5 MG/1
TABLET ORAL
Refills: 0 | COMMUNITY
Start: 2017-09-16 | End: 2017-11-13 | Stop reason: SDUPTHER

## 2017-09-25 RX ORDER — SULFAMETHOXAZOLE AND TRIMETHOPRIM 800; 160 MG/1; MG/1
1 TABLET ORAL 2 TIMES DAILY
Qty: 14 TABLET | Refills: 0 | Status: SHIPPED | OUTPATIENT
Start: 2017-09-25 | End: 2017-10-23

## 2017-09-28 LAB
BACTERIA UR CULT: ABNORMAL
BACTERIA UR CULT: ABNORMAL
OTHER ANTIBIOTIC SUSC ISLT: ABNORMAL

## 2017-09-29 ENCOUNTER — TELEPHONE (OUTPATIENT)
Dept: FAMILY MEDICINE CLINIC | Facility: CLINIC | Age: 82
End: 2017-09-29

## 2017-09-29 NOTE — PROGRESS NOTES
Urine culture indicates infection should respond to antibiotic we put you on, follow up 1 week if still having symptoms

## 2017-09-29 NOTE — TELEPHONE ENCOUNTER
----- Message from JOSHUA Chaudhari sent at 9/29/2017  8:25 AM EDT -----  Urine culture indicates infection should respond to antibiotic we put you on, follow up 1 week if still having symptoms

## 2017-10-23 ENCOUNTER — OFFICE VISIT (OUTPATIENT)
Dept: FAMILY MEDICINE CLINIC | Facility: CLINIC | Age: 82
End: 2017-10-23

## 2017-10-23 VITALS
TEMPERATURE: 98.1 F | SYSTOLIC BLOOD PRESSURE: 128 MMHG | WEIGHT: 126 LBS | DIASTOLIC BLOOD PRESSURE: 68 MMHG | HEIGHT: 65 IN | OXYGEN SATURATION: 97 % | BODY MASS INDEX: 20.99 KG/M2 | HEART RATE: 85 BPM

## 2017-10-23 DIAGNOSIS — J40 BRONCHITIS: Primary | ICD-10-CM

## 2017-10-23 PROCEDURE — 96372 THER/PROPH/DIAG INJ SC/IM: CPT | Performed by: NURSE PRACTITIONER

## 2017-10-23 PROCEDURE — 99213 OFFICE O/P EST LOW 20 MIN: CPT | Performed by: NURSE PRACTITIONER

## 2017-10-23 PROCEDURE — 71020 XR CHEST PA AND LATERAL: CPT | Performed by: NURSE PRACTITIONER

## 2017-10-23 RX ORDER — AZITHROMYCIN 250 MG/1
TABLET, FILM COATED ORAL
Qty: 6 TABLET | Refills: 0 | Status: SHIPPED | OUTPATIENT
Start: 2017-10-23 | End: 2018-04-18

## 2017-10-23 RX ORDER — METHYLPREDNISOLONE ACETATE 80 MG/ML
80 INJECTION, SUSPENSION INTRA-ARTICULAR; INTRALESIONAL; INTRAMUSCULAR; SOFT TISSUE ONCE
Status: COMPLETED | OUTPATIENT
Start: 2017-10-23 | End: 2017-10-23

## 2017-10-23 RX ADMIN — METHYLPREDNISOLONE ACETATE 80 MG: 80 INJECTION, SUSPENSION INTRA-ARTICULAR; INTRALESIONAL; INTRAMUSCULAR; SOFT TISSUE at 14:20

## 2017-10-23 NOTE — PROGRESS NOTES
"Subjective   Magnus Hartley is a 89 y.o. female who presents c/o cough x 1 week.     History of Present Illness   Thinks allergy as runny nose and a cough x 1 week. No fever or chills, but heavy production of cough. Using vicks vapor rub to address sore throat and congestion, though not improving after 1 week.     Therapy post stroke is going well, except for speech. Some word searching still. Now attending 1 x weekly.   The following portions of the patient's history were reviewed and updated as appropriate: allergies, current medications, past family history, past medical history, past social history, past surgical history and problem list.    Review of Systems   Constitutional: Negative for chills and fever.   HENT: Positive for congestion, rhinorrhea and sore throat. Negative for ear discharge, ear pain, facial swelling, hearing loss, nosebleeds, sinus pressure, sneezing and trouble swallowing.    Respiratory: Positive for cough. Negative for shortness of breath and wheezing.    Cardiovascular: Negative.  Negative for chest pain.   Gastrointestinal: Negative for abdominal pain.   Endocrine: Negative.    Musculoskeletal: Negative for arthralgias.   Allergic/Immunologic: Negative for environmental allergies.     /68  Pulse 85  Temp 98.1 °F (36.7 °C) (Oral)   Ht 64.5\" (163.8 cm)  Wt 126 lb (57.2 kg)  SpO2 97%  BMI 21.29 kg/m2    Objective   Physical Exam   Constitutional: She appears well-developed and well-nourished.   HENT:   Right Ear: Tympanic membrane normal.   Left Ear: Tympanic membrane normal.   Nose: Mucosal edema and rhinorrhea present.   Mouth/Throat: Oropharyngeal exudate present.   Cardiovascular: Normal rate, regular rhythm and normal heart sounds.    Pulmonary/Chest: Effort normal. She has rales (RLL).   Neurological: Gait (slight shift to the right. ) abnormal.   Skin: Skin is warm and dry.   Psychiatric: She has a normal mood and affect. Her behavior is normal. Judgment and thought " content normal.   Nursing note and vitals reviewed.    Assessment/Plan   Problems Addressed this Visit     None      Visit Diagnoses     Bronchitis    -  Primary    Relevant Orders    XR Chest PA & Lateral      CXR with RLL, early infiltrate, will cover with antibiotics. Will send xray to radiology for interpretation, no comparison available.   Last A1c in July, 6.38 (H),    With adventitious, add depo medrol. Will update flu vaccine in 2 weeks, outpt.

## 2017-11-13 ENCOUNTER — TELEPHONE (OUTPATIENT)
Dept: FAMILY MEDICINE CLINIC | Facility: CLINIC | Age: 82
End: 2017-11-13

## 2017-11-13 RX ORDER — AMLODIPINE BESYLATE 5 MG/1
5 TABLET ORAL DAILY
Qty: 30 TABLET | Refills: 1 | Status: SHIPPED | OUTPATIENT
Start: 2017-11-13 | End: 2018-01-14 | Stop reason: SDUPTHER

## 2017-11-15 ENCOUNTER — OFFICE VISIT (OUTPATIENT)
Dept: NEUROLOGY | Facility: CLINIC | Age: 82
End: 2017-11-15

## 2017-11-15 VITALS
DIASTOLIC BLOOD PRESSURE: 62 MMHG | SYSTOLIC BLOOD PRESSURE: 100 MMHG | HEIGHT: 65 IN | WEIGHT: 132 LBS | BODY MASS INDEX: 21.99 KG/M2

## 2017-11-15 DIAGNOSIS — I10 BENIGN ESSENTIAL HYPERTENSION: ICD-10-CM

## 2017-11-15 DIAGNOSIS — E11.9 CONTROLLED TYPE 2 DIABETES MELLITUS WITHOUT COMPLICATION, WITHOUT LONG-TERM CURRENT USE OF INSULIN (HCC): ICD-10-CM

## 2017-11-15 DIAGNOSIS — I48.20 CHRONIC ATRIAL FIBRILLATION (HCC): ICD-10-CM

## 2017-11-15 DIAGNOSIS — E78.5 DYSLIPIDEMIA: ICD-10-CM

## 2017-11-15 DIAGNOSIS — I63.511 CEREBROVASCULAR ACCIDENT (CVA) DUE TO OCCLUSION OF RIGHT MIDDLE CEREBRAL ARTERY (HCC): ICD-10-CM

## 2017-11-15 PROCEDURE — 99212 OFFICE O/P EST SF 10 MIN: CPT | Performed by: NURSE PRACTITIONER

## 2017-11-15 RX ORDER — BENZONATATE 100 MG/1
CAPSULE ORAL
Refills: 0 | COMMUNITY
Start: 2017-10-27 | End: 2018-04-18

## 2017-11-15 RX ORDER — SERTRALINE HYDROCHLORIDE 25 MG/1
TABLET, FILM COATED ORAL
Refills: 0 | COMMUNITY
Start: 2017-11-09

## 2017-11-15 NOTE — PROGRESS NOTES
DOS: 2017  NAME: Magnus Hartley   : 1928  PCP: Dameon Alford MD    Chief Complaint   Patient presents with   • Cerebrovascular Accident        Neurological Problem and Interval History:  89 y.o. RH [place] female with diabetes mellitus, hyperlipidemia, hypertension, atrial fib and Hx of stroke and smoking. She presents today for her 3 month stroke follow up.     She denies any new stroke symptoms. No H/As. She has been improving. She does have some continued left arm/hand numbness but it has improved. Her fine motor movement has also improved. She feels like she has more energy. She is now with PCP at [facility]. She is on Eliquis. She has not had any falls.  She exercises twice a day for 20 minutes. Her BP has been good. She is tolerating her medication.     2017 She woke up today at 3 AM and had some tingling of her left arm but went back to bed.  When she woke up for the day she noted that she had trouble moving her left side. She was Dx with a stroke and testing showed atrial fib. She was D/C on coumadin. She was on lovastatin at the time of the stroke but no antiplatelet or anticoagulants. She mentioned at the time of admission that she had some afib in the prior months.     Review of Systems:        Review of Systems   Constitutional: Positive for fatigue. Negative for chills and fever.   HENT: Positive for hearing loss (hearing aids) and tinnitus. Negative for trouble swallowing.    Eyes: Negative for pain, redness, itching and visual disturbance.   Respiratory: Negative for cough, shortness of breath and wheezing.    Cardiovascular: Negative for chest pain, palpitations and leg swelling.   Gastrointestinal: Negative for constipation, diarrhea, nausea and vomiting.   Endocrine: Negative for cold intolerance, heat intolerance and polydipsia.   Genitourinary: Negative for decreased urine volume, difficulty urinating, frequency and urgency.   Musculoskeletal: Negative  "for back pain, gait problem, neck pain and neck stiffness.   Skin: Negative for color change, rash and wound.   Allergic/Immunologic: Negative for environmental allergies, food allergies and immunocompromised state.   Neurological: Positive for dizziness, weakness (left side) and numbness (left side). Negative for tremors, seizures, syncope, facial asymmetry, speech difficulty, light-headedness and headaches.   Hematological: Negative for adenopathy. Does not bruise/bleed easily.   Psychiatric/Behavioral: Negative for agitation, behavioral problems, confusion, decreased concentration, dysphoric mood, hallucinations, self-injury, sleep disturbance and suicidal ideas. The patient is nervous/anxious. The patient is not hyperactive.        \"The following portions of the patient's history were reviewed and updated as appropriate: allergies, current medications, past family history, past medical history, past social history, past surgical history and problem list.\"  Review of Imagin/2017 CAROL  Left Ventricle  Left ventricular systolic function is normal. Estimated EF appears to be in the range of 56 - 60%. Normal left ventricular cavity size and wall thickness noted. All left ventricular wall segments contract normally.   Left Atrium  Left atrial cavity size is severely dilated. There is dense spontaneous echo contrast present in the atrial body. Left atrial appendage was found to be singularly lobar in nature. Doppler interrogation shows severely reduced flow within the left atrial appendage. No evidence of a left atrial appendage thrombus was present. Saline test results are negative.   Aortic Valve  The valve appears trileaflet. The aortic valve is abnormal in structure. There is calcification of the aortic valve.Mild aortic valve regurgitation is present. No aortic valve stenosis is present. There is fibrinous material noted on the LV side of the aortic leaflets.        Laboratory Results:             Lab " Results   Component Value Date    HGBA1C 6.38 (H) 07/25/2017     Lab Results   Component Value Date    CHOL 154 07/25/2017     Lab Results   Component Value Date    HDL 71 (H) 07/25/2017    HDL 73 06/22/2017    HDL 74 12/22/2016     Lab Results   Component Value Date    LDLCALC 73 07/25/2017       Lab Results   Component Value Date    LDL 77 06/22/2017    LDL 80 12/22/2016    LDL 85 06/21/2016     Lab Results   Component Value Date    TRIG 52 07/25/2017    TRIG 103 06/22/2017    TRIG 112 12/22/2016     No results found for: RPR  Lab Results   Component Value Date    TSH 2.660 07/24/2017     No results found for: [id number]   Lab Results   Component Value Date    INR 1.50 (H) 08/07/2017    INR 1.69 (H) 08/06/2017    INR 2.29 (H) 08/05/2017    PROTIME 17.6 (H) 08/07/2017    PROTIME 19.3 (H) 08/06/2017    PROTIME 24.4 (H) 08/05/2017       Physical Examination:  mRS: 1  General Appearance:   Well developed, thin, well groomed, alert, and cooperative.  HEENT: Normocephalic.    Neck and Spine: Normal range of motion.  Normal alignment. No mass or tenderness. No bruits.  Cardiac: Irregularly irregular. No murmurs.  Peripheral Vasculature: Radial and pedal pulses are equal and symmetric. No signs of distal embolization.  Extremities:    No edema or deformities.    Neurological examination:  Higher Integrative  Function: Oriented to time, place and person. Normal registration, recall, attention span and concentration. Normal language including comprehension, spontaneous speech, repetition, naming and vocabulary. No neglect with normal visual-spatial function and construction. Normal fund of knowledge and higher integrative function.   CN V: Normal facial sensation and strength of muscles of mastication.  CN VII: Facial movements are symmetric. No weakness.  CN VIII:   Auditory acuity is normal.  CN IX & X:   Symmetric palatal movement.  CN XI: Sternocleidomastoid and trapezius are normal.  No weakness.  CN XII:   The tongue  is midline.  No atrophy or fasciculations.  Motor: Normal muscle strength, bulk and tone in upper and lower extremities.  No fasciculations, rigidity, spasticity, or abnormal movements.  Sensation: Normal to light touch, temperature, and proprioception in arms and legs.   Station and Gait: Normal gait and station.    Coordination: Rapid alternating movements are normal.  Heel to shin normal.    Diagnoses / Discussion:  Ms. Hartley is an 89yo who presents for her 3 months post RMCA stroke. The etiology is cardioembolic from atrial fib. She has remained on Eliquis for stroke prevention. She needs continued aggressive risk factor control. I encouraged her to continue with daily exercise. We discussed when it is important to seek medical attention, on Eliquis. She agrees with the plan and will call with any questions or concerns.     Plan:   continue Eliquis for stroke prevention    Blood pressure control to <130/80   Goal LDL <70-recommend high dose statins-    Serum glucose < 140   Call 911 for stroke any stroke symptoms   Follow-up 1 year  Magnus was seen today for cerebrovascular accident.    Diagnoses and all orders for this visit:    Cerebrovascular accident (CVA) due to occlusion of right middle cerebral artery    Benign essential hypertension    Chronic atrial fibrillation    Controlled type 2 diabetes mellitus without complication, without long-term current use of insulin    Dyslipidemia        Coding

## 2018-01-15 RX ORDER — AMLODIPINE BESYLATE 5 MG/1
TABLET ORAL
Qty: 30 TABLET | Refills: 1 | Status: SHIPPED | OUTPATIENT
Start: 2018-01-15 | End: 2018-03-28 | Stop reason: SDUPTHER

## 2018-03-28 RX ORDER — AMLODIPINE BESYLATE 5 MG/1
TABLET ORAL
Qty: 30 TABLET | Refills: 1 | Status: SHIPPED | OUTPATIENT
Start: 2018-03-28

## 2018-04-18 ENCOUNTER — OFFICE VISIT (OUTPATIENT)
Dept: CARDIOLOGY | Facility: CLINIC | Age: 83
End: 2018-04-18

## 2018-04-18 VITALS
WEIGHT: 124.2 LBS | SYSTOLIC BLOOD PRESSURE: 108 MMHG | RESPIRATION RATE: 16 BRPM | DIASTOLIC BLOOD PRESSURE: 82 MMHG | HEIGHT: 64 IN | HEART RATE: 80 BPM | BODY MASS INDEX: 21.21 KG/M2

## 2018-04-18 DIAGNOSIS — I48.21 PERMANENT ATRIAL FIBRILLATION (HCC): Primary | ICD-10-CM

## 2018-04-18 DIAGNOSIS — R42 LIGHTHEADEDNESS: ICD-10-CM

## 2018-04-18 DIAGNOSIS — E11.9 CONTROLLED TYPE 2 DIABETES MELLITUS WITHOUT COMPLICATION, WITHOUT LONG-TERM CURRENT USE OF INSULIN (HCC): ICD-10-CM

## 2018-04-18 DIAGNOSIS — I10 BENIGN ESSENTIAL HYPERTENSION: ICD-10-CM

## 2018-04-18 DIAGNOSIS — R00.1 SINUS BRADYCARDIA: ICD-10-CM

## 2018-04-18 DIAGNOSIS — I63.511 CEREBROVASCULAR ACCIDENT (CVA) DUE TO OCCLUSION OF RIGHT MIDDLE CEREBRAL ARTERY (HCC): ICD-10-CM

## 2018-04-18 PROCEDURE — 99214 OFFICE O/P EST MOD 30 MIN: CPT | Performed by: INTERNAL MEDICINE

## 2018-04-18 PROCEDURE — 93000 ELECTROCARDIOGRAM COMPLETE: CPT | Performed by: INTERNAL MEDICINE

## 2018-04-18 NOTE — PATIENT INSTRUCTIONS
Cut amlodipine in 1/2 and take 1/2 once a day. Call in 2 weeks with blood pressure numbers. I may stop it all together. Decrease dose of Eliquis to 2.5mg twice a day.

## 2018-04-18 NOTE — PROGRESS NOTES
PATIENTINFORMATION    Date of Office Visit: 2018  Encounter Provider: Lashawn Torres MD  Place of Service: [facility] CARDIOLOGY  Patient Name: Magnus Hatrley  : 1928    Subjective:     Encounter Date:2018      Patient ID: Magnus Hartley is a 89 y.o. female.      History of Present Illness    This is a nice lady with a history of hypertension, bradycardia, diabetes, and hyperlipidemia who was hospitalized in 2017 and diagnosed with a stroke.  She was noted to be in atrial fibrillation.  She underwent a transesophageal echocardiogram in 2017 which revealed severe left atrial enlargement, mild tricuspid regurgitation, and normal left ventricular systolic function with an ejection fraction of 56-60%.  There was moderate-to-severe mitral valve regurgitation, and mild aortic regurgitation.  Fibrinous material was noted on both the aortic and mitral valve.  Her rate was controlled with beta blockers and she was discharged on Eliquis.  She followed up with Rody Nieves in 2017 and no changes were made to her medication.      She comes in today for followup.  She denies any chest pain, edema, or shortness of breath.  She does note some palpitations with exertion.  Her biggest complaint is that she feels lightheaded when she stands up or changes positions.        Review of Systems   Constitution: Negative for fever, malaise/fatigue, weight gain and weight loss.   HENT: Negative for ear pain, hearing loss, nosebleeds and sore throat.    Eyes: Negative for double vision, pain, vision loss in left eye and vision loss in right eye.   Cardiovascular:        See history of present illness.   Respiratory: Negative for cough, shortness of breath, sleep disturbances due to breathing, snoring and wheezing.    Endocrine: Negative for cold intolerance, heat intolerance and polyuria.   Skin: Negative for itching, poor wound healing and rash.   Musculoskeletal: Negative for  "joint pain, joint swelling and myalgias.   Gastrointestinal: Negative for abdominal pain, diarrhea, hematochezia, nausea and vomiting.   Genitourinary: Negative for hematuria and hesitancy.   Neurological: Negative for numbness, paresthesias and seizures.   Psychiatric/Behavioral: Negative for depression. The patient is not nervous/anxious.            ECG 12 Lead  Date/Time: 4/18/2018 11:23 AM  Performed by: MADINA DORADO  Authorized by: MADINA DORADO   Comparison: compared with previous ECG from 9/8/2017  Similar to previous ECG  Rhythm: atrial fibrillation  BPM: 80  Conduction: conduction normal  ST Segments: ST segments normal  Clinical impression: abnormal ECG               Objective:     /82 (BP Location: Left arm, Patient Position: Sitting, Cuff Size: Adult)   Pulse 80   Resp 16   Ht 162.6 cm (64\")   Wt 56.3 kg (124 lb 3.2 oz)   BMI 21.32 kg/m²  Body mass index is 21.32 kg/m².     Physical Exam   Constitutional: She appears well-developed.   HENT:   Head: Normocephalic and atraumatic.   Eyes: Conjunctivae and lids are normal. Pupils are equal, round, and reactive to light. Lids are everted and swept, no foreign bodies found.   Neck: Normal range of motion. No JVD present. Carotid bruit is not present. No tracheal deviation present. No thyroid mass present.   Cardiovascular: Normal rate and normal heart sounds.  An irregularly irregular rhythm present.   Pulses:       Dorsalis pedis pulses are 2+ on the right side, and 2+ on the left side.   Pulmonary/Chest: Effort normal and breath sounds normal.   Abdominal: Normal appearance and bowel sounds are normal.   Musculoskeletal: Normal range of motion.   Neurological: She is alert. She has normal strength.   Skin: Skin is warm, dry and intact.   Psychiatric: She has a normal mood and affect. Her behavior is normal.   Vitals reviewed.            Assessment/Plan:       1. Atrial Fibrillation and Atrial Flutter  Assessment  • The patient has permanent " atrial fibrillation  • This is non-valvular in etiology  • The patient's CHADS2-VASc score is 6  • A [id number]-[id number] score of 2 or more is considered a high risk for a thromboembolic event  • Apixaban prescribed    Plan  • Continue in atrial fibrillation with rate control  • Continue apixaban for antithrombotic therapy, bleeding issues discussed  • Continue beta blocker for rate control    Subjective - Objective  •   She is currently in rate controlled atrial fibrillation.  I would like her to continue her beta blocker.  I am going to decrease the Eliquis to 2.5 mg twice a day given her age and weight.      2.  Moderate to severe mitral regurgitation: She is euvolemic.  She is not a candidate for invasive therapy given her age and frailty.     3.  Essential Hypertension: I think her blood pressure is too low and that's why she is feeling dizzy when she stands.  I am going to have her cut her amlodipine in half.  I have asked her to call me in a week or 2 with blood pressure numbers.  If her blood pressure is still low, I will have her stop the amlodipine altogether.    I'm going to see her back in 3 months.      Orders Placed This Encounter   Procedures   • ECG 12 Lead     This order was created via procedure documentation      Magnus Hartley   Home Medication Instructions ABBIE:    Printed on:04/18/18 5962   Medication Information                      amLODIPine (NORVASC) 5 MG tablet  take 1 tablet by mouth once daily             apixaban (ELIQUIS) 2.5 MG tablet tablet  Take 1 tablet by mouth Every 12 (Twelve) Hours.             budesonide-formoterol (SYMBICORT) 80-4.5 MCG/ACT inhaler  Inhale 2 puffs 2 (Two) Times a Day.             lovastatin (MEVACOR) 40 MG tablet  Take 1 tablet by mouth Every Night.             meclizine (ANTIVERT) 25 MG tablet  take 1 tablet by mouth every 6 hours if needed             metoprolol tartrate (LOPRESSOR) 25 MG tablet  Take 1 tablet by mouth Every 12 (Twelve) Hours.             Omega-3  Fatty Acids (OMEGA 3 500) 500 MG capsule  Take 1,000 mg by mouth Daily.             polyethylene glycol (MIRALAX) packet  Take 17 g by mouth Every Night.             PREMARIN 0.625 MG/GM vaginal cream  Insert 0.5 g into the vagina daily.             sertraline (ZOLOFT) 25 MG tablet  take 1 tablet by mouth once daily             SYSTANE BALANCE 0.6 % solution  instill 1 drop into both eyes four times a day                        Lashawn Torres MD  04/18/18  12:17 PM

## 2018-04-19 RX ORDER — APIXABAN 5 MG/1
TABLET, FILM COATED ORAL
Qty: 60 TABLET | Refills: 3 | OUTPATIENT
Start: 2018-04-19

## 2018-05-19 ENCOUNTER — HOSPITAL ENCOUNTER (EMERGENCY)
Facility: HOSPITAL | Age: 83
Discharge: HOME | End: 2018-05-19
Attending: EMERGENCY MEDICINE | Admitting: EMERGENCY MEDICINE

## 2018-05-19 ENCOUNTER — APPOINTMENT (OUTPATIENT)
Dept: CT IMAGING | Facility: HOSPITAL | Age: 83
End: 2018-05-19

## 2018-05-19 VITALS
SYSTOLIC BLOOD PRESSURE: 121 MMHG | OXYGEN SATURATION: 98 % | HEART RATE: 88 BPM | HEIGHT: 64 IN | DIASTOLIC BLOOD PRESSURE: 86 MMHG | TEMPERATURE: 98.7 F | RESPIRATION RATE: 16 BRPM | WEIGHT: 124 LBS | BODY MASS INDEX: 21.17 KG/M2

## 2018-05-19 DIAGNOSIS — R42 DIZZINESS: Primary | ICD-10-CM

## 2018-05-19 LAB
ALBUMIN SERPL-MCNC: 3.7 G/DL (ref 3.5–5.2)
ALBUMIN/GLOB SERPL: 0.9 G/DL
ALP SERPL-CCNC: 85 U/L (ref 39–117)
ALT SERPL W P-5'-P-CCNC: 25 U/L (ref 1–33)
ANION GAP SERPL CALCULATED.3IONS-SCNC: 15 MMOL/L
AST SERPL-CCNC: 33 U/L (ref 1–32)
BASOPHILS # BLD AUTO: 0.03 10*3/MM3 (ref 0–0.2)
BASOPHILS NFR BLD AUTO: 0.8 % (ref 0–1.5)
BILIRUB SERPL-MCNC: 0.7 MG/DL (ref 0.1–1.2)
BUN BLD-MCNC: 24 MG/DL (ref 8–23)
BUN/CREAT SERPL: 26.1 (ref 7–25)
CALCIUM SPEC-SCNC: 8.9 MG/DL (ref 8.6–10.5)
CHLORIDE SERPL-SCNC: 100 MMOL/L (ref 98–107)
CO2 SERPL-SCNC: 27 MMOL/L (ref 22–29)
CREAT BLD-MCNC: 0.92 MG/DL (ref 0.57–1)
DEPRECATED RDW RBC AUTO: 50.9 FL (ref 37–54)
EOSINOPHIL # BLD AUTO: 0.14 10*3/MM3 (ref 0–0.7)
EOSINOPHIL NFR BLD AUTO: 3.6 % (ref 0.3–6.2)
ERYTHROCYTE [DISTWIDTH] IN BLOOD BY AUTOMATED COUNT: 13.5 % (ref 11.7–13)
GFR SERPL CREATININE-BSD FRML MDRD: 57 ML/MIN/1.73
GLOBULIN UR ELPH-MCNC: 4 GM/DL
GLUCOSE BLD-MCNC: 121 MG/DL (ref 65–99)
HCT VFR BLD AUTO: 41.8 % (ref 35.6–45.5)
HGB BLD-MCNC: 13.7 G/DL (ref 11.9–15.5)
IMM GRANULOCYTES # BLD: 0 10*3/MM3 (ref 0–0.03)
IMM GRANULOCYTES NFR BLD: 0 % (ref 0–0.5)
LYMPHOCYTES # BLD AUTO: 1.19 10*3/MM3 (ref 0.9–4.8)
LYMPHOCYTES NFR BLD AUTO: 30.9 % (ref 19.6–45.3)
MCH RBC QN AUTO: 33.8 PG (ref 26.9–32)
MCHC RBC AUTO-ENTMCNC: 32.8 G/DL (ref 32.4–36.3)
MCV RBC AUTO: 103.2 FL (ref 80.5–98.2)
MONOCYTES # BLD AUTO: 0.29 10*3/MM3 (ref 0.2–1.2)
MONOCYTES NFR BLD AUTO: 7.5 % (ref 5–12)
NEUTROPHILS # BLD AUTO: 2.2 10*3/MM3 (ref 1.9–8.1)
NEUTROPHILS NFR BLD AUTO: 57.2 % (ref 42.7–76)
PLATELET # BLD AUTO: 181 10*3/MM3 (ref 140–500)
PMV BLD AUTO: 10.4 FL (ref 6–12)
POTASSIUM BLD-SCNC: 3.6 MMOL/L (ref 3.5–5.2)
PROT SERPL-MCNC: 7.7 G/DL (ref 6–8.5)
RBC # BLD AUTO: 4.05 10*6/MM3 (ref 3.9–5.2)
SODIUM BLD-SCNC: 142 MMOL/L (ref 136–145)
TROPONIN T SERPL-MCNC: <0.01 NG/ML (ref 0–0.03)
WBC NRBC COR # BLD: 3.85 10*3/MM3 (ref 4.5–10.7)

## 2018-05-19 PROCEDURE — 99284 EMERGENCY DEPT VISIT MOD MDM: CPT

## 2018-05-19 PROCEDURE — 80053 COMPREHEN METABOLIC PANEL: CPT | Performed by: PHYSICIAN ASSISTANT

## 2018-05-19 PROCEDURE — 93005 ELECTROCARDIOGRAM TRACING: CPT | Performed by: PHYSICIAN ASSISTANT

## 2018-05-19 PROCEDURE — 85025 COMPLETE CBC W/AUTO DIFF WBC: CPT | Performed by: PHYSICIAN ASSISTANT

## 2018-05-19 PROCEDURE — 93010 ELECTROCARDIOGRAM REPORT: CPT | Performed by: INTERNAL MEDICINE

## 2018-05-19 PROCEDURE — 84484 ASSAY OF TROPONIN QUANT: CPT | Performed by: PHYSICIAN ASSISTANT

## 2018-05-19 PROCEDURE — 70450 CT HEAD/BRAIN W/O DYE: CPT

## 2018-05-19 RX ORDER — MECLIZINE HYDROCHLORIDE 25 MG/1
25 TABLET ORAL 3 TIMES DAILY PRN
Qty: 30 TABLET | Refills: 0 | Status: SHIPPED | OUTPATIENT
Start: 2018-05-19

## 2018-05-19 RX ORDER — SODIUM CHLORIDE 0.9 % (FLUSH) 0.9 %
10 SYRINGE (ML) INJECTION AS NEEDED
Status: DISCONTINUED | OUTPATIENT
Start: 2018-05-19 | End: 2018-05-19 | Stop reason: HOSPADM

## 2018-05-19 NOTE — ED PROVIDER NOTES
" EMERGENCY DEPARTMENT ENCOUNTER    CHIEF COMPLAINT  Chief Complaint: dizzy  History given by: patient  History limited by: nothing  Room Number: 04/04  PMD: Dameon Alford MD       HPI:  Pt is a 89 y.o. female with hx of afib who presents complaining of dizziness for 30 minutes while walking to the mail box this AM. Pt states she felt like she was going to pass out and that things were \"spinning\". Pt states her dizziness was alleviated by closing her eyes. She denies CP. Pt states her dizziness is resolved at this time.    Duration:  Since this AM  Onset: gradual  Timing: constant  Quality: dizzy  Intensity/Severity: moderate  Progression: unchanged  Associated Symptoms: lightheaded  Aggravating Factors: none  Alleviating Factors: closing her eyes  Previous Episodes: Pt has hx of afib.  Treatment before arrival: Pt reports no treatment PTA.    PAST MEDICAL HISTORY  Active Ambulatory Problems     Diagnosis Date Noted   • Foot pain 02/13/2016   • Asthma 02/13/2016   • Benign essential hypertension 02/13/2016   • Controlled type 2 diabetes mellitus without complication 02/13/2016   • Dyslipidemia 02/13/2016   • Macrocytosis without anemia 02/13/2016   • Senile osteoporosis 02/13/2016   • Post-menopausal osteoporosis 02/13/2016   • Sinus bradycardia 02/13/2016   • Stress incontinence in female 02/13/2016   • Urge incontinence of urine 02/13/2016   • Atrophic vaginitis 07/16/2015   • Encounter for fitting and adjustment of other specified devices 07/30/2015   • Incomplete emptying of bladder 07/16/2015   • Urethral caruncle 07/16/2015   • Incomplete uterovaginal prolapse 07/16/2015   • Cerebrovascular accident (CVA) due to occlusion of right middle cerebral artery 07/24/2017   • Atrial fibrillation 07/24/2017   • Anticoagulated 08/25/2017   • Anticoagulated by anticoagulation treatment 09/08/2017   • Non-rheumatic mitral regurgitation 09/08/2017     Resolved Ambulatory Problems     Diagnosis Date Noted   • " Atopic dermatitis 02/13/2016   • Intolerant of cold 02/13/2016   • Dizziness 02/13/2016   • Hyperkalemia 02/13/2016   • Hypokalemia 02/13/2016   • Acute upper respiratory infection 02/13/2016   • Increased frequency of urination 02/13/2016   • Urinary tract infection 02/13/2016   • Warfarin anticoagulation (goal INR 2-3) 07/27/2017     Past Medical History:   Diagnosis Date   • Acute pain of left foot    • Asthma    • Atopic dermatitis    • Atrial fibrillation 07/24/2017   • Back pain    • Cerebrovascular accident (CVA) due to occlusion of right middle cerebral artery 7/24/2017   • Controlled diabetes mellitus type II without complication    • Hyperlipidemia    • Hypertension    • Insomnia    • Macrocytosis    • Menopause    • Non-rheumatic mitral regurgitation 09/08/2017   • Osteoarthritis    • Osteoporosis    • Prolapsed uterus    • Reactive airway disease    • Stress incontinence    • Urinary frequency    • UTI (urinary tract infection)    • Vitamin D deficiency        PAST SURGICAL HISTORY  Past Surgical History:   Procedure Laterality Date   • COLONOSCOPY  08/09/2010    WNL      • ELBOW PROCEDURE Left     OPEN REDUCTION   • OOPHORECTOMY     • OVARIAN CYST SURGERY     • TOTAL KNEE ARTHROPLASTY  03/11/2013           FAMILY HISTORY  Family History   Problem Relation Age of Onset   • Hypertension Father    • Heart disease Father    • Stroke Father    • Diabetes Daughter        SOCIAL HISTORY  Social History     Social History   • Marital status:      Spouse name: N/A   • Number of children: N/A   • Years of education: N/A     Occupational History   • Not on file.     Social History Main Topics   • Smoking status: Former Smoker     Years: 15.00     Types: Cigarettes     Quit date: 1987   • Smokeless tobacco: Never Used      Comment: QUIT OVER 30 YRS AGO   • Alcohol use No   • Drug use: No   • Sexual activity: Defer     Other Topics Concern   • Not on file     Social History Narrative   • No  narrative on file       ALLERGIES  Aspirin and Penicillins    REVIEW OF SYSTEMS  Review of Systems   Constitutional: Negative for fever.   HENT: Negative for sore throat.    Respiratory: Negative for cough and shortness of breath.    Cardiovascular: Negative for chest pain.   Gastrointestinal: Negative for abdominal pain, diarrhea and vomiting.   Genitourinary: Negative for dysuria.   Musculoskeletal: Negative for neck pain.   Skin: Negative for rash.   Neurological: Positive for dizziness and light-headedness. Negative for weakness, numbness and headaches.   All other systems reviewed and are negative.      PHYSICAL EXAM  ED Triage Vitals   Temp Heart Rate Resp BP SpO2   05/19/18 1016 05/19/18 1016 05/19/18 1017 05/19/18 1016 05/19/18 1016   98.7 °F (37.1 °C) 90 18 112/75 97 %      Temp src Heart Rate Source Patient Position BP Location FiO2 (%)   05/19/18 1016 05/19/18 1016 -- -- --   Tympanic Monitor          Physical Exam   Constitutional: She is oriented to person, place, and time and well-developed, well-nourished, and in no distress. No distress.   HENT:   Head: Normocephalic and atraumatic.   Eyes: EOM are normal. Pupils are equal, round, and reactive to light.   No nystagmus   Neck: Normal range of motion. Neck supple.   Cardiovascular: Normal rate and normal heart sounds.  An irregularly irregular rhythm present.   Pulmonary/Chest: Effort normal and breath sounds normal. No respiratory distress.   Abdominal: Soft. There is no tenderness. There is no rebound and no guarding.   Musculoskeletal: Normal range of motion. She exhibits no edema.   Neurological: She is alert and oriented to person, place, and time. She has normal sensation and normal strength.   No weakness   Skin: Skin is warm and dry. No rash noted.   Psychiatric: Mood and affect normal.   Nursing note and vitals reviewed.      LAB RESULTS  Lab Results (last 24 hours)     Procedure Component Value Units Date/Time    CBC & Differential  [[id number]] Collected:  05/19/18 1044    Specimen:  Blood Updated:  05/19/18 1126    Narrative:       The following orders were created for panel order CBC & Differential.  Procedure                               Abnormality         Status                     ---------                               -----------         ------                     CBC Auto Differential[[id number]]        Abnormal            Final result                 Please view results for these tests on the individual orders.    Comprehensive Metabolic Panel [[id number]]  (Abnormal) Collected:  05/19/18 1044    Specimen:  Blood Updated:  05/19/18 1151     Glucose 121 (H) mg/dL      BUN 24 (H) mg/dL      Creatinine 0.92 mg/dL      Sodium 142 mmol/L      Potassium 3.6 mmol/L      Chloride 100 mmol/L      CO2 27.0 mmol/L      Calcium 8.9 mg/dL      Total Protein 7.7 g/dL      Albumin 3.70 g/dL      ALT (SGPT) 25 U/L      AST (SGOT) 33 (H) U/L      Alkaline Phosphatase 85 U/L      Total Bilirubin 0.7 mg/dL      eGFR Non African Amer 57 (L) mL/min/1.73      Globulin 4.0 gm/dL      A/G Ratio 0.9 g/dL      BUN/Creatinine Ratio 26.1 (H)     Anion Gap 15.0 mmol/L     Narrative:       The MDRD GFR formula is only valid for adults with stable renal function between ages 18 and 70.    Troponin [[id number]]  (Normal) Collected:  05/19/18 1044    Specimen:  Blood Updated:  05/19/18 1151     Troponin T <0.010 ng/mL     Narrative:       Troponin T Reference Ranges:  Less than 0.03 ng/mL:    Negative for AMI  0.03 to 0.09 ng/mL:      Indeterminant for AMI  Greater than 0.09 ng/mL: Positive for AMI    CBC Auto Differential [[id number]]  (Abnormal) Collected:  05/19/18 1044    Specimen:  Blood Updated:  05/19/18 1126     WBC 3.85 (L) 10*3/mm3      RBC 4.05 10*6/mm3      Hemoglobin 13.7 g/dL      Hematocrit 41.8 %      .2 (H) fL      MCH 33.8 (H) pg      MCHC 32.8 g/dL      RDW 13.5 (H) %      RDW-SD 50.9 fl      MPV 10.4 fL      Platelets 181 10*3/mm3       Neutrophil % 57.2 %      Lymphocyte % 30.9 %      Monocyte % 7.5 %      Eosinophil % 3.6 %      Basophil % 0.8 %      Immature Grans % 0.0 %      Neutrophils, Absolute 2.20 10*3/mm3      Lymphocytes, Absolute 1.19 10*3/mm3      Monocytes, Absolute 0.29 10*3/mm3      Eosinophils, Absolute 0.14 10*3/mm3      Basophils, Absolute 0.03 10*3/mm3      Immature Grans, Absolute 0.00 10*3/mm3           I ordered the above labs and reviewed the results    RADIOLOGY  CT Head Without Contrast      FINDINGS: The CT scan was performed as an emergency procedure through  the brain without contrast. There is moderate diffuse atrophy and  chronic small vessel ischemic change. There is an area of  encephalomalacia in the right MCA territory consistent with normal  evolutionary change of a right sided infarct when compared to the MRI  scan dated 07/24/2017. There is no evidence of acute intracranial  hemorrhage or mass effect. There is chronic mucosal thickening in the  right portion of the sphenoid sinus as well as scattered in both  maxillary sinuses that is unchanged.        I ordered the above noted radiological studies. Interpreted by radiologist. Discussed with radiologist (Dr. Chacon). Reviewed by me in PACS.       PROCEDURES  Procedures  EKG           EKG time: 1036  Rhythm/Rate: afib, 94  P waves and NC: afib  QRS, axis: normal   ST and T waves: non-specific T wave changes     Interpreted Contemporaneously by me, independently viewed  Unchanged compared to prior 4/18/18      PROGRESS AND CONSULTS  1048  Discussed pt's case with Dr. Prajapati who agrees with plan of care.    1052  Ordered CT head after bedside evaluation.     1210  Rechecked pt and notified her of all lab, imaging, and EKG results. Discussed plan to d/c. Pt understands and agrees with the plan, all questions answered.      MEDICAL DECISION MAKING  Results were reviewed/discussed with the patient and they were also made aware of online access. Pt also made  aware that some labs, such as cultures, will not be resulted during ER visit and follow up with PMD is necessary.     MDM  Number of Diagnoses or Management Options  Dizziness:   Diagnosis management comments: No evidence of CVA.  No new neuro deficits.        Amount and/or Complexity of Data Reviewed  Clinical lab tests: ordered and reviewed (Labs are unremarkable.)  Tests in the radiology section of CPT®: ordered and reviewed (CT head shows old stroke but no acute abnormalities.)  Tests in the medicine section of CPT®: ordered and reviewed (See procedure note.)  Discussion of test results with the performing providers: yes (Dr. Chacon)  Decide to obtain previous medical records or to obtain history from someone other than the patient: yes  Review and summarize past medical records: yes (Pt was seen in July 2017 for right sided CVA and new onset afib.)  Independent visualization of images, tracings, or specimens: yes           DIAGNOSIS  Final diagnoses:   Dizziness       DISPOSITION  DISCHARGE    Patient discharged in stable condition.    Reviewed implications of results, diagnosis, meds, responsibility to follow up, warning signs and symptoms of possible worsening, potential complications and reasons to return to ER.    Patient/Family voiced understanding of above instructions.    Discussed plan for discharge, as there is no emergent indication for admission. Patient referred to primary care provider for BP management due to today's BP. Pt/family is agreeable and understands need for follow up and repeat testing.  Pt is aware that discharge does not mean that nothing is wrong but it indicates no emergency is present that requires admission and they must continue care with follow-up as given below or physician of their choice.     FOLLOW-UP  Dameon Alford MD  [facility] [address] [place] [id number]  [phone]    Schedule an appointment as soon as possible for a visit in 3 days  if no  improvement         Medication List      Changed    meclizine 25 MG tablet  Commonly known as:  ANTIVERT  Take 1 tablet by mouth 3 (Three) Times a Day As Needed for dizziness.  What changed:  See the new instructions.          Latest Documented Vital Signs:  As of 12:03 PM  BP- 112/75 HR- 90 Temp- 98.7 °F (37.1 °C) (Tympanic) O2 sat- 97%    --  Documentation assistance provided by elizabeth Olivas for Bryan Mckoy PA-C.  Information recorded by the scribe was done at my direction and has been verified and validated by me.     Franci Olivas  05/19/18 1211       DIEGO Grigsby  05/19/18 7936

## 2018-05-19 NOTE — ED PROVIDER NOTES
Discussed pt's case with Ean BUSCH.  Pt presents to the ER c/o an episode of dizziness that lasted approximately 30 minutes. Pt states the dizziness has resolved since presenting to the ER. Pt denies HA, dysphagia, speech difficulty, numbness/ tingling to extremities, difficulty ambulating, visual disturbance, or any other pertinent symptoms. Pt has a hx of A-fib.     Exam:  Alert/ oriented x3  No apparent distress  Heart is Irregularly irregular [A-fib]  No focal deficits observed   Lungs clear to auscultation bilaterally  Abdomen is normal     MDM  Plan to obtain labs/ EKG for further evaluation.     MD ATTESTATION NOTE    The DARON and I have discussed this patient's history, physical exam, and treatment plan.  I have reviewed the documentation and personally had a face to face interaction with the patient. I affirm the documentation and agree with the treatment and plan.  The attached note describes my personal findings.    Documentation assistance provided by elizabeth Matthews for Dr. Prajapati.  Information recorded by the scribe was done at my direction and has been verified and validated by me.       Joshua Matthews  05/19/18 4055       Lefty Prajapati MD  05/19/18 5791

## 2018-05-19 NOTE — ED TRIAGE NOTES
Pt states she felt dizzy when walking to mail box, pt appears to be in atrial fibrillation per EMS monitor. Pt has hx of a. Fib.

## 2018-09-07 ENCOUNTER — OFFICE VISIT (OUTPATIENT)
Dept: CARDIOLOGY | Facility: CLINIC | Age: 83
End: 2018-09-07

## 2018-09-07 VITALS
SYSTOLIC BLOOD PRESSURE: 120 MMHG | WEIGHT: 123.2 LBS | BODY MASS INDEX: 21.03 KG/M2 | DIASTOLIC BLOOD PRESSURE: 82 MMHG | HEART RATE: 68 BPM | RESPIRATION RATE: 16 BRPM | HEIGHT: 64 IN

## 2018-09-07 DIAGNOSIS — E11.9 CONTROLLED TYPE 2 DIABETES MELLITUS WITHOUT COMPLICATION, WITHOUT LONG-TERM CURRENT USE OF INSULIN (HCC): ICD-10-CM

## 2018-09-07 DIAGNOSIS — I10 BENIGN ESSENTIAL HYPERTENSION: ICD-10-CM

## 2018-09-07 DIAGNOSIS — R42 DIZZY SPELLS: ICD-10-CM

## 2018-09-07 DIAGNOSIS — R00.1 SINUS BRADYCARDIA: ICD-10-CM

## 2018-09-07 DIAGNOSIS — I63.511 CEREBROVASCULAR ACCIDENT (CVA) DUE TO OCCLUSION OF RIGHT MIDDLE CEREBRAL ARTERY (HCC): ICD-10-CM

## 2018-09-07 DIAGNOSIS — I34.0 NON-RHEUMATIC MITRAL REGURGITATION: ICD-10-CM

## 2018-09-07 DIAGNOSIS — I48.21 PERMANENT ATRIAL FIBRILLATION (HCC): Primary | ICD-10-CM

## 2018-09-07 PROCEDURE — 93000 ELECTROCARDIOGRAM COMPLETE: CPT | Performed by: INTERNAL MEDICINE

## 2018-09-07 PROCEDURE — 99214 OFFICE O/P EST MOD 30 MIN: CPT | Performed by: INTERNAL MEDICINE

## 2018-09-07 NOTE — PROGRESS NOTES
PATIENTINFORMATION    Date of Office Visit: 2018  Encounter Provider: Lashawn Torres MD  Place of Service: [facility] CARDIOLOGY  Patient Name: Magnus Hartley  : 1928    Subjective:     Encounter Date:2018      Patient ID: Magnus Hartley is a 89 y.o. female.      History of Present Illness    This is a nice lady with a history of hypertension, bradycardia, diabetes, and hyperlipidemia who was hospitalized in 2017 and diagnosed with a stroke.  She was noted to be in atrial fibrillation.  She underwent a transesophageal echocardiogram in 2017 which revealed severe left atrial enlargement, mild tricuspid regurgitation, and normal left ventricular systolic function with an ejection fraction of 56-60%.  There was moderate-to-severe mitral valve regurgitation, and mild aortic regurgitation.  Fibrinous material was noted on both the aortic and mitral valve.  Her rate was controlled with beta blockers and she was discharged on Eliquis.  She followed up with Rody Nieves in 2017 and no changes were made to her medication.       She comes in today and mentions that she has had a couple of dizzy spells. She describes them as suddenly feeling lightheaded like she is going to pass out. She bends over and holds onto the furniture so that she does not. There is a significant room spinning sensation. The last time this happened, the family member who was with her said that she was bending over to shake out some rugs and she got really dizzy. They helped her get to a couch and they gave her some Antivert and in about 15 minutes, the whole episode had passed. It does seem that these are frequently positional.      Review of Systems   Constitution: Negative for fever, malaise/fatigue, weight gain and weight loss.   HENT: Negative for ear pain, hearing loss, nosebleeds and sore throat.    Eyes: Negative for double vision, pain, vision loss in left eye and vision loss in  "right eye.   Cardiovascular:        See history of present illness.   Respiratory: Negative for cough, shortness of breath, sleep disturbances due to breathing, snoring and wheezing.    Endocrine: Negative for cold intolerance, heat intolerance and polyuria.   Skin: Negative for itching, poor wound healing and rash.   Musculoskeletal: Negative for joint pain, joint swelling and myalgias.   Gastrointestinal: Negative for abdominal pain, diarrhea, hematochezia, nausea and vomiting.   Genitourinary: Negative for hematuria and hesitancy.   Neurological: Positive for dizziness. Negative for numbness, paresthesias and seizures.   Psychiatric/Behavioral: Negative for depression. The patient is not nervous/anxious.            ECG 12 Lead  Date/Time: 9/7/2018 10:12 AM  Performed by: MADINA DORADO  Authorized by: MADINA DORADO   Comparison: compared with previous ECG from 5/19/2018  Similar to previous ECG  Rhythm: atrial fibrillation  BPM: 68  Conduction: conduction normal  ST Segments: ST segments normal  Clinical impression: abnormal ECG               Objective:     /82 (BP Location: Right arm, Patient Position: Sitting, Cuff Size: Adult)   Pulse 68   Resp 16   Ht 162.6 cm (64\")   Wt 55.9 kg (123 lb 3.2 oz)   BMI 21.15 kg/m²  Body mass index is 21.15 kg/m².     Physical Exam   Constitutional: She appears well-developed.   HENT:   Head: Normocephalic and atraumatic.   Eyes: Pupils are equal, round, and reactive to light. Conjunctivae and lids are normal. Lids are everted and swept, no foreign bodies found.   Neck: Normal range of motion. No JVD present. Carotid bruit is not present. No tracheal deviation present. No thyroid mass present.   Cardiovascular: Normal rate and normal heart sounds.  An irregularly irregular rhythm present.   Pulses:       Dorsalis pedis pulses are 2+ on the right side, and 2+ on the left side.   Pulmonary/Chest: Effort normal and breath sounds normal.   Abdominal: Normal " appearance and bowel sounds are normal.   Musculoskeletal: Normal range of motion.   Neurological: She is alert. She has normal strength.   Skin: Skin is warm, dry and intact.   Psychiatric: She has a normal mood and affect. Her behavior is normal.   Vitals reviewed.          Assessment/Plan:       1. Atrial Fibrillation and Atrial Flutter  Assessment  • The patient has permanent atrial fibrillation  • This is non-valvular in etiology  • The patient's CHADS2-VASc score is 6  • A TDU3OH4-LXNk score of 2 or more is considered a high risk for a thromboembolic event  • Apixaban prescribed     Plan  • Continue in atrial fibrillation with rate control  • Continue apixaban for antithrombotic therapy, bleeding issues discussed  • Continue beta blocker for rate control     Subjective - Objective  •   She is currently in rate controlled atrial fibrillation.  I would like her to continue her beta blocker.  I am going to decrease the Eliquis to 2.5 mg twice a day given her age and weight.     2.  Moderate to severe mitral regurgitation: She is euvolemic.  She is not a candidate for invasive therapy given her age and frailty.     3.  Essential Hypertension: Controlled    4.  Dizzy spells.  I have cut back on her hypertensive medication.  Her blood pressure looks good today.  I did ask her family to check her heart rate and her blood pressure while she is having a dizzy spell and let me know if any of the numbers seem off.  I also told them if her spells become more frequent I would likely put a Zio patch on.  However, I suspect these episodes or vertigo though it is a little difficult due to the language barrier to get an explicit history from her.     She will see Roslyn in 6 months       Orders Placed This Encounter   Procedures   • ECG 12 Lead     This order was created via procedure documentation        Discharge Medications          Accurate as of 9/7/18 11:35 AM. If you have any questions, ask your nurse or doctor.                Continue These Medications      Instructions Start Date   amLODIPine 5 MG tablet  Commonly known as:  NORVASC   take 1 tablet by mouth once daily      apixaban 2.5 MG tablet tablet  Commonly known as:  ELIQUIS   2.5 mg, Oral, Every 12 Hours Scheduled      budesonide-formoterol 80-4.5 MCG/ACT inhaler  Commonly known as:  SYMBICORT   2 puffs, Inhalation, 2 Times Daily - RT      lovastatin 40 MG tablet  Commonly known as:  MEVACOR   40 mg, Oral, Nightly      meclizine 25 MG tablet  Commonly known as:  ANTIVERT   25 mg, Oral, 3 Times Daily PRN      metoprolol tartrate 25 MG tablet  Commonly known as:  LOPRESSOR   25 mg, Oral, Every 12 Hours Scheduled      OMEGA 3 500 500 MG capsule   1,000 mg, Oral, Daily      polyethylene glycol packet  Commonly known as:  MIRALAX   17 g, Oral, Nightly      PREMARIN 0.625 MG/GM vaginal cream  Generic drug:  conjugated estrogens   0.5 g, Vaginal, Daily      sertraline 25 MG tablet  Commonly known as:  ZOLOFT   take 1 tablet by mouth once daily      SYSTANE BALANCE 0.6 % solution  Generic drug:  Propylene Glycol   instill 1 drop into both eyes four times a day                    Lashawn Torres MD  09/07/18  11:35 AM

## 2019-03-13 ENCOUNTER — OFFICE VISIT (OUTPATIENT)
Dept: NEUROLOGY | Facility: CLINIC | Age: 84
End: 2019-03-13

## 2019-03-13 VITALS
OXYGEN SATURATION: 94 % | DIASTOLIC BLOOD PRESSURE: 60 MMHG | WEIGHT: 128 LBS | HEART RATE: 72 BPM | BODY MASS INDEX: 21.33 KG/M2 | SYSTOLIC BLOOD PRESSURE: 120 MMHG | HEIGHT: 65 IN

## 2019-03-13 DIAGNOSIS — E11.9 CONTROLLED TYPE 2 DIABETES MELLITUS WITHOUT COMPLICATION, WITHOUT LONG-TERM CURRENT USE OF INSULIN (HCC): ICD-10-CM

## 2019-03-13 DIAGNOSIS — E78.5 DYSLIPIDEMIA: ICD-10-CM

## 2019-03-13 DIAGNOSIS — I63.511 CEREBROVASCULAR ACCIDENT (CVA) DUE TO OCCLUSION OF RIGHT MIDDLE CEREBRAL ARTERY (HCC): Primary | ICD-10-CM

## 2019-03-13 DIAGNOSIS — I10 BENIGN ESSENTIAL HYPERTENSION: ICD-10-CM

## 2019-03-13 DIAGNOSIS — I48.0 PAROXYSMAL ATRIAL FIBRILLATION (HCC): ICD-10-CM

## 2019-03-13 PROCEDURE — 99214 OFFICE O/P EST MOD 30 MIN: CPT | Performed by: NURSE PRACTITIONER

## 2019-03-13 NOTE — PROGRESS NOTES
DOS: 3/13/2019  NAME: Magnus Hartley   : 1928  PCP: Dameon Alford MD    Chief Complaint   Patient presents with   • Stroke      SUBJECTIVE  Neurological Problem:  89 y.o. [place] female with HTN, HLD, PAF, borderline diabetes and stroke.  Today for stroke follow-up.  She is accompanied by her daughter-in-law's  sister.  Patient and problem are new to examiner. History is provided by patient, family friend and review of records that are summarized below.     Interval History:   Ms. Hartley initially presented to be  on 2017 with left-sided numbness and weakness.  Imaging showed a acute right hemispheric infarct likely due to her new onset A. fib.  She was not on any antithrombotic or anticoagulants prior to her event.  She was discharged to acute rehab on Lovenox bridge to warfarin, which was subsequently changed to Eliquis.    She presents today and continues on Eliquis 2.5 mg twice daily and lovastatin.  She is tolerating medications well, no signs or symptoms of bleeding.  She can use with some residual numbness in the left hand primarily the first 2 digits but otherwise is back to her baseline..  She denies any worsening symptoms, or new stroke/TIA symptoms.  She lives with her son, is independent of all of her daily activities.  She exercises daily by riding a stationary bike.  She did have a fall a couple weeks ago injuring her right shoulder and had a recent CT scan.  She states her blood sugar and blood pressure are well-controlled.  She is followed by the  of  geriatrics [facility].. She denies smoking (10-year history). She denies alcohol.     Review of Systems:Review of Systems   Constitutional: Negative.    HENT: Negative.    Eyes: Negative.    Respiratory: Negative.    Cardiovascular: Negative.    Gastrointestinal: Negative.    Endocrine: Negative.    Genitourinary: Negative.    Musculoskeletal: Negative.    Skin: Negative.    Allergic/Immunologic: Negative.     Neurological: Positive for numbness. Negative for dizziness, tremors, seizures, syncope, facial asymmetry, speech difficulty, weakness, light-headedness and headaches.   Hematological: Negative for adenopathy. Bruises/bleeds easily.   Psychiatric/Behavioral: Negative.     Above ROS reviewed    Current Medications:   Current Outpatient Medications:   •  amLODIPine (NORVASC) 5 MG tablet, take 1 tablet by mouth once daily, Disp: 30 tablet, Rfl: 1  •  apixaban (ELIQUIS) 2.5 MG tablet tablet, Take 1 tablet by mouth Every 12 (Twelve) Hours., Disp: 180 tablet, Rfl: 3  •  budesonide-formoterol (SYMBICORT) 80-4.5 MCG/ACT inhaler, Inhale 2 puffs 2 (Two) Times a Day., Disp: 3 inhaler, Rfl: 2  •  lovastatin (MEVACOR) 40 MG tablet, Take 1 tablet by mouth Every Night., Disp: 90 tablet, Rfl: 3  •  meclizine (ANTIVERT) 25 MG tablet, Take 1 tablet by mouth 3 (Three) Times a Day As Needed for dizziness., Disp: 30 tablet, Rfl: 0  •  metoprolol tartrate (LOPRESSOR) 25 MG tablet, Take 1 tablet by mouth Every 12 (Twelve) Hours., Disp: 60 tablet, Rfl: 3  •  Omega-3 Fatty Acids (OMEGA 3 500) 500 MG capsule, Take 1,000 mg by mouth Daily., Disp: 60 capsule, Rfl: 5  •  polyethylene glycol (MIRALAX) packet, Take 17 g by mouth Every Night., Disp: 30 packet, Rfl: 12  •  PREMARIN 0.625 MG/GM vaginal cream, Insert 0.5 g into the vagina daily., Disp: , Rfl: 0  •  sertraline (ZOLOFT) 25 MG tablet, take 1 tablet by mouth once daily, Disp: , Rfl: 0  •  SYSTANE BALANCE 0.6 % solution, instill 1 drop into both eyes four times a day, Disp: , Rfl: 0    The following portions of the patient's history were reviewed and updated as appropriate: allergies, current medications, past family history, past medical history, past social history, past surgical history and problem list.    OBJECTIVE  Vitals:    03/13/19 1547   BP: 120/60   Pulse: 72   SpO2: 94%       Diagnostics:  CT head 7/24/17:IMPRESSION:  1. There is mild small vessel disease in cerebral white  matter. There is  a 4 x 3 x 4 cm area of loss of gray-white matter differentiation that is  subtle in the anterior right parietal lobe extending into the right  temporoparietal junction most consistent with an acute infarct in the  distribution of temporal parietal branches of the right middle cerebral  artery territory and there is a punctate hyperdensity in the central  aspect of the right sylvian fissure seen on axial image 27 suspicious  for a tiny embolus  to an M3 branch of the right middle cerebral artery  leading to the area of acute infarction.  2. There is a hypoplastic maxillary and sphenoid sinuses bilaterally  with mild mucosal thickening maxillary sinuses bilaterally and a  completely opacified very hypoplastic right sphenoid sinus. The  remainder of the head CT is within normal limits  SHERRIE brain 7/24/17: IMPRESSION:  1. There is mild small vessel disease in cerebral white matter. There is  a 4 x 3 x 4 cm area of loss of gray-white matter differentiation that is  subtle in the anterior right parietal lobe extending into the right  temporoparietal junction most consistent with an acute infarct in the  distribution of temporal parietal branches of the right middle cerebral  artery territory and there is a punctate hyperdensity in the central  aspect of the right sylvian fissure seen on axial image 27 suspicious  for a tiny embolus  to an M3 branch of the right middle cerebral artery  leading to the area of acute infarction.  2. There is a hypoplastic maxillary and sphenoid sinuses bilaterally  with mild mucosal thickening maxillary sinuses bilaterally and a  completely opacified very hypoplastic right sphenoid sinus. The  remainder of the head CT is within normal limits  MRA head 7/24/17: IMPRESSION:  There is up to a moderate degree of stenosis involving left P1-P2  junction. Otherwise, the MRA of the head is unremarkable.  MRA neck 7/24/17: Relatively unremarkable MRA of the neck. Again, the origin of  the right  vertebral artery is not well-visualized but otherwise the MRA of the  neck is unremarkable.    EKG 7/24/17: Afib  CAROL 7/25/17: Interpretation Summary   Left atrial cavity size is severely dilated.  · Mild tricuspid valve regurgitation is present.  · Left ventricular systolic function is normal.  · Estimated EF appears to be in the range of 56 - 60%.  · The mitral valve is abnormal in structure. Moderate-to-severe mitral valve regurgitation is present. No significant mitral valve stenosis is present. There is mobile, fibrinous material on the left atrial side of the mitral valve. .  · The valve appears trileaflet. The aortic valve is abnormal in structure. There is calcification of the aortic valve.Mild aortic valve regurgitation is present. No aortic valve stenosis is present. There is fibrinous material noted on the LV side of the aortic leaflets     Laboratory Results:         Lab Results   Component Value Date    WBC 3.85 (L) 05/19/2018    HGB 13.7 05/19/2018    HCT 41.8 05/19/2018    .2 (H) 05/19/2018     05/19/2018     Lab Results   Component Value Date    GLUCOSE 121 (H) 05/19/2018    BUN 24 (H) 05/19/2018    CREATININE 0.92 05/19/2018    EGFRIFNONA 57 (L) 05/19/2018    EGFRIFAFRI 75 06/22/2017    BCR 26.1 (H) 05/19/2018    K 3.6 05/19/2018    CO2 27.0 05/19/2018    CALCIUM 8.9 05/19/2018    PROTENTOTREF 7.2 06/22/2017    ALBUMIN 3.70 05/19/2018    LABIL2 1.2 06/22/2017    AST 33 (H) 05/19/2018    ALT 25 05/19/2018     Lab Results   Component Value Date    HGBA1C 6.38 (H) 07/25/2017     Lab Results   Component Value Date    CHOL 154 07/25/2017     Lab Results   Component Value Date    HDL 71 (H) 07/25/2017    HDL 73 06/22/2017    HDL 74 12/22/2016     Lab Results   Component Value Date    LDL 73 07/25/2017    LDL 77 06/22/2017    LDL 80 12/22/2016     Lab Results   Component Value Date    TRIG 52 07/25/2017    TRIG 103 06/22/2017    TRIG 112 12/22/2016     No results found for:  RPR  Lab Results   Component Value Date    TSH 2.660 07/24/2017     Outside Labs ([facility], 12/5/18):  TSH 3.88  Lipid Panel: Total 178, HDL 79, , LDL 78  HgA1C 6.4      Physical Examination:   General Appearance:   Well developed, well nourished, well groomed, alert, and cooperative.  HEENT: Normocephalic.    Neck and Spine: Normal range of motion.  Normal alignment. No mass or tenderness.  Cardiac: Regular rate and rhythm.   Peripheral Vasculature: Radial pulses are equal and symmetric. No signs of distal embolization.  Extremities:    No edema or deformities. Normal joint ROM.  Skin:    No rashes or birth marks.    Neurological examination:  Higher Integrative  Function: Oriented to time, place and person. Normal registration, recall (3/3 with 2 clues), attention span and concentration. Normal language including comprehension, spontaneous speech, repetition, reading, writing, naming and vocabulary. No neglect with normal visual-spatial function and construction. Normal fund of knowledge and higher integrative function.  CN II: Pupils are equal, round, and reactive to light. Normal visual acuity and visual fields.    CN III IV VI: Extraocular movements are full without nystagmus.   CN V: Normal facial sensation and strength of muscles of mastication.  CN VII: Facial movements are symmetric. No weakness.  CN VIII:   Auditory acuity is normal.  CN IX & X:   Symmetric palatal movement.  CN XI: Sternocleidomastoid and trapezius are normal.  No weakness.  CN XII:   The tongue is midline.  No atrophy or fasciculations.  Motor: Mild generalized decreased strength overall with decreased bulk but otherwise good muscle strength in upper and lower extremities except for very subtle left pronator drift and orbiting about left arm. No fasciculations, rigidity, spasticity, or abnormal movements.  Reflexes: 1+ in the upper and lower extremities.   Sensation: Normal to light touch and proprioception in arms and legs.  Normal graphesthesia and no extinction on DSS. Positive Romberg  Station and Gait: Mildly stooped posture, shuffling gait, not using any assistive devices.   Coordination: Finger to nose test shows no dysmetria.  Heel to shin normal.    Impression:  Ms. Hartley is doing well following her right hemispheric stroke she suffered in July 2017 that was likely due to new onset A. Fib with some residual left hand numbness but otherwise normal neurologic exam today.  She has been maintained on Eliquis and lovastatin with no recurrent or new stroke/TIA symptoms.  Request and review of patient's lab from PCP show slightly elevated A1c at 6.4 and LDL of 78. We discussed at her personal risk factors for stroke and the importance of risk factor control for stroke prevention, including BP and cholesterol control. She will monitor BP regularly and f/u with PCP for continued cholesterol surveillance.  Encouraged continued regular physical activity along with falls risks prevention.  Also encouraged her to stay well-hydrated as this may have contributed to some of her dizzy spells in the past. We discussed importance of calling 911 for any signs/symptoms of stroke. F/U in one year, sooner if symptoms warrant.     Plan:     Continue Eliquis, lovastatin  Monitor BP regularly  Keep well hydrated.  Encouraged continued regular physical activity  Consider check of B12 -- patient to f/u with PCP   Secondary stroke prevention: Ideal targets for stroke prevention would be Blood pressure < 130/80; B12 > 500 TSH in normal range and LDL < 70; HbA1c < 6.5 and smoking cessation if applicable.    Call 911 for stroke symptoms  Follow-up in one year    I spent 30 minutes face to face with patient, with  > 50% spent counseling patient regarding diagnosis, review of diagnostics, personal risk factors for stroke and importance of risk factor control for stroke prevention.     Magnus was seen today for stroke.    Diagnoses and all orders for this  visit:    Cerebrovascular accident (CVA) due to occlusion of right middle cerebral artery (CMS/HCC)    Benign essential hypertension    Dyslipidemia    Paroxysmal atrial fibrillation (CMS/HCC)    Controlled type 2 diabetes mellitus without complication, without long-term current use of insulin (CMS/HCC)        Coding      Dictated using Dragon

## 2019-03-15 ENCOUNTER — OFFICE VISIT (OUTPATIENT)
Dept: CARDIOLOGY | Facility: CLINIC | Age: 84
End: 2019-03-15

## 2019-03-15 VITALS
HEIGHT: 65 IN | BODY MASS INDEX: 21.46 KG/M2 | WEIGHT: 128.8 LBS | HEART RATE: 89 BPM | SYSTOLIC BLOOD PRESSURE: 104 MMHG | DIASTOLIC BLOOD PRESSURE: 70 MMHG

## 2019-03-15 DIAGNOSIS — I34.0 NON-RHEUMATIC MITRAL REGURGITATION: ICD-10-CM

## 2019-03-15 DIAGNOSIS — I48.19 PERSISTENT ATRIAL FIBRILLATION (HCC): Primary | ICD-10-CM

## 2019-03-15 DIAGNOSIS — I10 BENIGN ESSENTIAL HYPERTENSION: ICD-10-CM

## 2019-03-15 PROCEDURE — 93000 ELECTROCARDIOGRAM COMPLETE: CPT | Performed by: NURSE PRACTITIONER

## 2019-03-15 PROCEDURE — 99214 OFFICE O/P EST MOD 30 MIN: CPT | Performed by: NURSE PRACTITIONER

## 2019-03-15 NOTE — PROGRESS NOTES
Date of Office Visit: 03/15/2019  Encounter Provider: Melisa Arevalo, GENEVA, APRN  Place of Service: [facility] CARDIOLOGY  Patient Name: Magnus Hartley  :[id number]        Subjective:     Chief Complaint:  Follow-up, hypertension, atrial fibrillation, mitral regurgitation.      History of Present Illness:  Magnus Hartley is a 89 y.o. female patient of Dr. Torres.  This is my first time seeing this patient in the office today, and I have reviewed her records.    Patient has a history of hypertension, bradycardia, diabetes, hyperlipidemia, atrial fibrillation, moderate to severe mitral regurgitation, dizziness.    Patient was hospitalized 2017 and diagnosed with stroke.  She was noted to be in atrial fibrillation.  She had transesophageal echocardiogram done 2018 which showed severe left atrial enlargement, mild tricuspid regurgitation, normal left ventricular systolic function, with ejection fraction of 56-60%.  There was moderate to severe mitral valve regurgitation and mild aortic regurgitation.  Fibrinous material was noted on both the aortic and mitral valve.  Rate control with beta-blockers was pursued and she was discharged on Eliquis.  She was seen for follow-up by JOSHUA Pino 2017 and no medication changes were made.    Patient was last seen in office by Dr. Torres 18.  At this point patient reported that she had had some dizzy spells.  She reported that she suddenly felt lightheaded like she was going to pass out.  She would bend over and hold on to furniture so she would not pass out.  The last time this it happened she had been bending over to shake out the rugs when she became dizzy.  She was helped to her couch and took some Antivert and symptoms passed in about 15 minutes.  It does not seem that the frequently did occur with position changes.  Her hypertensive medications were decreased.  She was asked to monitor heart rate and blood pressure when she was  dizzy and let the office know if the numbers seemed higher low.  If spells became worse then Zio patch would be pursued.  However it sounded like her symptoms were more consistent with vertigo, though language barrier made it difficult to interpret history.      Patient presents to office today for follow-up appointment.  Family member with patient in the office today, per patient preference.  Patient reports she has been feeling fine since last visit.  She reports she has not been having any more dizziness since she increased her water intake.  Her blood pressure is low normal in the office today at 104/70, however she reports blood pressure at home usually stays more like 135 systolic.  She will notify the office if she sees any systolic blood pressure readings less than 100.  She reports some occasional shortness of breath with activities, not new or worsening since last visit.  She continues to exercise daily on her exercise bike at home for 20 minutes.  Denies any issues with this.  Denies any chest pain, palpitations, racing heartbeat sensation, lower extremity edema, fatigue, or abnormal bleeding.  She reports she did have one fall approximately 3 months ago where she hurt her right shoulder.  She is following with primary care for this.  We discussed that if she were ever to fall and hit her head she would need to go the ER for a CT scan.  Overall it sounds like these are not occurring frequently.  Did recommend that she follow-up with primary care provider to discuss physical therapy to help improve balance further.  She will notify the office if she has any additional falls as well as going to the ER if injured or hit head, as it directed.    Patient to follow-up with Dr. Torres in 6 months or sooner if needed for any new, recurrent, or worsening symptoms or other problems/concerns.        Past Medical History:   Diagnosis Date   • Acute pain of left foot    • Asthma    • Atopic dermatitis    • Atrial  fibrillation (CMS/[facility]) 07/24/2017    on apixiban    • Back pain    • Cerebrovascular accident (CVA) due to occlusion of right middle cerebral artery (CMS/[facility]) 7/24/2017   • Controlled diabetes mellitus type II without complication (CMS/[facility])    • Dizziness    • Dyslipidemia    • Hyperkalemia    • Hyperlipidemia    • Hypertension    • Hypokalemia    • Insomnia    • Macrocytosis    • Menopause     AT AGE 50   • Non-rheumatic mitral regurgitation 09/08/2017    Moderate to severe per [facility] 7/25/17   • Osteoarthritis    • Osteoporosis    • Prolapsed uterus    • Reactive airway disease    • Sinus bradycardia    • Stress incontinence    • Urinary frequency    • UTI (urinary tract infection)    • Vitamin D deficiency      Past Surgical History:   Procedure Laterality Date   • COLONOSCOPY  08/09/2010    WNL      • ELBOW PROCEDURE Left     OPEN REDUCTION   • OOPHORECTOMY     • OVARIAN CYST SURGERY     • TOTAL KNEE ARTHROPLASTY  03/11/2013         Outpatient Medications Prior to Visit   Medication Sig Dispense Refill   • amLODIPine (NORVASC) 5 MG tablet take 1 tablet by mouth once daily 30 tablet 1   • apixaban (ELIQUIS) 2.5 MG tablet tablet Take 1 tablet by mouth Every 12 (Twelve) Hours. 180 tablet 3   • budesonide-formoterol (SYMBICORT) 80-4.5 MCG/ACT inhaler Inhale 2 puffs 2 (Two) Times a Day. 3 inhaler 2   • lovastatin (MEVACOR) 40 MG tablet Take 1 tablet by mouth Every Night. 90 tablet 3   • meclizine (ANTIVERT) 25 MG tablet Take 1 tablet by mouth 3 (Three) Times a Day As Needed for dizziness. 30 tablet 0   • metoprolol tartrate (LOPRESSOR) 25 MG tablet Take 1 tablet by mouth Every 12 (Twelve) Hours. 60 tablet 3   • Omega-3 Fatty Acids (OMEGA 3 500) 500 MG capsule Take 1,000 mg by mouth Daily. 60 capsule 5   • polyethylene glycol (MIRALAX) packet Take 17 g by mouth Every Night. 30 packet 12   • PREMARIN 0.625 MG/GM vaginal cream Insert 0.5 g into the vagina daily.  0   • sertraline (ZOLOFT) 25 MG tablet  take 1 tablet by mouth once daily  0   • SYSTANE BALANCE 0.6 % solution instill 1 drop into both eyes four times a day  0     No facility-administered medications prior to visit.        Allergies as of 03/15/2019 - Reviewed 03/15/2019   Allergen Reaction Noted   • Aspirin Hives 2016   • Penicillins  2016     Social History     Socioeconomic History   • Marital status:      Spouse name: Not on file   • Number of children: Not on file   • Years of education: Not on file   • Highest education level: Not on file   Social Needs   • Financial resource strain: Not on file   • Food insecurity - worry: Not on file   • Food insecurity - inability: Not on file   • Transportation needs - medical: Not on file   • Transportation needs - non-medical: Not on file   Occupational History   • Not on file   Tobacco Use   • Smoking status: Former Smoker     Years: 15.     Types: Cigarettes     Last attempt to quit: 1987     Years since quittin.2   • Smokeless tobacco: Never Used   • Tobacco comment: QUIT OVER 30 YRS AGO   Substance and Sexual Activity   • Alcohol use: No     Comment: no caffiene   • Drug use: No   • Sexual activity: Defer   Other Topics Concern   • Not on file   Social History Narrative   • Not on file     Family History   Problem Relation Age of Onset   • Hypertension Father    • Heart disease Father    • Stroke Father    • Diabetes Daughter        Review of Systems   Constitution: Negative for chills, fever, malaise/fatigue, weight gain and weight loss.   HENT: Negative for ear pain, hearing loss, nosebleeds and sore throat.    Eyes: Negative for blurred vision, double vision, redness, vision loss in left eye, vision loss in right eye and visual disturbance.   Cardiovascular:        SEE HPI.    Respiratory: Negative for cough, shortness of breath, snoring and wheezing.    Endocrine: Negative for cold intolerance and heat intolerance.   Hematologic/Lymphatic: Negative for bleeding problem.  "  Skin: Negative for itching, rash and suspicious lesions.   Musculoskeletal: Positive for myalgias. Negative for joint pain and joint swelling.   Gastrointestinal: Negative for abdominal pain, diarrhea, hematemesis, melena, nausea and vomiting.   Genitourinary: Negative for dysuria, frequency and hematuria.   Neurological: Negative for dizziness, headaches, numbness, paresthesias and seizures.   Psychiatric/Behavioral: Negative for altered mental status and depression. The patient is not nervous/anxious.           Objective:     Vitals:    03/15/19 1516   BP: 104/70   BP Location: Right arm   Pulse: 89   Weight: 58.4 kg (128 lb 12.8 oz)   Height: 165.1 cm (65\")     Body mass index is 21.43 kg/m².      PHYSICAL EXAM:  Physical Exam   Constitutional: She is oriented to person, place, and time. She appears well-developed and well-nourished. No distress.   Petite   HENT:   Head: Normocephalic and atraumatic.   Eyes: Pupils are equal, round, and reactive to light.   Neck: Neck supple. No JVD present. Carotid bruit is not present. No tracheal deviation present.   Cardiovascular: Normal rate, normal heart sounds and intact distal pulses. An irregularly irregular rhythm present. Exam reveals no gallop and no friction rub.   No murmur heard.  Pulses:       Radial pulses are 2+ on the right side, and 2+ on the left side.        Posterior tibial pulses are 2+ on the right side, and 2+ on the left side.   Pulmonary/Chest: Effort normal and breath sounds normal. No respiratory distress. She has no wheezes. She has no rales.   Abdominal: Soft. Bowel sounds are normal. She exhibits no distension. There is no tenderness.   Musculoskeletal: She exhibits no edema, tenderness or deformity.   Neurological: She is alert and oriented to person, place, and time.   Skin: Skin is warm and dry. No rash noted. She is not diaphoretic. No erythema.   Psychiatric: She has a normal mood and affect. Her behavior is normal. Judgment normal. "           ECG 12 Lead  Date/Time: 3/15/2019 3:41 PM  Performed by: Melisa Arevalo DNP, JOSHUA  Authorized by: Melisa Arevalo DNP, JOSHUA   Comparison: compared with previous ECG from 9/7/2018  Similar to previous ECG  Rhythm: atrial fibrillation  Rate: normal  BPM: 89  Other findings: non-specific ST-T wave changes  Other findings comments: Similar to previous ECG.    Clinical impression: non-specific ECG  Comments: No significant changes from previous.  Remains in atrial fibrillation with controlled ventricular rate.              Assessment:       Diagnosis Plan   1. Persistent atrial fibrillation (CMS/HCC)     2. Non-rheumatic mitral regurgitation     3. Benign essential hypertension           Plan:     1. Persistent atrial fibrillation: On Eliquis and beta-blocker.  Ventricular rate remains well controlled.  Denies any abnormal bleeding.  One fall 3 months ago.  Discussed preventing falls in the future and following with primary care for balance physical therapy.  She verbalized understanding.  If she continues to fall we may need to reevaluate Eliquis.  She knows that if she ever hits her head she will need to go the ER.  However it sounds like this is not occurring frequently.  2. Moderate to severe mitral regurgitation: Appears euvolemic.  Not a candidate for invasive therapy given age and frailty.  3. Hypertension: Blood pressure low normal in the office today.  She reports to stay slightly higher outside the office, with systolic staying around 130.  She will call if she sees any systolic blood pressure readings less than 100.  4. History of dizziness: Resolved with increased water intake.  Recommend continuing with increased water.    Patient to follow-up with Dr. Torres in 6 months or sooner if needed for any new, recurrent, or worsening symptoms or other problems/concerns.           Your medication list           Accurate as of 3/15/19  3:44 PM. If you have any questions, ask your nurse or doctor.                CONTINUE taking these medications      Instructions Last Dose Given Next Dose Due   amLODIPine 5 MG tablet  Commonly known as:  NORVASC      take 1 tablet by mouth once daily       apixaban 2.5 MG tablet tablet  Commonly known as:  ELIQUIS      Take 1 tablet by mouth Every 12 (Twelve) Hours.       budesonide-formoterol 80-4.5 MCG/ACT inhaler  Commonly known as:  SYMBICORT      Inhale 2 puffs 2 (Two) Times a Day.       lovastatin 40 MG tablet  Commonly known as:  MEVACOR      Take 1 tablet by mouth Every Night.       meclizine 25 MG tablet  Commonly known as:  ANTIVERT      Take 1 tablet by mouth 3 (Three) Times a Day As Needed for dizziness.       metoprolol tartrate 25 MG tablet  Commonly known as:  LOPRESSOR      Take 1 tablet by mouth Every 12 (Twelve) Hours.       OMEGA 3 500 500 MG capsule      Take 1,000 mg by mouth Daily.       polyethylene glycol packet  Commonly known as:  MIRALAX      Take 17 g by mouth Every Night.       PREMARIN 0.625 MG/GM vaginal cream  Generic drug:  conjugated estrogens      Insert 0.5 g into the vagina daily.       sertraline 25 MG tablet  Commonly known as:  ZOLOFT      take 1 tablet by mouth once daily       SYSTANE BALANCE 0.6 % solution  Generic drug:  Propylene Glycol      instill 1 drop into both eyes four times a day                  eMlisa Rodríguez, GENEVA, APRN  03/15/2019             Dictated utilizing Dragon dictation

## 2019-11-22 ENCOUNTER — OFFICE VISIT (OUTPATIENT)
Dept: CARDIOLOGY | Facility: CLINIC | Age: 84
End: 2019-11-22

## 2019-11-22 VITALS
HEIGHT: 65 IN | RESPIRATION RATE: 16 BRPM | WEIGHT: 126 LBS | SYSTOLIC BLOOD PRESSURE: 126 MMHG | BODY MASS INDEX: 20.99 KG/M2 | HEART RATE: 113 BPM | DIASTOLIC BLOOD PRESSURE: 80 MMHG

## 2019-11-22 DIAGNOSIS — I48.19 ATRIAL FIBRILLATION, PERSISTENT (HCC): Primary | ICD-10-CM

## 2019-11-22 PROCEDURE — 99213 OFFICE O/P EST LOW 20 MIN: CPT | Performed by: INTERNAL MEDICINE

## 2019-11-22 PROCEDURE — 93000 ELECTROCARDIOGRAM COMPLETE: CPT | Performed by: INTERNAL MEDICINE

## 2020-04-10 ENCOUNTER — LAB REQUISITION (OUTPATIENT)
Dept: LAB | Facility: HOSPITAL | Age: 85
End: 2020-04-10

## 2020-04-10 DIAGNOSIS — Z00.00 ROUTINE GENERAL MEDICAL EXAMINATION AT A HEALTH CARE FACILITY: ICD-10-CM

## 2020-04-10 LAB
ANION GAP SERPL CALCULATED.3IONS-SCNC: 11.7 MMOL/L (ref 5–15)
BASOPHILS # BLD AUTO: 0.02 10*3/MM3 (ref 0–0.2)
BASOPHILS NFR BLD AUTO: 0.3 % (ref 0–1.5)
BUN BLD-MCNC: 14 MG/DL (ref 8–23)
BUN/CREAT SERPL: 31.8 (ref 7–25)
CALCIUM SPEC-SCNC: 8.2 MG/DL (ref 8.2–9.6)
CHLORIDE SERPL-SCNC: 92 MMOL/L (ref 98–107)
CO2 SERPL-SCNC: 31.3 MMOL/L (ref 22–29)
CREAT BLD-MCNC: 0.44 MG/DL (ref 0.57–1)
DEPRECATED RDW RBC AUTO: 57.3 FL (ref 37–54)
EOSINOPHIL # BLD AUTO: 0.07 10*3/MM3 (ref 0–0.4)
EOSINOPHIL NFR BLD AUTO: 1.1 % (ref 0.3–6.2)
ERYTHROCYTE [DISTWIDTH] IN BLOOD BY AUTOMATED COUNT: 14.8 % (ref 12.3–15.4)
GFR SERPL CREATININE-BSD FRML MDRD: 134 ML/MIN/1.73
GFR SERPL CREATININE-BSD FRML MDRD: >150 ML/MIN/1.73
GLUCOSE BLD-MCNC: 142 MG/DL (ref 65–99)
HCT VFR BLD AUTO: 35.5 % (ref 34–46.6)
HGB BLD-MCNC: 11.5 G/DL (ref 12–15.9)
IMM GRANULOCYTES # BLD AUTO: 0.02 10*3/MM3 (ref 0–0.05)
IMM GRANULOCYTES NFR BLD AUTO: 0.3 % (ref 0–0.5)
LYMPHOCYTES # BLD AUTO: 1.87 10*3/MM3 (ref 0.7–3.1)
LYMPHOCYTES NFR BLD AUTO: 29.4 % (ref 19.6–45.3)
MCH RBC QN AUTO: 33.9 PG (ref 26.6–33)
MCHC RBC AUTO-ENTMCNC: 32.4 G/DL (ref 31.5–35.7)
MCV RBC AUTO: 104.7 FL (ref 79–97)
MONOCYTES # BLD AUTO: 0.45 10*3/MM3 (ref 0.1–0.9)
MONOCYTES NFR BLD AUTO: 7.1 % (ref 5–12)
NEUTROPHILS # BLD AUTO: 3.93 10*3/MM3 (ref 1.7–7)
NEUTROPHILS NFR BLD AUTO: 61.8 % (ref 42.7–76)
NRBC BLD AUTO-RTO: 0 /100 WBC (ref 0–0.2)
PLATELET # BLD AUTO: 340 10*3/MM3 (ref 140–450)
PMV BLD AUTO: 9.6 FL (ref 6–12)
POTASSIUM BLD-SCNC: 5.3 MMOL/L (ref 3.5–5.2)
RBC # BLD AUTO: 3.39 10*6/MM3 (ref 3.77–5.28)
SODIUM BLD-SCNC: 135 MMOL/L (ref 136–145)
WBC NRBC COR # BLD: 6.36 10*3/MM3 (ref 3.4–10.8)

## 2020-04-10 PROCEDURE — 85025 COMPLETE CBC W/AUTO DIFF WBC: CPT

## 2020-04-10 PROCEDURE — 80048 BASIC METABOLIC PNL TOTAL CA: CPT
